# Patient Record
Sex: MALE | Race: WHITE | NOT HISPANIC OR LATINO | Employment: OTHER | URBAN - METROPOLITAN AREA
[De-identification: names, ages, dates, MRNs, and addresses within clinical notes are randomized per-mention and may not be internally consistent; named-entity substitution may affect disease eponyms.]

---

## 2018-06-08 ENCOUNTER — OFFICE VISIT CONVERTED (OUTPATIENT)
Dept: CARDIOLOGY | Facility: CLINIC | Age: 68
End: 2018-06-08
Attending: INTERNAL MEDICINE

## 2018-06-08 ENCOUNTER — CONVERSION ENCOUNTER (OUTPATIENT)
Dept: CARDIOLOGY | Facility: CLINIC | Age: 68
End: 2018-06-08

## 2018-11-07 ENCOUNTER — CONVERSION ENCOUNTER (OUTPATIENT)
Dept: OTHER | Facility: HOSPITAL | Age: 68
End: 2018-11-07

## 2018-11-07 ENCOUNTER — OFFICE VISIT CONVERTED (OUTPATIENT)
Dept: CARDIOLOGY | Facility: CLINIC | Age: 68
End: 2018-11-07
Attending: INTERNAL MEDICINE

## 2019-05-17 ENCOUNTER — OFFICE VISIT CONVERTED (OUTPATIENT)
Dept: CARDIOLOGY | Facility: CLINIC | Age: 69
End: 2019-05-17
Attending: INTERNAL MEDICINE

## 2019-05-17 ENCOUNTER — CONVERSION ENCOUNTER (OUTPATIENT)
Dept: CARDIOLOGY | Facility: CLINIC | Age: 69
End: 2019-05-17

## 2019-08-20 ENCOUNTER — HOSPITAL ENCOUNTER (OUTPATIENT)
Dept: OTHER | Facility: HOSPITAL | Age: 69
Discharge: HOME | End: 2019-08-20
Attending: INTERNAL MEDICINE

## 2019-08-20 LAB
ALBUMIN SERPL-MCNC: 4.2 G/DL (ref 3.5–5)
ALBUMIN/GLOB SERPL: 1.4 {RATIO} (ref 1.4–2.6)
ALP SERPL-CCNC: 95 U/L (ref 56–155)
ALT SERPL-CCNC: 15 U/L (ref 10–40)
ANION GAP SERPL CALC-SCNC: 21 MMOL/L (ref 8–19)
AST SERPL-CCNC: 16 U/L (ref 15–50)
BASOPHILS # BLD AUTO: 0.08 10*3/UL (ref 0–0.2)
BASOPHILS NFR BLD AUTO: 1 % (ref 0–3)
BILIRUB SERPL-MCNC: 0.48 MG/DL (ref 0.2–1.3)
BUN SERPL-MCNC: 19 MG/DL (ref 5–25)
BUN/CREAT SERPL: 22 {RATIO} (ref 6–20)
CALCIUM SERPL-MCNC: 8.9 MG/DL (ref 8.7–10.4)
CHLORIDE SERPL-SCNC: 103 MMOL/L (ref 99–111)
CHOLEST SERPL-MCNC: 188 MG/DL (ref 107–200)
CHOLEST/HDLC SERPL: 4.8 {RATIO} (ref 3–6)
CONV ABS IMM GRAN: 0.06 10*3/UL (ref 0–0.2)
CONV CO2: 25 MMOL/L (ref 22–32)
CONV IMMATURE GRAN: 0.8 % (ref 0–1.8)
CONV TOTAL PROTEIN: 7.3 G/DL (ref 6.3–8.2)
CREAT UR-MCNC: 0.86 MG/DL (ref 0.7–1.2)
DEPRECATED RDW RBC AUTO: 42.4 FL (ref 35.1–43.9)
EOSINOPHIL # BLD AUTO: 0.21 10*3/UL (ref 0–0.7)
EOSINOPHIL # BLD AUTO: 2.7 % (ref 0–7)
ERYTHROCYTE [DISTWIDTH] IN BLOOD BY AUTOMATED COUNT: 13.2 % (ref 11.6–14.4)
GFR SERPLBLD BASED ON 1.73 SQ M-ARVRAT: >60 ML/MIN/{1.73_M2}
GLOBULIN UR ELPH-MCNC: 3.1 G/DL (ref 2–3.5)
GLUCOSE SERPL-MCNC: 94 MG/DL (ref 70–99)
HCT VFR BLD AUTO: 44.9 % (ref 42–52)
HDLC SERPL-MCNC: 39 MG/DL (ref 40–60)
HGB BLD-MCNC: 15.2 G/DL (ref 14–18)
LDLC SERPL CALC-MCNC: 121 MG/DL (ref 70–100)
LYMPHOCYTES # BLD AUTO: 1.74 10*3/UL (ref 1–5)
LYMPHOCYTES NFR BLD AUTO: 22.1 % (ref 20–45)
MCH RBC QN AUTO: 29.6 PG (ref 27–31)
MCHC RBC AUTO-ENTMCNC: 33.9 G/DL (ref 33–37)
MCV RBC AUTO: 87.4 FL (ref 80–96)
MONOCYTES # BLD AUTO: 0.63 10*3/UL (ref 0.2–1.2)
MONOCYTES NFR BLD AUTO: 8 % (ref 3–10)
NEUTROPHILS # BLD AUTO: 5.16 10*3/UL (ref 2–8)
NEUTROPHILS NFR BLD AUTO: 65.4 % (ref 30–85)
NRBC CBCN: 0 % (ref 0–0.7)
OSMOLALITY SERPL CALC.SUM OF ELEC: 300 MOSM/KG (ref 273–304)
PLATELET # BLD AUTO: 177 10*3/UL (ref 130–400)
PMV BLD AUTO: 10.5 FL (ref 9.4–12.4)
POTASSIUM SERPL-SCNC: 4.6 MMOL/L (ref 3.5–5.3)
PSA SERPL-MCNC: 1.03 NG/ML (ref 0–4)
RBC # BLD AUTO: 5.14 10*6/UL (ref 4.7–6.1)
SODIUM SERPL-SCNC: 144 MMOL/L (ref 135–147)
TRIGL SERPL-MCNC: 138 MG/DL (ref 40–150)
TSH SERPL-ACNC: 1.46 M[IU]/L (ref 0.27–4.2)
VLDLC SERPL-MCNC: 28 MG/DL (ref 5–37)
WBC # BLD AUTO: 7.88 10*3/UL (ref 4.8–10.8)

## 2019-12-05 ENCOUNTER — OFFICE VISIT CONVERTED (OUTPATIENT)
Dept: CARDIOLOGY | Facility: CLINIC | Age: 69
End: 2019-12-05
Attending: INTERNAL MEDICINE

## 2020-06-10 ENCOUNTER — CONVERSION ENCOUNTER (OUTPATIENT)
Dept: OTHER | Facility: HOSPITAL | Age: 70
End: 2020-06-10

## 2020-06-10 ENCOUNTER — OFFICE VISIT CONVERTED (OUTPATIENT)
Dept: CARDIOLOGY | Facility: CLINIC | Age: 70
End: 2020-06-10
Attending: INTERNAL MEDICINE

## 2020-10-02 ENCOUNTER — HOSPITAL ENCOUNTER (OUTPATIENT)
Dept: OTHER | Facility: HOSPITAL | Age: 70
Discharge: HOME | End: 2020-10-02
Attending: INTERNAL MEDICINE

## 2020-10-02 LAB
ALBUMIN SERPL-MCNC: 4 G/DL (ref 3.5–5)
ALBUMIN/GLOB SERPL: 1.3 {RATIO} (ref 1.4–2.6)
ALP SERPL-CCNC: 76 U/L (ref 56–155)
ALT SERPL-CCNC: 18 U/L (ref 10–40)
ANION GAP SERPL CALC-SCNC: 17 MMOL/L (ref 8–19)
AST SERPL-CCNC: 17 U/L (ref 15–50)
BASOPHILS # BLD AUTO: 0.08 10*3/UL (ref 0–0.2)
BASOPHILS NFR BLD AUTO: 1 % (ref 0–3)
BILIRUB SERPL-MCNC: 0.3 MG/DL (ref 0.2–1.3)
BUN SERPL-MCNC: 21 MG/DL (ref 5–25)
BUN/CREAT SERPL: 25 {RATIO} (ref 6–20)
CALCIUM SERPL-MCNC: 9.1 MG/DL (ref 8.7–10.4)
CHLORIDE SERPL-SCNC: 102 MMOL/L (ref 99–111)
CHOLEST SERPL-MCNC: 206 MG/DL (ref 107–200)
CHOLEST/HDLC SERPL: 5.7 {RATIO} (ref 3–6)
CONV ABS IMM GRAN: 0.07 10*3/UL (ref 0–0.2)
CONV CO2: 23 MMOL/L (ref 22–32)
CONV IMMATURE GRAN: 0.8 % (ref 0–1.8)
CONV TOTAL PROTEIN: 7 G/DL (ref 6.3–8.2)
CREAT UR-MCNC: 0.83 MG/DL (ref 0.7–1.2)
DEPRECATED RDW RBC AUTO: 43.8 FL (ref 35.1–43.9)
EOSINOPHIL # BLD AUTO: 0.28 10*3/UL (ref 0–0.7)
EOSINOPHIL # BLD AUTO: 3.3 % (ref 0–7)
ERYTHROCYTE [DISTWIDTH] IN BLOOD BY AUTOMATED COUNT: 13.2 % (ref 11.6–14.4)
GFR SERPLBLD BASED ON 1.73 SQ M-ARVRAT: >60 ML/MIN/{1.73_M2}
GLOBULIN UR ELPH-MCNC: 3 G/DL (ref 2–3.5)
GLUCOSE SERPL-MCNC: 91 MG/DL (ref 70–99)
HCT VFR BLD AUTO: 44 % (ref 42–52)
HDLC SERPL-MCNC: 36 MG/DL (ref 40–60)
HGB BLD-MCNC: 14.4 G/DL (ref 14–18)
LDLC SERPL CALC-MCNC: 136 MG/DL (ref 70–100)
LYMPHOCYTES # BLD AUTO: 2.23 10*3/UL (ref 1–5)
LYMPHOCYTES NFR BLD AUTO: 26.5 % (ref 20–45)
MCH RBC QN AUTO: 29.5 PG (ref 27–31)
MCHC RBC AUTO-ENTMCNC: 32.7 G/DL (ref 33–37)
MCV RBC AUTO: 90.2 FL (ref 80–96)
MONOCYTES # BLD AUTO: 0.78 10*3/UL (ref 0.2–1.2)
MONOCYTES NFR BLD AUTO: 9.3 % (ref 3–10)
NEUTROPHILS # BLD AUTO: 4.97 10*3/UL (ref 2–8)
NEUTROPHILS NFR BLD AUTO: 59.1 % (ref 30–85)
NRBC CBCN: 0 % (ref 0–0.7)
OSMOLALITY SERPL CALC.SUM OF ELEC: 289 MOSM/KG (ref 273–304)
PLATELET # BLD AUTO: 186 10*3/UL (ref 130–400)
PMV BLD AUTO: 10.6 FL (ref 9.4–12.4)
POTASSIUM SERPL-SCNC: 4.4 MMOL/L (ref 3.5–5.3)
PSA SERPL-MCNC: 1.04 NG/ML (ref 0–4)
RBC # BLD AUTO: 4.88 10*6/UL (ref 4.7–6.1)
SODIUM SERPL-SCNC: 138 MMOL/L (ref 135–147)
TRIGL SERPL-MCNC: 168 MG/DL (ref 40–150)
TSH SERPL-ACNC: 1.24 M[IU]/L (ref 0.27–4.2)
VLDLC SERPL-MCNC: 34 MG/DL (ref 5–37)
WBC # BLD AUTO: 8.41 10*3/UL (ref 4.8–10.8)

## 2020-12-16 ENCOUNTER — OFFICE VISIT CONVERTED (OUTPATIENT)
Dept: CARDIOLOGY | Facility: CLINIC | Age: 70
End: 2020-12-16
Attending: INTERNAL MEDICINE

## 2021-05-13 NOTE — PROGRESS NOTES
Progress Note      Patient Name: Ansuhl Shook Jr.   Patient ID: [id number]   Sex: Male   YOB: 1950    Primary Care Provider: Won Mcneill MD   Referring Provider: Renzo Hobbs MD    Visit Date: Jenn 10, 2020    Provider: Renzo Hobbs MD   Location: [facility]   Location Address: [address] [place] [address], [facility]   [place], [place]  [id number]   Location Phone: [phone]          Chief Complaint     CHF.       History Of Present Illness  REFERRING CARE PROVIDER: Renzo Hobbs MD   Anshul Shook Jr. is a 70 year old /White male with a history of nonischemic cardiomyopathy and previous traumatic brain injury who has been doing very well. He has not had any worsening shortness of breath, edema, or weight gain.   PAST MEDICAL HISTORY: Hyperlipidemia; Hypertension; Nonischemic cardiomyopathy; Traumatic brain injury.   FAMILY HISTORY: Positive for diabetes mellitus. Negative for hypertension or heart disease.   PSYCHOSOCIAL HISTORY: Denies alcohol or tobacco use. Admits mood changes and depression.   CURRENT MEDICATIONS: Lyrica 150 mg b.i.d.; carvedilol 12.5 mg b.i.d.; lisinopril 10 mg daily; Effexor  mg daily; hyoscyamine 0.375 mg b.i.d.; donepezil 10 mg daily; aspirin 81 mg daily. Dosage and frequency of the medications reviewed with the patient.       Review of Systems  · Cardiovascular  o Denies  o : palpitations (fast, fluttering, or skipping beats), swelling (feet, ankles, hands), shortness of breath while walking or lying flat, chest pain or angina pectoris   · Respiratory  o Denies  o : chronic or frequent cough, asthma or wheezing      Vitals  Date Time BP Position Site L\R Cuff Size HR RR TEMP (F) WT  HT  BMI kg/m2 BSA m2 O2 Sat HC       06/10/2020 12:17 /66 Sitting    84 - R   248lbs 0oz 6'   33.63 2.39           Physical Examination  · Constitutional  o Appearance  o : Awake, alert, in no acute distress.   · Eyes  o Conjunctivae  o : Normal.  · Ears, Nose,  Mouth and Throat  o Oral Cavity  o :   § Oral Mucosa  § : Normal.  · Neck  o Inspection/Palpation  o : No JVD. Good carotid upstroke. No thyromegaly.  · Respiratory  o Respiratory  o : Good respiratory effort. Clear to percussion and auscultation.  · Cardiovascular  o Heart  o :   § Auscultation of Heart  § : S1, S2 normal. Regular rate and rhythm without murmurs, gallops, or rubs.  o Peripheral Vascular System  o :   § Extremities  § : Mild bilateral lower extremity edema.   · Gastrointestinal  o Abdominal Examination  o : Soft. No tenderness or masses felt. No hepatosplenomegaly. Abdominal aorta is not palpable.  · Device Interrogation  o Device Interrogation  o : Interrogation of his ICD today was normal. Right atrial lead paced 1% of the time and working normally. Right ventricular paced 99% of the time and working normally. Left ventricular lead paced 99% of the time and working normally. No episodes were recorded. No changes made to his device.          Assessment     ASSESSMENT & PLAN:    Nonischemic cardiomyopathy with stable symptoms.  Continue with his current meds.  Repeat interrogation in 6 months.             Electronically Signed by: Ashlee Izaguirre-, Other -Author on June 16, 2020 09:18:58 AM  Electronically Co-signed by: Renzo Hobbs MD -Reviewer on June 17, 2020 05:41:26 PM

## 2021-05-14 VITALS
SYSTOLIC BLOOD PRESSURE: 112 MMHG | WEIGHT: 248 LBS | HEIGHT: 72 IN | BODY MASS INDEX: 33.59 KG/M2 | HEART RATE: 80 BPM | DIASTOLIC BLOOD PRESSURE: 68 MMHG

## 2021-05-14 NOTE — PROGRESS NOTES
Progress Note      Patient Name: Anshul Shook Jr.   Patient ID: [id number]   Sex: Male   YOB: 1950    Primary Care Provider: Won Mcneill MD   Referring Provider: Renzo Hobbs MD    Visit Date: December 16, 2020    Provider: Renzo Hobbs MD   Location: [facility]   Location Address: [address] [place] [address], [address]   [place], [place]  [id number]   Location Phone: [phone]          Chief Complaint     Congestive heart failure.       History Of Present Illness  Anshul Shook Jr. is a 70 year old /White male with nonischemic cardiomyopathy and previous traumatic brain injury with paraplegia who has had no complaints or problems and is doing very well symptomatically.   PAST MEDICAL HISTORY: Hyperlipidemia; Hypertension; Nonischemic cardiomyopathy; Traumatic brain injury.   PSYCHOSOCIAL HISTORY: Denies tobacco use. Denies alcohol use.   CURRENT MEDICATIONS: Effexor  mg daily; lisinopril 10 mg daily; hyoscyamine 0.375 mg b.i.d.; carvedilol 12.5 mg b.i.d.; Lyrica 150 mg b.i.d.      ALLERGIES:   Bee stings; Zosyn.       Review of Systems  · Cardiovascular  o Denies  o : palpitations (fast, fluttering, or skipping beats), swelling (feet, ankles, hands), shortness of breath while walking or lying flat, chest pain or angina pectoris   · Respiratory  o Denies  o : chronic or frequent cough      Vitals  Date Time BP Position Site L\R Cuff Size HR RR TEMP (F) WT  HT  BMI kg/m2 BSA m2 O2 Sat FR L/min FiO2 HC       12/16/2020 11:23 /68 Sitting    80 - R   248lbs 0oz 6'   33.63 2.39             Physical Examination  · Constitutional  o Appearance  o : Awake, alert, in no acute distress.   · Eyes  o Conjunctivae  o : Normal.  · Ears, Nose, Mouth and Throat  o Oral Cavity  o :   § Oral Mucosa  § : Normal.  · Neck  o Inspection/Palpation  o : No JVD. Good carotid upstroke. No thyromegaly.  · Respiratory  o Respiratory  o : Good respiratory effort. Clear to percussion and  auscultation.  · Cardiovascular  o Heart  o :   § Auscultation of Heart  § : S1, S2 normal. Regular rate and rhythm without murmurs, gallops, or rubs.  o Peripheral Vascular System  o :   § Extremities  § : Mild bilateral lower extremity edema. Good femoral and pedal pulses.   · Gastrointestinal  o Abdominal Examination  o : Soft. No tenderness or masses felt. No hepatosplenomegaly. Abdominal aorta is not palpable.  · Device Interrogation  o Device Interrogation  o : His interrogation of his bi-V ICD showed a normal functioning right atrial lead pacing 1% of the time. Right ventricular lead was functioning normally and pacing 99% of the time. Left ventricular lead paced 99% of the time and working normally. No episodes recorded. No changes made to the device.          Assessment     ASSESSMENT & PLAN:     1.  Nonischemic cardiomyopathy, stable symptoms.  Continue with current meds.  2.  Bi-V ICD working properly.  Paced 99% of the time.  Repeat interrogation on next visit.             Electronically Signed by: Ashlee Izaguirre-, Other -Author on December 23, 2020 10:01:52 AM  Electronically Co-signed by: Renzo Hobbs MD -Reviewer on January 4, 2021 01:00:58 PM

## 2021-05-15 VITALS
HEIGHT: 60 IN | HEART RATE: 86 BPM | HEIGHT: 72 IN | DIASTOLIC BLOOD PRESSURE: 60 MMHG | SYSTOLIC BLOOD PRESSURE: 110 MMHG | BODY MASS INDEX: 32.37 KG/M2 | DIASTOLIC BLOOD PRESSURE: 74 MMHG | HEART RATE: 70 BPM | SYSTOLIC BLOOD PRESSURE: 134 MMHG | BODY MASS INDEX: 35.73 KG/M2 | WEIGHT: 182 LBS | WEIGHT: 239 LBS

## 2021-05-15 VITALS
WEIGHT: 248 LBS | HEIGHT: 72 IN | BODY MASS INDEX: 33.59 KG/M2 | SYSTOLIC BLOOD PRESSURE: 130 MMHG | HEART RATE: 84 BPM | DIASTOLIC BLOOD PRESSURE: 66 MMHG

## 2021-05-15 VITALS
HEIGHT: 72 IN | DIASTOLIC BLOOD PRESSURE: 66 MMHG | SYSTOLIC BLOOD PRESSURE: 116 MMHG | HEART RATE: 80 BPM | BODY MASS INDEX: 32.51 KG/M2 | WEIGHT: 240 LBS

## 2021-05-16 VITALS
HEART RATE: 68 BPM | BODY MASS INDEX: 32.51 KG/M2 | HEIGHT: 72 IN | DIASTOLIC BLOOD PRESSURE: 66 MMHG | WEIGHT: 240 LBS | SYSTOLIC BLOOD PRESSURE: 110 MMHG

## 2021-05-16 VITALS
HEIGHT: 72 IN | WEIGHT: 243 LBS | HEART RATE: 72 BPM | SYSTOLIC BLOOD PRESSURE: 114 MMHG | DIASTOLIC BLOOD PRESSURE: 72 MMHG | BODY MASS INDEX: 32.91 KG/M2

## 2021-08-11 PROBLEM — I42.8 NICM (NONISCHEMIC CARDIOMYOPATHY): Status: ACTIVE | Noted: 2021-08-11

## 2021-08-11 PROBLEM — I10 ESSENTIAL HYPERTENSION: Status: ACTIVE | Noted: 2021-08-11

## 2021-08-11 NOTE — PROGRESS NOTES
"Chief Complaint  Hypertension    Subjective    Patient is doing well stable shortness of breath no change in edema    Past Medical History:   Diagnosis Date   • Essential hypertension 8/11/2021   • Nonischemic cardiomyopathy 8/11/2021   • Presence of biventricular implantable cardioverter-defibrillator (ICD) 8/12/2021    [employer] BiV ICD         Current Outpatient Medications:   •  carvedilol (COREG) 12.5 MG tablet, , Disp: , Rfl:   •  Effexor  MG 24 hr capsule, , Disp: , Rfl:   •  Hyoscyamine Sulfate ER 0.375 MG tablet controlled-release, Take  by mouth., Disp: , Rfl:   •  lisinopril (PRINIVIL,ZESTRIL) 10 MG tablet, , Disp: , Rfl:   •  Lyrica 150 MG capsule, , Disp: , Rfl:     There are no discontinued medications.  Allergies   Allergen Reactions   • Penicillins Rash        Social History     Tobacco Use   • Smoking status: Never Smoker   • Smokeless tobacco: Never Used   Vaping Use   • Vaping Use: Never used   Substance Use Topics   • Alcohol use: Never   • Drug use: Never       History reviewed. No pertinent family history.     Objective     BP 99/66 (BP Location: Left arm, Patient Position: Sitting)   Pulse 77   Ht 188 cm (74\")   Wt 114 kg (251 lb)   BMI 32.23 kg/m²       Physical Exam    General Appearance:   · no acute distress  · Alert and oriented x3  HENT:   · lips not cyanotic  · Atraumatic  Neck:  · No jvd   · supple  Respiratory:  · no respiratory distress  · normal breath sounds  · no rales  Cardiovascular:  · Regular rate and rhythm  · no S3, no S4   · no murmur  · no rub  Extremities  · No cyanosis  · lower extremity edema: +1 bilateral  Skin:   · warm, dry  · No rashes      Result Review :     No results found for: PROBNP  CMP    CMP 10/2/20   Glucose 91   BUN 21   Creatinine 0.83   Sodium 138   Potassium 4.4   Chloride 102   Calcium 9.1   Albumin 4.0   Total Bilirubin 0.30   Alkaline Phosphatase 76   AST (SGOT) 17   ALT (SGPT) 18           CBC w/diff    CBC w/Diff 10/2/20   WBC 8.41 "   RBC 4.88   Hemoglobin 14.4   Hematocrit 44.0   MCV 90.2   MCH 29.5   MCHC 32.7 (A)   RDW 13.2   Platelets 186   Neutrophil Rel % 59.1   Lymphocyte Rel % 26.5   Monocyte Rel % 9.3   Eosinophil Rel % 3.3   Basophil Rel % 1.0   (A) Abnormal value             Lab Results   Component Value Date    TSH 1.240 10/02/2020      No results found for: FREET4   No results found for: DDIMERQUANT  No results found for: MG   No results found for: DIGOXIN   No results found for: TROPONINT        Lipid Panel    Lipid Panel 10/2/20   Total Cholesterol 206 (A)   Triglycerides 168 (A)   HDL Cholesterol 36 (A)   VLDL Cholesterol 34   LDL Cholesterol  136 (A)   (A) Abnormal value       Comments are available for some flowsheets but are not being displayed.           No results found for: POCTROP    V ICD working properly paced 99% in the right ventricle and 99% left ventricle              Diagnoses and all orders for this visit:    1. Nonischemic cardiomyopathy (Primary)  Assessment & Plan:  Patient was stable volume status continue with lisinopril 10 and Coreg 25 twice daily dosing      2. Essential hypertension  Assessment & Plan:  Controlled blood pressure systolically on the lower side but is ideal for his CHF and no overt symptoms we will continue with current dosing            Follow Up     Return in about 6 months (around 2/12/2022).          Patient was given instructions and counseling regarding his condition or for health maintenance advice. Please see specific information pulled into the AVS if appropriate.

## 2021-08-12 ENCOUNTER — CLINICAL SUPPORT NO REQUIREMENTS (OUTPATIENT)
Dept: CARDIOLOGY | Facility: CLINIC | Age: 71
End: 2021-08-12

## 2021-08-12 ENCOUNTER — OFFICE VISIT (OUTPATIENT)
Dept: CARDIOLOGY | Facility: CLINIC | Age: 71
End: 2021-08-12

## 2021-08-12 VITALS
HEIGHT: 74 IN | HEART RATE: 77 BPM | SYSTOLIC BLOOD PRESSURE: 99 MMHG | BODY MASS INDEX: 32.21 KG/M2 | WEIGHT: 251 LBS | DIASTOLIC BLOOD PRESSURE: 66 MMHG

## 2021-08-12 DIAGNOSIS — Z95.810 PRESENCE OF BIVENTRICULAR IMPLANTABLE CARDIOVERTER-DEFIBRILLATOR (ICD): ICD-10-CM

## 2021-08-12 DIAGNOSIS — I42.9 CARDIOMYOPATHY, UNSPECIFIED TYPE (HCC): Primary | ICD-10-CM

## 2021-08-12 DIAGNOSIS — I42.8 NICM (NONISCHEMIC CARDIOMYOPATHY) (HCC): Primary | ICD-10-CM

## 2021-08-12 DIAGNOSIS — I10 ESSENTIAL HYPERTENSION: ICD-10-CM

## 2021-08-12 PROCEDURE — 93284 PRGRMG EVAL IMPLANTABLE DFB: CPT | Performed by: INTERNAL MEDICINE

## 2021-08-12 PROCEDURE — 99214 OFFICE O/P EST MOD 30 MIN: CPT | Performed by: INTERNAL MEDICINE

## 2021-08-12 RX ORDER — LISINOPRIL 10 MG/1
TABLET ORAL DAILY
Status: ON HOLD | COMMUNITY
Start: 2021-08-03 | End: 2022-09-28

## 2021-08-12 RX ORDER — CARVEDILOL 12.5 MG/1
TABLET ORAL 2 TIMES DAILY WITH MEALS
COMMUNITY
Start: 2021-08-03

## 2021-08-12 NOTE — PROGRESS NOTES
Normal BI-V ICD device interrogation.  Normal evaluation of device function and lead measurements.  No optimization was needed of parameters or maximization of device longevity.  Patient is on automated Home Remote Monitoring.  Remaining battery is 1.9 years.

## 2021-08-12 NOTE — PATIENT INSTRUCTIONS
Cardiomyopathy, Adult    Cardiomyopathy is a long-term (chronic) disease of the heart muscle. The disease makes the heart muscle thick, weak, or stiff. As a result, the heart works harder to pump blood. Over time, cardiomyopathy can lead to an irregular heartbeat (arrhythmia) and a condition in which the heart has trouble pumping blood (heart failure).  There are several types of cardiomyopathy. The kind of cardiomyopathy that you have depends on what part of the heart is affected and how it is affected.  What are the causes?  This condition may be caused by:  · A medical condition that damages the heart, such as diabetes, high blood pressure, or heart attack.  · Diseases of the immune system, connective tissues, or endocrine system.  · Alcohol abuse, use of illegal drugs, and taking certain medicines.  · Pregnancy.  · Too much iron in the body (hemochromatosis).  · Cancer treatments.  · Protein buildup in the organs or inflammation in the organs.  In some cases, the cause is not known.  What increases the risk?  You are more likely to develop this condition if:  · You have a gene that is passed down (inherited) from a family member.  · You are overweight or obese.  What are the signs or symptoms?  Often, people with this condition have no symptoms. If you do have symptoms, this may include:  · Shortness of breath, especially during activity.  · Fatigue.  · An arrhythmia.  · Feeling dizzy or light-headed, or fainting.  · Chest pain.  · Coughing.  · Swelling of the lower legs, feet, abdomen, or neck veins.  How is this diagnosed?  This condition is diagnosed based on:  · Your symptoms and medical history.  · A physical exam.  · Blood tests and imaging studies, such as X-ray, echocardiogram, or MRI.  · Other tests, such as:  ? An electrocardiogram (ECG).  ? A portable heart monitor that records your heart's electrical activity (event monitor).  ? A stress test.  ? Cardiac catheterization and coronary  angiogram.  ? Heart tissue biopsy.  ? An MRI of the heart.  How is this treated?  Your treatment depends on the type and severity of your symptoms. If you do not have symptoms, you may not need treatment. Treatment may include:  · General healthy lifestyle changes.  · Medicines to:  ? Treat high blood pressure, abnormal heart rate, or inflammation.  ? Clear excess fluids from your body.  ? Prevent blood clots.  ? Balance minerals (electrolytes) in your body and get rid of extra sodium in your body.  ? Strengthen your heartbeat.  · Surgery to:  ? Repair a heart defect, remove heart tissue, or destroy tissues in the area of abnormal electrical activity (ablation).  ? Implant a device to treat serious heart rhythm problems or to restore the heart's ability to pump blood, such as a pacemaker or a defibrillator.  ? Replace your heart with a healthy heart. This is done if all other treatments have failed.  Other treatment may include cardiac resynchronization therapy (CRT). This restores the heart's normal beating pattern.  Follow these instructions at home:  Medicines  · Take over-the-counter and prescription medicines only as told by your health care provider. Some medicines can be dangerous for your heart.  · If you are prescribed a blood thinner, make sure you understand what to do in a bleeding situation.  · Tell all health care providers, including your dentist, that you have cardiomyopathy. Ask your health care provider if you need antibiotics before having dental care or before surgery.  Lifestyle    · Eat a heart-healthy diet that includes plenty of fruits, vegetables, whole grains, and foods that are low in salt (sodium).  · Maintain a healthy weight.  · Stay physically active. Ask your health care provider what activities are safe for you.  · Do not use any products that contain nicotine or tobacco, such as cigarettes, e-cigarettes, and chewing tobacco. If you need help quitting, ask your health care  provider.  · Try to get at least 7 hours of sleep each night.  · Find healthy ways to manage stress.  Alcohol use  · Do not drink alcohol if:  ? Your health care provider tells you not to drink.  ? You are pregnant, may be pregnant, or are planning to become pregnant.  If you drink alcohol:  · Limit how much you use to:  ? 0-1 drink a day for women.  ? 0-2 drinks a day for men.  · Be aware of how much alcohol is in your drink. In the [place] one drink equals one 12 oz bottle of beer (355 mL), one 5 oz glass of wine (148 mL), or one 1½ oz glass of hard liquor (44 mL).  General instructions  · Ask your health care provider if you should wear a medical identification bracelet. This may be important if you have a pacemaker or a defibrillator.  · Get all needed vaccines. Get a flu shot every year.  · Work closely with your health care provider to manage any other chronic conditions.  · If you plan to start a family, talk with a genetic counselor to discuss the risk of having a child with cardiomyopathy.  · Keep all follow-up visits as told by your health care provider. This is important, even if you do not have any symptoms. Your health care provider may need to make sure your condition is not getting worse.  Where to find more information  · American Heart Association: www.heart.org  Contact a health care provider if:  · Your symptoms get worse.  · You have new symptoms.  Get help right away if you:  · Have severe chest pain.  · Have shortness of breath.  · Cough up a pink, bubbly substance.  · Feel nauseous and you vomit.  · Suddenly become light-headed or dizzy.  · Feel your heart beating very quickly.  · Feel like your heart is skipping beats.  These symptoms may represent a serious problem that is an emergency. Do not wait to see if the symptoms will go away. Get medical help right away. Call your local emergency services (911 in the [place] Do not drive yourself to the hospital.  Summary  · Cardiomyopathy is a  "long-term (chronic) disease of the heart muscle.  · Over time, cardiomyopathy can lead to an irregular heartbeat (arrhythmia) and heart failure.  · A number of treatments are available for this condition. Your treatment will depend on the type and severity of your symptoms.  · Follow your health care provider's instructions on diet, medicines, physical activity, and when to seek help.  This information is not intended to replace advice given to you by your health care provider. Make sure you discuss any questions you have with your health care provider.  Document Revised: 09/23/2020 Document Reviewed: 09/23/2020  [employer] Patient Education © 2021 [employer]      Low-Sodium Eating Plan  Sodium, which is an element that makes up salt, helps you maintain a healthy balance of fluids in your body. Too much sodium can increase your blood pressure and cause fluid and waste to be held in your body.  Your health care provider or dietitian may recommend following this plan if you have high blood pressure (hypertension), kidney disease, liver disease, or heart failure. Eating less sodium can help lower your blood pressure, reduce swelling, and protect your heart, liver, and kidneys.  What are tips for following this plan?  Reading food labels  · The Nutrition Facts label lists the amount of sodium in one serving of the food. If you eat more than one serving, you must multiply the listed amount of sodium by the number of servings.  · Choose foods with less than 140 mg of sodium per serving.  · Avoid foods with 300 mg of sodium or more per serving.  Shopping    · Look for lower-sodium products, often labeled as \"low-sodium\" or \"no salt added.\"  · Always check the sodium content, even if foods are labeled as \"unsalted\" or \"no salt added.\"  · Buy fresh foods.  ? Avoid canned foods and pre-made or frozen meals.  ? Avoid canned, cured, or processed meats.  · Buy breads that have less than 80 mg of sodium per " "slice.  Cooking    · Eat more home-cooked food and less restaurant, buffet, and fast food.  · Avoid adding salt when cooking. Use salt-free seasonings or herbs instead of table salt or sea salt. Check with your health care provider or pharmacist before using salt substitutes.  · Cook with plant-based oils, such as canola, sunflower, or olive oil.  Meal planning  · When eating at a restaurant, ask that your food be prepared with less salt or no salt, if possible. Avoid dishes labeled as brined, pickled, cured, smoked, or made with soy sauce, miso, or teriyaki sauce.  · Avoid foods that contain MSG (monosodium glutamate). MSG is sometimes added to Chinese food, bouillon, and some canned foods.  · Make meals that can be grilled, baked, poached, roasted, or steamed. These are generally made with less sodium.  General information  Most people on this plan should limit their sodium intake to 1,500-2,000 mg (milligrams) of sodium each day.  What foods should I eat?  Fruits  Fresh, frozen, or canned fruit. Fruit juice.  Vegetables  Fresh or frozen vegetables. \"No salt added\" canned vegetables. \"No salt added\" tomato sauce and paste. Low-sodium or reduced-sodium tomato and vegetable juice.  Grains  Low-sodium cereals, including oats, puffed wheat and rice, and shredded wheat. Low-sodium crackers. Unsalted rice. Unsalted pasta. Low-sodium bread. Whole-grain breads and whole-grain pasta.  Meats and other proteins  Fresh or frozen (no salt added) meat, poultry, seafood, and fish. Low-sodium canned tuna and salmon. Unsalted nuts. Dried peas, beans, and lentils without added salt. Unsalted canned beans. Eggs. Unsalted nut butters.  Dairy  Milk. Soy milk. Cheese that is naturally low in sodium, such as ricotta cheese, fresh mozzarella, or Swiss cheese. Low-sodium or reduced-sodium cheese. Cream cheese. Yogurt.  Seasonings and condiments  Fresh and dried herbs and spices. Salt-free seasonings. Low-sodium mustard and ketchup. " Sodium-free salad dressing. Sodium-free light mayonnaise. Fresh or refrigerated horseradish. Lemon juice. Vinegar.  Other foods  Homemade, reduced-sodium, or low-sodium soups. Unsalted popcorn and pretzels. Low-salt or salt-free chips.  The items listed above may not be a complete list of foods and beverages you can eat. Contact a dietitian for more information.  What foods should I avoid?  Vegetables  Sauerkraut, pickled vegetables, and relishes. Olives. French fries. Onion rings. Regular canned vegetables (not low-sodium or reduced-sodium). Regular canned tomato sauce and paste (not low-sodium or reduced-sodium). Regular tomato and vegetable juice (not low-sodium or reduced-sodium). Frozen vegetables in sauces.  Grains  Instant hot cereals. Bread stuffing, pancake, and biscuit mixes. Croutons. Seasoned rice or pasta mixes. Noodle soup cups. Boxed or frozen macaroni and cheese. Regular salted crackers. Self-rising flour.  Meats and other proteins  Meat or fish that is salted, canned, smoked, spiced, or pickled. Precooked or cured meat, such as sausages or meat loaves. Summers. Ham. Pepperoni. Hot dogs. Corned beef. Chipped beef. Salt pork. Jerky. Pickled herring. Anchovies and sardines. Regular canned tuna. Salted nuts.  Dairy  Processed cheese and cheese spreads. Hard cheeses. Cheese curds. Blue cheese. Feta cheese. String cheese. Regular cottage cheese. Buttermilk. Canned milk.  Fats and oils  Salted butter. Regular margarine. Ghee. Summers fat.  Seasonings and condiments  Onion salt, garlic salt, seasoned salt, table salt, and sea salt. Canned and packaged gravies. Worcestershire sauce. Tartar sauce. Barbecue sauce. Teriyaki sauce. Soy sauce, including reduced-sodium. Steak sauce. Fish sauce. Oyster sauce. Cocktail sauce. Horseradish that you find on the shelf. Regular ketchup and mustard. Meat flavorings and tenderizers. Bouillon cubes. Hot sauce. Pre-made or packaged marinades. Pre-made or packaged taco  seasonings. Relishes. Regular salad dressings. Salsa.  Other foods  Salted popcorn and pretzels. Corn chips and puffs. Potato and tortilla chips. Canned or dried soups. Pizza. Frozen entrees and pot pies.  The items listed above may not be a complete list of foods and beverages you should avoid. Contact a dietitian for more information.  Summary  · Eating less sodium can help lower your blood pressure, reduce swelling, and protect your heart, liver, and kidneys.  · Most people on this plan should limit their sodium intake to 1,500-2,000 mg (milligrams) of sodium each day.  · Canned, boxed, and frozen foods are high in sodium. Restaurant foods, fast foods, and pizza are also very high in sodium. You also get sodium by adding salt to food.  · Try to cook at home, eat more fresh fruits and vegetables, and eat less fast food and canned, processed, or prepared foods.  This information is not intended to replace advice given to you by your health care provider. Make sure you discuss any questions you have with your health care provider.  Document Revised: 01/22/2021 Document Reviewed: 11/18/2020  [employer] Patient Education © 2021 [employer]

## 2021-08-12 NOTE — ASSESSMENT & PLAN NOTE
Controlled blood pressure systolically on the lower side but is ideal for his CHF and no overt symptoms we will continue with current dosing

## 2021-10-06 ENCOUNTER — TRANSCRIBE ORDERS (OUTPATIENT)
Dept: LAB | Facility: HOSPITAL | Age: 71
End: 2021-10-06

## 2021-10-06 ENCOUNTER — LAB (OUTPATIENT)
Dept: LAB | Facility: HOSPITAL | Age: 71
End: 2021-10-06

## 2021-10-06 DIAGNOSIS — I10 ESSENTIAL HYPERTENSION: ICD-10-CM

## 2021-10-06 DIAGNOSIS — Z12.5 SCREENING FOR PROSTATE CANCER: ICD-10-CM

## 2021-10-06 DIAGNOSIS — E78.5 HYPERLIPIDEMIA, UNSPECIFIED HYPERLIPIDEMIA TYPE: Primary | ICD-10-CM

## 2021-10-06 DIAGNOSIS — E78.5 HYPERLIPIDEMIA, UNSPECIFIED HYPERLIPIDEMIA TYPE: ICD-10-CM

## 2021-10-06 LAB
ALBUMIN SERPL-MCNC: 4.1 G/DL (ref 3.5–5.2)
ALBUMIN/GLOB SERPL: 1.5 G/DL
ALP SERPL-CCNC: 65 U/L (ref 39–117)
ALT SERPL W P-5'-P-CCNC: 12 U/L (ref 1–41)
ANION GAP SERPL CALCULATED.3IONS-SCNC: 5.7 MMOL/L (ref 5–15)
AST SERPL-CCNC: 16 U/L (ref 1–40)
BASOPHILS # BLD AUTO: 0.08 10*3/MM3 (ref 0–0.2)
BASOPHILS NFR BLD AUTO: 0.9 % (ref 0–1.5)
BILIRUB SERPL-MCNC: 0.3 MG/DL (ref 0–1.2)
BUN SERPL-MCNC: 20 MG/DL (ref 8–23)
BUN/CREAT SERPL: 21.7 (ref 7–25)
CALCIUM SPEC-SCNC: 9.1 MG/DL (ref 8.6–10.5)
CHLORIDE SERPL-SCNC: 105 MMOL/L (ref 98–107)
CHOLEST SERPL-MCNC: 200 MG/DL (ref 0–200)
CO2 SERPL-SCNC: 27.3 MMOL/L (ref 22–29)
CREAT SERPL-MCNC: 0.92 MG/DL (ref 0.76–1.27)
DEPRECATED RDW RBC AUTO: 41.3 FL (ref 37–54)
EOSINOPHIL # BLD AUTO: 0.37 10*3/MM3 (ref 0–0.4)
EOSINOPHIL NFR BLD AUTO: 4.3 % (ref 0.3–6.2)
ERYTHROCYTE [DISTWIDTH] IN BLOOD BY AUTOMATED COUNT: 13.2 % (ref 12.3–15.4)
GFR SERPL CREATININE-BSD FRML MDRD: 81 ML/MIN/1.73
GLOBULIN UR ELPH-MCNC: 2.8 GM/DL
GLUCOSE SERPL-MCNC: 92 MG/DL (ref 65–99)
HCT VFR BLD AUTO: 42.2 % (ref 37.5–51)
HDLC SERPL-MCNC: 32 MG/DL (ref 40–60)
HGB BLD-MCNC: 14.1 G/DL (ref 13–17.7)
IMM GRANULOCYTES # BLD AUTO: 0.06 10*3/MM3 (ref 0–0.05)
IMM GRANULOCYTES NFR BLD AUTO: 0.7 % (ref 0–0.5)
LDLC SERPL CALC-MCNC: 144 MG/DL (ref 0–100)
LDLC/HDLC SERPL: 4.43 {RATIO}
LYMPHOCYTES # BLD AUTO: 1.88 10*3/MM3 (ref 0.7–3.1)
LYMPHOCYTES NFR BLD AUTO: 21.6 % (ref 19.6–45.3)
MCH RBC QN AUTO: 29 PG (ref 26.6–33)
MCHC RBC AUTO-ENTMCNC: 33.4 G/DL (ref 31.5–35.7)
MCV RBC AUTO: 86.7 FL (ref 79–97)
MONOCYTES # BLD AUTO: 0.8 10*3/MM3 (ref 0.1–0.9)
MONOCYTES NFR BLD AUTO: 9.2 % (ref 5–12)
NEUTROPHILS NFR BLD AUTO: 5.5 10*3/MM3 (ref 1.7–7)
NEUTROPHILS NFR BLD AUTO: 63.3 % (ref 42.7–76)
NRBC BLD AUTO-RTO: 0 /100 WBC (ref 0–0.2)
PLATELET # BLD AUTO: 181 10*3/MM3 (ref 140–450)
PMV BLD AUTO: 11 FL (ref 6–12)
POTASSIUM SERPL-SCNC: 4.5 MMOL/L (ref 3.5–5.2)
PROT SERPL-MCNC: 6.9 G/DL (ref 6–8.5)
PSA SERPL-MCNC: 1.5 NG/ML (ref 0–4)
RBC # BLD AUTO: 4.87 10*6/MM3 (ref 4.14–5.8)
SODIUM SERPL-SCNC: 138 MMOL/L (ref 136–145)
TRIGL SERPL-MCNC: 131 MG/DL (ref 0–150)
TSH SERPL DL<=0.05 MIU/L-ACNC: 1.13 UIU/ML (ref 0.27–4.2)
VLDLC SERPL-MCNC: 24 MG/DL (ref 5–40)
WBC # BLD AUTO: 8.69 10*3/MM3 (ref 3.4–10.8)

## 2021-10-06 PROCEDURE — G0103 PSA SCREENING: HCPCS

## 2021-10-06 PROCEDURE — 84443 ASSAY THYROID STIM HORMONE: CPT

## 2021-10-06 PROCEDURE — 36415 COLL VENOUS BLD VENIPUNCTURE: CPT

## 2021-10-06 PROCEDURE — 80061 LIPID PANEL: CPT

## 2021-10-06 PROCEDURE — 85025 COMPLETE CBC W/AUTO DIFF WBC: CPT

## 2021-10-06 PROCEDURE — 80053 COMPREHEN METABOLIC PANEL: CPT

## 2022-02-16 ENCOUNTER — OFFICE VISIT (OUTPATIENT)
Dept: CARDIOLOGY | Facility: CLINIC | Age: 72
End: 2022-02-16

## 2022-02-16 VITALS
DIASTOLIC BLOOD PRESSURE: 77 MMHG | BODY MASS INDEX: 32.51 KG/M2 | HEIGHT: 72 IN | WEIGHT: 240 LBS | SYSTOLIC BLOOD PRESSURE: 109 MMHG | HEART RATE: 77 BPM

## 2022-02-16 DIAGNOSIS — I10 ESSENTIAL HYPERTENSION: ICD-10-CM

## 2022-02-16 DIAGNOSIS — I42.8 NICM (NONISCHEMIC CARDIOMYOPATHY): ICD-10-CM

## 2022-02-16 DIAGNOSIS — Z95.810 PRESENCE OF BIVENTRICULAR IMPLANTABLE CARDIOVERTER-DEFIBRILLATOR (ICD): Primary | ICD-10-CM

## 2022-02-16 PROCEDURE — 99214 OFFICE O/P EST MOD 30 MIN: CPT | Performed by: INTERNAL MEDICINE

## 2022-02-16 RX ORDER — DONEPEZIL HYDROCHLORIDE 10 MG/1
TABLET, FILM COATED ORAL DAILY
COMMUNITY
Start: 2022-01-09

## 2022-02-16 RX ORDER — ASPIRIN 81 MG/1
81 TABLET, CHEWABLE ORAL DAILY
COMMUNITY

## 2022-02-16 NOTE — ASSESSMENT & PLAN NOTE
Stable symptoms normalization of his ejection fraction continue lisinopril 10 once a day and Coreg 12.5 twice daily dosing

## 2022-02-16 NOTE — PROGRESS NOTES
"Chief Complaint  Cardiomyopathy, Hypertension, and Presence of Defibrillator    Subjective    Patient is doing well symptomatically no change in his breathing weight gain or edema    Past Medical History:   Diagnosis Date   • Essential hypertension 8/11/2021   • Nonischemic cardiomyopathy 8/11/2021   • Presence of biventricular implantable cardioverter-defibrillator (ICD) 8/12/2021    [employer] BiV ICD         Current Outpatient Medications:   •  aspirin 81 MG chewable tablet, Chew 81 mg Daily., Disp: , Rfl:   •  carvedilol (COREG) 12.5 MG tablet, 2 (Two) Times a Day With Meals., Disp: , Rfl:   •  donepezil (ARICEPT) 10 MG tablet, Daily., Disp: , Rfl:   •  Effexor  MG 24 hr capsule, Daily., Disp: , Rfl:   •  Hyoscyamine Sulfate ER 0.375 MG tablet controlled-release, Take  by mouth 2 (Two) Times a Day., Disp: , Rfl:   •  lisinopril (PRINIVIL,ZESTRIL) 10 MG tablet, Daily., Disp: , Rfl:   •  Lyrica 150 MG capsule, 2 (Two) Times a Day., Disp: , Rfl:   •  naltrexone 1 mg/mL oral suspension, Take  by mouth. Takes 3mg QD, Disp: , Rfl:     There are no discontinued medications.  Allergies   Allergen Reactions   • Penicillins Rash        Social History     Tobacco Use   • Smoking status: Never Smoker   • Smokeless tobacco: Never Used   Vaping Use   • Vaping Use: Never used   Substance Use Topics   • Alcohol use: Never   • Drug use: Never       Family History   Family history unknown: Yes        Objective     /77   Pulse 77   Ht 182.9 cm (72\")   Wt 109 kg (240 lb)   BMI 32.55 kg/m²       Physical Exam    General Appearance:   · no acute distress  · Alert and oriented x3  HENT:   · lips not cyanotic  · Atraumatic  Neck:  · No jvd   · supple  Respiratory:  · no respiratory distress  · normal breath sounds  · no rales  Cardiovascular:  · Regular rate and rhythm  · no S3, no S4   · no murmur  · no rub  Extremities  · No cyanosis  · lower extremity edema: Mild  Skin:   · warm, dry  · No rashes      Result Review : "     No results found for: PROBNP  CMP    CMP 10/6/21   Glucose 92   BUN 20   Creatinine 0.92   eGFR Non African Am 81   Sodium 138   Potassium 4.5   Chloride 105   Calcium 9.1   Albumin 4.10   Total Bilirubin 0.3   Alkaline Phosphatase 65   AST (SGOT) 16   ALT (SGPT) 12           CBC w/diff    CBC w/Diff 10/6/21   WBC 8.69   RBC 4.87   Hemoglobin 14.1   Hematocrit 42.2   MCV 86.7   MCH 29.0   MCHC 33.4   RDW 13.2   Platelets 181   Neutrophil Rel % 63.3   Immature Granulocyte Rel % 0.7 (A)   Lymphocyte Rel % 21.6   Monocyte Rel % 9.2   Eosinophil Rel % 4.3   Basophil Rel % 0.9   (A) Abnormal value             Lab Results   Component Value Date    TSH 1.130 10/06/2021      No results found for: FREET4   No results found for: DDIMERQUANT  No results found for: MG   No results found for: DIGOXIN   No results found for: TROPONINT        Lipid Panel    Lipid Panel 10/6/21   Total Cholesterol 200   Triglycerides 131   HDL Cholesterol 32 (A)   VLDL Cholesterol 24   LDL Cholesterol  144 (A)   LDL/HDL Ratio 4.43   (A) Abnormal value            No results found for: POCTROP                   Diagnoses and all orders for this visit:    1. Presence of biventricular implantable cardioverter-defibrillator (ICD) (Primary)  Assessment & Plan:  Device working normally on remote interrogation paced 100% of time      2. Essential hypertension  Assessment & Plan:  Well-controlled continue on lisinopril 10 and Coreg 12.5 twice daily      3. Nonischemic cardiomyopathy  Assessment & Plan:  Stable symptoms normalization of his ejection fraction continue lisinopril 10 once a day and Coreg 12.5 twice daily dosing            Follow Up     Return in about 6 months (around 8/16/2022) for Follow with Naa Gambino.          Patient was given instructions and counseling regarding his condition or for health maintenance advice. Please see specific information pulled into the AVS if appropriate.

## 2022-08-16 ENCOUNTER — OFFICE VISIT (OUTPATIENT)
Dept: CARDIOLOGY | Facility: CLINIC | Age: 72
End: 2022-08-16

## 2022-08-16 VITALS
SYSTOLIC BLOOD PRESSURE: 130 MMHG | HEART RATE: 94 BPM | BODY MASS INDEX: 31.87 KG/M2 | WEIGHT: 235 LBS | DIASTOLIC BLOOD PRESSURE: 70 MMHG

## 2022-08-16 DIAGNOSIS — I10 ESSENTIAL HYPERTENSION: ICD-10-CM

## 2022-08-16 DIAGNOSIS — I42.8 NICM (NONISCHEMIC CARDIOMYOPATHY): Primary | ICD-10-CM

## 2022-08-16 DIAGNOSIS — Z95.810 PRESENCE OF BIVENTRICULAR IMPLANTABLE CARDIOVERTER-DEFIBRILLATOR (ICD): ICD-10-CM

## 2022-08-16 PROCEDURE — 99214 OFFICE O/P EST MOD 30 MIN: CPT | Performed by: NURSE PRACTITIONER

## 2022-08-16 NOTE — PROGRESS NOTES
Chief Complaint  Hypertension    Subjective            History of Present Illness   Anshul hSook is a 72-year-old white/ male patient who presents to the office today for follow-up.  He has nonischemic cardiomyopathy with presence of ICD and has hypertension.  He reports compliance with all of his medications.  He denies any chest pain, shortness of breath, lightheadedness/dizziness, palpitations, or edema.    PMH  Past Medical History:   Diagnosis Date   • Essential hypertension 8/11/2021   • Nonischemic cardiomyopathy 8/11/2021   • Presence of biventricular implantable cardioverter-defibrillator (ICD) 8/12/2021    [employer] BiV ICD         ALLERGY  Allergies   Allergen Reactions   • Penicillins Rash          SURGICALHX  Past Surgical History:   Procedure Laterality Date   • CARDIAC DEFIBRILLATOR PLACEMENT            SOC  Social History     Socioeconomic History   • Marital status:    Tobacco Use   • Smoking status: Never Smoker   • Smokeless tobacco: Never Used   Vaping Use   • Vaping Use: Never used   Substance and Sexual Activity   • Alcohol use: Never   • Drug use: Never   • Sexual activity: Defer         FAMHX  Family History   Family history unknown: Yes          MEDSIGONLY  Current Outpatient Medications on File Prior to Visit   Medication Sig   • aspirin 81 MG chewable tablet Chew 81 mg Daily.   • carvedilol (COREG) 12.5 MG tablet 2 (Two) Times a Day With Meals.   • donepezil (ARICEPT) 10 MG tablet Daily.   • Effexor  MG 24 hr capsule Daily.   • Hyoscyamine Sulfate ER 0.375 MG tablet controlled-release Take  by mouth 2 (Two) Times a Day.   • Lyrica 150 MG capsule 2 (Two) Times a Day.   • naltrexone 1 mg/mL oral suspension Take  by mouth. Takes 3mg QD   • lisinopril (PRINIVIL,ZESTRIL) 10 MG tablet Daily.     No current facility-administered medications on file prior to visit.         Objective   /70   Pulse 94   Wt 107 kg (235 lb) Comment: patient stated  BMI 31.87 kg/m²        Physical Exam  HENT:      Head: Normocephalic.   Neck:      Vascular: No carotid bruit.   Cardiovascular:      Rate and Rhythm: Normal rate and regular rhythm.      Pulses: Normal pulses.      Heart sounds: Normal heart sounds. No murmur heard.  Pulmonary:      Effort: Pulmonary effort is normal.      Breath sounds: Normal breath sounds.   Musculoskeletal:      Cervical back: Neck supple.      Right lower leg: No edema.      Left lower leg: No edema.   Skin:     General: Skin is dry.      Capillary Refill: Capillary refill takes less than 2 seconds.   Neurological:      Mental Status: He is alert and oriented to person, place, and time.   Psychiatric:         Behavior: Behavior normal.       Result Review :   The following data was reviewed by: JUNIOR Kirk on 08/16/2022:  No results found for: PROBNP  CMP    CMP 10/6/21   Glucose 92   BUN 20   Creatinine 0.92   eGFR Non African Am 81   Sodium 138   Potassium 4.5   Chloride 105   Calcium 9.1   Albumin 4.10   Total Bilirubin 0.3   Alkaline Phosphatase 65   AST (SGOT) 16   ALT (SGPT) 12           CBC w/diff    CBC w/Diff 10/6/21   WBC 8.69   RBC 4.87   Hemoglobin 14.1   Hematocrit 42.2   MCV 86.7   MCH 29.0   MCHC 33.4   RDW 13.2   Platelets 181   Neutrophil Rel % 63.3   Immature Granulocyte Rel % 0.7 (A)   Lymphocyte Rel % 21.6   Monocyte Rel % 9.2   Eosinophil Rel % 4.3   Basophil Rel % 0.9   (A) Abnormal value             Lab Results   Component Value Date    TSH 1.130 10/06/2021      No results found for: FREET4   No results found for: DDIMERQUANT  No results found for: MG   No results found for: DIGOXIN   No results found for: TROPONINT        Lipid Panel    Lipid Panel 10/6/21   Total Cholesterol 200   Triglycerides 131   HDL Cholesterol 32 (A)   VLDL Cholesterol 24   LDL Cholesterol  144 (A)   LDL/HDL Ratio 4.43   (A) Abnormal value                 Assessment and Plan    Diagnoses and all orders for this visit:    1. Nonischemic cardiomyopathy  (Primary)  Symptomatically stable at this time and euvolemic on exam.  Continue aspirin 81 mg daily, lisinopril 10 mg daily, and carvedilol 12.5 mg twice daily.  Check BMP to assess renal function and electrolytes.  -     Basic Metabolic Panel; Future    2. Presence of biventricular implantable cardioverter-defibrillator (ICD)  Last time remote session was 7/17/2022 which shows no acute episodes and a little over a year left on the battery.  He needs to come back in 4-5 months to have device checked since he will be approaching JONI soon.    3. Essential hypertension  Currently controlled and without adverse effect from medication, continue carvedilol 12.5 mg twice daily and lisinopril 10 mg daily.  Check lipid and hepatic function panel before next visit to assess need for statin therapy.  -     Lipid Panel; Future  -     Hepatic Function Panel; Future            Follow Up   Return in about 6 months (around 2/16/2023) for Follow up with Dr Hobbs with device check.    Patient was given instructions and counseling regarding his condition or for health maintenance advice. Please see specific information pulled into the AVS if appropriate.     Anshul Shook  reports that he has never smoked. He has never used smokeless tobacco.           Naa Gambino, APRN  08/16/22  14:39 EDT    Dictated Utilizing Dragon Dictation

## 2022-08-22 ENCOUNTER — APPOINTMENT (OUTPATIENT)
Dept: CT IMAGING | Facility: HOSPITAL | Age: 72
End: 2022-08-22

## 2022-08-22 ENCOUNTER — TRANSCRIBE ORDERS (OUTPATIENT)
Dept: ADMINISTRATIVE | Facility: HOSPITAL | Age: 72
End: 2022-08-22

## 2022-08-22 DIAGNOSIS — R19.7 DIARRHEA, UNSPECIFIED TYPE: Primary | ICD-10-CM

## 2022-08-22 DIAGNOSIS — K62.89 RECTAL MASS: ICD-10-CM

## 2022-08-22 DIAGNOSIS — R63.4 WEIGHT LOSS: ICD-10-CM

## 2022-08-26 ENCOUNTER — APPOINTMENT (OUTPATIENT)
Dept: CT IMAGING | Facility: HOSPITAL | Age: 72
End: 2022-08-26

## 2022-08-29 ENCOUNTER — LAB (OUTPATIENT)
Dept: LAB | Facility: HOSPITAL | Age: 72
End: 2022-08-29

## 2022-08-29 DIAGNOSIS — I42.8 NICM (NONISCHEMIC CARDIOMYOPATHY): ICD-10-CM

## 2022-08-29 LAB
ANION GAP SERPL CALCULATED.3IONS-SCNC: 11.3 MMOL/L (ref 5–15)
BUN SERPL-MCNC: 17 MG/DL (ref 8–23)
BUN/CREAT SERPL: 22.4 (ref 7–25)
CALCIUM SPEC-SCNC: 9.2 MG/DL (ref 8.6–10.5)
CHLORIDE SERPL-SCNC: 104 MMOL/L (ref 98–107)
CO2 SERPL-SCNC: 26.7 MMOL/L (ref 22–29)
CREAT SERPL-MCNC: 0.76 MG/DL (ref 0.76–1.27)
EGFRCR SERPLBLD CKD-EPI 2021: 94.3 ML/MIN/1.73
GLUCOSE SERPL-MCNC: 84 MG/DL (ref 65–99)
POTASSIUM SERPL-SCNC: 4.4 MMOL/L (ref 3.5–5.2)
SODIUM SERPL-SCNC: 142 MMOL/L (ref 136–145)

## 2022-08-29 PROCEDURE — 36415 COLL VENOUS BLD VENIPUNCTURE: CPT

## 2022-08-29 PROCEDURE — 80048 BASIC METABOLIC PNL TOTAL CA: CPT

## 2022-08-31 ENCOUNTER — TELEPHONE (OUTPATIENT)
Dept: CARDIOLOGY | Facility: CLINIC | Age: 72
End: 2022-08-31

## 2022-09-15 ENCOUNTER — HOSPITAL ENCOUNTER (OUTPATIENT)
Dept: CT IMAGING | Facility: HOSPITAL | Age: 72
Discharge: HOME | End: 2022-09-15
Admitting: INTERNAL MEDICINE

## 2022-09-15 DIAGNOSIS — K62.89 RECTAL MASS: ICD-10-CM

## 2022-09-15 DIAGNOSIS — R19.7 DIARRHEA, UNSPECIFIED TYPE: ICD-10-CM

## 2022-09-15 DIAGNOSIS — R63.4 WEIGHT LOSS: ICD-10-CM

## 2022-09-15 PROCEDURE — 74177 CT ABD & PELVIS W/CONTRAST: CPT

## 2022-09-15 PROCEDURE — 0 IOPAMIDOL PER 1 ML: Performed by: INTERNAL MEDICINE

## 2022-09-15 RX ADMIN — IOPAMIDOL 100 ML: 755 INJECTION, SOLUTION INTRAVENOUS at 14:44

## 2022-09-21 ENCOUNTER — TELEPHONE (OUTPATIENT)
Dept: CARDIOLOGY | Facility: CLINIC | Age: 72
End: 2022-09-21

## 2022-09-23 ENCOUNTER — OFFICE VISIT (OUTPATIENT)
Dept: SURGERY | Facility: CLINIC | Age: 72
End: 2022-09-23

## 2022-09-23 ENCOUNTER — PREP FOR SURGERY (OUTPATIENT)
Dept: OTHER | Facility: HOSPITAL | Age: 72
End: 2022-09-23

## 2022-09-23 VITALS — HEIGHT: 72 IN | RESPIRATION RATE: 16 BRPM | WEIGHT: 251 LBS | BODY MASS INDEX: 34 KG/M2

## 2022-09-23 DIAGNOSIS — R93.89 ABNORMAL CT SCAN: Primary | ICD-10-CM

## 2022-09-23 PROCEDURE — 99202 OFFICE O/P NEW SF 15 MIN: CPT | Performed by: SURGERY

## 2022-09-23 RX ORDER — MULTIVIT WITH MINERALS/LUTEIN
250 TABLET ORAL DAILY
COMMUNITY

## 2022-09-23 RX ORDER — MULTIPLE VITAMINS W/ MINERALS TAB 9MG-400MCG
1 TAB ORAL DAILY
COMMUNITY

## 2022-09-23 RX ORDER — UBIDECARENONE 100 MG
100 CAPSULE ORAL DAILY
COMMUNITY

## 2022-09-23 NOTE — PROGRESS NOTES
"Chief Complaint:  Mass (rectal)    Primary Care Provider: Won Mcneill MD    Referring Provider: Won Mcneill MD    History of Present Illness  Anshul Shook is a 72 y.o. male referred by Won Mcneill MD to have a colonoscopy.  Patient has some chronic issues with constipation and this is related to a neck injury he sustained over 20 years ago when he fell off a ladder.  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  The tracheostomy has been present for about 20 years.  Sometime recently when the patient had a bowel movement he noticed something protruding from his anus that then retracted back inside.  When he places his finger in his anus he can feel some type of smooth lesion.  CT A/P was done on 9/15/22 and the finding relevant for today's office visit is \"mild prominence of the wall of the distal rectum and anus.\"    Allergies: Penicillins    Outpatient Medications Marked as Taking for the 9/23/22 encounter (Office Visit) with Audie West MD   Medication Sig Dispense Refill   • aspirin 81 MG chewable tablet Chew 81 mg Daily.     • carvedilol (COREG) 12.5 MG tablet 2 (Two) Times a Day With Meals.     • coenzyme Q10 100 MG capsule Take 100 mg by mouth Daily.     • donepezil (ARICEPT) 10 MG tablet Daily.     • Effexor  MG 24 hr capsule Daily.     • Hyoscyamine Sulfate ER 0.375 MG tablet controlled-release Take  by mouth 2 (Two) Times a Day.     • Lyrica 150 MG capsule 2 (Two) Times a Day.     • multivitamin with minerals tablet tablet Take 1 tablet by mouth Daily.     • naltrexone 1 mg/mL oral suspension Take  by mouth. Takes 3mg QD     • vitamin C (ASCORBIC ACID) 250 MG tablet Take 250 mg by mouth Daily.         Past Medical History:   • Essential hypertension   • Nonischemic cardiomyopathy   • Presence of biventricular implantable cardioverter-defibrillator (ICD)    [employer] BiV ICD        Past Surgical History:   • CARDIAC DEFIBRILLATOR PLACEMENT       Family History: " "  Family History   Family history unknown: Yes        Social History:  Social History     Tobacco Use   • Smoking status: Never Smoker   • Smokeless tobacco: Never Used   Substance Use Topics   • Alcohol use: Never       Objective     Vital Signs:  Resp 16   Ht 182.9 cm (72\")   Wt 114 kg (251 lb)   BMI 34.04 kg/m²   • Constitutional: alert, no acute distress  • HENT:  NCAT, no visible deformities or lesions  • Eyes:  sclerae clear, conjunctivae clear, EOMI  • Neck:  tracheostomy  • Respiratory:  breathing not labored, respiratory effort appears normal  • Cardiovascular:  heart regular rate  • Abdomen:  nondistended    • Skin and subcutaneous tissue:  Warm and dry  • Neurologic:  Sitting in a wheelchair, limited movement of lower extremities, alert & oriented      Assessment:  Abnormal CT scan    Plan:  Colonoscopy    Discussion: Indications, options, risks, benefits, and expected outcomes of planned surgery were discussed with the patient and he agrees to proceed.    Audie West MD  09/23/2022    Electronically signed by Audie West MD, 09/23/22, 12:51 PM EDT.  "

## 2022-09-28 ENCOUNTER — ANESTHESIA (OUTPATIENT)
Dept: GASTROENTEROLOGY | Facility: HOSPITAL | Age: 72
End: 2022-09-28

## 2022-09-28 ENCOUNTER — HOSPITAL ENCOUNTER (OUTPATIENT)
Facility: HOSPITAL | Age: 72
Setting detail: HOSPITAL OUTPATIENT SURGERY
Discharge: HOME | End: 2022-09-28
Attending: SURGERY | Admitting: SURGERY

## 2022-09-28 ENCOUNTER — ANESTHESIA EVENT (OUTPATIENT)
Dept: GASTROENTEROLOGY | Facility: HOSPITAL | Age: 72
End: 2022-09-28

## 2022-09-28 VITALS
HEIGHT: 72 IN | TEMPERATURE: 97 F | RESPIRATION RATE: 16 BRPM | SYSTOLIC BLOOD PRESSURE: 108 MMHG | BODY MASS INDEX: 34 KG/M2 | OXYGEN SATURATION: 97 % | WEIGHT: 251 LBS | DIASTOLIC BLOOD PRESSURE: 73 MMHG | HEART RATE: 78 BPM

## 2022-09-28 DIAGNOSIS — R93.89 ABNORMAL CT SCAN: ICD-10-CM

## 2022-09-28 PROCEDURE — 25010000002 PROPOFOL 10 MG/ML EMULSION: Performed by: NURSE ANESTHETIST, CERTIFIED REGISTERED

## 2022-09-28 PROCEDURE — 88305 TISSUE EXAM BY PATHOLOGIST: CPT | Performed by: SURGERY

## 2022-09-28 DEVICE — DEV CLIP ENDO RESOLUTION360 CONTRL ROT 235CM: Type: IMPLANTABLE DEVICE | Site: ASCENDING COLON | Status: FUNCTIONAL

## 2022-09-28 RX ORDER — SODIUM CHLORIDE, SODIUM LACTATE, POTASSIUM CHLORIDE, CALCIUM CHLORIDE 600; 310; 30; 20 MG/100ML; MG/100ML; MG/100ML; MG/100ML
30 INJECTION, SOLUTION INTRAVENOUS CONTINUOUS
Status: DISCONTINUED | OUTPATIENT
Start: 2022-09-28 | End: 2022-09-28 | Stop reason: HOSPADM

## 2022-09-28 RX ORDER — PROPOFOL 10 MG/ML
VIAL (ML) INTRAVENOUS AS NEEDED
Status: DISCONTINUED | OUTPATIENT
Start: 2022-09-28 | End: 2022-09-28 | Stop reason: SURG

## 2022-09-28 RX ORDER — LIDOCAINE HYDROCHLORIDE 20 MG/ML
INJECTION, SOLUTION EPIDURAL; INFILTRATION; INTRACAUDAL; PERINEURAL AS NEEDED
Status: DISCONTINUED | OUTPATIENT
Start: 2022-09-28 | End: 2022-09-28 | Stop reason: SURG

## 2022-09-28 RX ADMIN — SODIUM CHLORIDE, POTASSIUM CHLORIDE, SODIUM LACTATE AND CALCIUM CHLORIDE 30 ML/HR: 600; 310; 30; 20 INJECTION, SOLUTION INTRAVENOUS at 12:24

## 2022-09-28 RX ADMIN — LIDOCAINE HYDROCHLORIDE 100 MG: 20 INJECTION, SOLUTION EPIDURAL; INFILTRATION; INTRACAUDAL; PERINEURAL at 12:30

## 2022-09-28 RX ADMIN — PROPOFOL 100 MG: 10 INJECTION, EMULSION INTRAVENOUS at 12:30

## 2022-09-28 RX ADMIN — PROPOFOL 125 MCG/KG/MIN: 10 INJECTION, EMULSION INTRAVENOUS at 12:33

## 2022-09-28 NOTE — ANESTHESIA POSTPROCEDURE EVALUATION
Patient: Anshul Shook    Procedure Summary     Date: 09/28/22 Room / Location: [facility] ENDOSCOPY 3 / [facility] ENDOSCOPY    Anesthesia Start: 1229 Anesthesia Stop: 1309    Procedure: COLONOSCOPY WITH POLYPECTOMIES, HOT SNARE, CLIP APPLICATION X2 (N/A ) Diagnosis:       Abnormal CT scan      (Abnormal CT scan [R93.89])    Surgeons: Audie West MD Provider: Reinier Ashraf MD    Anesthesia Type: general ASA Status: 2          Anesthesia Type: general    Vitals  Vitals Value Taken Time   BP 92/79 09/28/22 1333   Temp 36.1 °C (97 °F) 09/28/22 1315   Pulse 67 09/28/22 1334   Resp 16 09/28/22 1330   SpO2 97 % 09/28/22 1334   Vitals shown include unvalidated device data.        Post Anesthesia Care and Evaluation    Patient location during evaluation: bedside  Patient participation: complete - patient participated  Level of consciousness: awake  Pain management: adequate    Airway patency: patent  Anesthetic complications: No anesthetic complications  PONV Status: none  Cardiovascular status: acceptable and stable  Respiratory status: acceptable  Hydration status: acceptable    Comments: An Anesthesiologist personally participated in the most demanding procedures (including induction and emergence if applicable) in the anesthesia plan, monitored the course of anesthesia administration at frequent intervals and remained physically present and available for immediate diagnosis and treatment of emergencies.

## 2022-09-28 NOTE — ANESTHESIA PREPROCEDURE EVALUATION
Anesthesia Evaluation     Patient summary reviewed and Nursing notes reviewed   no history of anesthetic complications:  NPO Solid Status: > 8 hours  NPO Liquid Status: > 2 hours           Airway   Mallampati: II  TM distance: >3 FB  Neck ROM: full  No difficulty expected  Dental      Pulmonary - negative pulmonary ROS and normal exam    breath sounds clear to auscultation    ROS comment: Trach stoma x 20 yrs, breathing stable  Cardiovascular - normal exam  Exercise tolerance: good (4-7 METS)    Rhythm: regular  Rate: normal    (+) hypertension well controlled,       Neuro/Psych- negative ROS  GI/Hepatic/Renal/Endo - negative ROS     Musculoskeletal     Abdominal    Substance History - negative use     OB/GYN negative ob/gyn ROS         Other   arthritis,      ROS/Med Hx Other: PAT Nursing Notes unavailable.                 Anesthesia Plan    ASA 2     general     (Total IV Anesthesia    Patient understands anesthesia not responsible for dental damage.  )  intravenous induction     Anesthetic plan, risks, benefits, and alternatives have been provided, discussed and informed consent has been obtained with: patient.  Pre-procedure education provided  Plan discussed with CRNA.        CODE STATUS:

## 2022-09-30 LAB
CYTO UR: NORMAL
LAB AP CASE REPORT: NORMAL
LAB AP CLINICAL INFORMATION: NORMAL
PATH REPORT.FINAL DX SPEC: NORMAL
PATH REPORT.GROSS SPEC: NORMAL

## 2022-10-24 ENCOUNTER — TELEPHONE (OUTPATIENT)
Dept: SURGERY | Facility: CLINIC | Age: 72
End: 2022-10-24

## 2022-10-24 NOTE — TELEPHONE ENCOUNTER
Patient had colonoscopy with polyps removed on 09/28/22.    Patient's wife, Delmy, called. She said patient is paraplegic, and has issues moving his bowels.  When he tries, something comes out, like a prolapse.  He has bleeding.      She doesn't know what to do for him to help, or if there is help for him, or who he needs to see.

## 2023-03-20 ENCOUNTER — LAB (OUTPATIENT)
Dept: LAB | Facility: HOSPITAL | Age: 73
End: 2023-03-20
Payer: MEDICARE

## 2023-03-20 DIAGNOSIS — I10 ESSENTIAL HYPERTENSION: ICD-10-CM

## 2023-03-20 LAB
ALBUMIN SERPL-MCNC: 4.3 G/DL (ref 3.5–5.2)
ALP SERPL-CCNC: 95 U/L (ref 39–117)
ALT SERPL W P-5'-P-CCNC: 14 U/L (ref 1–41)
AST SERPL-CCNC: 14 U/L (ref 1–40)
BILIRUB CONJ SERPL-MCNC: <0.2 MG/DL (ref 0–0.3)
BILIRUB INDIRECT SERPL-MCNC: NORMAL MG/DL
BILIRUB SERPL-MCNC: 0.6 MG/DL (ref 0–1.2)
CHOLEST SERPL-MCNC: 180 MG/DL (ref 0–200)
HDLC SERPL-MCNC: 34 MG/DL (ref 40–60)
LDLC SERPL CALC-MCNC: 123 MG/DL (ref 0–100)
LDLC/HDLC SERPL: 3.56 {RATIO}
PROT SERPL-MCNC: 7.7 G/DL (ref 6–8.5)
TRIGL SERPL-MCNC: 124 MG/DL (ref 0–150)
VLDLC SERPL-MCNC: 23 MG/DL (ref 5–40)

## 2023-03-20 PROCEDURE — 80076 HEPATIC FUNCTION PANEL: CPT

## 2023-03-20 PROCEDURE — 36415 COLL VENOUS BLD VENIPUNCTURE: CPT

## 2023-03-20 PROCEDURE — 80061 LIPID PANEL: CPT

## 2023-03-29 ENCOUNTER — CLINICAL SUPPORT NO REQUIREMENTS (OUTPATIENT)
Dept: CARDIOLOGY | Facility: CLINIC | Age: 73
End: 2023-03-29
Payer: MEDICARE

## 2023-03-29 ENCOUNTER — OFFICE VISIT (OUTPATIENT)
Dept: CARDIOLOGY | Facility: CLINIC | Age: 73
End: 2023-03-29
Payer: MEDICARE

## 2023-03-29 VITALS
SYSTOLIC BLOOD PRESSURE: 95 MMHG | DIASTOLIC BLOOD PRESSURE: 47 MMHG | HEIGHT: 72 IN | BODY MASS INDEX: 34.04 KG/M2 | HEART RATE: 84 BPM

## 2023-03-29 DIAGNOSIS — Z95.810 PRESENCE OF BIVENTRICULAR IMPLANTABLE CARDIOVERTER-DEFIBRILLATOR (ICD): ICD-10-CM

## 2023-03-29 DIAGNOSIS — I42.8 NICM (NONISCHEMIC CARDIOMYOPATHY): Primary | ICD-10-CM

## 2023-03-29 DIAGNOSIS — I10 ESSENTIAL HYPERTENSION: ICD-10-CM

## 2023-03-29 PROBLEM — K75.81 NASH (NONALCOHOLIC STEATOHEPATITIS): Status: ACTIVE | Noted: 2023-03-29

## 2023-03-29 PROCEDURE — 99214 OFFICE O/P EST MOD 30 MIN: CPT | Performed by: INTERNAL MEDICINE

## 2023-03-29 PROCEDURE — 93284 PRGRMG EVAL IMPLANTABLE DFB: CPT | Performed by: INTERNAL MEDICINE

## 2023-03-29 PROCEDURE — 3074F SYST BP LT 130 MM HG: CPT | Performed by: INTERNAL MEDICINE

## 2023-03-29 PROCEDURE — 3078F DIAST BP <80 MM HG: CPT | Performed by: INTERNAL MEDICINE

## 2023-03-29 NOTE — PROGRESS NOTES
Normal BI-V ICD Device Interrogation and Device Testing.  Normal evaluation of device function and lead measurements.  No optimization was needed of parameters or maximization of device longevity.  Patient is on automated Home Remote Monitoring. Patient has approximately 10 months on battery, he is on automated remotes and is not pacemaker dependent.

## 2023-03-29 NOTE — ASSESSMENT & PLAN NOTE
Most recent echo showing low normal ejection fraction stable symptoms patient.  79% time with BiV ICD continue with current dose of Coreg.  No evidence of volume overload on exam

## 2023-03-29 NOTE — PROGRESS NOTES
"Chief Complaint  Follow-up, Cardiomyopathy, and Pacemaker Check    Subjective    Patient with stable breathing capacity no weight gain or increased edema since last visit.    Past Medical History:   Diagnosis Date   • Arthritis    • Back injury    • Essential hypertension 08/11/2021   • Irregular heartbeat    • Memory loss    • Nonischemic cardiomyopathy 08/11/2021   • Paraplegic gait    • Presence of biventricular implantable cardioverter-defibrillator (ICD) 08/12/2021    [employer] BiV ICD         Current Outpatient Medications:   •  aspirin 81 MG chewable tablet, Chew 1 tablet Daily., Disp: , Rfl:   •  carvedilol (COREG) 12.5 MG tablet, 2 (Two) Times a Day With Meals., Disp: , Rfl:   •  coenzyme Q10 100 MG capsule, Take 1 capsule by mouth Daily., Disp: , Rfl:   •  donepezil (ARICEPT) 10 MG tablet, Daily., Disp: , Rfl:   •  Effexor  MG 24 hr capsule, Daily., Disp: , Rfl:   •  Hyoscyamine Sulfate ER 0.375 MG tablet controlled-release, Take  by mouth 2 (Two) Times a Day., Disp: , Rfl:   •  Lyrica 150 MG capsule, 2 (Two) Times a Day., Disp: , Rfl:   •  multivitamin with minerals tablet tablet, Take 1 tablet by mouth Daily., Disp: , Rfl:   •  naltrexone 1 mg/mL oral suspension, Take  by mouth. Takes 3mg QD, Disp: , Rfl:   •  vitamin C (ASCORBIC ACID) 250 MG tablet, Take 1 tablet by mouth Daily., Disp: , Rfl:     There are no discontinued medications.  Allergies   Allergen Reactions   • Penicillins Rash        Social History     Tobacco Use   • Smoking status: Never   • Smokeless tobacco: Never   Vaping Use   • Vaping Use: Never used   Substance Use Topics   • Alcohol use: Never   • Drug use: Never       Family History   Family history unknown: Yes        Objective     BP 95/47   Pulse 84   Ht 182.9 cm (72\")   BMI 34.04 kg/m²       Physical Exam    General Appearance:   · no acute distress  · Alert and oriented x3  HENT:   · lips not cyanotic  · Atraumatic  Neck:  · No jvd   · supple  Respiratory:  · no " respiratory distress  · normal breath sounds  · no rales  Cardiovascular:  · Regular rate and rhythm  · no S3, no S4   · no murmur  · no rub  Extremities  · No cyanosis  · lower extremity edema: +1  Skin:   · warm, dry  · No rashes      Result Review :     No results found for: PROBNP  CMP    CMP 8/29/22 3/20/23   Glucose 84    BUN 17    Creatinine 0.76    eGFR 94.3    Sodium 142    Potassium 4.4    Chloride 104    Calcium 9.2    Total Protein  7.7   Albumin  4.3   Total Bilirubin  0.6   Alkaline Phosphatase  95   AST (SGOT)  14   ALT (SGPT)  14   BUN/Creatinine Ratio 22.4    Anion Gap 11.3       Comments are available for some flowsheets but are not being displayed.              Lab Results   Component Value Date    TSH 1.130 10/06/2021      No results found for: FREET4   No results found for: DDIMERQUANT  No results found for: MG   No results found for: DIGOXIN   No results found for: TROPONINT        Lipid Panel    Lipid Panel 3/20/23   Total Cholesterol 180   Triglycerides 124   HDL Cholesterol 34 (A)   VLDL Cholesterol 23   LDL Cholesterol  123 (A)   LDL/HDL Ratio 3.56   (A) Abnormal value            No results found for: POCTROP    BiV ICD interrogation right atrial lead paced 1% time working normally right ventricularly paced 90% time working normally left-ventricular pacing episode time working normally.  No events recorded device no changes made battery life is 10.4 months              Diagnoses and all orders for this visit:    1. Nonischemic cardiomyopathy (Primary)  Assessment & Plan:   Most recent echo showing low normal ejection fraction stable symptoms patient.  79% time with BiV ICD continue with current dose of Coreg.  No evidence of volume overload on exam        2. Presence of biventricular implantable cardioverter-defibrillator (ICD)  Assessment & Plan:  Device working normally approaching JONI consider change with [employer] battery to promote longer battery life      3. Essential  hypertension  Assessment & Plan:  .  Blood pressure at goal range continue with Coreg 2.5 mg twice daily            Follow Up     Return in about 6 months (around 9/29/2023).          Patient was given instructions and counseling regarding his condition or for health maintenance advice. Please see specific information pulled into the AVS if appropriate.

## 2023-03-29 NOTE — ASSESSMENT & PLAN NOTE
Device working normally approaching JONI consider change with [employer] battery to promote longer battery life

## 2023-08-23 ENCOUNTER — TRANSCRIBE ORDERS (OUTPATIENT)
Dept: ADMINISTRATIVE | Facility: HOSPITAL | Age: 73
End: 2023-08-23
Payer: MEDICARE

## 2023-08-23 ENCOUNTER — LAB (OUTPATIENT)
Dept: LAB | Facility: HOSPITAL | Age: 73
End: 2023-08-23
Payer: MEDICARE

## 2023-08-23 DIAGNOSIS — Z12.5 SPECIAL SCREENING FOR MALIGNANT NEOPLASM OF PROSTATE: Primary | ICD-10-CM

## 2023-08-23 DIAGNOSIS — I10 HYPERTENSION, UNSPECIFIED TYPE: ICD-10-CM

## 2023-08-23 DIAGNOSIS — E78.5 HYPERLIPIDEMIA, UNSPECIFIED HYPERLIPIDEMIA TYPE: ICD-10-CM

## 2023-08-23 DIAGNOSIS — Z12.5 SPECIAL SCREENING FOR MALIGNANT NEOPLASM OF PROSTATE: ICD-10-CM

## 2023-08-23 LAB
ALBUMIN SERPL-MCNC: 3.6 G/DL (ref 3.5–5.2)
ALBUMIN/GLOB SERPL: 1.1 G/DL
ALP SERPL-CCNC: 81 U/L (ref 39–117)
ALT SERPL W P-5'-P-CCNC: 14 U/L (ref 1–41)
ANION GAP SERPL CALCULATED.3IONS-SCNC: 10.3 MMOL/L (ref 5–15)
AST SERPL-CCNC: 17 U/L (ref 1–40)
BILIRUB SERPL-MCNC: 0.4 MG/DL (ref 0–1.2)
BUN SERPL-MCNC: 14 MG/DL (ref 8–23)
BUN/CREAT SERPL: 17.7 (ref 7–25)
CALCIUM SPEC-SCNC: 8.8 MG/DL (ref 8.6–10.5)
CHLORIDE SERPL-SCNC: 105 MMOL/L (ref 98–107)
CHOLEST SERPL-MCNC: 163 MG/DL (ref 0–200)
CO2 SERPL-SCNC: 24.7 MMOL/L (ref 22–29)
CREAT SERPL-MCNC: 0.79 MG/DL (ref 0.76–1.27)
EGFRCR SERPLBLD CKD-EPI 2021: 92.6 ML/MIN/1.73
GLOBULIN UR ELPH-MCNC: 3.2 GM/DL
GLUCOSE SERPL-MCNC: 90 MG/DL (ref 65–99)
HDLC SERPL-MCNC: 35 MG/DL (ref 40–60)
LDLC SERPL CALC-MCNC: 103 MG/DL (ref 0–100)
LDLC/HDLC SERPL: 2.87 {RATIO}
POTASSIUM SERPL-SCNC: 4.1 MMOL/L (ref 3.5–5.2)
PROT SERPL-MCNC: 6.8 G/DL (ref 6–8.5)
PSA SERPL-MCNC: 1.61 NG/ML (ref 0–4)
SODIUM SERPL-SCNC: 140 MMOL/L (ref 136–145)
TRIGL SERPL-MCNC: 138 MG/DL (ref 0–150)
TSH SERPL DL<=0.05 MIU/L-ACNC: 1.39 UIU/ML (ref 0.27–4.2)
VLDLC SERPL-MCNC: 25 MG/DL (ref 5–40)

## 2023-08-23 PROCEDURE — 84443 ASSAY THYROID STIM HORMONE: CPT

## 2023-08-23 PROCEDURE — G0103 PSA SCREENING: HCPCS

## 2023-08-23 PROCEDURE — 36415 COLL VENOUS BLD VENIPUNCTURE: CPT

## 2023-08-23 PROCEDURE — 80061 LIPID PANEL: CPT

## 2023-08-23 PROCEDURE — 80053 COMPREHEN METABOLIC PANEL: CPT

## 2023-10-30 ENCOUNTER — OFFICE VISIT (OUTPATIENT)
Dept: CARDIOLOGY | Facility: CLINIC | Age: 73
End: 2023-10-30
Payer: MEDICARE

## 2023-10-30 VITALS
BODY MASS INDEX: 34.04 KG/M2 | HEIGHT: 72 IN | SYSTOLIC BLOOD PRESSURE: 113 MMHG | DIASTOLIC BLOOD PRESSURE: 69 MMHG | HEART RATE: 70 BPM

## 2023-10-30 DIAGNOSIS — Z95.810 PRESENCE OF BIVENTRICULAR IMPLANTABLE CARDIOVERTER-DEFIBRILLATOR (ICD): Primary | ICD-10-CM

## 2023-10-30 DIAGNOSIS — I42.8 NICM (NONISCHEMIC CARDIOMYOPATHY): ICD-10-CM

## 2023-10-30 DIAGNOSIS — I10 ESSENTIAL HYPERTENSION: ICD-10-CM

## 2023-10-30 PROCEDURE — 3074F SYST BP LT 130 MM HG: CPT | Performed by: INTERNAL MEDICINE

## 2023-10-30 PROCEDURE — 99214 OFFICE O/P EST MOD 30 MIN: CPT | Performed by: INTERNAL MEDICINE

## 2023-10-30 PROCEDURE — 3078F DIAST BP <80 MM HG: CPT | Performed by: INTERNAL MEDICINE

## 2023-10-30 NOTE — PROGRESS NOTES
Chief Complaint  Follow-up, Nonischemic cardiomyopathy, and Hypertension    Subjective    Patient following up today has been doing quite well symptomatically denies any change in his breathing symptoms has not had any chest pain problems  Past Medical History:   Diagnosis Date    Arthritis     Back injury     Essential hypertension 08/11/2021    Irregular heartbeat     Memory loss     Nonischemic cardiomyopathy 08/11/2021    Paraplegic gait     Presence of biventricular implantable cardioverter-defibrillator (ICD) 08/12/2021    [employer] BiV ICD         Current Outpatient Medications:     carvedilol (COREG) 12.5 MG tablet, 2 (Two) Times a Day With Meals., Disp: , Rfl:     Effexor  MG 24 hr capsule, Daily., Disp: , Rfl:     Hyoscyamine Sulfate ER 0.375 MG tablet controlled-release, Take  by mouth 2 (Two) Times a Day., Disp: , Rfl:     Lyrica 150 MG capsule, 2 (Two) Times a Day., Disp: , Rfl:     multivitamin with minerals tablet tablet, Take 1 tablet by mouth Daily., Disp: , Rfl:     aspirin 81 MG chewable tablet, Chew 1 tablet Daily. (Patient not taking: Reported on 10/30/2023), Disp: , Rfl:     coenzyme Q10 100 MG capsule, Take 1 capsule by mouth Daily. (Patient not taking: Reported on 10/30/2023), Disp: , Rfl:     donepezil (ARICEPT) 10 MG tablet, Daily. (Patient not taking: Reported on 10/30/2023), Disp: , Rfl:     naltrexone 1 mg/mL oral suspension, Take  by mouth. Takes 3mg QD (Patient not taking: Reported on 10/30/2023), Disp: , Rfl:     vitamin C (ASCORBIC ACID) 250 MG tablet, Take 1 tablet by mouth Daily. (Patient not taking: Reported on 10/30/2023), Disp: , Rfl:     There are no discontinued medications.  Allergies   Allergen Reactions    Penicillins Rash        Social History     Tobacco Use    Smoking status: Never    Smokeless tobacco: Never   Vaping Use    Vaping Use: Never used   Substance Use Topics    Alcohol use: Never    Drug use: Never       Family History   Family history unknown: Yes     "    Objective     /69   Pulse 70   Ht 182.9 cm (72\")   BMI 34.04 kg/m²       Physical Exam    General Appearance:   no acute distress  Alert and oriented x3  HENT:   lips not cyanotic  Atraumatic  Neck:  No jvd   supple  Respiratory:  no respiratory distress  normal breath sounds  no rales  Cardiovascular:  Regular rate and rhythm  no S3, no S4   no murmur  no rub  Extremities  No cyanosis  lower extremity edema: 1  Skin:   warm, dry  No rashes      Result Review :     No results found for: \"PROBNP\"  CMP          3/20/2023    11:56 8/23/2023    12:44   CMP   Glucose  90    BUN  14    Creatinine  0.79    EGFR  92.6    Sodium  140    Potassium  4.1    Chloride  105    Calcium  8.8    Total Protein 7.7  6.8    Albumin 4.3  3.6    Globulin  3.2    Total Bilirubin 0.6  0.4    Alkaline Phosphatase 95  81    AST (SGOT) 14  17    ALT (SGPT) 14  14    Albumin/Globulin Ratio  1.1    BUN/Creatinine Ratio  17.7    Anion Gap  10.3         Lab Results   Component Value Date    TSH 1.390 08/23/2023      No results found for: \"FREET4\"   No results found for: \"DDIMERQUANT\"  No results found for: \"MG\"   No results found for: \"DIGOXIN\"   No results found for: \"TROPONINT\"        Lipid Panel          3/20/2023    11:56 8/23/2023    12:44   Lipid Panel   Total Cholesterol 180  163    Triglycerides 124  138    HDL Cholesterol 34  35    VLDL Cholesterol 23  25    LDL Cholesterol  123  103    LDL/HDL Ratio 3.56  2.87      No results found for: \"POCTROP\"     BiV ICD interrogated regularly with normal.  The right ventricular lead was paced 99% time working normally.  The left ventricular lead was paced 99% with a persistently elevated threshold level which trended up some over the last couple years.  Other lead parameters were within normal limits.  Device is within 3 months of JONI              Diagnoses and all orders for this visit:    1. Presence of biventricular implantable cardioverter-defibrillator (ICD) (Primary)  Assessment & " Plan:  Patient with device approaching JONI threshold on his left ventricular lead has trended up some discussed with him the options of just changing the battery versus consideration for lead extraction and new lead placement.  Overall feel that if the threshold remains reasonably stable we should get similar battery life with a new device but did discuss with him that there was no way to guarantee this.  Recommended following back up in 2 months      2. Nonischemic cardiomyopathy  Assessment & Plan:  Previous ejection fraction looked normal several years ago continue with BiV pacing and Coreg 12.5 twice daily dosing.  No change clinically since ejection fraction is normal would not add any further GDMT therapy      3. Essential hypertension  Assessment & Plan:  Well-controlled continue with current antihypertensives              Follow Up     Return in about 2 months (around 12/30/2023).          Patient was given instructions and counseling regarding his condition or for health maintenance advice. Please see specific information pulled into the AVS if appropriate.

## 2023-10-30 NOTE — ASSESSMENT & PLAN NOTE
Previous ejection fraction looked normal several years ago continue with BiV pacing and Coreg 12.5 twice daily dosing.  No change clinically since ejection fraction is normal would not add any further GDMT therapy

## 2023-10-30 NOTE — ASSESSMENT & PLAN NOTE
Patient with device approaching JONI threshold on his left ventricular lead has trended up some discussed with him the options of just changing the battery versus consideration for lead extraction and new lead placement.  Overall feel that if the threshold remains reasonably stable we should get similar battery life with a new device but did discuss with him that there was no way to guarantee this.  Recommended following back up in 2 months

## 2024-01-04 ENCOUNTER — OFFICE VISIT (OUTPATIENT)
Dept: CARDIOLOGY | Facility: CLINIC | Age: 74
End: 2024-01-04
Payer: MEDICARE

## 2024-01-04 VITALS
WEIGHT: 245 LBS | HEIGHT: 72 IN | HEART RATE: 83 BPM | DIASTOLIC BLOOD PRESSURE: 66 MMHG | BODY MASS INDEX: 33.18 KG/M2 | SYSTOLIC BLOOD PRESSURE: 130 MMHG

## 2024-01-04 DIAGNOSIS — Z95.810 PRESENCE OF BIVENTRICULAR IMPLANTABLE CARDIOVERTER-DEFIBRILLATOR (ICD): ICD-10-CM

## 2024-01-04 DIAGNOSIS — I42.8 NICM (NONISCHEMIC CARDIOMYOPATHY): Primary | ICD-10-CM

## 2024-01-04 PROCEDURE — 3078F DIAST BP <80 MM HG: CPT | Performed by: INTERNAL MEDICINE

## 2024-01-04 PROCEDURE — 99213 OFFICE O/P EST LOW 20 MIN: CPT | Performed by: INTERNAL MEDICINE

## 2024-01-04 PROCEDURE — 3075F SYST BP GE 130 - 139MM HG: CPT | Performed by: INTERNAL MEDICINE

## 2024-01-04 NOTE — PROGRESS NOTES
Chief Complaint  Nonischemic cardiomyopathy and Follow-up    Subjective    Patient follow-up today stable symptoms has not had any changes breathing ability no problems with increased weight gain or edema.  His blood pressure at home has been well-controlled  Past Medical History:   Diagnosis Date    Arthritis     Back injury     Essential hypertension 08/11/2021    Irregular heartbeat     Memory loss     Nonischemic cardiomyopathy 08/11/2021    Paraplegic gait     Presence of biventricular implantable cardioverter-defibrillator (ICD) 08/12/2021    [employer] BiV ICD         Current Outpatient Medications:     carvedilol (COREG) 12.5 MG tablet, 2 (Two) Times a Day With Meals., Disp: , Rfl:     donepezil (ARICEPT) 10 MG tablet, Daily., Disp: , Rfl:     Effexor  MG 24 hr capsule, Daily., Disp: , Rfl:     Hyoscyamine Sulfate ER 0.375 MG tablet controlled-release, Take  by mouth 2 (Two) Times a Day., Disp: , Rfl:     Lyrica 150 MG capsule, 2 (Two) Times a Day., Disp: , Rfl:     multivitamin with minerals tablet tablet, Take 1 tablet by mouth Daily., Disp: , Rfl:     vitamin C (ASCORBIC ACID) 250 MG tablet, Take 1 tablet by mouth Daily., Disp: , Rfl:     aspirin 81 MG chewable tablet, Chew 1 tablet Daily. (Patient not taking: Reported on 10/30/2023), Disp: , Rfl:     coenzyme Q10 100 MG capsule, Take 1 capsule by mouth Daily. (Patient not taking: Reported on 10/30/2023), Disp: , Rfl:     naltrexone 1 mg/mL oral suspension, Take  by mouth. Takes 3mg QD (Patient not taking: Reported on 10/30/2023), Disp: , Rfl:     There are no discontinued medications.  Allergies   Allergen Reactions    Penicillins Rash        Social History     Tobacco Use    Smoking status: Never    Smokeless tobacco: Never   Vaping Use    Vaping Use: Never used   Substance Use Topics    Alcohol use: Never    Drug use: Never       Family History   Family history unknown: Yes        Objective     /66 (BP Location: Left arm)   Pulse 83   Ht  "182.9 cm (72\")   Wt 111 kg (245 lb)   BMI 33.23 kg/m²       Physical Exam    General Appearance:   no acute distress  Alert and oriented x3  HENT:   lips not cyanotic  Atraumatic  Neck:  No jvd   supple  Respiratory:  no respiratory distress  normal breath sounds  no rales  Cardiovascular:  Regular rate and rhythm  no S3, no S4   no murmur  no rub  Extremities  No cyanosis  lower extremity edema: +1-2  Skin:   warm, dry  No rashes      Result Review :     No results found for: \"PROBNP\"  CMP          3/20/2023    11:56 8/23/2023    12:44   CMP   Glucose  90    BUN  14    Creatinine  0.79    EGFR  92.6    Sodium  140    Potassium  4.1    Chloride  105    Calcium  8.8    Total Protein 7.7  6.8    Albumin 4.3  3.6    Globulin  3.2    Total Bilirubin 0.6  0.4    Alkaline Phosphatase 95  81    AST (SGOT) 14  17    ALT (SGPT) 14  14    Albumin/Globulin Ratio  1.1    BUN/Creatinine Ratio  17.7    Anion Gap  10.3         Lab Results   Component Value Date    TSH 1.390 08/23/2023      No results found for: \"FREET4\"   No results found for: \"DDIMERQUANT\"  No results found for: \"MG\"   No results found for: \"DIGOXIN\"   No results found for: \"TROPONINT\"        Lipid Panel          3/20/2023    11:56 8/23/2023    12:44   Lipid Panel   Total Cholesterol 180  163    Triglycerides 124  138    HDL Cholesterol 34  35    VLDL Cholesterol 23  25    LDL Cholesterol  123  103    LDL/HDL Ratio 3.56  2.87      No results found for: \"POCTROP\"  Device tolerated JONI                 Diagnoses and all orders for this visit:    1. NICM (nonischemic cardiomyopathy) (Primary)  -     Adult Transthoracic Echo Complete W/ Cont if Necessary Per Protocol; Future    2. Presence of biventricular implantable cardioverter-defibrillator (ICD)  Assessment & Plan:  Patient with BiV ICD at JONI left ventricular lead has been having some increased threshold values we will have patient come back in office next week to confirm if this is stable and not to help " decide about possible lead revision at the time of battery change.  In addition we will go ahead and repeat echocardiogram to see where her ejection fraction is as well              Follow Up     No follow-ups on file.          Patient was given instructions and counseling regarding his condition or for health maintenance advice. Please see specific information pulled into the AVS if appropriate.

## 2024-01-04 NOTE — ASSESSMENT & PLAN NOTE
Patient with BiV ICD at JONI left ventricular lead has been having some increased threshold values we will have patient come back in office next week to confirm if this is stable and not to help decide about possible lead revision at the time of battery change.  In addition we will go ahead and repeat echocardiogram to see where her ejection fraction is as well

## 2024-01-09 ENCOUNTER — CLINICAL SUPPORT NO REQUIREMENTS (OUTPATIENT)
Dept: CARDIOLOGY | Facility: CLINIC | Age: 74
End: 2024-01-09
Payer: MEDICARE

## 2024-01-09 DIAGNOSIS — I42.8 NICM (NONISCHEMIC CARDIOMYOPATHY): Primary | ICD-10-CM

## 2024-01-09 DIAGNOSIS — Z95.810 PRESENCE OF BIVENTRICULAR IMPLANTABLE CARDIOVERTER-DEFIBRILLATOR (ICD): ICD-10-CM

## 2024-01-09 NOTE — PROGRESS NOTES
Normal BI-V ICD Device Interrogation and Device Testing.  Normal evaluation of device function and lead measurements.  No optimization was needed of parameters or maximization of device longevity.  Patient is on automated Home Remote Monitoring, Patient has reached JONI.  I will discuss with Dr. Hobbs because patient has a higher threshold on LV Lead and has discussed having a possible lead revision.

## 2024-01-17 ENCOUNTER — HOSPITAL ENCOUNTER (OUTPATIENT)
Dept: CARDIOLOGY | Facility: HOSPITAL | Age: 74
Discharge: HOME | End: 2024-01-17
Admitting: INTERNAL MEDICINE
Payer: MEDICARE

## 2024-01-17 DIAGNOSIS — I42.8 NICM (NONISCHEMIC CARDIOMYOPATHY): ICD-10-CM

## 2024-01-17 PROCEDURE — 93306 TTE W/DOPPLER COMPLETE: CPT

## 2024-01-18 ENCOUNTER — TELEPHONE (OUTPATIENT)
Dept: CARDIOLOGY | Facility: CLINIC | Age: 74
End: 2024-01-18
Payer: MEDICARE

## 2024-01-18 LAB
BH CV ECHO MEAS - AO MAX PG: 6 MMHG
BH CV ECHO MEAS - AO MEAN PG: 3.2 MMHG
BH CV ECHO MEAS - AO V2 MAX: 122.2 CM/SEC
BH CV ECHO MEAS - AO V2 VTI: 24.3 CM
BH CV ECHO MEAS - AVA(I,D): 2.49 CM2
BH CV ECHO MEAS - EDV(CUBED): 78.9 ML
BH CV ECHO MEAS - EDV(MOD-SP2): 64.8 ML
BH CV ECHO MEAS - EDV(MOD-SP4): 79.8 ML
BH CV ECHO MEAS - EF(MOD-BP): 60.6 %
BH CV ECHO MEAS - EF(MOD-SP2): 61.3 %
BH CV ECHO MEAS - EF(MOD-SP4): 58.4 %
BH CV ECHO MEAS - ESV(CUBED): 41.3 ML
BH CV ECHO MEAS - ESV(MOD-SP2): 25.1 ML
BH CV ECHO MEAS - ESV(MOD-SP4): 33.2 ML
BH CV ECHO MEAS - FS: 19.4 %
BH CV ECHO MEAS - IVS/LVPW: 0.91 CM
BH CV ECHO MEAS - IVSD: 0.71 CM
BH CV ECHO MEAS - LAT PEAK E' VEL: 8.3 CM/SEC
BH CV ECHO MEAS - LV DIASTOLIC VOL/BSA (35-75): 34.4 CM2
BH CV ECHO MEAS - LV MASS(C)D: 94.6 GRAMS
BH CV ECHO MEAS - LV MAX PG: 3.2 MMHG
BH CV ECHO MEAS - LV MEAN PG: 2.13 MMHG
BH CV ECHO MEAS - LV SYSTOLIC VOL/BSA (12-30): 14.3 CM2
BH CV ECHO MEAS - LV V1 MAX: 89.5 CM/SEC
BH CV ECHO MEAS - LV V1 VTI: 19.8 CM
BH CV ECHO MEAS - LVIDD: 4.3 CM
BH CV ECHO MEAS - LVIDS: 3.5 CM
BH CV ECHO MEAS - LVOT AREA: 3.1 CM2
BH CV ECHO MEAS - LVOT DIAM: 1.98 CM
BH CV ECHO MEAS - LVPWD: 0.77 CM
BH CV ECHO MEAS - MED PEAK E' VEL: 6.7 CM/SEC
BH CV ECHO MEAS - MV A MAX VEL: 96.4 CM/SEC
BH CV ECHO MEAS - MV DEC SLOPE: 373 CM/SEC2
BH CV ECHO MEAS - MV DEC TIME: 0.26 SEC
BH CV ECHO MEAS - MV E MAX VEL: 95.5 CM/SEC
BH CV ECHO MEAS - MV E/A: 0.99
BH CV ECHO MEAS - PA V2 MAX: 74.7 CM/SEC
BH CV ECHO MEAS - SI(MOD-SP2): 17.1 ML/M2
BH CV ECHO MEAS - SI(MOD-SP4): 20.1 ML/M2
BH CV ECHO MEAS - SV(LVOT): 60.7 ML
BH CV ECHO MEAS - SV(MOD-SP2): 39.7 ML
BH CV ECHO MEAS - SV(MOD-SP4): 46.6 ML
BH CV ECHO MEAS - TAPSE (>1.6): 1.63 CM
BH CV ECHO MEAS - TR MAX PG: 11.3 MMHG
BH CV ECHO MEAS - TR MAX VEL: 168.3 CM/SEC
BH CV ECHO MEASUREMENTS AVERAGE E/E' RATIO: 12.73
LEFT ATRIUM VOLUME INDEX: 10.2 ML/M2

## 2024-01-18 NOTE — TELEPHONE ENCOUNTER
----- Message from JUNIOR Da Silva sent at 1/18/2024  2:03 PM EST -----  Echocardiogram shows normal heart function with mild calcification of aortic valve.  Continue current medication.  Follow-up as scheduled.  He needs appointment with Dr. Hobbs

## 2024-01-19 NOTE — TELEPHONE ENCOUNTER
Attempted to call patient. No answer. Unable to leave VM due to mailbox full     [employer] message sent    Per Naa since just seen Dr Hobbs 1/4/24 can f/u in 6M

## 2024-01-22 ENCOUNTER — CLINICAL SUPPORT NO REQUIREMENTS (OUTPATIENT)
Dept: CARDIOLOGY | Facility: CLINIC | Age: 74
End: 2024-01-22
Payer: MEDICARE

## 2024-01-22 DIAGNOSIS — Z95.810 PRESENCE OF BIVENTRICULAR IMPLANTABLE CARDIOVERTER-DEFIBRILLATOR (ICD): Primary | ICD-10-CM

## 2024-01-22 DIAGNOSIS — I42.8 NICM (NONISCHEMIC CARDIOMYOPATHY): ICD-10-CM

## 2024-01-22 PROCEDURE — 93284 PRGRMG EVAL IMPLANTABLE DFB: CPT | Performed by: INTERNAL MEDICINE

## 2024-01-22 NOTE — PROGRESS NOTES
Mr. Shook's Bi-V ICD has reached JONI and will need to be scheduled for battery change.  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

## 2024-01-29 ENCOUNTER — PREP FOR SURGERY (OUTPATIENT)
Dept: OTHER | Facility: HOSPITAL | Age: 74
End: 2024-01-29
Payer: MEDICARE

## 2024-01-29 ENCOUNTER — TELEPHONE (OUTPATIENT)
Dept: CARDIOLOGY | Facility: CLINIC | Age: 74
End: 2024-01-29
Payer: MEDICARE

## 2024-01-29 DIAGNOSIS — I50.22 CHRONIC SYSTOLIC (CONGESTIVE) HEART FAILURE: ICD-10-CM

## 2024-01-29 DIAGNOSIS — Z95.810 PRESENCE OF BIVENTRICULAR IMPLANTABLE CARDIOVERTER-DEFIBRILLATOR (ICD): Primary | ICD-10-CM

## 2024-01-29 DIAGNOSIS — I50.22 CHRONIC SYSTOLIC CONGESTIVE HEART FAILURE: ICD-10-CM

## 2024-01-29 RX ORDER — SODIUM CHLORIDE 9 MG/ML
40 INJECTION, SOLUTION INTRAVENOUS AS NEEDED
OUTPATIENT
Start: 2024-01-29

## 2024-01-29 RX ORDER — CEFAZOLIN SODIUM IN 0.9 % NACL 3 G/100 ML
3000 INTRAVENOUS SOLUTION, PIGGYBACK (ML) INTRAVENOUS ONCE
OUTPATIENT
Start: 2024-01-29 | End: 2024-01-29

## 2024-01-29 RX ORDER — SODIUM CHLORIDE 0.9 % (FLUSH) 0.9 %
10 SYRINGE (ML) INJECTION EVERY 12 HOURS SCHEDULED
OUTPATIENT
Start: 2024-01-29

## 2024-01-29 RX ORDER — SODIUM CHLORIDE 0.9 % (FLUSH) 0.9 %
10 SYRINGE (ML) INJECTION AS NEEDED
OUTPATIENT
Start: 2024-01-29

## 2024-01-29 NOTE — TELEPHONE ENCOUNTER
I spoke to patient and gave an arrival time of 7:00 on 02/06/24 for [employer] Bi-V pacemaker battery change. Patient was instructed to have a  for the day of the procedure and to arrive at the main entrance/registration area. Patient was instructed to continue all medications as usual. Patient was instructed to be NPO after midnight with sips of water as needed. Patient was instructed to have labs completed on the morning of the procedure due to location. Patient is agreeable with no other questions or concerns.

## 2024-01-31 NOTE — PRE-PROCEDURE INSTRUCTIONS
PATIENT INSTRUCTED TO BE:    - NPO AFTER MIDNIGHT EXCEPT CAN HAVE SIPS OF WATER WITH HIS MEDICATION PRIOR TO PROCEDURE    -  INSTRUCTED NO LOTIONS, JEWELRY, PIERCINGS, OR DEODORANT DAY OF THE PROCEDURE    - INSTRUCTED TO TAKE THE FOLLOWING MEDICATIONS THE DAY OF SURGERY:          COREG, EFFEXOR, HYOSCYAMINE. LYRICA, VITAMINS      - INSTRUCTED PT TO FOLLOW ANY INSTRUCTIONS GIVEN BY HIS CARDIOLOGIST.    - INFORMED PT AN INCISION WILL BE MADE IN UPPER CHEST FOR PACEMAKER PLACEMENT. HE/ SHE WILL GO HOME IF HAVING A BATTERY CHANGE TO THE PACEMAKER. IF A NEW DEVICE/PACEMAKER IS PLACED, HE/SHE WILL STAY OVER NIGHT FOR OBSERVATION AND MONITORING.     - INFORMED THE PATIENT HE CAN HAVE TWO VISITORS WITH HIM THE DAY OF THE PROCEDURE    - INSTRUCTED PT TO PARK IN THE PARKING GARAGE, ENTER THE HOSPITAL THRU ENTRANCE A AND  CHECK IN AT THE MAIN REGISTRATION DESK, AFTER REGISTRATION PT WILL BE TAKEN THE THE PREOP AREA FOR HIS PROCEDURE.    -ARRIVAL TIME GIVEN BY CARDIOLOGIST OFFICE, INFORMED PT IF ARRIVAL TIME CHANGES OR ADJUSTMENTS NEED TO BE MADE IN THEIR ARRIVAL TIME, HE/SHE WOULD RECEIVE A CALL.    -INSTRUCTED PT TO COME TO [facility] TO GET THEIR LABS/ EKG DONE 48 HOURS PRIOR TO PROCEDURE  -- PER ASHER NOTE AND PATIENT REPORTED ASHER AT DR GAY OFFICE INSTRUCTED PT IS OK TO GET BLOOD WORK DAY OF SURGERY DUE TO LOCATION HE LIVES.    - PATIENT VERBALIZED UNDERSTANDING

## 2024-02-02 NOTE — H&P
"Cardiology Consultation Note  [facility] CATH LAB          Patient Identification:  Anshul Shook Jr.      [id number]  73 y.o.        male  1950         PCP: Won Mcneill MD      History of Present Illness:     Patient is a 73-year-old gentleman with a known history of nonischemic cardiomyopathy after traumatic injury with his BiV ICD his ejection fraction did normalize and device has not reached JONI.  The patient has been stable symptomatically has not had any change in his overall breathing ability or increased lower extremity edema problems.  Denies any fever chills or cough    Past History:  Past Medical History:   Diagnosis Date    Arthritis     Back injury     Essential hypertension 08/11/2021    Irregular heartbeat     SEE'S DR GAY. DENIED  CP/SOB    Memory loss     Nonischemic cardiomyopathy 08/11/2021    Paraplegic gait     Presence of biventricular implantable cardioverter-defibrillator (ICD) 08/12/2021    [employer] BiV ICD     Past Surgical History:   Procedure Laterality Date    BACK SURGERY      CARDIAC DEFIBRILLATOR PLACEMENT      [facility]    COLONOSCOPY      COLONOSCOPY N/A 09/28/2022    Procedure: COLONOSCOPY WITH POLYPECTOMIES, HOT SNARE, CLIP APPLICATION X2;  Surgeon: Audie West MD;  Location: [facility] ENDOSCOPY;  Service: General;  Laterality: N/A;  COLON POLYPS     Allergies   Allergen Reactions    Penicillins Rash     Social History     Socioeconomic History    Marital status:    Tobacco Use    Smoking status: Never    Smokeless tobacco: Never   Vaping Use    Vaping Use: Never used   Substance and Sexual Activity    Alcohol use: Never    Drug use: Never    Sexual activity: Defer     Family History   Family history unknown: Yes     Medications:  No current facility-administered medications for this encounter.     Physical exam:    Ht 182.9 cm (72\")   Wt 107 kg (235 lb)   BMI 31.87 kg/m²  Body mass index is 31.87 kg/m².   Oxygen saturation   @FLOWAN(10::1)@ No " "data recorded    General Appearance:   no acute distress  HENT:   lips not cyanotic  Neck:  thyroid not enlarged  supple  Respiratory:  no respiratory distress  normal breath sounds  no rales  Cardiovascular:  no jugular venous distention  regular rhythm  apical impulse normal  S1 normal, S2 normal  no S3, no S4   no murmur  no rub, no thrill  no carotid bruit  pedal pulses normal  lower extremity edema: none    Gastrointestinal:   bowel sounds normal  non-tender  no hepatomegaly, no splenomegaly  Musculoskeletal:  no clubbing of fingers.   normocephalic, head atraumatic  Skin:   warm, dry  Neuro/Psychiatric:  judgement and insight appropriate  normal mood and affect    Cardiographics:   Results for orders placed during the hospital encounter of 01/17/24    Adult Transthoracic Echo Complete W/ Cont if Necessary Per Protocol    Interpretation Summary    The study is technically difficult for diagnosis.    Left ventricular systolic function is normal. Calculated left ventricular EF = 60.6%    Left ventricular diastolic function was indeterminate.    There is mild calcification of the aortic valve.      No results found for this or any previous visit.      Cardiolite (Tc-99m Sestamibi) stress test     Lab Review:           Invalid input(s): \"PLATELETCT\"                            CrCl cannot be calculated (Patient's most recent lab result is older than the maximum 30 days allowed.).         Invalid input(s): \"LDLCALC\"          Lab Results   Component Value Date    TSH 1.390 08/23/2023      No results found for: \"HGBA1C\"   No results found for: \"DIGOXIN\"   No components found for: \"DDIMERQUAN\"       Assessment:      Presence of biventricular implantable cardioverter-defibrillator (ICD)      Initial cardiac assessment: BiV ICD battery has reached JONI is left regularly and has had a chronically elevated threshold which has been stable in the last year discussed with him prior to this the possibility of battery change " versus consider for LV lead revision after going over the risk and benefits of both approaches decided upon just changing the battery life and trying to optimize for longevity.  Discussed risk of bleeding infection need for possible lead placement patient was agreeable proceeding      Recommendations:  1.  BiV ICD battery change        Thank you for allowing us to share in Anshul Shook Jr.  care.        Renzo Hobbs MD  2/2/2024  12:55 EST

## 2024-02-06 ENCOUNTER — HOSPITAL ENCOUNTER (OUTPATIENT)
Facility: HOSPITAL | Age: 74
Setting detail: HOSPITAL OUTPATIENT SURGERY
Discharge: HOME | End: 2024-02-06
Attending: INTERNAL MEDICINE | Admitting: INTERNAL MEDICINE
Payer: MEDICARE

## 2024-02-06 VITALS
BODY MASS INDEX: 31.83 KG/M2 | DIASTOLIC BLOOD PRESSURE: 70 MMHG | HEIGHT: 72 IN | SYSTOLIC BLOOD PRESSURE: 121 MMHG | TEMPERATURE: 98.1 F | HEART RATE: 71 BPM | RESPIRATION RATE: 16 BRPM | WEIGHT: 235 LBS | OXYGEN SATURATION: 95 %

## 2024-02-06 DIAGNOSIS — Z95.810 PRESENCE OF BIVENTRICULAR IMPLANTABLE CARDIOVERTER-DEFIBRILLATOR (ICD): ICD-10-CM

## 2024-02-06 LAB
ANION GAP SERPL CALCULATED.3IONS-SCNC: 7.8 MMOL/L (ref 5–15)
BUN SERPL-MCNC: 11 MG/DL (ref 8–23)
BUN/CREAT SERPL: 13.8 (ref 7–25)
CALCIUM SPEC-SCNC: 9.3 MG/DL (ref 8.6–10.5)
CHLORIDE SERPL-SCNC: 104 MMOL/L (ref 98–107)
CO2 SERPL-SCNC: 27.2 MMOL/L (ref 22–29)
CREAT SERPL-MCNC: 0.8 MG/DL (ref 0.76–1.27)
DEPRECATED RDW RBC AUTO: 47.9 FL (ref 37–54)
EGFRCR SERPLBLD CKD-EPI 2021: 92.3 ML/MIN/1.73
ERYTHROCYTE [DISTWIDTH] IN BLOOD BY AUTOMATED COUNT: 15.4 % (ref 12.3–15.4)
GLUCOSE SERPL-MCNC: 97 MG/DL (ref 65–99)
HCT VFR BLD AUTO: 40.4 % (ref 37.5–51)
HGB BLD-MCNC: 12.9 G/DL (ref 13–17.7)
INR PPP: 1.11 (ref 0.86–1.15)
MCH RBC QN AUTO: 27.1 PG (ref 26.6–33)
MCHC RBC AUTO-ENTMCNC: 31.9 G/DL (ref 31.5–35.7)
MCV RBC AUTO: 84.9 FL (ref 79–97)
PLATELET # BLD AUTO: 152 10*3/MM3 (ref 140–450)
PMV BLD AUTO: 11.4 FL (ref 6–12)
POTASSIUM SERPL-SCNC: 4.1 MMOL/L (ref 3.5–5.2)
PROTHROMBIN TIME: 14.5 SECONDS (ref 11.8–14.9)
RBC # BLD AUTO: 4.76 10*6/MM3 (ref 4.14–5.8)
SODIUM SERPL-SCNC: 139 MMOL/L (ref 136–145)
WBC NRBC COR # BLD AUTO: 8.25 10*3/MM3 (ref 3.4–10.8)

## 2024-02-06 PROCEDURE — C1882 AICD, OTHER THAN SING/DUAL: HCPCS | Performed by: INTERNAL MEDICINE

## 2024-02-06 PROCEDURE — 85027 COMPLETE CBC AUTOMATED: CPT | Performed by: INTERNAL MEDICINE

## 2024-02-06 PROCEDURE — 25010000002 MIDAZOLAM PER 1MG: Performed by: INTERNAL MEDICINE

## 2024-02-06 PROCEDURE — 85610 PROTHROMBIN TIME: CPT | Performed by: INTERNAL MEDICINE

## 2024-02-06 PROCEDURE — 25010000002 FENTANYL CITRATE (PF) 50 MCG/ML SOLUTION: Performed by: INTERNAL MEDICINE

## 2024-02-06 PROCEDURE — 25010000002 CEFAZOLIN IN DEXTROSE 2000 MG/ 100 ML SOLUTION: Performed by: INTERNAL MEDICINE

## 2024-02-06 PROCEDURE — 33264 RMVL & RPLCMT DFB GEN MLT LD: CPT | Performed by: INTERNAL MEDICINE

## 2024-02-06 PROCEDURE — 25010000002 BUPIVACAINE (PF) 0.5 % SOLUTION: Performed by: INTERNAL MEDICINE

## 2024-02-06 PROCEDURE — 33263 RMVL & RPLCMT DFB GEN 2 LEAD: CPT | Performed by: INTERNAL MEDICINE

## 2024-02-06 PROCEDURE — 80048 BASIC METABOLIC PNL TOTAL CA: CPT | Performed by: INTERNAL MEDICINE

## 2024-02-06 DEVICE — IMPLANTABLE DEVICE: Type: IMPLANTABLE DEVICE | Status: FUNCTIONAL

## 2024-02-06 RX ORDER — MIDAZOLAM HYDROCHLORIDE 2 MG/2ML
INJECTION, SOLUTION INTRAMUSCULAR; INTRAVENOUS
Status: DISCONTINUED | OUTPATIENT
Start: 2024-02-06 | End: 2024-02-06 | Stop reason: HOSPADM

## 2024-02-06 RX ORDER — SODIUM CHLORIDE 9 MG/ML
40 INJECTION, SOLUTION INTRAVENOUS AS NEEDED
Status: DISCONTINUED | OUTPATIENT
Start: 2024-02-06 | End: 2024-02-06 | Stop reason: HOSPADM

## 2024-02-06 RX ORDER — CEFAZOLIN SODIUM 2 G/100ML
INJECTION, SOLUTION INTRAVENOUS
Status: COMPLETED | OUTPATIENT
Start: 2024-02-06 | End: 2024-02-06

## 2024-02-06 RX ORDER — CEPHALEXIN 500 MG/1
500 CAPSULE ORAL 3 TIMES DAILY
Qty: 12 CAPSULE | Refills: 0 | Status: SHIPPED | OUTPATIENT
Start: 2024-02-06

## 2024-02-06 RX ORDER — ACETAMINOPHEN 650 MG/1
650 SUPPOSITORY RECTAL EVERY 4 HOURS PRN
Status: DISCONTINUED | OUTPATIENT
Start: 2024-02-06 | End: 2024-02-06 | Stop reason: HOSPADM

## 2024-02-06 RX ORDER — SODIUM CHLORIDE 0.9 % (FLUSH) 0.9 %
3 SYRINGE (ML) INJECTION EVERY 12 HOURS SCHEDULED
Status: DISCONTINUED | OUTPATIENT
Start: 2024-02-06 | End: 2024-02-06 | Stop reason: HOSPADM

## 2024-02-06 RX ORDER — ACETAMINOPHEN 325 MG/1
650 TABLET ORAL EVERY 4 HOURS PRN
Status: DISCONTINUED | OUTPATIENT
Start: 2024-02-06 | End: 2024-02-06 | Stop reason: HOSPADM

## 2024-02-06 RX ORDER — LIDOCAINE HYDROCHLORIDE 20 MG/ML
INJECTION, SOLUTION INFILTRATION; PERINEURAL
Status: DISCONTINUED | OUTPATIENT
Start: 2024-02-06 | End: 2024-02-06 | Stop reason: HOSPADM

## 2024-02-06 RX ORDER — BUPIVACAINE HYDROCHLORIDE 5 MG/ML
INJECTION, SOLUTION EPIDURAL; INTRACAUDAL
Status: DISCONTINUED | OUTPATIENT
Start: 2024-02-06 | End: 2024-02-06 | Stop reason: HOSPADM

## 2024-02-06 RX ORDER — FENTANYL CITRATE 50 UG/ML
INJECTION, SOLUTION INTRAMUSCULAR; INTRAVENOUS
Status: DISCONTINUED | OUTPATIENT
Start: 2024-02-06 | End: 2024-02-06 | Stop reason: HOSPADM

## 2024-02-06 RX ORDER — CEFAZOLIN SODIUM 2 G/100ML
2000 INJECTION, SOLUTION INTRAVENOUS ONCE
Status: DISCONTINUED | OUTPATIENT
Start: 2024-02-06 | End: 2024-02-06 | Stop reason: HOSPADM

## 2024-02-06 RX ORDER — SODIUM CHLORIDE 0.9 % (FLUSH) 0.9 %
10 SYRINGE (ML) INJECTION AS NEEDED
Status: DISCONTINUED | OUTPATIENT
Start: 2024-02-06 | End: 2024-02-06 | Stop reason: HOSPADM

## 2024-02-06 RX ORDER — CEFAZOLIN SODIUM 2 G/100ML
2 INJECTION, SOLUTION INTRAVENOUS ONCE
Qty: 100 ML | Refills: 0 | Status: DISCONTINUED | OUTPATIENT
Start: 2024-02-06 | End: 2024-02-06 | Stop reason: HOSPADM

## 2024-02-06 RX ORDER — SODIUM CHLORIDE 0.9 % (FLUSH) 0.9 %
10 SYRINGE (ML) INJECTION EVERY 12 HOURS SCHEDULED
Status: DISCONTINUED | OUTPATIENT
Start: 2024-02-06 | End: 2024-02-06 | Stop reason: HOSPADM

## 2024-02-06 RX ORDER — ONDANSETRON 2 MG/ML
4 INJECTION INTRAMUSCULAR; INTRAVENOUS EVERY 6 HOURS PRN
Status: DISCONTINUED | OUTPATIENT
Start: 2024-02-06 | End: 2024-02-06 | Stop reason: HOSPADM

## 2024-02-14 ENCOUNTER — CLINICAL SUPPORT NO REQUIREMENTS (OUTPATIENT)
Dept: CARDIOLOGY | Facility: CLINIC | Age: 74
End: 2024-02-14
Payer: MEDICARE

## 2024-02-14 DIAGNOSIS — Z95.810 PRESENCE OF BIVENTRICULAR IMPLANTABLE CARDIOVERTER-DEFIBRILLATOR (ICD): Primary | ICD-10-CM

## 2024-02-14 DIAGNOSIS — I42.8 NICM (NONISCHEMIC CARDIOMYOPATHY): ICD-10-CM

## 2024-05-06 ENCOUNTER — APPOINTMENT (OUTPATIENT)
Dept: GENERAL RADIOLOGY | Facility: HOSPITAL | Age: 74
DRG: 659 | End: 2024-05-06
Payer: MEDICARE

## 2024-05-06 ENCOUNTER — HOSPITAL ENCOUNTER (INPATIENT)
Facility: HOSPITAL | Age: 74
LOS: 4 days | Discharge: HOME | DRG: 659 | End: 2024-05-10
Attending: EMERGENCY MEDICINE | Admitting: HOSPITALIST
Payer: MEDICARE

## 2024-05-06 DIAGNOSIS — R13.12 DYSPHAGIA, OROPHARYNGEAL: ICD-10-CM

## 2024-05-06 DIAGNOSIS — N39.0 ACUTE UTI: Primary | ICD-10-CM

## 2024-05-06 DIAGNOSIS — N20.1 URETERAL STONE: ICD-10-CM

## 2024-05-06 DIAGNOSIS — T83.511A URINARY TRACT INFECTION ASSOCIATED WITH INDWELLING URETHRAL CATHETER, INITIAL ENCOUNTER: ICD-10-CM

## 2024-05-06 DIAGNOSIS — N39.0 URINARY TRACT INFECTION ASSOCIATED WITH INDWELLING URETHRAL CATHETER, INITIAL ENCOUNTER: ICD-10-CM

## 2024-05-06 LAB
ALBUMIN SERPL-MCNC: 3.2 G/DL (ref 3.5–5.2)
ALBUMIN/GLOB SERPL: 0.9 G/DL
ALP SERPL-CCNC: 67 U/L (ref 39–117)
ALT SERPL W P-5'-P-CCNC: 8 U/L (ref 1–41)
ANION GAP SERPL CALCULATED.3IONS-SCNC: 10.5 MMOL/L (ref 5–15)
AST SERPL-CCNC: 15 U/L (ref 1–40)
B PARAPERT DNA SPEC QL NAA+PROBE: NOT DETECTED
B PERT DNA SPEC QL NAA+PROBE: NOT DETECTED
BACTERIA UR QL AUTO: ABNORMAL /HPF
BASOPHILS # BLD AUTO: 0.08 10*3/MM3 (ref 0–0.2)
BASOPHILS NFR BLD AUTO: 0.3 % (ref 0–1.5)
BILIRUB SERPL-MCNC: 0.6 MG/DL (ref 0–1.2)
BILIRUB UR QL STRIP: NEGATIVE
BUN SERPL-MCNC: 16 MG/DL (ref 8–23)
BUN/CREAT SERPL: 15 (ref 7–25)
C PNEUM DNA NPH QL NAA+NON-PROBE: NOT DETECTED
CALCIUM SPEC-SCNC: 8.9 MG/DL (ref 8.6–10.5)
CHLORIDE SERPL-SCNC: 100 MMOL/L (ref 98–107)
CLARITY UR: ABNORMAL
CO2 SERPL-SCNC: 25.5 MMOL/L (ref 22–29)
COLOR UR: YELLOW
CREAT SERPL-MCNC: 1.07 MG/DL (ref 0.76–1.27)
D-LACTATE SERPL-SCNC: 1.2 MMOL/L (ref 0.5–2)
DEPRECATED RDW RBC AUTO: 47.7 FL (ref 37–54)
EGFRCR SERPLBLD CKD-EPI 2021: 71.9 ML/MIN/1.73
EOSINOPHIL # BLD AUTO: 0 10*3/MM3 (ref 0–0.4)
EOSINOPHIL NFR BLD AUTO: 0 % (ref 0.3–6.2)
ERYTHROCYTE [DISTWIDTH] IN BLOOD BY AUTOMATED COUNT: 15.9 % (ref 12.3–15.4)
FLUAV SUBTYP SPEC NAA+PROBE: NOT DETECTED
FLUBV RNA ISLT QL NAA+PROBE: NOT DETECTED
GLOBULIN UR ELPH-MCNC: 3.5 GM/DL
GLUCOSE BLDC GLUCOMTR-MCNC: 108 MG/DL (ref 70–99)
GLUCOSE SERPL-MCNC: 112 MG/DL (ref 65–99)
GLUCOSE UR STRIP-MCNC: NEGATIVE MG/DL
HADV DNA SPEC NAA+PROBE: NOT DETECTED
HCOV 229E RNA SPEC QL NAA+PROBE: NOT DETECTED
HCOV HKU1 RNA SPEC QL NAA+PROBE: NOT DETECTED
HCOV NL63 RNA SPEC QL NAA+PROBE: NOT DETECTED
HCOV OC43 RNA SPEC QL NAA+PROBE: NOT DETECTED
HCT VFR BLD AUTO: 38.8 % (ref 37.5–51)
HGB BLD-MCNC: 12.6 G/DL (ref 13–17.7)
HGB UR QL STRIP.AUTO: ABNORMAL
HMPV RNA NPH QL NAA+NON-PROBE: NOT DETECTED
HOLD SPECIMEN: NORMAL
HOLD SPECIMEN: NORMAL
HPIV1 RNA ISLT QL NAA+PROBE: NOT DETECTED
HPIV2 RNA SPEC QL NAA+PROBE: NOT DETECTED
HPIV3 RNA NPH QL NAA+PROBE: NOT DETECTED
HPIV4 P GENE NPH QL NAA+PROBE: NOT DETECTED
HYALINE CASTS UR QL AUTO: ABNORMAL /LPF
IMM GRANULOCYTES # BLD AUTO: 0.27 10*3/MM3 (ref 0–0.05)
IMM GRANULOCYTES NFR BLD AUTO: 1.2 % (ref 0–0.5)
INR PPP: 1.36 (ref 0.86–1.15)
KETONES UR QL STRIP: ABNORMAL
L PNEUMO1 AG UR QL IA: NEGATIVE
LEUKOCYTE ESTERASE UR QL STRIP.AUTO: ABNORMAL
LYMPHOCYTES # BLD AUTO: 0.93 10*3/MM3 (ref 0.7–3.1)
LYMPHOCYTES NFR BLD AUTO: 4 % (ref 19.6–45.3)
M PNEUMO IGG SER IA-ACNC: NOT DETECTED
MAGNESIUM SERPL-MCNC: 1.6 MG/DL (ref 1.6–2.4)
MCH RBC QN AUTO: 27.2 PG (ref 26.6–33)
MCHC RBC AUTO-ENTMCNC: 32.5 G/DL (ref 31.5–35.7)
MCV RBC AUTO: 83.6 FL (ref 79–97)
MONOCYTES # BLD AUTO: 2.02 10*3/MM3 (ref 0.1–0.9)
MONOCYTES NFR BLD AUTO: 8.7 % (ref 5–12)
NEUTROPHILS NFR BLD AUTO: 19.92 10*3/MM3 (ref 1.7–7)
NEUTROPHILS NFR BLD AUTO: 85.8 % (ref 42.7–76)
NITRITE UR QL STRIP: POSITIVE
NRBC BLD AUTO-RTO: 0.1 /100 WBC (ref 0–0.2)
PH UR STRIP.AUTO: 5.5 [PH] (ref 5–8)
PHOSPHATE SERPL-MCNC: 1.9 MG/DL (ref 2.5–4.5)
PLATELET # BLD AUTO: 169 10*3/MM3 (ref 140–450)
PMV BLD AUTO: 10.1 FL (ref 6–12)
POTASSIUM SERPL-SCNC: 3.5 MMOL/L (ref 3.5–5.2)
PROCALCITONIN SERPL-MCNC: 1.04 NG/ML (ref 0–0.25)
PROT SERPL-MCNC: 6.7 G/DL (ref 6–8.5)
PROT UR QL STRIP: ABNORMAL
PROTHROMBIN TIME: 17 SECONDS (ref 11.8–14.9)
RBC # BLD AUTO: 4.64 10*6/MM3 (ref 4.14–5.8)
RBC # UR STRIP: ABNORMAL /HPF
REF LAB TEST METHOD: ABNORMAL
RHINOVIRUS RNA SPEC NAA+PROBE: NOT DETECTED
RSV RNA NPH QL NAA+NON-PROBE: NOT DETECTED
S PNEUM AG SPEC QL LA: NEGATIVE
SARS-COV-2 RNA RESP QL NAA+PROBE: NOT DETECTED
SODIUM SERPL-SCNC: 136 MMOL/L (ref 136–145)
SP GR UR STRIP: 1.01 (ref 1–1.03)
SQUAMOUS #/AREA URNS HPF: ABNORMAL /HPF
TROPONIN T SERPL HS-MCNC: 26 NG/L
UROBILINOGEN UR QL STRIP: ABNORMAL
WBC # UR STRIP: ABNORMAL /HPF
WBC NRBC COR # BLD AUTO: 23.22 10*3/MM3 (ref 3.4–10.8)
WHOLE BLOOD HOLD COAG: NORMAL
WHOLE BLOOD HOLD SPECIMEN: NORMAL

## 2024-05-06 PROCEDURE — 99291 CRITICAL CARE FIRST HOUR: CPT

## 2024-05-06 PROCEDURE — 0202U NFCT DS 22 TRGT SARS-COV-2: CPT | Performed by: HOSPITALIST

## 2024-05-06 PROCEDURE — 87040 BLOOD CULTURE FOR BACTERIA: CPT | Performed by: EMERGENCY MEDICINE

## 2024-05-06 PROCEDURE — 25010000002 AZITHROMYCIN PER 500 MG: Performed by: HOSPITALIST

## 2024-05-06 PROCEDURE — 81001 URINALYSIS AUTO W/SCOPE: CPT

## 2024-05-06 PROCEDURE — 84484 ASSAY OF TROPONIN QUANT: CPT

## 2024-05-06 PROCEDURE — 87449 NOS EACH ORGANISM AG IA: CPT | Performed by: HOSPITALIST

## 2024-05-06 PROCEDURE — 83605 ASSAY OF LACTIC ACID: CPT | Performed by: EMERGENCY MEDICINE

## 2024-05-06 PROCEDURE — 25810000003 SODIUM CHLORIDE 0.9 % SOLUTION: Performed by: EMERGENCY MEDICINE

## 2024-05-06 PROCEDURE — 84100 ASSAY OF PHOSPHORUS: CPT | Performed by: HOSPITALIST

## 2024-05-06 PROCEDURE — 87077 CULTURE AEROBIC IDENTIFY: CPT | Performed by: FAMILY MEDICINE

## 2024-05-06 PROCEDURE — 81001 URINALYSIS AUTO W/SCOPE: CPT | Performed by: FAMILY MEDICINE

## 2024-05-06 PROCEDURE — 93005 ELECTROCARDIOGRAM TRACING: CPT | Performed by: EMERGENCY MEDICINE

## 2024-05-06 PROCEDURE — 99285 EMERGENCY DEPT VISIT HI MDM: CPT

## 2024-05-06 PROCEDURE — 36415 COLL VENOUS BLD VENIPUNCTURE: CPT

## 2024-05-06 PROCEDURE — 87186 SC STD MICRODIL/AGAR DIL: CPT | Performed by: FAMILY MEDICINE

## 2024-05-06 PROCEDURE — 25010000002 CEFTRIAXONE PER 250 MG: Performed by: EMERGENCY MEDICINE

## 2024-05-06 PROCEDURE — 80053 COMPREHEN METABOLIC PANEL: CPT

## 2024-05-06 PROCEDURE — 84145 PROCALCITONIN (PCT): CPT | Performed by: HOSPITALIST

## 2024-05-06 PROCEDURE — 83735 ASSAY OF MAGNESIUM: CPT

## 2024-05-06 PROCEDURE — 87077 CULTURE AEROBIC IDENTIFY: CPT | Performed by: EMERGENCY MEDICINE

## 2024-05-06 PROCEDURE — 85610 PROTHROMBIN TIME: CPT | Performed by: HOSPITALIST

## 2024-05-06 PROCEDURE — 93005 ELECTROCARDIOGRAM TRACING: CPT

## 2024-05-06 PROCEDURE — 82948 REAGENT STRIP/BLOOD GLUCOSE: CPT

## 2024-05-06 PROCEDURE — 87154 CUL TYP ID BLD PTHGN 6+ TRGT: CPT | Performed by: EMERGENCY MEDICINE

## 2024-05-06 PROCEDURE — 99223 1ST HOSP IP/OBS HIGH 75: CPT | Performed by: HOSPITALIST

## 2024-05-06 PROCEDURE — 0 DEXTROSE 5 % SOLUTION: Performed by: HOSPITALIST

## 2024-05-06 PROCEDURE — 85025 COMPLETE CBC W/AUTO DIFF WBC: CPT

## 2024-05-06 PROCEDURE — 71045 X-RAY EXAM CHEST 1 VIEW: CPT

## 2024-05-06 PROCEDURE — 87186 SC STD MICRODIL/AGAR DIL: CPT | Performed by: EMERGENCY MEDICINE

## 2024-05-06 PROCEDURE — 87086 URINE CULTURE/COLONY COUNT: CPT | Performed by: FAMILY MEDICINE

## 2024-05-06 PROCEDURE — 25810000003 SODIUM CHLORIDE 0.9 % SOLUTION: Performed by: HOSPITALIST

## 2024-05-06 RX ORDER — BISACODYL 10 MG
10 SUPPOSITORY, RECTAL RECTAL DAILY PRN
Status: DISCONTINUED | OUTPATIENT
Start: 2024-05-06 | End: 2024-05-10 | Stop reason: HOSPADM

## 2024-05-06 RX ORDER — DONEPEZIL HYDROCHLORIDE 10 MG/1
10 TABLET, FILM COATED ORAL NIGHTLY
Status: DISCONTINUED | OUTPATIENT
Start: 2024-05-06 | End: 2024-05-10 | Stop reason: HOSPADM

## 2024-05-06 RX ORDER — SODIUM CHLORIDE 0.9 % (FLUSH) 0.9 %
10 SYRINGE (ML) INJECTION AS NEEDED
Status: DISCONTINUED | OUTPATIENT
Start: 2024-05-06 | End: 2024-05-10 | Stop reason: HOSPADM

## 2024-05-06 RX ORDER — ACETAMINOPHEN 160 MG/5ML
650 SOLUTION ORAL EVERY 4 HOURS PRN
Status: DISCONTINUED | OUTPATIENT
Start: 2024-05-06 | End: 2024-05-10 | Stop reason: HOSPADM

## 2024-05-06 RX ORDER — AMOXICILLIN 250 MG
2 CAPSULE ORAL 2 TIMES DAILY
Status: DISCONTINUED | OUTPATIENT
Start: 2024-05-06 | End: 2024-05-10 | Stop reason: HOSPADM

## 2024-05-06 RX ORDER — MULTIPLE VITAMINS W/ MINERALS TAB 9MG-400MCG
1 TAB ORAL DAILY
Status: DISCONTINUED | OUTPATIENT
Start: 2024-05-06 | End: 2024-05-10 | Stop reason: HOSPADM

## 2024-05-06 RX ORDER — BISACODYL 5 MG/1
5 TABLET, DELAYED RELEASE ORAL DAILY PRN
Status: DISCONTINUED | OUTPATIENT
Start: 2024-05-06 | End: 2024-05-10 | Stop reason: HOSPADM

## 2024-05-06 RX ORDER — HYDROCODONE BITARTRATE AND ACETAMINOPHEN 5; 325 MG/1; MG/1
1 TABLET ORAL EVERY 4 HOURS PRN
Status: DISCONTINUED | OUTPATIENT
Start: 2024-05-06 | End: 2024-05-10 | Stop reason: HOSPADM

## 2024-05-06 RX ORDER — SODIUM CHLORIDE 9 MG/ML
40 INJECTION, SOLUTION INTRAVENOUS AS NEEDED
Status: DISCONTINUED | OUTPATIENT
Start: 2024-05-06 | End: 2024-05-10 | Stop reason: HOSPADM

## 2024-05-06 RX ORDER — ONDANSETRON 4 MG/1
4 TABLET, ORALLY DISINTEGRATING ORAL EVERY 6 HOURS PRN
Status: DISCONTINUED | OUTPATIENT
Start: 2024-05-06 | End: 2024-05-10 | Stop reason: HOSPADM

## 2024-05-06 RX ORDER — CARVEDILOL 12.5 MG/1
12.5 TABLET ORAL 2 TIMES DAILY WITH MEALS
Status: DISCONTINUED | OUTPATIENT
Start: 2024-05-06 | End: 2024-05-10 | Stop reason: HOSPADM

## 2024-05-06 RX ORDER — NITROGLYCERIN 0.4 MG/1
0.4 TABLET SUBLINGUAL
Status: DISCONTINUED | OUTPATIENT
Start: 2024-05-06 | End: 2024-05-10 | Stop reason: HOSPADM

## 2024-05-06 RX ORDER — ONDANSETRON 2 MG/ML
4 INJECTION INTRAMUSCULAR; INTRAVENOUS EVERY 6 HOURS PRN
Status: DISCONTINUED | OUTPATIENT
Start: 2024-05-06 | End: 2024-05-10 | Stop reason: HOSPADM

## 2024-05-06 RX ORDER — AMOXICILLIN 250 MG
2 CAPSULE ORAL 2 TIMES DAILY PRN
Status: DISCONTINUED | OUTPATIENT
Start: 2024-05-06 | End: 2024-05-10 | Stop reason: HOSPADM

## 2024-05-06 RX ORDER — PREGABALIN 100 MG/1
200 CAPSULE ORAL 2 TIMES DAILY
Status: DISCONTINUED | OUTPATIENT
Start: 2024-05-06 | End: 2024-05-10 | Stop reason: HOSPADM

## 2024-05-06 RX ORDER — ACETAMINOPHEN 650 MG/1
650 SUPPOSITORY RECTAL EVERY 4 HOURS PRN
Status: DISCONTINUED | OUTPATIENT
Start: 2024-05-06 | End: 2024-05-10 | Stop reason: HOSPADM

## 2024-05-06 RX ORDER — ACETAMINOPHEN 325 MG/1
650 TABLET ORAL EVERY 4 HOURS PRN
Status: DISCONTINUED | OUTPATIENT
Start: 2024-05-06 | End: 2024-05-10 | Stop reason: HOSPADM

## 2024-05-06 RX ORDER — ENOXAPARIN SODIUM 100 MG/ML
40 INJECTION SUBCUTANEOUS DAILY
Status: DISCONTINUED | OUTPATIENT
Start: 2024-05-06 | End: 2024-05-10 | Stop reason: HOSPADM

## 2024-05-06 RX ORDER — POLYETHYLENE GLYCOL 3350 17 G/17G
17 POWDER, FOR SOLUTION ORAL DAILY PRN
Status: DISCONTINUED | OUTPATIENT
Start: 2024-05-06 | End: 2024-05-06

## 2024-05-06 RX ORDER — ENOXAPARIN SODIUM 100 MG/ML
40 INJECTION SUBCUTANEOUS DAILY
Status: DISCONTINUED | OUTPATIENT
Start: 2024-05-06 | End: 2024-05-06

## 2024-05-06 RX ORDER — HYOSCYAMINE SULFATE 0.38 MG/1
375 TABLET, EXTENDED RELEASE ORAL EVERY 12 HOURS PRN
Status: DISCONTINUED | OUTPATIENT
Start: 2024-05-06 | End: 2024-05-10 | Stop reason: HOSPADM

## 2024-05-06 RX ORDER — VENLAFAXINE HYDROCHLORIDE 150 MG/1
150 CAPSULE, EXTENDED RELEASE ORAL DAILY
Status: DISCONTINUED | OUTPATIENT
Start: 2024-05-06 | End: 2024-05-10 | Stop reason: HOSPADM

## 2024-05-06 RX ORDER — SODIUM CHLORIDE 0.9 % (FLUSH) 0.9 %
10 SYRINGE (ML) INJECTION EVERY 12 HOURS SCHEDULED
Status: DISCONTINUED | OUTPATIENT
Start: 2024-05-06 | End: 2024-05-10 | Stop reason: HOSPADM

## 2024-05-06 RX ORDER — BISACODYL 5 MG/1
5 TABLET, DELAYED RELEASE ORAL DAILY PRN
Status: DISCONTINUED | OUTPATIENT
Start: 2024-05-06 | End: 2024-05-06

## 2024-05-06 RX ORDER — POLYETHYLENE GLYCOL 3350 17 G/17G
17 POWDER, FOR SOLUTION ORAL DAILY PRN
Status: DISCONTINUED | OUTPATIENT
Start: 2024-05-06 | End: 2024-05-10 | Stop reason: HOSPADM

## 2024-05-06 RX ORDER — ASCORBIC ACID 500 MG
250 TABLET ORAL DAILY
Status: DISCONTINUED | OUTPATIENT
Start: 2024-05-06 | End: 2024-05-10 | Stop reason: HOSPADM

## 2024-05-06 RX ORDER — MELATONIN
1000 DAILY
COMMUNITY

## 2024-05-06 RX ADMIN — CEFTRIAXONE SODIUM 2000 MG: 2 INJECTION, POWDER, FOR SOLUTION INTRAMUSCULAR; INTRAVENOUS at 13:31

## 2024-05-06 RX ADMIN — SODIUM PHOSPHATE, MONOBASIC, MONOHYDRATE AND SODIUM PHOSPHATE, DIBASIC, ANHYDROUS 15 MMOL: 142; 276 INJECTION, SOLUTION INTRAVENOUS at 18:28

## 2024-05-06 RX ADMIN — AZITHROMYCIN MONOHYDRATE 500 MG: 500 INJECTION, POWDER, LYOPHILIZED, FOR SOLUTION INTRAVENOUS at 16:11

## 2024-05-06 RX ADMIN — Medication 10 ML: at 16:12

## 2024-05-06 RX ADMIN — SODIUM CHLORIDE 1000 ML: 9 INJECTION, SOLUTION INTRAVENOUS at 13:31

## 2024-05-06 RX ADMIN — Medication 10 ML: at 13:31

## 2024-05-06 NOTE — ED PROVIDER NOTES
Time: 4:35 PM EDT  Date of encounter:  5/6/2024  Independent Historian/Clinical History and Information was obtained by:   Patient and Family    History is limited by: N/A    Chief Complaint: Weakness      History of Present Illness:  Patient is a 74 y.o. year old male who presents to the emergency department for evaluation of weakness.  Patient has had a fever with no chills.  Patient denies nausea, vomiting, and diarrhea.  Patient has no leg pain or swelling.  Patient has had urinary frequency with no dysuria.    HPI    Patient Care Team  Primary Care Provider: Won Mcneill MD    Past Medical History:     Allergies   Allergen Reactions    Penicillins Rash     Past Medical History:   Diagnosis Date    Arthritis     Back injury     Essential hypertension 08/11/2021    Irregular heartbeat     SEE'S DR GAY. DENIED  CP/SOB    Memory loss     Nonischemic cardiomyopathy 08/11/2021    Paraplegic gait     Presence of biventricular implantable cardioverter-defibrillator (ICD) 08/12/2021    [employer] BiV ICD     Past Surgical History:   Procedure Laterality Date    BACK SURGERY      CARDIAC DEFIBRILLATOR PLACEMENT      [facility]    COLONOSCOPY      COLONOSCOPY N/A 09/28/2022    Procedure: COLONOSCOPY WITH POLYPECTOMIES, HOT SNARE, CLIP APPLICATION X2;  Surgeon: Audie West MD;  Location: [facility] ENDOSCOPY;  Service: General;  Laterality: N/A;  COLON POLYPS    PACEMAKER REPLACEMENT Bilateral 2/6/2024    Procedure: PPM generator change bi-v;  Surgeon: Renzo Gay MD;  Location: [facility] CATH INVASIVE LOCATION;  Service: Cardiovascular;  Laterality: Bilateral;     Family History   Family history unknown: Yes       Home Medications:  Prior to Admission medications    Medication Sig Start Date End Date Taking? Authorizing Provider   carvedilol (COREG) 12.5 MG tablet Take 1 tablet by mouth 2 (Two) Times a Day With Meals. 8/3/21  Yes Provider, MD Harpal   Cholecalciferol 25 MCG (1000 UT) tablet Take 1 tablet by  "mouth Daily.   Yes Harpal Liu MD   donepezil (ARICEPT) 10 MG tablet Take 1 tablet by mouth Every Night. 1/9/22  Yes Harpal Liu MD   Effexor  MG 24 hr capsule Take 1 capsule by mouth Daily. 5/21/21  Yes Harpal Liu MD   Hyoscyamine Sulfate ER 0.375 MG tablet controlled-release Take  by mouth 2 (Two) Times a Day.   Yes Harpal Liu MD   multivitamin with minerals tablet tablet Take 1 tablet by mouth Daily.   Yes Harpal Liu MD   pregabalin (LYRICA) 200 MG capsule Take 1 capsule by mouth 2 (Two) Times a Day. 7/26/21  Yes Harpal Liu MD   vitamin C (ASCORBIC ACID) 250 MG tablet Take 1 tablet by mouth Daily.   Yes Harpal Liu MD   cephalexin (Keflex) 500 MG capsule Take 1 capsule by mouth 3 (Three) Times a Day. 2/6/24 5/6/24  Renzo Hobbs MD        Social History:   Social History     Tobacco Use    Smoking status: Never    Smokeless tobacco: Never   Vaping Use    Vaping status: Never Used   Substance Use Topics    Alcohol use: Never    Drug use: Never         Review of Systems:  Review of Systems   Constitutional:  Negative for chills and fever.   HENT:  Negative for congestion, rhinorrhea and sore throat.    Eyes:  Negative for pain and visual disturbance.   Respiratory:  Negative for apnea, cough, chest tightness and shortness of breath.    Cardiovascular:  Negative for chest pain and palpitations.   Gastrointestinal:  Negative for abdominal pain, diarrhea, nausea and vomiting.   Genitourinary:  Negative for difficulty urinating and dysuria.   Musculoskeletal:  Negative for joint swelling and myalgias.   Skin:  Negative for color change.   Neurological:  Positive for weakness. Negative for seizures and headaches.   Psychiatric/Behavioral: Negative.     All other systems reviewed and are negative.       Physical Exam:  /82   Pulse (!) 122   Temp (!) 101 °F (38.3 °C) (Oral)   Resp 22   Ht 182.9 cm (72\")   Wt 108 kg (237 lb 10.5 oz)  "  SpO2 96%   BMI 32.23 kg/m²     Physical Exam  Vitals and nursing note reviewed.   Constitutional:       General: He is not in acute distress.     Appearance: Normal appearance. He is not toxic-appearing.   HENT:      Head: Normocephalic and atraumatic.      Jaw: There is normal jaw occlusion.   Eyes:      General: Lids are normal.      Extraocular Movements: Extraocular movements intact.      Conjunctiva/sclera: Conjunctivae normal.      Pupils: Pupils are equal, round, and reactive to light.   Cardiovascular:      Rate and Rhythm: Normal rate and regular rhythm.      Pulses: Normal pulses.      Heart sounds: Normal heart sounds.   Pulmonary:      Effort: Pulmonary effort is normal. No respiratory distress.      Breath sounds: Normal breath sounds. No wheezing or rhonchi.   Abdominal:      General: Abdomen is flat.      Palpations: Abdomen is soft.      Tenderness: There is no abdominal tenderness. There is no guarding or rebound.   Musculoskeletal:         General: Normal range of motion.      Cervical back: Normal range of motion and neck supple.      Right lower leg: No edema.      Left lower leg: No edema.   Skin:     General: Skin is warm and dry.   Neurological:      Mental Status: He is alert and oriented to person, place, and time. Mental status is at baseline.   Psychiatric:         Mood and Affect: Mood normal.                  Procedures:  Procedures      Medical Decision Making:      Comorbidities that affect care:    Hypertension    External Notes reviewed:    Hospital Discharge Summary: Patient was last seen and admitted for cardiomyopathy.      The following orders were placed and all results were independently analyzed by me:  Orders Placed This Encounter   Procedures    Blood Culture - Blood,    Blood Culture - Blood,    Respiratory Panel PCR w/COVID-19(SARS-CoV-2) NEGIN/TIFFANY/DWAINE/PAD/COR/ESTRELLA In-House, NP Swab in UTM/VTM, 2 HR TAT - Swab, Nasopharynx    Respiratory Culture - Sputum, Throat     Legionella Antigen, Urine - Urine, Urine, Clean Catch    S. Pneumo Ag Urine or CSF - Urine, Urine, Clean Catch    XR Chest 1 View    [place] Draw    Comprehensive Metabolic Panel    Single High Sensitivity Troponin T    Magnesium    Urinalysis With Microscopic If Indicated (No Culture) - Urine, Clean Catch    CBC Auto Differential    Urinalysis, Microscopic Only - Urine, Clean Catch    Lactic Acid, Plasma    Procalcitonin    Basic Metabolic Panel    Magnesium    Phosphorus    Protime-INR    Diet: Regular/House; Fluid Consistency: Thin (IDDSI 0)    Undress & Gown    Continuous Pulse Oximetry    Vital Signs    Orthostatic Blood Pressure    Vital Signs    Intake & Output    Weigh Patient    Oral Care    Telemetry - Place Orders & Notify Provider of Results When Patient Experiences Acute Chest Pain, Dysrhythmia or Respiratory Distress    Insert Indwelling Urinary Catheter    Assess Need for Indwelling Urinary Catheter - Follow Removal Protocol    Urinary Catheter Care    Vital Signs    Intake & Output    Weigh Patient    Oral Care    Saline Lock & Maintain IV Access    Code Status and Medical Interventions:    Inpatient Hospitalist Consult    Inpatient Case Management  Consult    OT Consult: Eval & Treat ADL Performance Below Baseline    PT Consult: Eval & Treat As Tolerated; Discharge Placement Assessment    Oxygen Therapy- Nasal Cannula; Titrate 1-6 LPM Per SpO2; 90 - 95%    RT to Initiate Bronchopulmonary Hygiene Protocol    SLP Consult: Eval & Treat Swallow Disorder    POC Glucose Once    POC Glucose Once    ECG 12 Lead ED Triage Standing Order; Weak / Dizzy / AMS    Insert Peripheral IV    Insert Peripheral IV    Insert Peripheral IV    Inpatient Admission    Fall Precautions    CBC & Differential    Green Top (Gel)    Lavender Top    Gold Top - SST    Light Blue Top    CBC & Differential       Medications Given in the Emergency Department:  Medications   sodium chloride 0.9 % flush 10 mL (has no  administration in time range)   sodium chloride 0.9 % flush 10 mL (10 mL Intravenous Given 5/6/24 1612)   sodium chloride 0.9 % flush 10 mL (10 mL Intravenous Given 5/6/24 1331)   sodium chloride 0.9 % infusion 40 mL (has no administration in time range)   nitroglycerin (NITROSTAT) SL tablet 0.4 mg (has no administration in time range)   sennosides-docusate (PERICOLACE) 8.6-50 MG per tablet 2 tablet (has no administration in time range)     And   bisacodyl (DULCOLAX) suppository 10 mg (has no administration in time range)   cefTRIAXone (ROCEPHIN) 2000 mg/100 mL 0.9% NS IVPB (MBP) (has no administration in time range)   carvedilol (COREG) tablet 12.5 mg (has no administration in time range)   donepezil (ARICEPT) tablet 10 mg (has no administration in time range)   venlafaxine XR (EFFEXOR-XR) 24 hr capsule 150 mg (has no administration in time range)   hyoscyamine (LEVBID) 12 hr tablet 375 mcg (has no administration in time range)   multivitamin with minerals 1 tablet (has no administration in time range)   pregabalin (LYRICA) capsule 200 mg (has no administration in time range)   ascorbic acid (VITAMIN C) tablet 250 mg (has no administration in time range)   sodium chloride 0.9 % flush 10 mL (has no administration in time range)   sodium chloride 0.9 % flush 10 mL (has no administration in time range)   sodium chloride 0.9 % infusion 40 mL (has no administration in time range)   Enoxaparin Sodium (LOVENOX) syringe 40 mg (has no administration in time range)   sennosides-docusate (PERICOLACE) 8.6-50 MG per tablet 2 tablet (has no administration in time range)     And   polyethylene glycol (MIRALAX) packet 17 g (has no administration in time range)     And   bisacodyl (DULCOLAX) EC tablet 5 mg (has no administration in time range)     And   bisacodyl (DULCOLAX) suppository 10 mg (has no administration in time range)   acetaminophen (TYLENOL) tablet 650 mg (has no administration in time range)     Or   acetaminophen  (TYLENOL) 160 MG/5ML oral solution 650 mg (has no administration in time range)     Or   acetaminophen (TYLENOL) suppository 650 mg (has no administration in time range)   HYDROcodone-acetaminophen (NORCO) 5-325 MG per tablet 1 tablet (has no administration in time range)   ondansetron ODT (ZOFRAN-ODT) disintegrating tablet 4 mg (has no administration in time range)     Or   ondansetron (ZOFRAN) injection 4 mg (has no administration in time range)   AZITHROMYCIN 500 MG/250 ML 0.9% NS IVPB (vial-mate) (500 mg Intravenous New Bag 5/6/24 1611)   Sodium Phosphates-Dextrose IVPB 15 mmol (has no administration in time range)   cefTRIAXone (ROCEPHIN) 2000 mg/100 mL 0.9% NS IVPB (MBP) (0 mg Intravenous Stopped 5/6/24 1518)   sodium chloride 0.9 % bolus 1,000 mL (0 mL Intravenous Stopped 5/6/24 1518)        ED Course:         Labs:    Lab Results (last 24 hours)       Procedure Component Value Units Date/Time    POC Glucose Once [[id number]]  (Abnormal) Collected: 05/06/24 1040    Specimen: Blood Updated: 05/06/24 1042     Glucose 108 mg/dL      Comment: Serial Number: [id number]:  [id number]       CBC & Differential [[id number]]  (Abnormal) Collected: 05/06/24 1049    Specimen: Blood Updated: 05/06/24 1056    Narrative:      The following orders were created for panel order CBC & Differential.  Procedure                               Abnormality         Status                     ---------                               -----------         ------                     CBC Auto Differential[[id number]]        Abnormal            Final result                 Please view results for these tests on the individual orders.    Comprehensive Metabolic Panel [[id number]]  (Abnormal) Collected: 05/06/24 1049    Specimen: Blood Updated: 05/06/24 1123     Glucose 112 mg/dL      BUN 16 mg/dL      Creatinine 1.07 mg/dL      Sodium 136 mmol/L      Potassium 3.5 mmol/L      Chloride 100 mmol/L      CO2 25.5 mmol/L      Calcium 8.9 mg/dL       Total Protein 6.7 g/dL      Albumin 3.2 g/dL      ALT (SGPT) 8 U/L      AST (SGOT) 15 U/L      Alkaline Phosphatase 67 U/L      Total Bilirubin 0.6 mg/dL      Globulin 3.5 gm/dL      A/G Ratio 0.9 g/dL      BUN/Creatinine Ratio 15.0     Anion Gap 10.5 mmol/L      eGFR 71.9 mL/min/1.73     Narrative:      GFR Normal >60  Chronic Kidney Disease <60  Kidney Failure <15    The GFR formula is only valid for adults with stable renal function between ages 18 and 70.    Single High Sensitivity Troponin T [[id number]]  (Abnormal) Collected: 05/06/24 1049    Specimen: Blood Updated: 05/06/24 1123     HS Troponin T 26 ng/L     Narrative:      High Sensitive Troponin T Reference Range:  <14.0 ng/L- Negative Female for AMI  <22.0 ng/L- Negative Male for AMI  >=14 - Abnormal Female indicating possible myocardial injury.  >=22 - Abnormal Male indicating possible myocardial injury.   Clinicians would have to utilize clinical acumen, EKG, Troponin, and serial changes to determine if it is an Acute Myocardial Infarction or myocardial injury due to an underlying chronic condition.         Magnesium [[id number]]  (Normal) Collected: 05/06/24 1049    Specimen: Blood Updated: 05/06/24 1123     Magnesium 1.6 mg/dL     Urinalysis With Microscopic If Indicated (No Culture) - Urine, Clean Catch [[id number]]  (Abnormal) Collected: 05/06/24 1049    Specimen: Urine, Clean Catch Updated: 05/06/24 1113     Color, UA Yellow     Appearance, UA Turbid     pH, UA 5.5     Specific Gravity, UA 1.013     Glucose, UA Negative     Ketones, UA 15 mg/dL (1+)     Bilirubin, UA Negative     Blood, UA Moderate (2+)     Protein,  mg/dL (2+)     Leuk Esterase, UA Large (3+)     Nitrite, UA Positive     Urobilinogen, UA 1.0 E.U./dL    CBC Auto Differential [[id number]]  (Abnormal) Collected: 05/06/24 1049    Specimen: Blood Updated: 05/06/24 1056     WBC 23.22 10*3/mm3      RBC 4.64 10*6/mm3      Hemoglobin 12.6 g/dL      Hematocrit 38.8 %      MCV 83.6 fL      " MCH 27.2 pg      MCHC 32.5 g/dL      RDW 15.9 %      RDW-SD 47.7 fl      MPV 10.1 fL      Platelets 169 10*3/mm3      Neutrophil % 85.8 %      Lymphocyte % 4.0 %      Monocyte % 8.7 %      Eosinophil % 0.0 %      Basophil % 0.3 %      Immature Grans % 1.2 %      Neutrophils, Absolute 19.92 10*3/mm3      Lymphocytes, Absolute 0.93 10*3/mm3      Monocytes, Absolute 2.02 10*3/mm3      Eosinophils, Absolute 0.00 10*3/mm3      Basophils, Absolute 0.08 10*3/mm3      Immature Grans, Absolute 0.27 10*3/mm3      nRBC 0.1 /100 WBC     Urinalysis, Microscopic Only - Urine, Clean Catch [[id number]]  (Abnormal) Collected: 05/06/24 1049    Specimen: Urine, Clean Catch Updated: 05/06/24 1113     RBC, UA 0-2 /HPF      WBC, UA       Unable to determine due to loaded field     /HPF     Bacteria, UA 4+ /HPF      Squamous Epithelial Cells, UA 0-2 /HPF      Hyaline Casts, UA None Seen /LPF      Methodology Manual Light Microscopy    Procalcitonin [[id number]]  (Abnormal) Collected: 05/06/24 1049    Specimen: Blood Updated: 05/06/24 1640     Procalcitonin 1.04 ng/mL     Narrative:      As a Marker for Sepsis (Non-Neonates):    1. <0.5 ng/mL represents a low risk of severe sepsis and/or septic shock.  2. >2 ng/mL represents a high risk of severe sepsis and/or septic shock.    As a Marker for Lower Respiratory Tract Infections that require antibiotic therapy:    PCT on Admission    Antibiotic Therapy       6-12 Hrs later    >0.5                Strongly Recommended  >0.25 - <0.5        Recommended  0.1 - 0.25          Discouraged              Remeasure/reassess PCT  <0.1                Strongly Discouraged     Remeasure/reassess PCT    As 28 day mortality risk marker: \"Change in Procalcitonin Result\" (>80% or <=80%) if Day 0 (or Day 1) and Day 4 values are available. Refer to http://www.[facility]-pct-calculator.com    Change in PCT <=80%  A decrease of PCT levels below or equal to 80% defines a positive change in PCT test result representing a " higher risk for 28-day all-cause mortality of patients diagnosed with severe sepsis for septic shock.    Change in PCT >80%  A decrease of PCT levels of more than 80% defines a negative change in PCT result representing a lower risk for 28-day all-cause mortality of patients diagnosed with severe sepsis or septic shock.    This test is Prognostic not Diagnostic, if elevated correlate with clinical findings before administering antibiotic treatment.        Phosphorus [[id number]]  (Abnormal) Collected: 05/06/24 1049    Specimen: Blood Updated: 05/06/24 1639     Phosphorus 1.9 mg/dL     Protime-INR [[id number]]  (Abnormal) Collected: 05/06/24 1049    Specimen: Blood Updated: 05/06/24 1624     Protime 17.0 Seconds      INR 1.36    Narrative:      Suggested Therapeutic Ranges For Oral Anticoagulant Therapy:  Level of Therapy                      INR Target Range  Standard Dose                            2.0-3.0  High Dose                                2.5-3.5  Patients not receiving anticoagulant  Therapy Normal Range                     0.86-1.15    Lactic Acid, Plasma [[id number]]  (Normal) Collected: 05/06/24 1236    Specimen: Blood Updated: 05/06/24 1308     Lactate 1.2 mmol/L     Blood Culture - Blood, Arm, Right [[id number]] Collected: 05/06/24 1236    Specimen: Blood from Arm, Right Updated: 05/06/24 1247    Blood Culture - Blood, Arm, Left [[id number]] Collected: 05/06/24 1258    Specimen: Blood from Arm, Left Updated: 05/06/24 1304    Respiratory Panel PCR w/COVID-19(SARS-CoV-2) NEGIN/TIFFANY/DWAINE/PAD/COR/ESTRELLA In-House, NP Swab in UTM/VTM, 2 HR TAT - Swab, Nasopharynx [[id number]] Collected: 05/06/24 1622    Specimen: Swab from Nasopharynx Updated: 05/06/24 1626    Legionella Antigen, Urine - Urine, Urine, Clean Catch [[id number]] Collected: 05/06/24 1623    Specimen: Urine, Clean Catch Updated: 05/06/24 1626    S. Pneumo Ag Urine or CSF - Urine, Urine, Clean Catch [[id number]] Collected: 05/06/24 1623    Specimen: Urine,  Clean Catch Updated: 05/06/24 1626             Imaging:    XR Chest 1 View    Result Date: 5/6/2024  XR CHEST 1 VW-  Date of Exam: 5/6/2024 11:11 AM  Indication: Weak/Dizzy/AMS triage protocol  Comparison: None available.  Findings: Left subclavian transvenous pacemaker is in place. Heart size and pulmonary vessels are within normal limits. There is linear atelectasis within the left lower lobe. Lungs are otherwise clear. No pleural effusion or pneumothorax. There are multiple old healed right rib fractures. There is fusion of the thoracolumbar junction with left lateral cordelia and screw fixation, incompletely imaged. Tracheostomy tube is noted to be in place the tip at the thoracic inlet.      Impression:  1. Left lower lobe atelectasis. No other acute cardiopulmonary disease identified.   Electronically Signed By-Eliceo Garza MD On:5/6/2024 11:25 AM         Differential Diagnosis and Discussion:    Weakness: Based on the patient's history, signs, and symptoms, the diffential diagnosis includes but is not limited to meningitis, stroke, sepsis, subarachnoid hemorrhage, intracranial bleeding, encephalitis, acute uti, dehydration, MS, myasthenia gravis, Gu[place]an [place], migraine variant, neuromuscular disorders vertigo, electrolyte imbalance, and metabolic disorders.    All labs were reviewed and interpreted by me.  All X-rays impressions were independently interpreted by me.  EKG was interpreted by me.    MDM     The patient´s CBC that was reviewed and interpreted by me shows no abnormalities of critical concern. Of note, there is no anemia requiring a blood transfusion and the platelet count is acceptable.  The patient´s CMP that was reviewed and interpretted by me shows no abnormalities of critical concern. Of note, the patient´s sodium and potassium are acceptable. The patient´s liver enzymes are unremarkable. The patient´s renal function (creatinine) is preserved. The patient has a normal anion gap.  Urinalysis  shows 4+ bacteriuria.  Urinalysis also shows positive nitrites.        Sepsis criteria was met in the emergency department and the Sepsis protocol (including antibiotic administration) was initiated.      SIRS criteria considered:   1.  Temperature > 100.4 or <96.8    2.  Heart Rate > 90    3.  Respiratory Rate > 22    4.  WBC > 12K or <4K.             Severe Sepsis:     Respiratory: Mechanical Ventilation or Bipap  Hypotension: SBP > 90 or MAP < 65  Renal: Creatinine > 2  Metabolic: Lactic Acid > 2  Hematologic: Platelets < 100K or INR > 1.5  Hepatic: BILI  >  2  CNS: Sudden AMS     Septic Shock:     Severe Sepsis + Persistent hypotension or Lactic Acid > 4     Normal saline bolus, Antibiotics, and final disposition was based on these definitions.        Sepsis was recognized at 1210    Antibiotics were ordered.     30 cc/kg bolus was not indicated.       Total Critical Care time of 45 minutes. Total critical care time documented does not include time spent on separately billed procedures for services of nurses or physician assistants. I personally saw and examined the patient. I have reviewed all diagnostic interpretations and treatment plans as written. I was present for the key portions of any procedures performed and the inclusive time noted in any critical care statement. Critical care time includes patient management by me, time spent at the patients bedside,  time to review lab and imaging results, discussing patient care, documentation in the medical record, and time spent with family or caregiver.    Patient Care Considerations:    None      Consultants/Shared Management Plan:    Case was discussed with Dr. Gil who agrees with admission.    Social Determinants of Health:    Patient is independent, reliable, and has access to care.       Disposition and Care Coordination:    Admit:   Through independent evaluation of the patient's history, physical, and imperical data, the patient meets criteria for  inpatient admission to the hospital.        Final diagnoses:   Acute UTI        ED Disposition       ED Disposition   Decision to Admit    Condition   --    Comment   Level of Care: Telemetry [5]   Diagnosis: UTI (urinary tract infection) [022194]   Admitting Physician: BHUPINDER COBB [[id number]]   Attending Physician: BHUPINDER COBB [[id number]]   Certification: I Certify That Inpatient Hospital Services Are Medically Necessary For Greater Than 2 Midnights                 This medical record created using voice recognition software.             Karime Nelson MD  05/06/24 [id number]

## 2024-05-06 NOTE — ED NOTES
Family reports the patient started to have weakness and chills yesterday. Family reports the patient typically self caths himself but was unable to and is usually able to transfer to a wheelchair with assistance but was very difficult today.

## 2024-05-06 NOTE — CASE MANAGEMENT/SOCIAL WORK
Discharge Planning Assessment   Larry     Patient Name: Anshul Shook Jr.  MRN: [id number]  Today's Date: 5/6/2024    Admit Date: 5/6/2024        Discharge Needs Assessment       Row Name 05/06/24 1638       Living Environment    People in Home child(mina), adult;spouse    Unique Family Situation adult daughter is special needs    Current Living Arrangements home    Potentially Unsafe Housing Conditions none    In the past 12 months has the electric, gas, oil, or water company threatened to shut off services in your home? No    Primary Care Provided by self;spouse/significant other    Provides Primary Care For no one, unable/limited ability to care for self    Family Caregiver if Needed spouse    Quality of Family Relationships supportive    Able to Return to Prior Arrangements yes       Resource/Environmental Concerns    Resource/Environmental Concerns none    Transportation Concerns none       Transportation Needs    In the past 12 months, has lack of transportation kept you from medical appointments or from getting medications? no    In the past 12 months, has lack of transportation kept you from meetings, work, or from getting things needed for daily living? No       Food Insecurity    Within the past 12 months, you worried that your food would run out before you got the money to buy more. Never true    Within the past 12 months, the food you bought just didn't last and you didn't have money to get more. Never true       Transition Planning    Patient/Family Anticipates Transition to home with family;home    Patient/Family Anticipated Services at Transition none    Transportation Anticipated family or friend will provide       Discharge Needs Assessment    Readmission Within the Last 30 Days no previous admission in last 30 days    Equipment Currently Used at Home walker, standard;wheelchair;other (see comments)  temperpedic bed that adjusts at the head/foot of the bed; per wife pt is not strong enough to  utilize walker and is wheelchair bound    Concerns to be Addressed denies needs/concerns at this time    Anticipated Changes Related to Illness inability to care for self    Equipment Needed After Discharge none                   Discharge Plan    No documentation.                 Continued Care and Services - Admitted Since 5/6/2024    No active coordination exists for this encounter.          Demographic Summary       Row Name 05/06/24 1635       General Information    Admission Type inpatient    Arrived From home    Referral Source emergency department    Reason for Consult discharge planning    Preferred Language English       Contact Information    Permission Granted to Share Info With family/designee                   Functional Status       Row Name 05/06/24 1637       Functional Status    Usual Activity Tolerance moderate    Current Activity Tolerance poor       Physical Activity    On average, how many days per week do you engage in moderate to strenuous exercise (like a brisk walk)? 0 days    On average, how many minutes do you engage in exercise at this level? 0 min    Number of minutes of exercise per week 0       Assessment of Health Literacy    How often do you have someone help you read hospital materials? Sometimes    How often do you have problems learning about your medical condition because of difficulty understanding written information? Sometimes    How often do you have a problem understanding what is told to you about your medical condition? Sometimes    How confident are you filling out medical forms by yourself? Somewhat    Health Literacy Moderate       Functional Status, IADL    Medications assistive equipment and person    Meal Preparation completely dependent    Housekeeping completely dependent    Laundry completely dependent    Shopping completely dependent       Mental Status    General Appearance WDL WDL       Mental Status Summary    Recent Changes in Mental Status/Cognitive  Functioning ability to understand                   Psychosocial    No documentation.                  Abuse/Neglect       Row Name 05/06/24 1637       Personal Safety    Feels Unsafe at Home or Work/School no    Feels Threatened by Someone no    Does Anyone Try to Keep You From Having Contact with Others or Doing Things Outside Your Home? no    Physical Signs of Abuse Present no                   Legal       Row Name 05/06/24 1637       Financial Resource Strain    How hard is it for you to pay for the very basics like food, housing, medical care, and heating? Not hard                   Substance Abuse    No documentation.                  Patient Forms    No documentation.                 SW spoke with pts wife at bedside this date. At this time, pts wife denies any discharge needs. She reports that they do not have a living will or POA, however, they would like to speak to their  about completing this. She reports that pt does have chronic depression/anxiety and was recently changed from Effexor to a generic brand of Effexor resulting in pts mood being indifferent. She reports that he currently takes 150mg but believes the dosage could be adjusted. She reports that pts PCP is Dr. Won Mcneill and they use [facility] as their pharmacy. Pt does not have any  affiliation.     JASSI Shannon

## 2024-05-06 NOTE — PLAN OF CARE
Problem: Adult Inpatient Plan of Care  Goal: Plan of Care Review  Outcome: Ongoing, Progressing  Flowsheets (Taken 5/6/2024 3976)  Plan of Care Reviewed With: patient  Outcome Evaluation: Patient arrived to unit alert and oriented. Room air with no signs of distress. Patient is paraplegic with a tracheostomy. Patient plan of care discussed at bedside with patient and wife. Will continue with patient plan of care.   Goal Outcome Evaluation:  Plan of Care Reviewed With: patient           Outcome Evaluation: Patient arrived to unit alert and oriented. Room air with no signs of distress. Patient is paraplegic with a tracheostomy. Patient plan of care discussed at bedside with patient and wife. Will continue with patient plan of care.

## 2024-05-06 NOTE — H&P
[facility]  Date: 2024   Patient Name: Anshul Shook Jr.  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  Patient also has a trach.    Patient reports fevers and chills.  Wife is also noted that he has more secretions from his trach.    On arrival to the ED, patient's temperature 101.3, pulse of 128, respiratory rate of 22, blood pressure 154/75, and he saturating 95% on room air.    This x-ray shows left lower lobe atelectasis.    His initial troponin is 26, white blood cell count is 23.22.  Patient has positive nitrites in his urine.  Personal History     Past Medical History:  Past Medical History:   Diagnosis Date    Arthritis     Back injury     Essential hypertension 2021    Irregular heartbeat     SEE'S DR HOBBS. DENIED  CP/SOB    Memory loss     Nonischemic cardiomyopathy 2021    Paraplegic gait     Presence of biventricular implantable cardioverter-defibrillator (ICD) 2021    [employer] BiV ICD       Past Surgical History:  Past Surgical History:   Procedure Laterality Date    BACK SURGERY      CARDIAC DEFIBRILLATOR PLACEMENT      [facility]    COLONOSCOPY      COLONOSCOPY N/A 2022    Procedure: COLONOSCOPY WITH POLYPECTOMIES, HOT SNARE, CLIP APPLICATION X2;  Surgeon: Audie West MD;  Location: [facility] ENDOSCOPY;  Service: General;  Laterality: N/A;  COLON POLYPS    PACEMAKER REPLACEMENT Bilateral 2024    Procedure: PPM generator change bi-v;  Surgeon: Renzo Hobbs MD;  Location: [facility] CATH INVASIVE LOCATION;  Service: Cardiovascular;  Laterality: Bilateral;       Family History:   Family History   Family history unknown: Yes       Social History:   Social History      Socioeconomic History    Marital status:    Tobacco Use    Smoking status: Never    Smokeless tobacco: Never   Vaping Use    Vaping status: Never Used   Substance and Sexual Activity    Alcohol use: Never    Drug use: Never    Sexual activity: Defer       Home Medications:  Hyoscyamine Sulfate ER, carvedilol, cholecalciferol, donepezil, multivitamin with minerals, pregabalin, venlafaxine XR, and vitamin C    Allergies:  Allergies   Allergen Reactions    Penicillins Rash       Review of Systems   All systems were reviewed and negative except for: Cough, fevers and chills    Objective   Objective     Vitals:   Temp:  [101 °F (38.3 °C)-101.3 °F (38.5 °C)] 101 °F (38.3 °C)  Heart Rate:  [119-128] 122  Resp:  [19-22] 22  BP: (135-152)/(75-82) 150/82    Physical Exam    Constitutional: Awake, alert, no acute distress   Eyes: Pupils equal, sclerae anicteric, no conjunctival injection   HENT: NCAT, mucous membranes moist   Neck: Supple, no thyromegaly, no lymphadenopathy, trachea midline   Respiratory: Clear to auscultation bilaterally, nonlabored respirations    Cardiovascular: RRR, no murmurs, rubs, or gallops, palpable pedal pulses bilaterally   Gastrointestinal: Positive bowel sounds, soft, nontender, nondistended   Musculoskeletal: No bilateral ankle edema, no clubbing or cyanosis to extremities   Psychiatric: Appropriate affect, cooperative   Neurologic: Oriented x 3, strength symmetric in all extremities, Cranial Nerves grossly intact to confrontation, speech clear   Skin: No rashes     Result Review    Result Review:  I have personally reviewed the results from the time of this admission to 5/6/2024 15:47 EDT and agree with these findings:  [x]  Laboratory  [x]  Microbiology  [x]  Radiology  [x]  EKG/Telemetry   []  Cardiology/Vascular   [x]  Pathology  [x]  Old records  []  Other:      Assessment & Plan   Assessment / Plan   #1 UTI-started on Rocephin.  Follow urine cultures. Patient has indwelling  carrera.  Continue hyoscyamine.   #2 concern for pneumonia-on Azithromycin and Rocephin.  RT bronchopulmonary hygiene.  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  Continue Coreg.  #4 depression continue Effexor.  #5 dementia continue Aricept.  #6 chronic pain continue Lyrica.      DVT prophylaxis:  Medical DVT prophylaxis orders are present.        CODE STATUS:    Code Status (Patient has no pulse and is not breathing): CPR (Attempt to Resuscitate)  Medical Interventions (Patient has pulse or is breathing): Full Support      Admission Status:  I believe this patient meets inpatient status.    Electronically signed by Dany Gil DO, 05/06/24, 3:47 PM EDT.

## 2024-05-07 LAB
AMORPH URATE CRY URNS QL MICRO: ABNORMAL /HPF
ANION GAP SERPL CALCULATED.3IONS-SCNC: 10.4 MMOL/L (ref 5–15)
BACTERIA BLD CULT: ABNORMAL
BACTERIA UR QL AUTO: ABNORMAL /HPF
BASOPHILS # BLD AUTO: 0.05 10*3/MM3 (ref 0–0.2)
BASOPHILS NFR BLD AUTO: 0.2 % (ref 0–1.5)
BILIRUB UR QL STRIP: NEGATIVE
BOTTLE TYPE: ABNORMAL
BUN SERPL-MCNC: 18 MG/DL (ref 8–23)
BUN/CREAT SERPL: 16.7 (ref 7–25)
CALCIUM SPEC-SCNC: 8.2 MG/DL (ref 8.6–10.5)
CHLORIDE SERPL-SCNC: 103 MMOL/L (ref 98–107)
CLARITY UR: ABNORMAL
CO2 SERPL-SCNC: 23.6 MMOL/L (ref 22–29)
COLOR UR: YELLOW
CREAT SERPL-MCNC: 1.08 MG/DL (ref 0.76–1.27)
DEPRECATED RDW RBC AUTO: 48.5 FL (ref 37–54)
EGFRCR SERPLBLD CKD-EPI 2021: 71.1 ML/MIN/1.73
EOSINOPHIL # BLD AUTO: 0.06 10*3/MM3 (ref 0–0.4)
EOSINOPHIL NFR BLD AUTO: 0.2 % (ref 0.3–6.2)
ERYTHROCYTE [DISTWIDTH] IN BLOOD BY AUTOMATED COUNT: 16.1 % (ref 12.3–15.4)
GLUCOSE BLDC GLUCOMTR-MCNC: 110 MG/DL (ref 70–99)
GLUCOSE SERPL-MCNC: 123 MG/DL (ref 65–99)
GLUCOSE UR STRIP-MCNC: NEGATIVE MG/DL
HCT VFR BLD AUTO: 37.9 % (ref 37.5–51)
HGB BLD-MCNC: 12.3 G/DL (ref 13–17.7)
HGB UR QL STRIP.AUTO: ABNORMAL
HYALINE CASTS UR QL AUTO: ABNORMAL /LPF
IMM GRANULOCYTES # BLD AUTO: 0.33 10*3/MM3 (ref 0–0.05)
IMM GRANULOCYTES NFR BLD AUTO: 1.3 % (ref 0–0.5)
INR PPP: 1.44 (ref 0.86–1.15)
KETONES UR QL STRIP: ABNORMAL
LARGE PLATELETS: NORMAL
LEUKOCYTE ESTERASE UR QL STRIP.AUTO: ABNORMAL
LYMPHOCYTES # BLD AUTO: 1.31 10*3/MM3 (ref 0.7–3.1)
LYMPHOCYTES NFR BLD AUTO: 5.2 % (ref 19.6–45.3)
MAGNESIUM SERPL-MCNC: 1.6 MG/DL (ref 1.6–2.4)
MCH RBC QN AUTO: 27 PG (ref 26.6–33)
MCHC RBC AUTO-ENTMCNC: 32.5 G/DL (ref 31.5–35.7)
MCV RBC AUTO: 83.1 FL (ref 79–97)
MONOCYTES # BLD AUTO: 1.61 10*3/MM3 (ref 0.1–0.9)
MONOCYTES NFR BLD AUTO: 6.4 % (ref 5–12)
NEUTROPHILS NFR BLD AUTO: 21.63 10*3/MM3 (ref 1.7–7)
NEUTROPHILS NFR BLD AUTO: 86.7 % (ref 42.7–76)
NITRITE UR QL STRIP: POSITIVE
NRBC BLD AUTO-RTO: 0 /100 WBC (ref 0–0.2)
PH UR STRIP.AUTO: 5.5 [PH] (ref 5–8)
PHOSPHATE SERPL-MCNC: 2.7 MG/DL (ref 2.5–4.5)
PLATELET # BLD AUTO: 142 10*3/MM3 (ref 140–450)
PMV BLD AUTO: 10.4 FL (ref 6–12)
POTASSIUM SERPL-SCNC: 2.9 MMOL/L (ref 3.5–5.2)
PROT UR QL STRIP: ABNORMAL
PROTHROMBIN TIME: 17.9 SECONDS (ref 11.8–14.9)
RBC # BLD AUTO: 4.56 10*6/MM3 (ref 4.14–5.8)
RBC # UR STRIP: ABNORMAL /HPF
RBC MORPH BLD: NORMAL
REF LAB TEST METHOD: ABNORMAL
SODIUM SERPL-SCNC: 137 MMOL/L (ref 136–145)
SP GR UR STRIP: 1.01 (ref 1–1.03)
SQUAMOUS #/AREA URNS HPF: ABNORMAL /HPF
TRANS CELLS #/AREA URNS HPF: ABNORMAL /HPF
UROBILINOGEN UR QL STRIP: ABNORMAL
WBC # UR STRIP: ABNORMAL /HPF
WBC MORPH BLD: NORMAL
WBC NRBC COR # BLD AUTO: 24.99 10*3/MM3 (ref 3.4–10.8)

## 2024-05-07 PROCEDURE — 25010000002 ENOXAPARIN PER 10 MG: Performed by: HOSPITALIST

## 2024-05-07 PROCEDURE — 85025 COMPLETE CBC W/AUTO DIFF WBC: CPT | Performed by: HOSPITALIST

## 2024-05-07 PROCEDURE — 25010000002 MAGNESIUM SULFATE 2 GM/50ML SOLUTION: Performed by: FAMILY MEDICINE

## 2024-05-07 PROCEDURE — 94799 UNLISTED PULMONARY SVC/PX: CPT

## 2024-05-07 PROCEDURE — 25010000002 CEFTRIAXONE PER 250 MG: Performed by: FAMILY MEDICINE

## 2024-05-07 PROCEDURE — 80048 BASIC METABOLIC PNL TOTAL CA: CPT | Performed by: HOSPITALIST

## 2024-05-07 PROCEDURE — 99233 SBSQ HOSP IP/OBS HIGH 50: CPT | Performed by: FAMILY MEDICINE

## 2024-05-07 PROCEDURE — 85007 BL SMEAR W/DIFF WBC COUNT: CPT | Performed by: HOSPITALIST

## 2024-05-07 PROCEDURE — 36415 COLL VENOUS BLD VENIPUNCTURE: CPT | Performed by: HOSPITALIST

## 2024-05-07 PROCEDURE — 85610 PROTHROMBIN TIME: CPT | Performed by: HOSPITALIST

## 2024-05-07 PROCEDURE — 84100 ASSAY OF PHOSPHORUS: CPT | Performed by: HOSPITALIST

## 2024-05-07 PROCEDURE — 25810000003 SODIUM CHLORIDE 0.9 % SOLUTION: Performed by: HOSPITALIST

## 2024-05-07 PROCEDURE — 82948 REAGENT STRIP/BLOOD GLUCOSE: CPT

## 2024-05-07 PROCEDURE — 25010000002 AZITHROMYCIN PER 500 MG: Performed by: HOSPITALIST

## 2024-05-07 PROCEDURE — 92610 EVALUATE SWALLOWING FUNCTION: CPT

## 2024-05-07 PROCEDURE — 83735 ASSAY OF MAGNESIUM: CPT | Performed by: HOSPITALIST

## 2024-05-07 PROCEDURE — 97165 OT EVAL LOW COMPLEX 30 MIN: CPT

## 2024-05-07 PROCEDURE — 25010000002 POTASSIUM CHLORIDE 10 MEQ/100ML SOLUTION: Performed by: FAMILY MEDICINE

## 2024-05-07 RX ORDER — POTASSIUM CHLORIDE 7.45 MG/ML
10 INJECTION INTRAVENOUS
Status: COMPLETED | OUTPATIENT
Start: 2024-05-07 | End: 2024-05-07

## 2024-05-07 RX ORDER — POTASSIUM CHLORIDE 750 MG/1
40 CAPSULE, EXTENDED RELEASE ORAL
Status: COMPLETED | OUTPATIENT
Start: 2024-05-07 | End: 2024-05-07

## 2024-05-07 RX ORDER — MAGNESIUM SULFATE HEPTAHYDRATE 40 MG/ML
2 INJECTION, SOLUTION INTRAVENOUS ONCE
Status: COMPLETED | OUTPATIENT
Start: 2024-05-07 | End: 2024-05-07

## 2024-05-07 RX ORDER — MELATONIN
1000 DAILY
Status: DISCONTINUED | OUTPATIENT
Start: 2024-05-07 | End: 2024-05-10 | Stop reason: HOSPADM

## 2024-05-07 RX ADMIN — Medication 1000 UNITS: at 14:13

## 2024-05-07 RX ADMIN — Medication 10 ML: at 02:35

## 2024-05-07 RX ADMIN — CARVEDILOL 12.5 MG: 12.5 TABLET, FILM COATED ORAL at 08:41

## 2024-05-07 RX ADMIN — MAGNESIUM SULFATE HEPTAHYDRATE 2 G: 40 INJECTION, SOLUTION INTRAVENOUS at 14:13

## 2024-05-07 RX ADMIN — ACETAMINOPHEN 650 MG: 325 TABLET ORAL at 02:53

## 2024-05-07 RX ADMIN — PREGABALIN 200 MG: 100 CAPSULE ORAL at 02:33

## 2024-05-07 RX ADMIN — POTASSIUM CHLORIDE 40 MEQ: 750 CAPSULE, EXTENDED RELEASE ORAL at 14:12

## 2024-05-07 RX ADMIN — POTASSIUM CHLORIDE 10 MEQ: 7.46 INJECTION, SOLUTION INTRAVENOUS at 16:17

## 2024-05-07 RX ADMIN — POTASSIUM CHLORIDE 10 MEQ: 7.46 INJECTION, SOLUTION INTRAVENOUS at 14:13

## 2024-05-07 RX ADMIN — POTASSIUM CHLORIDE 40 MEQ: 750 CAPSULE, EXTENDED RELEASE ORAL at 17:25

## 2024-05-07 RX ADMIN — DONEPEZIL HYDROCHLORIDE 10 MG: 10 TABLET, FILM COATED ORAL at 20:38

## 2024-05-07 RX ADMIN — SENNOSIDES AND DOCUSATE SODIUM 2 TABLET: 50; 8.6 TABLET ORAL at 20:39

## 2024-05-07 RX ADMIN — ENOXAPARIN SODIUM 40 MG: 100 INJECTION SUBCUTANEOUS at 08:41

## 2024-05-07 RX ADMIN — Medication 10 ML: at 20:39

## 2024-05-07 RX ADMIN — PREGABALIN 200 MG: 100 CAPSULE ORAL at 08:45

## 2024-05-07 RX ADMIN — PREGABALIN 200 MG: 100 CAPSULE ORAL at 20:38

## 2024-05-07 RX ADMIN — SENNOSIDES AND DOCUSATE SODIUM 2 TABLET: 50; 8.6 TABLET ORAL at 08:41

## 2024-05-07 RX ADMIN — DONEPEZIL HYDROCHLORIDE 10 MG: 10 TABLET, FILM COATED ORAL at 02:32

## 2024-05-07 RX ADMIN — Medication 1 TABLET: at 08:41

## 2024-05-07 RX ADMIN — VENLAFAXINE HYDROCHLORIDE 150 MG: 150 CAPSULE, EXTENDED RELEASE ORAL at 12:23

## 2024-05-07 RX ADMIN — Medication 10 ML: at 08:46

## 2024-05-07 RX ADMIN — Medication 10 ML: at 08:45

## 2024-05-07 RX ADMIN — AZITHROMYCIN MONOHYDRATE 500 MG: 500 INJECTION, POWDER, LYOPHILIZED, FOR SOLUTION INTRAVENOUS at 18:11

## 2024-05-07 RX ADMIN — CARVEDILOL 12.5 MG: 12.5 TABLET, FILM COATED ORAL at 17:25

## 2024-05-07 RX ADMIN — OXYCODONE HYDROCHLORIDE AND ACETAMINOPHEN 250 MG: 500 TABLET ORAL at 08:48

## 2024-05-07 RX ADMIN — Medication 10 ML: at 02:33

## 2024-05-07 RX ADMIN — POTASSIUM CHLORIDE 10 MEQ: 7.46 INJECTION, SOLUTION INTRAVENOUS at 17:24

## 2024-05-07 RX ADMIN — CEFTRIAXONE SODIUM 2000 MG: 2 INJECTION, POWDER, FOR SOLUTION INTRAMUSCULAR; INTRAVENOUS at 08:42

## 2024-05-07 NOTE — PLAN OF CARE
Goal Outcome Evaluation:      VSS. Sinus Tach / paced. Lungs with scattered rhonchi, respirations even, unlabored. Patient and wife independent with trach care. Kyle catheter patent to bedside drainage. No acute distress noted.

## 2024-05-07 NOTE — PLAN OF CARE
Goal Outcome Evaluation:  Plan of Care Reviewed With: patient        Progress: no change  Outcome Evaluation: Patient is primarily bedbound and dependent with all self-care activities.  No additional OT services recommended at this time      Anticipated Discharge Disposition (OT): home with 24/7 care

## 2024-05-07 NOTE — PLAN OF CARE
Goal Outcome Evaluation:                 FUNCTIONAL ASSESSMENT INSTRUMENT: Patient currently scored a level 7 of 7 on Functional Communication Measures for swallowing indicating a 0% limitation in function.    ASSESSMENT/ PLAN OF CARE:  Pt presents with functional oropharyngeal swallow no clinical signs or symptoms of aspiration noted.  Vocal quality remained clear to auscultation.  REHAB POTENTIAL:  Pt has good rehab potential.  The following limitations may influence improvement/ length of tx medical diagnosis.    RECOMMENDATIONS:   1.   DIET: Regular diet-thin liquids    2.  POSITION: 90 degrees upright for all intake    3.  COMPENSATORY STRATEGIES: Slow rate, small bites/drinks    Pt/responsible party agrees with plan of care and has been informed of all alternatives, risks and benefits.    EDUCATION  The patient has been educated in the following areas:   Dysphagia (Swallowing Impairment).

## 2024-05-07 NOTE — PLAN OF CARE
Goal Outcome Evaluation:  Plan of Care Reviewed With: patient        Progress: improving     VSS this shift with no significant issues to report. Will continue with POC.

## 2024-05-07 NOTE — THERAPY EVALUATION
Acute Care - Speech Language Pathology   Swallow Initial Evaluation  [place]     Patient Name: Anshul Shook Jr.  : 1950  MRN: [id number]  Today's Date: 2024               Admit Date: 2024    Visit Dx:     ICD-10-CM ICD-9-CM   1. Acute UTI  N39.0 599.0   2. Dysphagia, oropharyngeal  R13.12 787.22     Patient Active Problem List   Diagnosis    Nonischemic cardiomyopathy    Essential hypertension    Presence of biventricular implantable cardioverter-defibrillator (ICD)    SANTAMARIA (nonalcoholic steatohepatitis)    UTI (urinary tract infection)     Past Medical History:   Diagnosis Date    Arthritis     Back injury     Essential hypertension 2021    Irregular heartbeat     SEE'S DR HOBBS. DENIED  CP/SOB    Memory loss     Nonischemic cardiomyopathy 2021    Paraplegic gait     Presence of biventricular implantable cardioverter-defibrillator (ICD) 2021    [employer] BiV ICD     Past Surgical History:   Procedure Laterality Date    BACK SURGERY      CARDIAC DEFIBRILLATOR PLACEMENT      [facility]    COLONOSCOPY      COLONOSCOPY N/A 2022    Procedure: COLONOSCOPY WITH POLYPECTOMIES, HOT SNARE, CLIP APPLICATION X2;  Surgeon: Audie West MD;  Location: [facility] ENDOSCOPY;  Service: General;  Laterality: N/A;  COLON POLYPS    PACEMAKER REPLACEMENT Bilateral 2024    Procedure: PPM generator change bi-v;  Surgeon: Renzo Hobbs MD;  Location: [facility] CATH INVASIVE LOCATION;  Service: Cardiovascular;  Laterality: Bilateral;       Inpatient Speech Pathology Dysphagia Evaluation        PAIN SCALE: None noted    PRECAUTIONS/CONTRAINDICATIONS: None noted     SUSPECTED ABUSE/NEGLECT/EXPLOITATION: None noted    SOCIAL/PSYCHOLOGICAL NEEDS/BARRIERS: None noted    PAST SOCIAL HISTORY: Lives at home with family    PRIOR FUNCTION: Dependent for care    PATIENT GOALS/EXPECTATIONS: Did not report    HISTORY: This patient is a 74-year-old admitted with the above diagnosis.  Patient had trach for  approximately 20 years.  Denies difficulty swallowing or any coughing or choking episodes when eating or drinking.    CURRENT DIET LEVEL: Regular diet    OBJECTIVE:    TEST ADMINISTERED: Clinical dysphagia evaluation    COGNITION/SAFETY AWARENESS: Alert    BEHAVIORAL OBSERVATIONS: Cooperative    ORAL MOTOR EXAM: Lingual and labial strength and range of motion within functional limits    VOICE QUALITY: Within normal limits    REFLEX EXAM: Deferred    POSTURE: 90 degrees upright    FEEDING/SWALLOWING FUNCTION: Assessed with full range of consistencies with thin liquids from spoon cup and straw, purée consistency soft and hard solids    CLINICAL OBSERVATIONS: Oral stage is characterized by good bolus preparation and control.  Timely oral transit.  Pharyngeal phase is timely with good laryngeal elevation per palpation.  No clinical signs or symptoms of aspiration noted.  Vocal quality remained clear to auscultation.    DYSPHAGIA CRITERIA: N/A    FUNCTIONAL ASSESSMENT INSTRUMENT: Patient currently scored a level 7 of 7 on Functional Communication Measures for swallowing indicating a 0% limitation in function.    ASSESSMENT/ PLAN OF CARE:  Pt presents with functional oropharyngeal swallow no clinical signs or symptoms of aspiration noted.  Vocal quality remained clear to auscultation.  REHAB POTENTIAL:  Pt has good rehab potential.  The following limitations may influence improvement/ length of tx medical diagnosis.    RECOMMENDATIONS:   1.   DIET: Regular diet-thin liquids    2.  POSITION: 90 degrees upright for all intake    3.  COMPENSATORY STRATEGIES: Slow rate, small bites/drinks    Pt/responsible party agrees with plan of care and has been informed of all alternatives, risks and benefits.    EDUCATION  The patient has been educated in the following areas:   Dysphagia (Swallowing Impairment).            Nighat Santos, SLP  5/7/2024

## 2024-05-07 NOTE — PROGRESS NOTES
Respiratory Therapist Broncho-Pulmonary Hygiene Progress Note      Patient Name:  Anshul Shook Jr.  YOB: 1950    Anshul Shook Jr. meets the qualification for Level 1 of the Bronco-Pulmonary Hygiene Protocol. This was based on my daily patient assessment and includes review of chest x-ray results, cough ability and quality, oxygenation, secretions or risk for secretion development and patient mobility.     Broncho-Pulmonary Hygiene Assessment:    Level of Movement: Other: Patient is paraplegic    Breath Sounds: Clear to slightly diminished    Cough: Strong, effective    Chest X-Ray: Possible signs of consolidation and/or atelectasis or clear.     Sputum Productions: Able to produce small to moderate amount, of moderately thick secretions    History and Physical: None    SpO2 to Oxygen Need: greater than 92% on room air or  less than 3L nasal canula    Current SpO2 is: 95 on 21%    Based on this information, I have completed the following interventions: Teach/Instruct patient on cough and deep breathe      Electronically signed by López Castaneda RRT, 05/07/24, 4:49 AM EDT.

## 2024-05-07 NOTE — THERAPY EVALUATION
Patient Name: Anshul Shook Jr.  : 1950    MRN: [id number]                              Today's Date: 2024       Admit Date: 2024    Visit Dx:     ICD-10-CM ICD-9-CM   1. Acute UTI  N39.0 599.0     Patient Active Problem List   Diagnosis    Nonischemic cardiomyopathy    Essential hypertension    Presence of biventricular implantable cardioverter-defibrillator (ICD)    SANTAMARIA (nonalcoholic steatohepatitis)    UTI (urinary tract infection)     Past Medical History:   Diagnosis Date    Arthritis     Back injury     Essential hypertension 2021    Irregular heartbeat     SEE'S DR HOBBS. DENIED  CP/SOB    Memory loss     Nonischemic cardiomyopathy 2021    Paraplegic gait     Presence of biventricular implantable cardioverter-defibrillator (ICD) 2021    [employer] BiV ICD     Past Surgical History:   Procedure Laterality Date    BACK SURGERY      CARDIAC DEFIBRILLATOR PLACEMENT      [facility]    COLONOSCOPY      COLONOSCOPY N/A 2022    Procedure: COLONOSCOPY WITH POLYPECTOMIES, HOT SNARE, CLIP APPLICATION X2;  Surgeon: Audie West MD;  Location: [facility] ENDOSCOPY;  Service: General;  Laterality: N/A;  COLON POLYPS    PACEMAKER REPLACEMENT Bilateral 2024    Procedure: PPM generator change bi-v;  Surgeon: Renzo Hobbs MD;  Location: [facility] ED CATH INVASIVE LOCATION;  Service: Cardiovascular;  Laterality: Bilateral;      General Information       Row Name 24 0846          OT Time and Intention    Document Type evaluation  -PG     Mode of Treatment occupational therapy;individual therapy  -PG       Row Name 2446          General Information    Patient Profile Reviewed yes  Patient reportedly bedbound due to paraplegia.  -PG     Prior Level of Function dependent:;ADL's  -PG     Existing Precautions/Restrictions no known precautions/restrictions  -PG     Barriers to Rehab none identified  -PG       Row Name 2446          Occupational Profile    Reason for  Services/Referral (Occupational Profile) Patient is a 74-year-old paraplegic male admitted for acute UTI.  Patient being evaluated by Occupational Therapy due to recent decline in ADL function.  No previous OT services identified  -       Row Name 05/07/24 0846          Living Environment    People in Home child(mina), adult  -PG       Row Name 05/07/24 0846          Cognition    Orientation Status (Cognition) oriented to;person;verbal cues/prompts needed for orientation  -PG               User Key  (r) = Recorded By, (t) = Taken By, (c) = Cosigned By      Initials Name Provider Type    PG Randolph Saleem, OT Occupational Therapist                     Mobility/ADL's       Row Name 05/07/24 0848          Bed Mobility    Bed Mobility bed mobility (all) activities  -     All Activities, [place] (Bed Mobility) dependent (less than 25% patient effort)  -       Row Name 05/07/24 0848          Activities of Daily Living    BADL Assessment/Intervention bathing;upper body dressing;lower body dressing;grooming;toileting  -       Row Name 05/07/24 0848          Bathing Assessment/Intervention    [place] Level (Bathing) bathing skills;maximum assist (25% patient effort)  -       Row Name 05/07/24 0848          Upper Body Dressing Assessment/Training    [place] Level (Upper Body Dressing) upper body dressing skills;dependent (less than 25% patient effort)  -       Row Name 05/07/24 0848          Lower Body Dressing Assessment/Training    [place] Level (Lower Body Dressing) lower body dressing skills;dependent (less than 25% patient effort)  -       Row Name 05/07/24 0848          Grooming Assessment/Training    [place] Level (Grooming) grooming skills;dependent (less than 25% patient effort)  -       Row Name 05/07/24 0848          Toileting Assessment/Training    [place] Level (Toileting) toileting skills;dependent (less than 25% patient effort)  -PG               User Key  (r) =  Recorded By, (t) = Taken By, (c) = Cosigned By      Initials Name Provider Type    PG Randolph Saleem, LATONYA Occupational Therapist                   Obj/Interventions       Row Name 05/07/24 0848          Sensory Assessment (Somatosensory)    Sensory Assessment (Somatosensory) unable/difficult to assess  -PG       [facility] Name 05/07/24 0848          Vision Assessment/Intervention    Visual Impairment/Limitations unable/difficult to assess  -PG       Row Name 05/07/24 0848          Range of Motion Comprehensive    General Range of Motion no range of motion deficits identified  -PG       Row Name 05/07/24 0848          Strength Comprehensive (MMT)    Comment, General Manual Muscle Testing (MMT) Assessment Grossly 4 -/5  -PG       [facility] Name 05/07/24 0848          Motor Skills    Motor Skills coordination;functional endurance  -PG     Coordination WFL;minimal impairment  -PG     Functional Endurance Fair minus  -PG               User Key  (r) = Recorded By, (t) = Taken By, (c) = Cosigned By      Initials Name Provider Type    PG Randolph Saleem OT Occupational Therapist                   Goals/Plan    No documentation.                  Clinical Impression       Row Name 05/07/24 0849          Pain Assessment    Pretreatment Pain Rating 2/10  -PG     Posttreatment Pain Rating 2/10  -PG     Pain Location - back  -PG       Row Name 05/07/24 0849          Plan of Care Review    Plan of Care Reviewed With patient  -PG     Progress no change  -PG     Outcome Evaluation Patient is primarily bedbound and dependent with all self-care activities.  No additional OT services recommended at this time  -PG       [facility] Name 05/07/24 0849          Therapy Assessment/Plan (OT)    Criteria for Skilled Therapeutic Interventions Met (OT) no;does not meet criteria for skilled intervention  -PG     Therapy Frequency (OT) evaluation only  -PG       Row Name 05/07/24 0849          Therapy Plan Review/Discharge Plan (OT)    Anticipated Discharge  Disposition (OT) home with 24/7 care  -PG               User Key  (r) = Recorded By, (t) = Taken By, (c) = Cosigned By      Initials Name Provider Type    PG Randolph Saleem OT Occupational Therapist                   Outcome Measures       Row Name 05/07/24 0850          How much help from another is currently needed...    Putting on and taking off regular lower body clothing? 1  -PG     Bathing (including washing, rinsing, and drying) 1  -PG     Toileting (which includes using toilet bed pan or urinal) 1  -PG     Putting on and taking off regular upper body clothing 1  -PG     Taking care of personal grooming (such as brushing teeth) 2  -PG     Eating meals 3  -PG     AM-PAC 6 Clicks Score (OT) 9  -PG       Row Name 05/07/24 0850          Functional Assessment    Outcome Measure Options AM-PAC 6 Clicks Daily Activity (OT);Optimal Instrument  -PG       Row Name 05/07/24 0850          Optimal Instrument    Optimal Instrument Optimal - 3  -PG     Bending/Stooping 5  -PG     Standing 5  -PG     Reaching 2  -PG     From the list, choose the 3 activities you would most like to be able to do without any difficulty Bending/stooping;Standing;Reaching  -PG     Total Score Optimal - 3 12  -PG               User Key  (r) = Recorded By, (t) = Taken By, (c) = Cosigned By      Initials Name Provider Type    Randolph Davison OT Occupational Therapist                      OT Recommendation and Plan  Therapy Frequency (OT): evaluation only  Plan of Care Review  Plan of Care Reviewed With: patient  Progress: no change  Outcome Evaluation: Patient is primarily bedbound and dependent with all self-care activities.  No additional OT services recommended at this time     Time Calculation:   Evaluation Complexity (OT)  Review Occupational Profile/Medical/Therapy History Complexity: brief/low complexity  Assessment, Occupational Performance/Identification of Deficit Complexity: 3-5 performance deficits  Clinical Decision Making Complexity  (OT): problem focused assessment/low complexity  Overall Complexity of Evaluation (OT): low complexity     Time Calculation- OT       Row Name 05/07/24 0850             Time Calculation- OT    OT Received On 05/07/24  -PG         Untimed Charges    OT Eval/Re-eval Minutes 30  -PG         Total Minutes    Untimed Charges Total Minutes 30  -PG       Total Minutes 30  -PG                User Key  (r) = Recorded By, (t) = Taken By, (c) = Cosigned By      Initials Name Provider Type    PG Randolph Saleem OT Occupational Therapist                  Therapy Charges for Today       Code Description Service Date Service Provider Modifiers Qty    [id number]  OT EVAL LOW COMPLEXITY 2 5/7/2024 Randolph Saleem OT GO 1                 Randolph Saleem OT  5/7/2024

## 2024-05-08 ENCOUNTER — APPOINTMENT (OUTPATIENT)
Dept: CT IMAGING | Facility: HOSPITAL | Age: 74
DRG: 659 | End: 2024-05-08
Payer: MEDICARE

## 2024-05-08 LAB
ANION GAP SERPL CALCULATED.3IONS-SCNC: 9.5 MMOL/L (ref 5–15)
BASOPHILS # BLD AUTO: 0.04 10*3/MM3 (ref 0–0.2)
BASOPHILS NFR BLD AUTO: 0.3 % (ref 0–1.5)
BUN SERPL-MCNC: 19 MG/DL (ref 8–23)
BUN/CREAT SERPL: 21.3 (ref 7–25)
CALCIUM SPEC-SCNC: 7.9 MG/DL (ref 8.6–10.5)
CHLORIDE SERPL-SCNC: 101 MMOL/L (ref 98–107)
CO2 SERPL-SCNC: 22.5 MMOL/L (ref 22–29)
CREAT SERPL-MCNC: 0.89 MG/DL (ref 0.76–1.27)
DEPRECATED RDW RBC AUTO: 47.9 FL (ref 37–54)
EGFRCR SERPLBLD CKD-EPI 2021: 88.8 ML/MIN/1.73
EOSINOPHIL # BLD AUTO: 0.12 10*3/MM3 (ref 0–0.4)
EOSINOPHIL NFR BLD AUTO: 0.8 % (ref 0.3–6.2)
ERYTHROCYTE [DISTWIDTH] IN BLOOD BY AUTOMATED COUNT: 15.9 % (ref 12.3–15.4)
GLUCOSE SERPL-MCNC: 95 MG/DL (ref 65–99)
HCT VFR BLD AUTO: 34.1 % (ref 37.5–51)
HGB BLD-MCNC: 11.2 G/DL (ref 13–17.7)
IMM GRANULOCYTES # BLD AUTO: 0.1 10*3/MM3 (ref 0–0.05)
IMM GRANULOCYTES NFR BLD AUTO: 0.6 % (ref 0–0.5)
INR PPP: 1.27 (ref 0.86–1.15)
LYMPHOCYTES # BLD AUTO: 1 10*3/MM3 (ref 0.7–3.1)
LYMPHOCYTES NFR BLD AUTO: 6.4 % (ref 19.6–45.3)
MAGNESIUM SERPL-MCNC: 1.9 MG/DL (ref 1.6–2.4)
MCH RBC QN AUTO: 27.3 PG (ref 26.6–33)
MCHC RBC AUTO-ENTMCNC: 32.8 G/DL (ref 31.5–35.7)
MCV RBC AUTO: 83.2 FL (ref 79–97)
MONOCYTES # BLD AUTO: 1.1 10*3/MM3 (ref 0.1–0.9)
MONOCYTES NFR BLD AUTO: 7 % (ref 5–12)
NEUTROPHILS NFR BLD AUTO: 13.31 10*3/MM3 (ref 1.7–7)
NEUTROPHILS NFR BLD AUTO: 84.9 % (ref 42.7–76)
NRBC BLD AUTO-RTO: 0 /100 WBC (ref 0–0.2)
PHOSPHATE SERPL-MCNC: 1.5 MG/DL (ref 2.5–4.5)
PLATELET # BLD AUTO: 128 10*3/MM3 (ref 140–450)
PMV BLD AUTO: 11.7 FL (ref 6–12)
POTASSIUM SERPL-SCNC: 4 MMOL/L (ref 3.5–5.2)
PROTHROMBIN TIME: 16.1 SECONDS (ref 11.8–14.9)
RBC # BLD AUTO: 4.1 10*6/MM3 (ref 4.14–5.8)
SODIUM SERPL-SCNC: 133 MMOL/L (ref 136–145)
WBC NRBC COR # BLD AUTO: 15.67 10*3/MM3 (ref 3.4–10.8)

## 2024-05-08 PROCEDURE — 99233 SBSQ HOSP IP/OBS HIGH 50: CPT | Performed by: FAMILY MEDICINE

## 2024-05-08 PROCEDURE — 84100 ASSAY OF PHOSPHORUS: CPT | Performed by: HOSPITALIST

## 2024-05-08 PROCEDURE — 25010000002 ENOXAPARIN PER 10 MG: Performed by: HOSPITALIST

## 2024-05-08 PROCEDURE — 25810000003 SODIUM CHLORIDE 0.9 % SOLUTION: Performed by: FAMILY MEDICINE

## 2024-05-08 PROCEDURE — 85025 COMPLETE CBC W/AUTO DIFF WBC: CPT | Performed by: HOSPITALIST

## 2024-05-08 PROCEDURE — 25010000002 CEFTRIAXONE PER 250 MG: Performed by: FAMILY MEDICINE

## 2024-05-08 PROCEDURE — 74176 CT ABD & PELVIS W/O CONTRAST: CPT

## 2024-05-08 PROCEDURE — 83735 ASSAY OF MAGNESIUM: CPT | Performed by: HOSPITALIST

## 2024-05-08 PROCEDURE — 80048 BASIC METABOLIC PNL TOTAL CA: CPT | Performed by: HOSPITALIST

## 2024-05-08 PROCEDURE — 36415 COLL VENOUS BLD VENIPUNCTURE: CPT | Performed by: HOSPITALIST

## 2024-05-08 PROCEDURE — 85610 PROTHROMBIN TIME: CPT | Performed by: HOSPITALIST

## 2024-05-08 RX ORDER — FENTANYL/ROPIVACAINE/NS/PF 2-625MCG/1
15 PLASTIC BAG, INJECTION (ML) EPIDURAL ONCE
Status: COMPLETED | OUTPATIENT
Start: 2024-05-08 | End: 2024-05-08

## 2024-05-08 RX ORDER — FENTANYL/ROPIVACAINE/NS/PF 2-625MCG/1
15 PLASTIC BAG, INJECTION (ML) EPIDURAL
Status: DISPENSED | OUTPATIENT
Start: 2024-05-08 | End: 2024-05-08

## 2024-05-08 RX ORDER — CHOLECALCIFEROL (VITAMIN D3) 125 MCG
10 CAPSULE ORAL NIGHTLY PRN
Status: DISCONTINUED | OUTPATIENT
Start: 2024-05-08 | End: 2024-05-10 | Stop reason: HOSPADM

## 2024-05-08 RX ADMIN — SENNOSIDES AND DOCUSATE SODIUM 2 TABLET: 50; 8.6 TABLET ORAL at 09:44

## 2024-05-08 RX ADMIN — Medication 10 ML: at 21:52

## 2024-05-08 RX ADMIN — Medication 10 ML: at 09:45

## 2024-05-08 RX ADMIN — Medication 10 ML: at 21:51

## 2024-05-08 RX ADMIN — ENOXAPARIN SODIUM 40 MG: 100 INJECTION SUBCUTANEOUS at 09:45

## 2024-05-08 RX ADMIN — Medication 10 ML: at 10:39

## 2024-05-08 RX ADMIN — PREGABALIN 200 MG: 100 CAPSULE ORAL at 21:51

## 2024-05-08 RX ADMIN — Medication 10 MG: at 01:28

## 2024-05-08 RX ADMIN — DONEPEZIL HYDROCHLORIDE 10 MG: 10 TABLET, FILM COATED ORAL at 21:52

## 2024-05-08 RX ADMIN — Medication 1 TABLET: at 09:44

## 2024-05-08 RX ADMIN — POTASSIUM PHOSPHATES 15 MMOL: 236; 224 INJECTION, SOLUTION INTRAVENOUS at 17:29

## 2024-05-08 RX ADMIN — CEFTRIAXONE SODIUM 2000 MG: 2 INJECTION, POWDER, FOR SOLUTION INTRAMUSCULAR; INTRAVENOUS at 09:44

## 2024-05-08 RX ADMIN — POTASSIUM PHOSPHATES 15 MMOL: 236; 224 INJECTION, SOLUTION INTRAVENOUS at 11:51

## 2024-05-08 RX ADMIN — Medication 1000 UNITS: at 09:44

## 2024-05-08 RX ADMIN — CARVEDILOL 12.5 MG: 12.5 TABLET, FILM COATED ORAL at 17:29

## 2024-05-08 RX ADMIN — PREGABALIN 200 MG: 100 CAPSULE ORAL at 09:44

## 2024-05-08 RX ADMIN — CARVEDILOL 12.5 MG: 12.5 TABLET, FILM COATED ORAL at 09:44

## 2024-05-08 RX ADMIN — OXYCODONE HYDROCHLORIDE AND ACETAMINOPHEN 250 MG: 500 TABLET ORAL at 10:38

## 2024-05-08 RX ADMIN — VENLAFAXINE HYDROCHLORIDE 150 MG: 150 CAPSULE, EXTENDED RELEASE ORAL at 10:39

## 2024-05-08 NOTE — PROGRESS NOTES
[facility]   Hospitalist Progress Note  Date: 2024  Patient Name: Anshul Shook Jr.  : 1950  MRN: [id number]  Date of admission: 2024      Subjective   Subjective     Chief Complaint: Follow-up fevers and chills    Summary:Anshul Shook Jr. is a 74 y.o. 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  Patient also has a trach and chronic indwelling Kyle catheter. Patient reports fevers and chills.  Wife is also noted that he has more secretions from his trach and has been much more lethargic in the past couple days. On arrival to the ED, patient's temperature 101.3, pulse of 128, respiratory rate of 22, blood pressure 154/75, and he saturating 95% on room air. This x-ray shows left lower lobe atelectasis. His initial troponin is 26, white blood cell count is 23.22.  Patient has positive nitrites in his urine.  Patient started on empiric antibiotics.  Hospitalist service contacted for admission for further evaluation and treatment.  Blood cultures positive for Klebsiella pneumoniae.  Antibiotics tailored to known sensitivities.    Interval Followup: Patient lying in bed appears to be resting fairly comfortably with family at the bedside.  Per family patient's mental status improved.  Patient indicates he is feeling much better.  Patient febrile overnight Tmax 103.1 though fever curve improved over this morning into this afternoon.  V-paced with PVCs 70s to 100 on telemetry review.  Blood pressure within normal limits.  Satting well on room air.  Serum potassium low this morning.  Mag slightly low.  White blood cell count not improved.  Hemoglobin stable.  Blood culture appears to be growing Klebsiella pneumoniae 1 out of 2 bottles.  Respiratory panel negative.  Urine culture pending.  No other issues per nursing.    Review of Systems  Constitutional: Positive for fatigue and fever.   HENT: Negative for sore throat and trouble swallowing.    Eyes:  Negative for pain and discharge.   Respiratory: Negative for cough and shortness of breath.    Cardiovascular: Negative for chest pain and palpitations.   Gastrointestinal: Negative for abdominal pain, nausea and vomiting.   Endocrine: Negative for cold intolerance and heat intolerance.   Genitourinary: Negative for difficulty urinating and dysuria.   Musculoskeletal: Negative for back pain and neck stiffness.   Skin: Negative for color change and rash.   Neurological: Negative for syncope and headaches.   Hematological: Negative for adenopathy.   Psychiatric/Behavioral: Negative for confusion and hallucinations.    Objective   Objective     Vitals:   Temp:  [97.8 °F (36.6 °C)-102 °F (38.9 °C)] 98.6 °F (37 °C)  Heart Rate:  [72-96] 96  Resp:  [18-20] 20  BP: (108-143)/(60-75) 108/65  Physical Exam   General: well-developed appearing stated age in no acute distress  HEENT: Normocephalic atraumatic moist membranes pupils equal round reactive light, no scleral icterus no conjunctival injection  Cardiovascular: regular rate and rhythm no murmurs rubs or gallops S1-S2, no lower extremity edema appreciated  Pulmonary: Clear to auscultation bilaterally no wheezes rales or rhonchi symmetric chest expansion, unlabored, no conversational dyspnea appreciated  Gastrointestinal: Soft nontender nondistended positive bowel sounds all 4 quadrants no rebound or guarding  Musculoskeletal: No clubbing cyanosis, warm and well-perfused, calves soft symmetric nontender bilaterally  Skin: Clean dry without rashes  Neuro: Cranial nerves II through XII intact grossly no sensorimotor deficits appreciated bilateral upper and lower extremities  Psych: Patient is calm cooperative and appropriate with exam not responding to internal stimuli  : Kyle catheter no bladder distention no suprapubic tenderness    Result Review    Result Review:  I have personally reviewed these results and agree with these findings:  [x]  Laboratory  LAB RESULTS:       Lab 05/07/24  0507 05/06/24  1236 05/06/24  1049   WBC 24.99*  --  23.22*   HEMOGLOBIN 12.3*  --  12.6*   HEMATOCRIT 37.9  --  38.8   PLATELETS 142  --  169   NEUTROS ABS 21.63*  --  19.92*   IMMATURE GRANS (ABS) 0.33*  --  0.27*   LYMPHS ABS 1.31  --  0.93   MONOS ABS 1.61*  --  2.02*   EOS ABS 0.06  --  0.00   MCV 83.1  --  83.6   PROCALCITONIN  --   --  1.04*   LACTATE  --  1.2  --    PROTIME 17.9*  --  17.0*         Lab 05/07/24  0507 05/06/24  1049   SODIUM 137 136   POTASSIUM 2.9* 3.5   CHLORIDE 103 100   CO2 23.6 25.5   ANION GAP 10.4 10.5   BUN 18 16   CREATININE 1.08 1.07   EGFR 71.1 71.9   GLUCOSE 123* 112*   CALCIUM 8.2* 8.9   MAGNESIUM 1.6 1.6   PHOSPHORUS 2.7 1.9*         Lab 05/06/24  1049   TOTAL PROTEIN 6.7   ALBUMIN 3.2*   GLOBULIN 3.5   ALT (SGPT) 8   AST (SGOT) 15   BILIRUBIN 0.6   ALK PHOS 67         Lab 05/07/24  0507 05/06/24  1049   HSTROP T  --  26*   PROTIME 17.9* 17.0*   INR 1.44* 1.36*                 Brief Urine Lab Results  (Last result in the past 365 days)        Color   Clarity   Blood   Leuk Est   Nitrite   Protein   CREAT   Urine HCG        05/06/24 1623 Yellow   Cloudy   Moderate (2+)   Moderate (2+)   Positive   100 mg/dL (2+)                 Microbiology Results (last 10 days)       Procedure Component Value - Date/Time    Legionella Antigen, Urine - Urine, Urine, Clean Catch [[id number]]  (Normal) Collected: 05/06/24 1623    Lab Status: Final result Specimen: Urine, Clean Catch Updated: 05/06/24 1646     LEGIONELLA ANTIGEN, URINE Negative    S. Pneumo Ag Urine or CSF - Urine, Urine, Clean Catch [[id number]]  (Normal) Collected: 05/06/24 1623    Lab Status: Final result Specimen: Urine, Clean Catch Updated: 05/06/24 1647     Strep Pneumo Ag Negative    Respiratory Panel PCR w/COVID-19(SARS-CoV-2) NEGIN/TIFFANY/DWAINE/PAD/COR/ESTRELLA In-House, NP Swab in UTM/VTM, 2 HR TAT - Swab, Nasopharynx [[id number]]  (Normal) Collected: 05/06/24 1622    Lab Status: Final result Specimen: Swab from  Nasopharynx Updated: 05/06/24 1718     ADENOVIRUS, PCR Not Detected     Coronavirus 229E Not Detected     Coronavirus HKU1 Not Detected     Coronavirus NL63 Not Detected     Coronavirus OC43 Not Detected     COVID19 Not Detected     Human Metapneumovirus Not Detected     Human Rhinovirus/Enterovirus Not Detected     Influenza A PCR Not Detected     Influenza B PCR Not Detected     Parainfluenza Virus 1 Not Detected     Parainfluenza Virus 2 Not Detected     Parainfluenza Virus 3 Not Detected     Parainfluenza Virus 4 Not Detected     RSV, PCR Not Detected     Bordetella pertussis pcr Not Detected     Bordetella parapertussis PCR Not Detected     Chlamydophila pneumoniae PCR Not Detected     Mycoplasma pneumo by PCR Not Detected    Narrative:      In the setting of a positive respiratory panel with a viral infection PLUS a negative procalcitonin without other underlying concern for bacterial infection, consider observing off antibiotics or discontinuation of antibiotics and continue supportive care. If the respiratory panel is positive for atypical bacterial infection (Bordetella pertussis, Chlamydophila pneumoniae, or Mycoplasma pneumoniae), consider antibiotic de-escalation to target atypical bacterial infection.    Blood Culture - Blood, Arm, Left [[id number]]  (Normal) Collected: 05/06/24 1258    Lab Status: Preliminary result Specimen: Blood from Arm, Left Updated: 05/07/24 1315     Blood Culture No growth at 24 hours    Blood Culture - Blood, Arm, Right [[id number]]  (Abnormal) Collected: 05/06/24 1236    Lab Status: Preliminary result Specimen: Blood from Arm, Right Updated: 05/07/24 0506     Blood Culture Abnormal Stain     Gram Stain Anaerobic Bottle Gram negative bacilli    Blood Culture ID, PCR - Blood, Arm, Right [[id number]]  (Abnormal) Collected: 05/06/24 1236    Lab Status: Final result Specimen: Blood from Arm, Right Updated: 05/07/24 0638     BCID, PCR Klebsiella pneumoniae group. Identification by  BCID2 PCR.     BOTTLE TYPE Anaerobic Bottle    Narrative:      No resistance genes detected.            [x]  Microbiology  [x]  Radiology  XR Chest 1 View    Result Date: 5/6/2024  Impression:  1. Left lower lobe atelectasis. No other acute cardiopulmonary disease identified.   Electronically Signed By-Eliceo Garza MD On:5/6/2024 11:25 AM       [x]  EKG/Telemetry   []  Cardiology/Vascular   []  Pathology  []  Old records  [x]  Other:  Scheduled Meds:vitamin C, 250 mg, Oral, Daily  azithromycin, 500 mg, Intravenous, Q24H  carvedilol, 12.5 mg, Oral, BID With Meals  [START ON 5/8/2024] cefTRIAXone, 2,000 mg, Intravenous, Daily  cholecalciferol, 1,000 Units, Oral, Daily  donepezil, 10 mg, Oral, Nightly  enoxaparin, 40 mg, Subcutaneous, Daily  multivitamin with minerals, 1 tablet, Oral, Daily  pregabalin, 200 mg, Oral, BID  senna-docusate sodium, 2 tablet, Oral, BID  sodium chloride, 10 mL, Intravenous, Q12H  sodium chloride, 10 mL, Intravenous, Q12H  venlafaxine XR, 150 mg, Oral, Daily      Continuous Infusions:   PRN Meds:.  acetaminophen **OR** acetaminophen **OR** acetaminophen    senna-docusate sodium **AND** polyethylene glycol **AND** bisacodyl **AND** bisacodyl    senna-docusate sodium **AND** [DISCONTINUED] polyethylene glycol **AND** [DISCONTINUED] bisacodyl **AND** bisacodyl    HYDROcodone-acetaminophen    hyoscyamine    nitroglycerin    ondansetron ODT **OR** ondansetron    sodium chloride    sodium chloride    sodium chloride    sodium chloride    sodium chloride      Assessment & Plan   Assessment / Plan     Assessment/Plan:  Sepsis present on admission due to Klebsiella bacteremia  Leukocytosis  Fever  Tachycardia  Klebsiella pneumonia bacteremia  Suspected Klebsiella UTI due to chronic indwelling Kyle catheter  Concern for possible pneumonia  Nonischemic cardiomyopathy  Depression  Dementia  Chronic pain  Chronic urinary retention with chronic indwelling Kyle catheter  Hypokalemia        Patient  admitted for further evaluation and treatment  Continue ceftriaxone 2 g daily for bacteremia UTI coverage and de-escalate based on cultures  Continue azithromycin for pneumonia coverage  Continue to monitor fever and leukocytosis  Continue Tylenol as needed for fever  Continue bronchopulmonary hygiene protocol  Continue carvedilol 12.5 mg twice daily and titrate as needed  Monitor volume status closely diuresis needed  Continue venlafaxine and donepezil  Continue oral analgesics as needed  Continue to monitor electrolytes replace as needed  Continue Kyle catheter care per protocol  Further inpatient orders recommendations pending clinical course       Discussed plan with bedside RN.    Disposition: Home with 24/7 care pending culture and sensitivity results resolution of sepsis    DVT prophylaxis:  Medical DVT prophylaxis orders are present.        CODE STATUS:   Level Of Support Discussed With: Patient  Code Status (Patient has no pulse and is not breathing): CPR (Attempt to Resuscitate)  Medical Interventions (Patient has pulse or is breathing): Full Support

## 2024-05-08 NOTE — PLAN OF CARE
Goal Outcome Evaluation:  Plan of Care Reviewed With: patient        Progress: no change  Outcome Evaluation: pt resed well during the night. no significant changes in pt condition this shift. will continue POC. Monisha Malagon RN

## 2024-05-08 NOTE — PROGRESS NOTES
[facility]   Hospitalist Progress Note  Date: 2024  Patient Name: Anshul Shook Jr.  : 1950  MRN: [id number]  Date of admission: 2024      Subjective   Subjective     Chief Complaint: Follow-up fevers and chills    Summary:Anshul Shook Jr. is a 74 y.o. 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  Patient also has a trach and chronic indwelling Kyle catheter. Patient reports fevers and chills.  Wife is also noted that he has more secretions from his trach and has been much more lethargic in the past couple days. On arrival to the ED, patient's temperature 101.3, pulse of 128, respiratory rate of 22, blood pressure 154/75, and he saturating 95% on room air. This x-ray shows left lower lobe atelectasis. His initial troponin is 26, white blood cell count is 23.22.  Patient has positive nitrites in his urine.  Patient started on empiric antibiotics.  Hospitalist service contacted for admission for further evaluation and treatment.  Blood cultures positive for Klebsiella pneumoniae.  Antibiotics tailored to known sensitivities.  Patient had recurrent fever.  CT of the abdomen pursued and pending.    Interval Followup: Patient lying in bed appears to be resting fairly comfortably with family at the bedside.  Patient's mental status remains at baseline per family at the bedside.  Patient indicates he is feeling good.  Patient febrile overnight Tmax 100.8.  V-paced with PVCs 80s to 90s on telemetry review.  Blood pressure low normal limits.  Satting well on room air.  Serum potassium within normal limits this morning.  Phosphorus low this morning.  White blood cell count improving.  Hemoglobin fairly stable.  Blood culture appears to be growing Klebsiella pneumoniae 1 out of 2 bottles detected by PCR.  Respiratory panel negative.  Urine culture growing gram-negative bacilli.  No other issues per nursing.    Review of Systems  Constitutional: Positive  for fatigue and fever.   HENT: Negative for sore throat and trouble swallowing.    Eyes: Negative for pain and discharge.   Respiratory: Negative for cough and shortness of breath.    Cardiovascular: Negative for chest pain and palpitations.   Gastrointestinal: Negative for abdominal pain, nausea and vomiting.   Endocrine: Negative for cold intolerance and heat intolerance.   Genitourinary: Negative for difficulty urinating and dysuria.   Musculoskeletal: Negative for back pain and neck stiffness.   Skin: Negative for color change and rash.   Neurological: Negative for syncope and headaches.   Hematological: Negative for adenopathy.   Psychiatric/Behavioral: Negative for confusion and hallucinations.    Objective   Objective     Vitals:   Temp:  [98.2 °F (36.8 °C)-100.8 °F (38.2 °C)] 98.6 °F (37 °C)  Heart Rate:  [82-96] 82  Resp:  [19-20] 19  BP: (101-112)/(55-65) 103/62  Physical Exam   General: well-developed appearing stated age in no acute distress  HEENT: Normocephalic atraumatic moist membranes pupils equal round reactive light, no scleral icterus no conjunctival injection  Cardiovascular: regular rate and rhythm no murmurs rubs or gallops S1-S2, no lower extremity edema appreciated  Pulmonary: Clear to auscultation bilaterally no wheezes rales or rhonchi symmetric chest expansion, unlabored, no conversational dyspnea appreciated  Gastrointestinal: Soft nontender nondistended positive bowel sounds all 4 quadrants no rebound or guarding  Musculoskeletal: No clubbing cyanosis, warm and well-perfused, calves soft symmetric nontender bilaterally  Skin: Clean dry without rashes  Neuro: Cranial nerves II through XII intact grossly no sensorimotor deficits appreciated bilateral upper and lower extremities  Psych: Patient is calm cooperative and appropriate with exam not responding to internal stimuli  : Kyle catheter no bladder distention no suprapubic tenderness    Result Review    Result Review:  I have  personally reviewed these results and agree with these findings:  [x]  Laboratory  LAB RESULTS:      Lab 05/08/24  0502 05/07/24  0507 05/06/24  1236 05/06/24  1049   WBC 15.67* 24.99*  --  23.22*   HEMOGLOBIN 11.2* 12.3*  --  12.6*   HEMATOCRIT 34.1* 37.9  --  38.8   PLATELETS 128* 142  --  169   NEUTROS ABS 13.31* 21.63*  --  19.92*   IMMATURE GRANS (ABS) 0.10* 0.33*  --  0.27*   LYMPHS ABS 1.00 1.31  --  0.93   MONOS ABS 1.10* 1.61*  --  2.02*   EOS ABS 0.12 0.06  --  0.00   MCV 83.2 83.1  --  83.6   PROCALCITONIN  --   --   --  1.04*   LACTATE  --   --  1.2  --    PROTIME 16.1* 17.9*  --  17.0*         Lab 05/08/24  0502 05/07/24  0507 05/06/24  1049   SODIUM 133* 137 136   POTASSIUM 4.0 2.9* 3.5   CHLORIDE 101 103 100   CO2 22.5 23.6 25.5   ANION GAP 9.5 10.4 10.5   BUN 19 18 16   CREATININE 0.89 1.08 1.07   EGFR 88.8 71.1 71.9   GLUCOSE 95 123* 112*   CALCIUM 7.9* 8.2* 8.9   MAGNESIUM 1.9 1.6 1.6   PHOSPHORUS 1.5* 2.7 1.9*         Lab 05/06/24  1049   TOTAL PROTEIN 6.7   ALBUMIN 3.2*   GLOBULIN 3.5   ALT (SGPT) 8   AST (SGOT) 15   BILIRUBIN 0.6   ALK PHOS 67         Lab 05/08/24  0502 05/07/24  0507 05/06/24  1049   HSTROP T  --   --  26*   PROTIME 16.1* 17.9* 17.0*   INR 1.27* 1.44* 1.36*                 Brief Urine Lab Results  (Last result in the past 365 days)        Color   Clarity   Blood   Leuk Est   Nitrite   Protein   CREAT   Urine HCG        05/06/24 1623 Yellow   Cloudy   Moderate (2+)   Moderate (2+)   Positive   100 mg/dL (2+)                 Microbiology Results (last 10 days)       Procedure Component Value - Date/Time    Legionella Antigen, Urine - Urine, Urine, Clean Catch [[id number]]  (Normal) Collected: 05/06/24 1623    Lab Status: Final result Specimen: Urine, Clean Catch Updated: 05/06/24 1646     LEGIONELLA ANTIGEN, URINE Negative    S. Pneumo Ag Urine or CSF - Urine, Urine, Clean Catch [[id number]]  (Normal) Collected: 05/06/24 1623    Lab Status: Final result Specimen: Urine, Clean Catch  Updated: 05/06/24 1647     Strep Pneumo Ag Negative    Urine Culture - Urine, Urine, Clean Catch [[id number]]  (Abnormal) Collected: 05/06/24 1623    Lab Status: Preliminary result Specimen: Urine, Clean Catch Updated: 05/08/24 1253     Urine Culture 50,000 CFU/mL Gram Negative Bacilli    Narrative:      Colonization of the urinary tract without infection is common. Treatment is discouraged unless the patient is symptomatic, pregnant, or undergoing an invasive urologic procedure.    Respiratory Panel PCR w/COVID-19(SARS-CoV-2) NEGIN/TIFFANY/DWAINE/PAD/COR/ESTRELLA In-House, NP Swab in UTM/VTM, 2 HR TAT - Swab, Nasopharynx [[id number]]  (Normal) Collected: 05/06/24 1622    Lab Status: Final result Specimen: Swab from Nasopharynx Updated: 05/06/24 1718     ADENOVIRUS, PCR Not Detected     Coronavirus 229E Not Detected     Coronavirus HKU1 Not Detected     Coronavirus NL63 Not Detected     Coronavirus OC43 Not Detected     COVID19 Not Detected     Human Metapneumovirus Not Detected     Human Rhinovirus/Enterovirus Not Detected     Influenza A PCR Not Detected     Influenza B PCR Not Detected     Parainfluenza Virus 1 Not Detected     Parainfluenza Virus 2 Not Detected     Parainfluenza Virus 3 Not Detected     Parainfluenza Virus 4 Not Detected     RSV, PCR Not Detected     Bordetella pertussis pcr Not Detected     Bordetella parapertussis PCR Not Detected     Chlamydophila pneumoniae PCR Not Detected     Mycoplasma pneumo by PCR Not Detected    Narrative:      In the setting of a positive respiratory panel with a viral infection PLUS a negative procalcitonin without other underlying concern for bacterial infection, consider observing off antibiotics or discontinuation of antibiotics and continue supportive care. If the respiratory panel is positive for atypical bacterial infection (Bordetella pertussis, Chlamydophila pneumoniae, or Mycoplasma pneumoniae), consider antibiotic de-escalation to target atypical bacterial infection.     Blood Culture - Blood, Arm, Left [[id number]]  (Normal) Collected: 05/06/24 1258    Lab Status: Preliminary result Specimen: Blood from Arm, Left Updated: 05/08/24 1316     Blood Culture No growth at 2 days    Blood Culture - Blood, Arm, Right [[id number]]  (Abnormal) Collected: 05/06/24 1236    Lab Status: Preliminary result Specimen: Blood from Arm, Right Updated: 05/08/24 0631     Blood Culture Gram Negative Bacilli     Isolated from Anaerobic Bottle     Gram Stain Anaerobic Bottle Gram negative bacilli    Blood Culture ID, PCR - Blood, Arm, Right [[id number]]  (Abnormal) Collected: 05/06/24 1236    Lab Status: Final result Specimen: Blood from Arm, Right Updated: 05/07/24 0638     BCID, PCR Klebsiella pneumoniae group. Identification by BCID2 PCR.     BOTTLE TYPE Anaerobic Bottle    Narrative:      No resistance genes detected.            [x]  Microbiology  [x]  Radiology  XR Chest 1 View    Result Date: 5/6/2024  Impression:  1. Left lower lobe atelectasis. No other acute cardiopulmonary disease identified.   Electronically Signed By-Eliceo Garza MD On:5/6/2024 11:25 AM       [x]  EKG/Telemetry   []  Cardiology/Vascular   []  Pathology  []  Old records  [x]  Other:  Scheduled Meds:vitamin C, 250 mg, Oral, Daily  carvedilol, 12.5 mg, Oral, BID With Meals  cefTRIAXone, 2,000 mg, Intravenous, Daily  cholecalciferol, 1,000 Units, Oral, Daily  donepezil, 10 mg, Oral, Nightly  enoxaparin, 40 mg, Subcutaneous, Daily  multivitamin with minerals, 1 tablet, Oral, Daily  potassium phosphate, 15 mmol, Intravenous, Once  pregabalin, 200 mg, Oral, BID  senna-docusate sodium, 2 tablet, Oral, BID  sodium chloride, 10 mL, Intravenous, Q12H  sodium chloride, 10 mL, Intravenous, Q12H  venlafaxine XR, 150 mg, Oral, Daily      Continuous Infusions:   PRN Meds:.  acetaminophen **OR** acetaminophen **OR** acetaminophen    senna-docusate sodium **AND** polyethylene glycol **AND** bisacodyl **AND** bisacodyl    senna-docusate sodium  **AND** [DISCONTINUED] polyethylene glycol **AND** [DISCONTINUED] bisacodyl **AND** bisacodyl    HYDROcodone-acetaminophen    hyoscyamine    melatonin    nitroglycerin    ondansetron ODT **OR** ondansetron    sodium chloride    sodium chloride    sodium chloride    sodium chloride    sodium chloride      Assessment & Plan   Assessment / Plan     Assessment/Plan:  Sepsis present on admission due to Klebsiella bacteremia  Leukocytosis  Fever  Tachycardia  Klebsiella pneumonia bacteremia  Suspected Klebsiella UTI due to chronic indwelling Kyle catheter  Pneumonia ruled out  Nonischemic cardiomyopathy  Depression  Dementia  Chronic pain  Chronic urinary retention with chronic indwelling Kyle catheter  Hypokalemia  Hypophosphatemia        Patient admitted for further evaluation and treatment  Continue ceftriaxone 2 g daily for bacteremia UTI coverage and de-escalate based on cultures  Follow-up CT of the abdomen for possible obstruction versus abscess  Stop azithromycin  Continue to monitor fever and leukocytosis  Continue Tylenol as needed for fever  Continue bronchopulmonary hygiene protocol  Continue carvedilol 12.5 mg twice daily and titrate as needed  Continue to monitor volume status closely and diuresis as needed  Continue venlafaxine and donepezil  Continue oral analgesics as needed  Continue to monitor electrolytes replace as needed  Kyle catheter exchanged  Continue Kyle catheter care per protocol  Further inpatient orders recommendations pending clinical course       Discussed plan with bedside RN.    Disposition: Home with 24/7 care pending culture and sensitivity results resolution of sepsis and results of CT abdomen.    DVT prophylaxis:  Medical DVT prophylaxis orders are present.        CODE STATUS:   Level Of Support Discussed With: Patient  Code Status (Patient has no pulse and is not breathing): CPR (Attempt to Resuscitate)  Medical Interventions (Patient has pulse or is breathing): Full  Support

## 2024-05-08 NOTE — PLAN OF CARE
Goal Outcome Evaluation:  Plan of Care Reviewed With: patient           Outcome Evaluation: Patient AOx4, on room air. No complaints of pain or discomfort. Continuing plan of care.

## 2024-05-08 NOTE — SIGNIFICANT NOTE
05/08/24 0800   Physical Therapy Time and Intention   Session Not Performed other (see comments)  (DC orders-patient is bedbound at baseline)   Plan of Care Review   Outcome Evaluation According to information available in the medical records, patient is bedbound at baseline.  He needs maximum assistance for all ADLs and out of bed transfers.  Patient is not appropriate for inpatient PT services.   Therapy Assessment/Plan (PT)   Criteria for Skilled Interventions Met (PT) does not meet criteria for skilled intervention

## 2024-05-09 ENCOUNTER — ANESTHESIA (OUTPATIENT)
Dept: PERIOP | Facility: HOSPITAL | Age: 74
End: 2024-05-09
Payer: MEDICARE

## 2024-05-09 ENCOUNTER — ANESTHESIA EVENT (OUTPATIENT)
Dept: PERIOP | Facility: HOSPITAL | Age: 74
End: 2024-05-09
Payer: MEDICARE

## 2024-05-09 ENCOUNTER — APPOINTMENT (OUTPATIENT)
Dept: GENERAL RADIOLOGY | Facility: HOSPITAL | Age: 74
DRG: 659 | End: 2024-05-09
Payer: MEDICARE

## 2024-05-09 PROBLEM — N20.1 URETERAL STONE: Status: ACTIVE | Noted: 2024-05-06

## 2024-05-09 LAB
ALBUMIN SERPL-MCNC: 2.6 G/DL (ref 3.5–5.2)
ANION GAP SERPL CALCULATED.3IONS-SCNC: 9.4 MMOL/L (ref 5–15)
BACTERIA SPEC AEROBE CULT: ABNORMAL
BASOPHILS # BLD AUTO: 0.06 10*3/MM3 (ref 0–0.2)
BASOPHILS NFR BLD AUTO: 0.6 % (ref 0–1.5)
BUN SERPL-MCNC: 16 MG/DL (ref 8–23)
BUN/CREAT SERPL: 20.3 (ref 7–25)
CALCIUM SPEC-SCNC: 7.6 MG/DL (ref 8.6–10.5)
CHLORIDE SERPL-SCNC: 105 MMOL/L (ref 98–107)
CO2 SERPL-SCNC: 21.6 MMOL/L (ref 22–29)
CREAT SERPL-MCNC: 0.79 MG/DL (ref 0.76–1.27)
DEPRECATED RDW RBC AUTO: 48.6 FL (ref 37–54)
EGFRCR SERPLBLD CKD-EPI 2021: 92.1 ML/MIN/1.73
EOSINOPHIL # BLD AUTO: 0.26 10*3/MM3 (ref 0–0.4)
EOSINOPHIL NFR BLD AUTO: 2.5 % (ref 0.3–6.2)
ERYTHROCYTE [DISTWIDTH] IN BLOOD BY AUTOMATED COUNT: 15.9 % (ref 12.3–15.4)
GLUCOSE SERPL-MCNC: 99 MG/DL (ref 65–99)
GRAM STN SPEC: ABNORMAL
HCT VFR BLD AUTO: 35.3 % (ref 37.5–51)
HGB BLD-MCNC: 11.2 G/DL (ref 13–17.7)
IMM GRANULOCYTES # BLD AUTO: 0.06 10*3/MM3 (ref 0–0.05)
IMM GRANULOCYTES NFR BLD AUTO: 0.6 % (ref 0–0.5)
ISOLATED FROM: ABNORMAL
LYMPHOCYTES # BLD AUTO: 1.21 10*3/MM3 (ref 0.7–3.1)
LYMPHOCYTES NFR BLD AUTO: 11.6 % (ref 19.6–45.3)
MCH RBC QN AUTO: 26.7 PG (ref 26.6–33)
MCHC RBC AUTO-ENTMCNC: 31.7 G/DL (ref 31.5–35.7)
MCV RBC AUTO: 84 FL (ref 79–97)
MONOCYTES # BLD AUTO: 0.9 10*3/MM3 (ref 0.1–0.9)
MONOCYTES NFR BLD AUTO: 8.7 % (ref 5–12)
NEUTROPHILS NFR BLD AUTO: 7.9 10*3/MM3 (ref 1.7–7)
NEUTROPHILS NFR BLD AUTO: 76 % (ref 42.7–76)
NRBC BLD AUTO-RTO: 0 /100 WBC (ref 0–0.2)
PHOSPHATE SERPL-MCNC: 2.2 MG/DL (ref 2.5–4.5)
PLATELET # BLD AUTO: 147 10*3/MM3 (ref 140–450)
PMV BLD AUTO: 11.3 FL (ref 6–12)
POTASSIUM SERPL-SCNC: 3.9 MMOL/L (ref 3.5–5.2)
RBC # BLD AUTO: 4.2 10*6/MM3 (ref 4.14–5.8)
SODIUM SERPL-SCNC: 136 MMOL/L (ref 136–145)
WBC NRBC COR # BLD AUTO: 10.39 10*3/MM3 (ref 3.4–10.8)

## 2024-05-09 PROCEDURE — 0T778DZ DILATION OF LEFT URETER WITH INTRALUMINAL DEVICE, VIA NATURAL OR ARTIFICIAL OPENING ENDOSCOPIC: ICD-10-PCS | Performed by: UROLOGY

## 2024-05-09 PROCEDURE — 52332 CYSTOSCOPY AND TREATMENT: CPT | Performed by: UROLOGY

## 2024-05-09 PROCEDURE — 25010000002 ONDANSETRON PER 1 MG

## 2024-05-09 PROCEDURE — 25810000003 SODIUM CHLORIDE 0.9 % SOLUTION: Performed by: FAMILY MEDICINE

## 2024-05-09 PROCEDURE — 25010000002 CEFTRIAXONE PER 250 MG: Performed by: FAMILY MEDICINE

## 2024-05-09 PROCEDURE — BT1F1ZZ FLUOROSCOPY OF LEFT KIDNEY, URETER AND BLADDER USING LOW OSMOLAR CONTRAST: ICD-10-PCS | Performed by: UROLOGY

## 2024-05-09 PROCEDURE — 74420 UROGRAPHY RTRGR +-KUB: CPT | Performed by: UROLOGY

## 2024-05-09 PROCEDURE — 94799 UNLISTED PULMONARY SVC/PX: CPT

## 2024-05-09 PROCEDURE — 25010000002 PROPOFOL 10 MG/ML EMULSION

## 2024-05-09 PROCEDURE — C1876 STENT, NON-COA/NON-COV W/DEL: HCPCS | Performed by: UROLOGY

## 2024-05-09 PROCEDURE — 76000 FLUOROSCOPY <1 HR PHYS/QHP: CPT

## 2024-05-09 PROCEDURE — 85025 COMPLETE CBC W/AUTO DIFF WBC: CPT | Performed by: HOSPITALIST

## 2024-05-09 PROCEDURE — 80069 RENAL FUNCTION PANEL: CPT | Performed by: FAMILY MEDICINE

## 2024-05-09 PROCEDURE — 25010000002 AMPICILLIN-SULBACTAM PER 1.5 G: Performed by: UROLOGY

## 2024-05-09 PROCEDURE — 25010000002 SUGAMMADEX 200 MG/2ML SOLUTION

## 2024-05-09 PROCEDURE — 25810000003 SODIUM CHLORIDE 0.9 % SOLUTION

## 2024-05-09 PROCEDURE — 25010000002 DEXAMETHASONE PER 1 MG

## 2024-05-09 PROCEDURE — 25510000001 IOPAMIDOL PER 1 ML: Performed by: UROLOGY

## 2024-05-09 PROCEDURE — 94664 DEMO&/EVAL PT USE INHALER: CPT

## 2024-05-09 PROCEDURE — C1769 GUIDE WIRE: HCPCS | Performed by: UROLOGY

## 2024-05-09 PROCEDURE — 25010000002 AMPICILLIN-SULBACTAM PER 1.5 G: Performed by: FAMILY MEDICINE

## 2024-05-09 DEVICE — IMPLANTABLE DEVICE: Type: IMPLANTABLE DEVICE | Site: URETER | Status: FUNCTIONAL

## 2024-05-09 RX ORDER — LIDOCAINE HYDROCHLORIDE 20 MG/ML
INJECTION, SOLUTION EPIDURAL; INFILTRATION; INTRACAUDAL; PERINEURAL AS NEEDED
Status: DISCONTINUED | OUTPATIENT
Start: 2024-05-09 | End: 2024-05-09 | Stop reason: SURG

## 2024-05-09 RX ORDER — SODIUM CHLORIDE, SODIUM LACTATE, POTASSIUM CHLORIDE, CALCIUM CHLORIDE 600; 310; 30; 20 MG/100ML; MG/100ML; MG/100ML; MG/100ML
9 INJECTION, SOLUTION INTRAVENOUS CONTINUOUS PRN
Status: CANCELLED | OUTPATIENT
Start: 2024-05-09

## 2024-05-09 RX ORDER — FENTANYL/ROPIVACAINE/NS/PF 2-625MCG/1
15 PLASTIC BAG, INJECTION (ML) EPIDURAL ONCE
Status: COMPLETED | OUTPATIENT
Start: 2024-05-09 | End: 2024-05-09

## 2024-05-09 RX ORDER — PROPOFOL 10 MG/ML
VIAL (ML) INTRAVENOUS AS NEEDED
Status: DISCONTINUED | OUTPATIENT
Start: 2024-05-09 | End: 2024-05-09 | Stop reason: SURG

## 2024-05-09 RX ORDER — ROCURONIUM BROMIDE 10 MG/ML
INJECTION, SOLUTION INTRAVENOUS AS NEEDED
Status: DISCONTINUED | OUTPATIENT
Start: 2024-05-09 | End: 2024-05-09 | Stop reason: SURG

## 2024-05-09 RX ORDER — OXYCODONE HYDROCHLORIDE 5 MG/1
5 TABLET ORAL
Status: DISCONTINUED | OUTPATIENT
Start: 2024-05-09 | End: 2024-05-09 | Stop reason: HOSPADM

## 2024-05-09 RX ORDER — SODIUM CHLORIDE 9 MG/ML
INJECTION, SOLUTION INTRAVENOUS CONTINUOUS PRN
Status: DISCONTINUED | OUTPATIENT
Start: 2024-05-09 | End: 2024-05-09 | Stop reason: SURG

## 2024-05-09 RX ORDER — ONDANSETRON 2 MG/ML
INJECTION INTRAMUSCULAR; INTRAVENOUS AS NEEDED
Status: DISCONTINUED | OUTPATIENT
Start: 2024-05-09 | End: 2024-05-09 | Stop reason: SURG

## 2024-05-09 RX ORDER — DEXAMETHASONE SODIUM PHOSPHATE 4 MG/ML
INJECTION, SOLUTION INTRA-ARTICULAR; INTRALESIONAL; INTRAMUSCULAR; INTRAVENOUS; SOFT TISSUE AS NEEDED
Status: DISCONTINUED | OUTPATIENT
Start: 2024-05-09 | End: 2024-05-09 | Stop reason: SURG

## 2024-05-09 RX ORDER — ONDANSETRON 2 MG/ML
4 INJECTION INTRAMUSCULAR; INTRAVENOUS ONCE AS NEEDED
Status: DISCONTINUED | OUTPATIENT
Start: 2024-05-09 | End: 2024-05-09 | Stop reason: HOSPADM

## 2024-05-09 RX ADMIN — AMPICILLIN AND SULBACTAM 3 G: 2; 1 INJECTION, POWDER, FOR SOLUTION INTRAVENOUS at 23:30

## 2024-05-09 RX ADMIN — SODIUM CHLORIDE: 9 INJECTION, SOLUTION INTRAVENOUS at 15:39

## 2024-05-09 RX ADMIN — SUGAMMADEX 200 MG: 100 INJECTION, SOLUTION INTRAVENOUS at 16:15

## 2024-05-09 RX ADMIN — Medication 10 ML: at 09:06

## 2024-05-09 RX ADMIN — PREGABALIN 200 MG: 100 CAPSULE ORAL at 20:23

## 2024-05-09 RX ADMIN — POTASSIUM PHOSPHATES 15 MMOL: 236; 224 INJECTION, SOLUTION INTRAVENOUS at 13:16

## 2024-05-09 RX ADMIN — Medication 10 ML: at 20:23

## 2024-05-09 RX ADMIN — LIDOCAINE HYDROCHLORIDE 50 MG: 20 INJECTION, SOLUTION INTRAVENOUS at 15:48

## 2024-05-09 RX ADMIN — DONEPEZIL HYDROCHLORIDE 10 MG: 10 TABLET, FILM COATED ORAL at 20:23

## 2024-05-09 RX ADMIN — PROPOFOL 70 MG: 10 INJECTION, EMULSION INTRAVENOUS at 15:48

## 2024-05-09 RX ADMIN — OXYCODONE HYDROCHLORIDE AND ACETAMINOPHEN 250 MG: 500 TABLET ORAL at 11:31

## 2024-05-09 RX ADMIN — AMPICILLIN AND SULBACTAM 3 G: 2; 1 INJECTION, POWDER, FOR SOLUTION INTRAVENOUS at 12:11

## 2024-05-09 RX ADMIN — ONDANSETRON HYDROCHLORIDE 4 MG: 2 SOLUTION INTRAMUSCULAR; INTRAVENOUS at 15:52

## 2024-05-09 RX ADMIN — SENNOSIDES AND DOCUSATE SODIUM 2 TABLET: 50; 8.6 TABLET ORAL at 11:31

## 2024-05-09 RX ADMIN — CARVEDILOL 12.5 MG: 12.5 TABLET, FILM COATED ORAL at 09:05

## 2024-05-09 RX ADMIN — DEXAMETHASONE SODIUM PHOSPHATE 4 MG: 4 INJECTION, SOLUTION INTRAMUSCULAR; INTRAVENOUS at 15:52

## 2024-05-09 RX ADMIN — AMPICILLIN AND SULBACTAM 3 G: 2; 1 INJECTION, POWDER, FOR SOLUTION INTRAVENOUS at 18:20

## 2024-05-09 RX ADMIN — Medication 1 TABLET: at 09:05

## 2024-05-09 RX ADMIN — CARVEDILOL 12.5 MG: 12.5 TABLET, FILM COATED ORAL at 18:19

## 2024-05-09 RX ADMIN — CEFTRIAXONE SODIUM 2000 MG: 2 INJECTION, POWDER, FOR SOLUTION INTRAMUSCULAR; INTRAVENOUS at 09:05

## 2024-05-09 RX ADMIN — PREGABALIN 200 MG: 100 CAPSULE ORAL at 09:05

## 2024-05-09 RX ADMIN — Medication 1000 UNITS: at 09:05

## 2024-05-09 RX ADMIN — ROCURONIUM BROMIDE 20 MG: 10 INJECTION, SOLUTION INTRAVENOUS at 16:01

## 2024-05-09 RX ADMIN — VENLAFAXINE HYDROCHLORIDE 150 MG: 150 CAPSULE, EXTENDED RELEASE ORAL at 09:05

## 2024-05-09 RX ADMIN — PROPOFOL 70 MG: 10 INJECTION, EMULSION INTRAVENOUS at 15:46

## 2024-05-09 RX ADMIN — LIDOCAINE HYDROCHLORIDE 50 MG: 20 INJECTION, SOLUTION INTRAVENOUS at 15:46

## 2024-05-09 NOTE — OP NOTE
Preoperative diagnosis  Left ureteral stone, obstructive uropathy, UTI    Postoperative diagnosis  Left ureteral stone, obstructive uropathy, UTI    Procedure performed  Cystoscopy, left retrograde pyelogram, ureteral stent insertion    Attending surgeon  Sara Montano MD     Anesthesia  General    EBL  0 mL    Complications  None    Specimen  None    Findings  Cystoscopy without abnormality, findings consistent with chronic indwelling catheter.  Bilateral orthotopic ureter  Left retrograde pyelogram with filling defect of the proximal ureter consistent with stone seen on CT  6 x 28 stent no string    Indications  74 y.o. male agreed to undergo the above named procedure after discussion of the alternatives, risks and benefits.   Informed consent was obtained.      Procedure  After informed consent was obtained, the patient was taken back to the  operating suite where general anesthesia was administered. Once the patient  was adequately anesthetized, he was placed in the dorsal lithotomy position.  The genitals were prepped and draped in a normal sterile fashion.  Rigid  cystoscope was inserted gently in the patient's urethra meatus, which passed  atraumatically into the bladder; pan cystoscopy was performed a systematic manner.  Patchy erythema in bladder consistent with indwelling Kyle catheter.  Bilateral orthotopic ureters were identified.   A Pollack catheter was then placed in the distal left ureter in order to obtain retrograde  pyelogram. Retrograde pyelogram was performed revealing mild hydronephrosis, filling defect of the proximal ureter consistent with calculi.  The wire was then placed after retrograde pyelogram, the Pollack was reduced.  A 6 x 28 double J stent was placed over the wire under  direct visualization.  Upon retrieval of the wire, there was good proximal  coil noted on x-ray, as well as distal coil within the bladder.  Patient's bladder was emptied.  Cystoscope removed.  At this  point,  the procedure was deemed terminated.    He was then placed back in the supine position and awakened from general  anesthesia and transferred to the PACU in good condition.       Signed:  Sara Montano MD  05/09/24  16:25 EDT

## 2024-05-09 NOTE — PLAN OF CARE
Goal Outcome Evaluation:      No complaints or significant changes at this time. NPO since 0130.

## 2024-05-09 NOTE — PROGRESS NOTES
Respiratory Therapist Broncho-Pulmonary Hygiene Progress Note      Patient Name:  Anshul Shook Jr.  YOB: 1950    Anshul Shook Jr. meets the qualification for Level 1 of the Bronco-Pulmonary Hygiene Protocol. This was based on my daily patient assessment and includes review of chest x-ray results, cough ability and quality, oxygenation, secretions or risk for secretion development and patient mobility.     Broncho-Pulmonary Hygiene Assessment:    Level of Movement: Other: Paraplegic patient    Breath Sounds: Clear to slightly diminished    Cough: Strong, effective    Chest X-Ray: Possible signs of consolidation and/or atelectasis or clear.     Sputum Productions: None or small amount of thin or watery secretions with effective cough    History and Physical: None    SpO2 to Oxygen Need: greater than 92% on room air or  less than 3L nasal canula    Current SpO2 is: 94 on RA    Based on this information, I have completed the following interventions: Teach/Instruct patient on cough and deep breathe      Electronically signed by Danae Izaguirre RRT, 05/09/24, 9:05 AM EDT.

## 2024-05-09 NOTE — CONSULTS
History and Physical    Patient Name:  Anshul Shook Jr.  YOB: 1950  [id number]    Referring Provider: Dr. Mena      Patient Care Team:  Won Mcneill MD as PCP - General (Internal Medicine)    Reason for consult/Chief complaint:  left ureteral stones    Subjective .     History of present illness:  Anshul Shook Jr. is a 74 y.o.  Gentleman with a chronic indwelling Kyle catheter who presented to the ED for fevers and chills on 5/6/2024.  He was noted to have a fever of 101.3 tachycardic at 128.  He was positive for nitrites in his urine.  He was started on IV antibiotics.  His blood cultures positive for Klebsiella pneumoniae.  He had a CT scan of the abdomen and pelvis yesterday that indicates he has obstructive uropathy in the left side due to severe proximal left ureteral calculi at the level of the left UPJ with moderate left hydronephrosis.  Currently his vital signs are stable.  He is afebrile.  He denies any left sided flank pain.      History:  Past Medical History:   Diagnosis Date    Arthritis     Back injury     Essential hypertension 08/11/2021    Irregular heartbeat     SEE'S DR HOBBS. DENIED  CP/SOB    Memory loss     Nonischemic cardiomyopathy 08/11/2021    Paraplegic gait     Presence of biventricular implantable cardioverter-defibrillator (ICD) 08/12/2021    [employer] BiV ICD       Past Surgical History:   Procedure Laterality Date    BACK SURGERY      CARDIAC DEFIBRILLATOR PLACEMENT      [facility]    COLONOSCOPY      COLONOSCOPY N/A 09/28/2022    Procedure: COLONOSCOPY WITH POLYPECTOMIES, HOT SNARE, CLIP APPLICATION X2;  Surgeon: Audie West MD;  Location: [facility] ENDOSCOPY;  Service: General;  Laterality: N/A;  COLON POLYPS    PACEMAKER REPLACEMENT Bilateral 2/6/2024    Procedure: PPM generator change bi-v;  Surgeon: Renzo Hobbs MD;  Location: [facility] CATH INVASIVE LOCATION;  Service: Cardiovascular;  Laterality: Bilateral;       Family History   Family history  unknown: Yes       Social History     Tobacco Use    Smoking status: Never    Smokeless tobacco: Never   Vaping Use    Vaping status: Never Used   Substance Use Topics    Alcohol use: Never    Drug use: Never       Review of Systems:  A complete ROS was performed and all are negative except for what is documented in the HPI.    MEDS:  Prior to Admission medications    Medication Sig Start Date End Date Taking? Authorizing Provider   carvedilol (COREG) 12.5 MG tablet Take 1 tablet by mouth 2 (Two) Times a Day With Meals. 8/3/21  Yes Harpal Liu MD   Cholecalciferol 25 MCG (1000 UT) tablet Take 1 tablet by mouth Daily.   Yes Harpal Liu MD   donepezil (ARICEPT) 10 MG tablet Take 1 tablet by mouth Every Night. 1/9/22  Yes Harpal Liu MD   Effexor  MG 24 hr capsule Take 1 capsule by mouth Daily. 5/21/21  Yes Harpal Liu MD   Hyoscyamine Sulfate ER 0.375 MG tablet controlled-release Take  by mouth 2 (Two) Times a Day.   Yes Harpal Liu MD   multivitamin with minerals tablet tablet Take 1 tablet by mouth Daily.   Yes Harpal Liu MD   pregabalin (LYRICA) 200 MG capsule Take 1 capsule by mouth 2 (Two) Times a Day. 7/26/21  Yes Harpal Liu MD   vitamin C (ASCORBIC ACID) 250 MG tablet Take 1 tablet by mouth Daily.   Yes Harpal Liu MD        vitamin C, 250 mg, Oral, Daily  carvedilol, 12.5 mg, Oral, BID With Meals  cefTRIAXone, 2,000 mg, Intravenous, Daily  cholecalciferol, 1,000 Units, Oral, Daily  donepezil, 10 mg, Oral, Nightly  enoxaparin, 40 mg, Subcutaneous, Daily  multivitamin with minerals, 1 tablet, Oral, Daily  pregabalin, 200 mg, Oral, BID  senna-docusate sodium, 2 tablet, Oral, BID  sodium chloride, 10 mL, Intravenous, Q12H  sodium chloride, 10 mL, Intravenous, Q12H  venlafaxine XR, 150 mg, Oral, Daily               Allergies:  Penicillins    Objective         Physical Exam:      Constitutional:  well nourished, no acute  "distress, appears stated age /73 (BP Location: Left arm, Patient Position: Lying)   Pulse 89   Temp 97.9 °F (36.6 °C) (Oral)   Resp 18   Ht 182.9 cm (72\")   Wt 108 kg (237 lb 10.5 oz)   SpO2 94%   BMI 32.23 kg/m²    Eyes:  anicteric sclerae, moist conjunctivae, no lid lag, PERRLA  ENMT:  oropharynx clear, moist mucous membranes, good dentition  Neck:   full ROM, trachea midline, no thyromegaly  Cardiovascular: RRR, S1 and S2 present, no MRG, heart rate 89, no pedal edema  Respiratory: lungs CTA, respirations even and unlabored  GI:  Abdomen soft, nontender, nondistended, no HSM     Skin:  warm and dry, normal turgor, no rashes  Psychiatric:  alert and oriented x3, intact judgment and insight, cooperative         Results Review: I have reviewed the patient's labs and imaging including CBC, BMP, CT of abd and pelvis    LABS/IMAGING:    WBC   Date Value Ref Range Status   05/09/2024 10.39 3.40 - 10.80 10*3/mm3 Final     RBC   Date Value Ref Range Status   05/09/2024 4.20 4.14 - 5.80 10*6/mm3 Final     Hemoglobin   Date Value Ref Range Status   05/09/2024 11.2 (L) 13.0 - 17.7 g/dL Final     Hematocrit   Date Value Ref Range Status   05/09/2024 35.3 (L) 37.5 - 51.0 % Final     MCV   Date Value Ref Range Status   05/09/2024 84.0 79.0 - 97.0 fL Final     MCH   Date Value Ref Range Status   05/09/2024 26.7 26.6 - 33.0 pg Final     MCHC   Date Value Ref Range Status   05/09/2024 31.7 31.5 - 35.7 g/dL Final     RDW   Date Value Ref Range Status   05/09/2024 15.9 (H) 12.3 - 15.4 % Final     RDW-SD   Date Value Ref Range Status   05/09/2024 48.6 37.0 - 54.0 fl Final     MPV   Date Value Ref Range Status   05/09/2024 11.3 6.0 - 12.0 fL Final     Platelets   Date Value Ref Range Status   05/09/2024 147 140 - 450 10*3/mm3 Final     Neutrophil %   Date Value Ref Range Status   05/09/2024 76.0 42.7 - 76.0 % Final     Lymphocyte %   Date Value Ref Range Status   05/09/2024 11.6 (L) 19.6 - 45.3 % Final     Monocyte % " "  Date Value Ref Range Status   05/09/2024 8.7 5.0 - 12.0 % Final     Eosinophil %   Date Value Ref Range Status   05/09/2024 2.5 0.3 - 6.2 % Final     Basophil %   Date Value Ref Range Status   05/09/2024 0.6 0.0 - 1.5 % Final     Immature Grans %   Date Value Ref Range Status   05/09/2024 0.6 (H) 0.0 - 0.5 % Final     Neutrophils, Absolute   Date Value Ref Range Status   05/09/2024 7.90 (H) 1.70 - 7.00 10*3/mm3 Final     Lymphocytes, Absolute   Date Value Ref Range Status   05/09/2024 1.21 0.70 - 3.10 10*3/mm3 Final     Monocytes, Absolute   Date Value Ref Range Status   05/09/2024 0.90 0.10 - 0.90 10*3/mm3 Final     Eosinophils, Absolute   Date Value Ref Range Status   05/09/2024 0.26 0.00 - 0.40 10*3/mm3 Final     Basophils, Absolute   Date Value Ref Range Status   05/09/2024 0.06 0.00 - 0.20 10*3/mm3 Final     Immature Grans, Absolute   Date Value Ref Range Status   05/09/2024 0.06 (H) 0.00 - 0.05 10*3/mm3 Final     nRBC   Date Value Ref Range Status   05/09/2024 0.0 0.0 - 0.2 /100 WBC Final        Basic Metabolic Panel    Sodium Sodium   Date Value Ref Range Status   05/09/2024 136 136 - 145 mmol/L Final   05/08/2024 133 (L) 136 - 145 mmol/L Final   05/07/2024 137 136 - 145 mmol/L Final   05/06/2024 136 136 - 145 mmol/L Final      Potassium Potassium   Date Value Ref Range Status   05/09/2024 3.9 3.5 - 5.2 mmol/L Final   05/08/2024 4.0 3.5 - 5.2 mmol/L Final   05/07/2024 2.9 (L) 3.5 - 5.2 mmol/L Final   05/06/2024 3.5 3.5 - 5.2 mmol/L Final      Chloride Chloride   Date Value Ref Range Status   05/09/2024 105 98 - 107 mmol/L Final   05/08/2024 101 98 - 107 mmol/L Final   05/07/2024 103 98 - 107 mmol/L Final   05/06/2024 100 98 - 107 mmol/L Final      Bicarbonate No results found for: \"PLASMABICARB\"   BUN BUN   Date Value Ref Range Status   05/09/2024 16 8 - 23 mg/dL Final   05/08/2024 19 8 - 23 mg/dL Final   05/07/2024 18 8 - 23 mg/dL Final   05/06/2024 16 8 - 23 mg/dL Final      Creatinine Creatinine   Date " "Value Ref Range Status   05/09/2024 0.79 0.76 - 1.27 mg/dL Final   05/08/2024 0.89 0.76 - 1.27 mg/dL Final   05/07/2024 1.08 0.76 - 1.27 mg/dL Final   05/06/2024 1.07 0.76 - 1.27 mg/dL Final      Calcium Calcium   Date Value Ref Range Status   05/09/2024 7.6 (L) 8.6 - 10.5 mg/dL Final   05/08/2024 7.9 (L) 8.6 - 10.5 mg/dL Final   05/07/2024 8.2 (L) 8.6 - 10.5 mg/dL Final   05/06/2024 8.9 8.6 - 10.5 mg/dL Final      Glucose      No components found for: \"GLUCOSE.*\"        Lab Results   Component Value Date    GLUCOSE 99 05/09/2024    BUN 16 05/09/2024    CREATININE 0.79 05/09/2024    EGFRIFNONA 81 10/06/2021    BCR 20.3 05/09/2024    K 3.9 05/09/2024    CO2 21.6 (L) 05/09/2024    CALCIUM 7.6 (L) 05/09/2024    ALBUMIN 2.6 (L) 05/09/2024    LABIL2 1.3 (L) 10/02/2020    AST 15 05/06/2024    ALT 8 05/06/2024          CT Abdomen Pelvis Without Contrast    Result Date: 5/8/2024  CT ABDOMEN PELVIS WO CONTRAST-  Date of Exam: 5/8/2024 4:36 PM  Indication: 74-year-old male w/ h/o UTI w/ bacteremia (slow to resolve); possible urinary tract obstruction or abscess; h/o contrast allergy (not a candidate for preprocedure steroids); N39.0-urinary tract infection (UTI), site not specified; R13.12-dysphagia, oropharyngeal phase.  Comparison: 9/15/2022.  Technique: Axial CT images were obtained of the abdomen and pelvis without the administration of contrast. Reconstructed 2D coronal and sagittal images were also obtained. Automated exposure control and iterative construction methods were used.  Findings: There are several obstructing proximal left ureteral calculi, such as seen on image 85 of series 4 and adjacent images, measuring about 5 mm in greatest diameter. The extent of the obstructing calculi is about 2 cm (craniocaudal dimension). These findings are centered at the left ureteropelvic junction (UPJ). There is moderate left hydronephrosis. No hydronephrosis is seen on the right. Nonobstructing renal calyceal stones are " present bilaterally, which measure about 6 mm or less. There are bilateral renal cysts, which are likely benign and measure about 6 cm in greatest axial diameter. Similar cysts were seen on the prior study, allowing for differences in technique. A urinary bladder catheter is in place. The urinary bladder is under-distended. Probably no prostatomegaly is seen. There is interval progression of disc and endplate degenerative change at L3-4 with chronic retrolisthesis. Nonemergent MRI examination of the lumbar spine may be helpful in further imaging assessment if clinically warranted and if not contraindicated. Please correlate with pertinent lab values. Otherwise, no significant change is seen since the 9/15/2022 CT study. No other acute findings are seen. Please see the prior exam report for further detail regarding additional chronic findings.      Impression: The study is ABNORMAL. Obstructive uropathy is seen on the LEFT due to several proximal LEFT ureteral calculi at about the level of the LEFT ureterovesical junction (UPJ) with moderate LEFT hydronephrosis. No ureterolithiasis or hydronephrosis is seen on the right. A urinary bladder catheter is in place. The urinary bladder is under-distended. Worsening degenerative changes are suggested at the L5-S1 level. Otherwise, no acute findings are seen, and no significant change is suggested since the 9/15/2022 study, allowing for differences in technique.    Please note that portions of this note were completed with a voice recognition program.        Electronically Signed By-Lalito Churchill MD On:5/8/2024 9:01 PM            Assessment & Plan         UTI (urinary tract infection)    Ureteral stone      NPO   Case request and consent for cystoscopy left ureteral stent placement by Dr. Montano risks, benefits, alternatives discussed            Electronically signed by JUNIOR Guevara, 05/09/24, 8:40 AM EDT.

## 2024-05-09 NOTE — H&P (VIEW-ONLY)
History and Physical    Patient Name:  Anshul Shook Jr.  YOB: 1950  [id number]    Referring Provider: Dr. Mena      Patient Care Team:  Won Mcneill MD as PCP - General (Internal Medicine)    Reason for consult/Chief complaint:  left ureteral stones    Subjective .     History of present illness:  Anshul Shook Jr. is a 74 y.o.  Gentleman with a chronic indwelling Kyle catheter who presented to the ED for fevers and chills on 5/6/2024.  He was noted to have a fever of 101.3 tachycardic at 128.  He was positive for nitrites in his urine.  He was started on IV antibiotics.  His blood cultures positive for Klebsiella pneumoniae.  He had a CT scan of the abdomen and pelvis yesterday that indicates he has obstructive uropathy in the left side due to severe proximal left ureteral calculi at the level of the left UPJ with moderate left hydronephrosis.  Currently his vital signs are stable.  He is afebrile.  He denies any left sided flank pain.      History:  Past Medical History:   Diagnosis Date    Arthritis     Back injury     Essential hypertension 08/11/2021    Irregular heartbeat     SEE'S DR HOBBS. DENIED  CP/SOB    Memory loss     Nonischemic cardiomyopathy 08/11/2021    Paraplegic gait     Presence of biventricular implantable cardioverter-defibrillator (ICD) 08/12/2021    [employer] BiV ICD       Past Surgical History:   Procedure Laterality Date    BACK SURGERY      CARDIAC DEFIBRILLATOR PLACEMENT      [facility]    COLONOSCOPY      COLONOSCOPY N/A 09/28/2022    Procedure: COLONOSCOPY WITH POLYPECTOMIES, HOT SNARE, CLIP APPLICATION X2;  Surgeon: Audie West MD;  Location: [facility] ENDOSCOPY;  Service: General;  Laterality: N/A;  COLON POLYPS    PACEMAKER REPLACEMENT Bilateral 2/6/2024    Procedure: PPM generator change bi-v;  Surgeon: Renzo Hobbs MD;  Location: [facility] CATH INVASIVE LOCATION;  Service: Cardiovascular;  Laterality: Bilateral;       Family History   Family history  unknown: Yes       Social History     Tobacco Use    Smoking status: Never    Smokeless tobacco: Never   Vaping Use    Vaping status: Never Used   Substance Use Topics    Alcohol use: Never    Drug use: Never       Review of Systems:  A complete ROS was performed and all are negative except for what is documented in the HPI.    MEDS:  Prior to Admission medications    Medication Sig Start Date End Date Taking? Authorizing Provider   carvedilol (COREG) 12.5 MG tablet Take 1 tablet by mouth 2 (Two) Times a Day With Meals. 8/3/21  Yes Harpal Liu MD   Cholecalciferol 25 MCG (1000 UT) tablet Take 1 tablet by mouth Daily.   Yes Harpal Liu MD   donepezil (ARICEPT) 10 MG tablet Take 1 tablet by mouth Every Night. 1/9/22  Yes Harpal Liu MD   Effexor  MG 24 hr capsule Take 1 capsule by mouth Daily. 5/21/21  Yes Harpal Liu MD   Hyoscyamine Sulfate ER 0.375 MG tablet controlled-release Take  by mouth 2 (Two) Times a Day.   Yes Harpal Liu MD   multivitamin with minerals tablet tablet Take 1 tablet by mouth Daily.   Yes Harpal Liu MD   pregabalin (LYRICA) 200 MG capsule Take 1 capsule by mouth 2 (Two) Times a Day. 7/26/21  Yes Harpal Liu MD   vitamin C (ASCORBIC ACID) 250 MG tablet Take 1 tablet by mouth Daily.   Yes Harpal Liu MD        vitamin C, 250 mg, Oral, Daily  carvedilol, 12.5 mg, Oral, BID With Meals  cefTRIAXone, 2,000 mg, Intravenous, Daily  cholecalciferol, 1,000 Units, Oral, Daily  donepezil, 10 mg, Oral, Nightly  enoxaparin, 40 mg, Subcutaneous, Daily  multivitamin with minerals, 1 tablet, Oral, Daily  pregabalin, 200 mg, Oral, BID  senna-docusate sodium, 2 tablet, Oral, BID  sodium chloride, 10 mL, Intravenous, Q12H  sodium chloride, 10 mL, Intravenous, Q12H  venlafaxine XR, 150 mg, Oral, Daily               Allergies:  Penicillins    Objective         Physical Exam:      Constitutional:  well nourished, no acute  "distress, appears stated age /73 (BP Location: Left arm, Patient Position: Lying)   Pulse 89   Temp 97.9 °F (36.6 °C) (Oral)   Resp 18   Ht 182.9 cm (72\")   Wt 108 kg (237 lb 10.5 oz)   SpO2 94%   BMI 32.23 kg/m²    Eyes:  anicteric sclerae, moist conjunctivae, no lid lag, PERRLA  ENMT:  oropharynx clear, moist mucous membranes, good dentition  Neck:   full ROM, trachea midline, no thyromegaly  Cardiovascular: RRR, S1 and S2 present, no MRG, heart rate 89, no pedal edema  Respiratory: lungs CTA, respirations even and unlabored  GI:  Abdomen soft, nontender, nondistended, no HSM     Skin:  warm and dry, normal turgor, no rashes  Psychiatric:  alert and oriented x3, intact judgment and insight, cooperative         Results Review: I have reviewed the patient's labs and imaging including CBC, BMP, CT of abd and pelvis    LABS/IMAGING:    WBC   Date Value Ref Range Status   05/09/2024 10.39 3.40 - 10.80 10*3/mm3 Final     RBC   Date Value Ref Range Status   05/09/2024 4.20 4.14 - 5.80 10*6/mm3 Final     Hemoglobin   Date Value Ref Range Status   05/09/2024 11.2 (L) 13.0 - 17.7 g/dL Final     Hematocrit   Date Value Ref Range Status   05/09/2024 35.3 (L) 37.5 - 51.0 % Final     MCV   Date Value Ref Range Status   05/09/2024 84.0 79.0 - 97.0 fL Final     MCH   Date Value Ref Range Status   05/09/2024 26.7 26.6 - 33.0 pg Final     MCHC   Date Value Ref Range Status   05/09/2024 31.7 31.5 - 35.7 g/dL Final     RDW   Date Value Ref Range Status   05/09/2024 15.9 (H) 12.3 - 15.4 % Final     RDW-SD   Date Value Ref Range Status   05/09/2024 48.6 37.0 - 54.0 fl Final     MPV   Date Value Ref Range Status   05/09/2024 11.3 6.0 - 12.0 fL Final     Platelets   Date Value Ref Range Status   05/09/2024 147 140 - 450 10*3/mm3 Final     Neutrophil %   Date Value Ref Range Status   05/09/2024 76.0 42.7 - 76.0 % Final     Lymphocyte %   Date Value Ref Range Status   05/09/2024 11.6 (L) 19.6 - 45.3 % Final     Monocyte % " "  Date Value Ref Range Status   05/09/2024 8.7 5.0 - 12.0 % Final     Eosinophil %   Date Value Ref Range Status   05/09/2024 2.5 0.3 - 6.2 % Final     Basophil %   Date Value Ref Range Status   05/09/2024 0.6 0.0 - 1.5 % Final     Immature Grans %   Date Value Ref Range Status   05/09/2024 0.6 (H) 0.0 - 0.5 % Final     Neutrophils, Absolute   Date Value Ref Range Status   05/09/2024 7.90 (H) 1.70 - 7.00 10*3/mm3 Final     Lymphocytes, Absolute   Date Value Ref Range Status   05/09/2024 1.21 0.70 - 3.10 10*3/mm3 Final     Monocytes, Absolute   Date Value Ref Range Status   05/09/2024 0.90 0.10 - 0.90 10*3/mm3 Final     Eosinophils, Absolute   Date Value Ref Range Status   05/09/2024 0.26 0.00 - 0.40 10*3/mm3 Final     Basophils, Absolute   Date Value Ref Range Status   05/09/2024 0.06 0.00 - 0.20 10*3/mm3 Final     Immature Grans, Absolute   Date Value Ref Range Status   05/09/2024 0.06 (H) 0.00 - 0.05 10*3/mm3 Final     nRBC   Date Value Ref Range Status   05/09/2024 0.0 0.0 - 0.2 /100 WBC Final        Basic Metabolic Panel    Sodium Sodium   Date Value Ref Range Status   05/09/2024 136 136 - 145 mmol/L Final   05/08/2024 133 (L) 136 - 145 mmol/L Final   05/07/2024 137 136 - 145 mmol/L Final   05/06/2024 136 136 - 145 mmol/L Final      Potassium Potassium   Date Value Ref Range Status   05/09/2024 3.9 3.5 - 5.2 mmol/L Final   05/08/2024 4.0 3.5 - 5.2 mmol/L Final   05/07/2024 2.9 (L) 3.5 - 5.2 mmol/L Final   05/06/2024 3.5 3.5 - 5.2 mmol/L Final      Chloride Chloride   Date Value Ref Range Status   05/09/2024 105 98 - 107 mmol/L Final   05/08/2024 101 98 - 107 mmol/L Final   05/07/2024 103 98 - 107 mmol/L Final   05/06/2024 100 98 - 107 mmol/L Final      Bicarbonate No results found for: \"PLASMABICARB\"   BUN BUN   Date Value Ref Range Status   05/09/2024 16 8 - 23 mg/dL Final   05/08/2024 19 8 - 23 mg/dL Final   05/07/2024 18 8 - 23 mg/dL Final   05/06/2024 16 8 - 23 mg/dL Final      Creatinine Creatinine   Date " "Value Ref Range Status   05/09/2024 0.79 0.76 - 1.27 mg/dL Final   05/08/2024 0.89 0.76 - 1.27 mg/dL Final   05/07/2024 1.08 0.76 - 1.27 mg/dL Final   05/06/2024 1.07 0.76 - 1.27 mg/dL Final      Calcium Calcium   Date Value Ref Range Status   05/09/2024 7.6 (L) 8.6 - 10.5 mg/dL Final   05/08/2024 7.9 (L) 8.6 - 10.5 mg/dL Final   05/07/2024 8.2 (L) 8.6 - 10.5 mg/dL Final   05/06/2024 8.9 8.6 - 10.5 mg/dL Final      Glucose      No components found for: \"GLUCOSE.*\"        Lab Results   Component Value Date    GLUCOSE 99 05/09/2024    BUN 16 05/09/2024    CREATININE 0.79 05/09/2024    EGFRIFNONA 81 10/06/2021    BCR 20.3 05/09/2024    K 3.9 05/09/2024    CO2 21.6 (L) 05/09/2024    CALCIUM 7.6 (L) 05/09/2024    ALBUMIN 2.6 (L) 05/09/2024    LABIL2 1.3 (L) 10/02/2020    AST 15 05/06/2024    ALT 8 05/06/2024          CT Abdomen Pelvis Without Contrast    Result Date: 5/8/2024  CT ABDOMEN PELVIS WO CONTRAST-  Date of Exam: 5/8/2024 4:36 PM  Indication: 74-year-old male w/ h/o UTI w/ bacteremia (slow to resolve); possible urinary tract obstruction or abscess; h/o contrast allergy (not a candidate for preprocedure steroids); N39.0-urinary tract infection (UTI), site not specified; R13.12-dysphagia, oropharyngeal phase.  Comparison: 9/15/2022.  Technique: Axial CT images were obtained of the abdomen and pelvis without the administration of contrast. Reconstructed 2D coronal and sagittal images were also obtained. Automated exposure control and iterative construction methods were used.  Findings: There are several obstructing proximal left ureteral calculi, such as seen on image 85 of series 4 and adjacent images, measuring about 5 mm in greatest diameter. The extent of the obstructing calculi is about 2 cm (craniocaudal dimension). These findings are centered at the left ureteropelvic junction (UPJ). There is moderate left hydronephrosis. No hydronephrosis is seen on the right. Nonobstructing renal calyceal stones are " present bilaterally, which measure about 6 mm or less. There are bilateral renal cysts, which are likely benign and measure about 6 cm in greatest axial diameter. Similar cysts were seen on the prior study, allowing for differences in technique. A urinary bladder catheter is in place. The urinary bladder is under-distended. Probably no prostatomegaly is seen. There is interval progression of disc and endplate degenerative change at L3-4 with chronic retrolisthesis. Nonemergent MRI examination of the lumbar spine may be helpful in further imaging assessment if clinically warranted and if not contraindicated. Please correlate with pertinent lab values. Otherwise, no significant change is seen since the 9/15/2022 CT study. No other acute findings are seen. Please see the prior exam report for further detail regarding additional chronic findings.      Impression: The study is ABNORMAL. Obstructive uropathy is seen on the LEFT due to several proximal LEFT ureteral calculi at about the level of the LEFT ureterovesical junction (UPJ) with moderate LEFT hydronephrosis. No ureterolithiasis or hydronephrosis is seen on the right. A urinary bladder catheter is in place. The urinary bladder is under-distended. Worsening degenerative changes are suggested at the L5-S1 level. Otherwise, no acute findings are seen, and no significant change is suggested since the 9/15/2022 study, allowing for differences in technique.    Please note that portions of this note were completed with a voice recognition program.        Electronically Signed By-Lalito Churchill MD On:5/8/2024 9:01 PM            Assessment & Plan         UTI (urinary tract infection)    Ureteral stone      NPO   Case request and consent for cystoscopy left ureteral stent placement by Dr. Montano risks, benefits, alternatives discussed            Electronically signed by JUNIOR Guevara, 05/09/24, 8:40 AM EDT.

## 2024-05-10 ENCOUNTER — READMISSION MANAGEMENT (OUTPATIENT)
Dept: CALL CENTER | Facility: HOSPITAL | Age: 74
End: 2024-05-10
Payer: MEDICARE

## 2024-05-10 VITALS
SYSTOLIC BLOOD PRESSURE: 121 MMHG | WEIGHT: 237.66 LBS | RESPIRATION RATE: 18 BRPM | BODY MASS INDEX: 32.19 KG/M2 | TEMPERATURE: 97.3 F | HEIGHT: 72 IN | OXYGEN SATURATION: 98 % | HEART RATE: 82 BPM | DIASTOLIC BLOOD PRESSURE: 69 MMHG

## 2024-05-10 LAB
ALBUMIN SERPL-MCNC: 2.7 G/DL (ref 3.5–5.2)
ANION GAP SERPL CALCULATED.3IONS-SCNC: 6.9 MMOL/L (ref 5–15)
BACTERIA SPEC AEROBE CULT: ABNORMAL
BACTERIA SPEC AEROBE CULT: ABNORMAL
BUN SERPL-MCNC: 12 MG/DL (ref 8–23)
BUN/CREAT SERPL: 15.4 (ref 7–25)
CALCIUM SPEC-SCNC: 8 MG/DL (ref 8.6–10.5)
CHLORIDE SERPL-SCNC: 105 MMOL/L (ref 98–107)
CO2 SERPL-SCNC: 23.1 MMOL/L (ref 22–29)
CREAT SERPL-MCNC: 0.78 MG/DL (ref 0.76–1.27)
EGFRCR SERPLBLD CKD-EPI 2021: 92.4 ML/MIN/1.73
GLUCOSE SERPL-MCNC: 123 MG/DL (ref 65–99)
MAGNESIUM SERPL-MCNC: 2 MG/DL (ref 1.6–2.4)
PHOSPHATE SERPL-MCNC: 2.1 MG/DL (ref 2.5–4.5)
POTASSIUM SERPL-SCNC: 5 MMOL/L (ref 3.5–5.2)
SODIUM SERPL-SCNC: 135 MMOL/L (ref 136–145)

## 2024-05-10 PROCEDURE — 25010000002 ENOXAPARIN PER 10 MG: Performed by: UROLOGY

## 2024-05-10 PROCEDURE — 99231 SBSQ HOSP IP/OBS SF/LOW 25: CPT | Performed by: UROLOGY

## 2024-05-10 PROCEDURE — 83735 ASSAY OF MAGNESIUM: CPT | Performed by: FAMILY MEDICINE

## 2024-05-10 PROCEDURE — 80069 RENAL FUNCTION PANEL: CPT | Performed by: FAMILY MEDICINE

## 2024-05-10 PROCEDURE — 25010000002 AMPICILLIN-SULBACTAM PER 1.5 G: Performed by: UROLOGY

## 2024-05-10 RX ORDER — AMOXICILLIN AND CLAVULANATE POTASSIUM 875; 125 MG/1; MG/1
1 TABLET, FILM COATED ORAL 2 TIMES DAILY
Qty: 14 TABLET | Refills: 0 | Status: SHIPPED | OUTPATIENT
Start: 2024-05-10 | End: 2024-05-17

## 2024-05-10 RX ORDER — HYDROCODONE BITARTRATE AND ACETAMINOPHEN 5; 325 MG/1; MG/1
1 TABLET ORAL EVERY 8 HOURS PRN
Qty: 6 TABLET | Refills: 0 | Status: SHIPPED | OUTPATIENT
Start: 2024-05-10 | End: 2024-05-12

## 2024-05-10 RX ADMIN — Medication 10 ML: at 09:36

## 2024-05-10 RX ADMIN — SENNOSIDES AND DOCUSATE SODIUM 2 TABLET: 50; 8.6 TABLET ORAL at 09:35

## 2024-05-10 RX ADMIN — HYDROCODONE BITARTRATE AND ACETAMINOPHEN 1 TABLET: 5; 325 TABLET ORAL at 09:36

## 2024-05-10 RX ADMIN — AMPICILLIN AND SULBACTAM 3 G: 2; 1 INJECTION, POWDER, FOR SOLUTION INTRAVENOUS at 06:03

## 2024-05-10 RX ADMIN — PREGABALIN 200 MG: 100 CAPSULE ORAL at 09:36

## 2024-05-10 RX ADMIN — ENOXAPARIN SODIUM 40 MG: 100 INJECTION SUBCUTANEOUS at 09:34

## 2024-05-10 RX ADMIN — AMPICILLIN AND SULBACTAM 3 G: 2; 1 INJECTION, POWDER, FOR SOLUTION INTRAVENOUS at 13:47

## 2024-05-10 RX ADMIN — Medication 1000 UNITS: at 09:36

## 2024-05-10 RX ADMIN — OXYCODONE HYDROCHLORIDE AND ACETAMINOPHEN 250 MG: 500 TABLET ORAL at 09:35

## 2024-05-10 RX ADMIN — Medication 1 TABLET: at 09:34

## 2024-05-10 RX ADMIN — VENLAFAXINE HYDROCHLORIDE 150 MG: 150 CAPSULE, EXTENDED RELEASE ORAL at 09:34

## 2024-05-10 RX ADMIN — CARVEDILOL 12.5 MG: 12.5 TABLET, FILM COATED ORAL at 09:35
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
## 2024-05-10 NOTE — PROGRESS NOTES
[facility]   Urology Progress Note    Patient Name: Anshul Shook Jr.  : 1950  MRN: [id number]  Primary Care Physician:  Won Mcneill MD  Date of admission: 2024    Subjective   Subjective     Denies complaints      Objective   Objective     Vitals:   Temp:  [97 °F (36.1 °C)-98.6 °F (37 °C)] 97.9 °F (36.6 °C)  Heart Rate:  [] 82  Resp:  [18-24] 18  BP: (101-128)/(58-73) 128/73  Flow (L/min):  [10] 10  Physical Exam     Alert and oriented x3  No acute distress  Unlabored respirations  Nontender/nondistended       Result Review    Result Review:  I have personally reviewed the results from the time of this admission to 5/10/2024 07:32 EDT and agree with these findings:  [x]  Laboratory  []  Microbiology  []  Radiology  []  EKG/Telemetry   []  Cardiology/Vascular   []  Pathology  []  Old records  []  Other:      Assessment & Plan   Assessment / Plan     Brief Patient Summary:  Anshul Shook Jr. is a 74 y.o. male found to have left obstructive uropathy status post stent placement 2024  Klebsiella bacteremia    Active Hospital Problems:  Active Hospital Problems    Diagnosis     **UTI (urinary tract infection)     Ureteral stone      Plan:   Labs reviewed  Clinically doing well    Patient maintain ureteral stent    Continue antibiotics to complete course    Office to arrange outpatient OR for definitive stone management  Will contact him to schedule    Discharge instructions and details placed under discharge tab  Please call with questions    Electronically signed by Sara Montano MD, 05/10/24, 7:32 AM EDT.

## 2024-05-10 NOTE — PROGRESS NOTES
[facility]   Hospitalist Progress Note  Date: 2024  Patient Name: Anshul Shook Jr.  : 1950  MRN: [id number]  Date of admission: 2024      Subjective   Subjective     Chief Complaint: Follow-up fevers and chills    Summary:Anshul Shook Jr. is a 74 y.o. 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  Patient also has a trach and chronic indwelling Kyle catheter. Patient reports fevers and chills.  Wife is also noted that he has more secretions from his trach and has been much more lethargic in the past couple days. On arrival to the ED, patient's temperature 101.3, pulse of 128, respiratory rate of 22, blood pressure 154/75, and he saturating 95% on room air. This x-ray shows left lower lobe atelectasis. His initial troponin is 26, white blood cell count is 23.22.  Patient has positive nitrites in his urine.  Patient started on empiric antibiotics.  Hospitalist service contacted for admission for further evaluation and treatment.  Blood cultures positive for Klebsiella pneumoniae.  Antibiotics tailored to known sensitivities.  Patient had recurrent fever.  CT of the abdomen demonstrated obstructive left-sided ureterolithiasis.  Urology consulted.  Stent placed.  Patient to follow-up for definitive stone treatment once completed course of antibiotics.  Patient planning on returning home with full-time caregivers on discharge once cleared by urology.    Interval Followup: Patient lying in bed appears to be resting fairly comfortably .  Patient's mental status remains at baseline.  Patient indicates he is feeling good making jokes about getting his stones from his dad.  Afebrile overnight.  V-paced with PVCs 80s to 100s on telemetry review.  Blood pressure within normal limits.  Satting well on room air.  Phosphorus low this morning.  White blood cell count improved.  Hemoglobin stable.  Cultures and urine cultures both positive for Klebsiella.   No  other issues per nursing.    Review of Systems  Constitutional: Negative for fatigue and fever.   HENT: Negative for sore throat and trouble swallowing.    Eyes: Negative for pain and discharge.   Respiratory: Negative for cough and shortness of breath.    Cardiovascular: Negative for chest pain and palpitations.   Gastrointestinal: Negative for abdominal pain, nausea and vomiting.   Endocrine: Negative for cold intolerance and heat intolerance.   Genitourinary: Negative for difficulty urinating and dysuria.   Musculoskeletal: Negative for back pain and neck stiffness.   Skin: Negative for color change and rash.   Neurological: Negative for syncope and headaches.   Hematological: Negative for adenopathy.   Psychiatric/Behavioral: Negative for confusion and hallucinations.    Objective   Objective     Vitals:   Temp:  [97 °F (36.1 °C)-99 °F (37.2 °C)] 98.2 °F (36.8 °C)  Heart Rate:  [] 88  Resp:  [18-24] 18  BP: (101-124)/(58-75) 108/68  Flow (L/min):  [10] 10  Physical Exam   General: well-developed appearing stated age in no acute distress  HEENT: Normocephalic atraumatic moist membranes pupils equal round reactive light, no scleral icterus no conjunctival injection  Cardiovascular: regular rate and rhythm no murmurs rubs or gallops S1-S2, no lower extremity edema appreciated  Pulmonary: Clear to auscultation bilaterally no wheezes rales or rhonchi symmetric chest expansion, unlabored, no conversational dyspnea appreciated  Gastrointestinal: Soft nontender nondistended positive bowel sounds all 4 quadrants no rebound or guarding  Musculoskeletal: No clubbing cyanosis, warm and well-perfused, calves soft symmetric nontender bilaterally  Skin: Clean dry without rashes  Neuro: Cranial nerves II through XII intact grossly no sensorimotor deficits appreciated bilateral upper and lower extremities  Psych: Patient is calm cooperative and appropriate with exam not responding to internal stimuli  : Kyle catheter no  bladder distention no suprapubic tenderness    Result Review    Result Review:  I have personally reviewed these results and agree with these findings:  [x]  Laboratory  LAB RESULTS:      Lab 05/09/24 0343 05/08/24 0502 05/07/24  0507 05/06/24  1236 05/06/24  1049   WBC 10.39 15.67* 24.99*  --  23.22*   HEMOGLOBIN 11.2* 11.2* 12.3*  --  12.6*   HEMATOCRIT 35.3* 34.1* 37.9  --  38.8   PLATELETS 147 128* 142  --  169   NEUTROS ABS 7.90* 13.31* 21.63*  --  19.92*   IMMATURE GRANS (ABS) 0.06* 0.10* 0.33*  --  0.27*   LYMPHS ABS 1.21 1.00 1.31  --  0.93   MONOS ABS 0.90 1.10* 1.61*  --  2.02*   EOS ABS 0.26 0.12 0.06  --  0.00   MCV 84.0 83.2 83.1  --  83.6   PROCALCITONIN  --   --   --   --  1.04*   LACTATE  --   --   --  1.2  --    PROTIME  --  16.1* 17.9*  --  17.0*         Lab 05/09/24 0343 05/08/24  0502 05/07/24  0507 05/06/24  1049   SODIUM 136 133* 137 136   POTASSIUM 3.9 4.0 2.9* 3.5   CHLORIDE 105 101 103 100   CO2 21.6* 22.5 23.6 25.5   ANION GAP 9.4 9.5 10.4 10.5   BUN 16 19 18 16   CREATININE 0.79 0.89 1.08 1.07   EGFR 92.1 88.8 71.1 71.9   GLUCOSE 99 95 123* 112*   CALCIUM 7.6* 7.9* 8.2* 8.9   MAGNESIUM  --  1.9 1.6 1.6   PHOSPHORUS 2.2* 1.5* 2.7 1.9*         Lab 05/09/24 0343 05/06/24  1049   TOTAL PROTEIN  --  6.7   ALBUMIN 2.6* 3.2*   GLOBULIN  --  3.5   ALT (SGPT)  --  8   AST (SGOT)  --  15   BILIRUBIN  --  0.6   ALK PHOS  --  67         Lab 05/08/24  0502 05/07/24  0507 05/06/24  1049   HSTROP T  --   --  26*   PROTIME 16.1* 17.9* 17.0*   INR 1.27* 1.44* 1.36*                 Brief Urine Lab Results  (Last result in the past 365 days)        Color   Clarity   Blood   Leuk Est   Nitrite   Protein   CREAT   Urine HCG        05/06/24 1623 Yellow   Cloudy   Moderate (2+)   Moderate (2+)   Positive   100 mg/dL (2+)                 Microbiology Results (last 10 days)       Procedure Component Value - Date/Time    Legionella Antigen, Urine - Urine, Urine, Clean Catch [[id number]]  (Normal) Collected:  05/06/24 1623    Lab Status: Final result Specimen: Urine, Clean Catch Updated: 05/06/24 1646     LEGIONELLA ANTIGEN, URINE Negative    S. Pneumo Ag Urine or CSF - Urine, Urine, Clean Catch [[id number]]  (Normal) Collected: 05/06/24 1623    Lab Status: Final result Specimen: Urine, Clean Catch Updated: 05/06/24 1647     Strep Pneumo Ag Negative    Urine Culture - Urine, Urine, Clean Catch [[id number]]  (Abnormal)  (Susceptibility) Collected: 05/06/24 1623    Lab Status: Preliminary result Specimen: Urine, Clean Catch Updated: 05/09/24 1018     Urine Culture 50,000 CFU/mL Klebsiella pneumoniae ssp pneumoniae      >100,000 CFU/mL Gram Positive Cocci    Narrative:      Colonization of the urinary tract without infection is common. Treatment is discouraged unless the patient is symptomatic, pregnant, or undergoing an invasive urologic procedure.    Susceptibility        Klebsiella pneumoniae ssp pneumoniae      SAL      Amoxicillin + Clavulanate Susceptible      Ampicillin Resistant      Ampicillin + Sulbactam Susceptible      Cefazolin Susceptible      Cefepime Susceptible      Ceftazidime Susceptible      Ceftriaxone Susceptible      Gentamicin Susceptible      Levofloxacin Susceptible      Nitrofurantoin Resistant      Piperacillin + Tazobactam Susceptible      Trimethoprim + Sulfamethoxazole Susceptible                           Respiratory Panel PCR w/COVID-19(SARS-CoV-2) NEGIN/TIFFANY/DWAINE/PAD/COR/ESTRELLA In-House, NP Swab in UTM/VTM, 2 HR TAT - Swab, Nasopharynx [[id number]]  (Normal) Collected: 05/06/24 1622    Lab Status: Final result Specimen: Swab from Nasopharynx Updated: 05/06/24 1718     ADENOVIRUS, PCR Not Detected     Coronavirus 229E Not Detected     Coronavirus HKU1 Not Detected     Coronavirus NL63 Not Detected     Coronavirus OC43 Not Detected     COVID19 Not Detected     Human Metapneumovirus Not Detected     Human Rhinovirus/Enterovirus Not Detected     Influenza A PCR Not Detected     Influenza B PCR Not  Detected     Parainfluenza Virus 1 Not Detected     Parainfluenza Virus 2 Not Detected     Parainfluenza Virus 3 Not Detected     Parainfluenza Virus 4 Not Detected     RSV, PCR Not Detected     Bordetella pertussis pcr Not Detected     Bordetella parapertussis PCR Not Detected     Chlamydophila pneumoniae PCR Not Detected     Mycoplasma pneumo by PCR Not Detected    Narrative:      In the setting of a positive respiratory panel with a viral infection PLUS a negative procalcitonin without other underlying concern for bacterial infection, consider observing off antibiotics or discontinuation of antibiotics and continue supportive care. If the respiratory panel is positive for atypical bacterial infection (Bordetella pertussis, Chlamydophila pneumoniae, or Mycoplasma pneumoniae), consider antibiotic de-escalation to target atypical bacterial infection.    Blood Culture - Blood, Arm, Left [[id number]]  (Normal) Collected: 05/06/24 1258    Lab Status: Preliminary result Specimen: Blood from Arm, Left Updated: 05/09/24 1315     Blood Culture No growth at 3 days    Blood Culture - Blood, Arm, Right [[id number]]  (Abnormal)  (Susceptibility) Collected: 05/06/24 1236    Lab Status: Final result Specimen: Blood from Arm, Right Updated: 05/09/24 0618     Blood Culture Klebsiella pneumoniae ssp pneumoniae     Isolated from Anaerobic Bottle     Gram Stain Anaerobic Bottle Gram negative bacilli    Susceptibility        Klebsiella pneumoniae ssp pneumoniae      SAL      Amoxicillin + Clavulanate Susceptible      Ampicillin Resistant      Ampicillin + Sulbactam Susceptible      Cefepime Susceptible      Ceftazidime Susceptible      Ceftriaxone Susceptible      Gentamicin Susceptible      Levofloxacin Susceptible      Piperacillin + Tazobactam Susceptible      Trimethoprim + Sulfamethoxazole Susceptible                       Susceptibility Comments       Klebsiella pneumoniae ssp pneumoniae    Cefazolin sensitivity will not be  reported for Enterobacteriaceae in non-urine isolates. If cefazolin is preferred, please call the microbiology lab to request an E-test.  With the exception of urinary-sourced infections, aminoglycosides should not be used as monotherapy.               Blood Culture ID, PCR - Blood, Arm, Right [[id number]]  (Abnormal) Collected: 05/06/24 1236    Lab Status: Final result Specimen: Blood from Arm, Right Updated: 05/07/24 0638     BCID, PCR Klebsiella pneumoniae group. Identification by BCID2 PCR.     BOTTLE TYPE Anaerobic Bottle    Narrative:      No resistance genes detected.            [x]  Microbiology  [x]  Radiology  CT Abdomen Pelvis Without Contrast    Result Date: 5/8/2024  The study is ABNORMAL. Obstructive uropathy is seen on the LEFT due to several proximal LEFT ureteral calculi at about the level of the LEFT ureterovesical junction (UPJ) with moderate LEFT hydronephrosis. No ureterolithiasis or hydronephrosis is seen on the right. A urinary bladder catheter is in place. The urinary bladder is under-distended. Worsening degenerative changes are suggested at the L5-S1 level. Otherwise, no acute findings are seen, and no significant change is suggested since the 9/15/2022 study, allowing for differences in technique.    Please note that portions of this note were completed with a voice recognition program.        Electronically Signed By-Lalito Churchill MD On:5/8/2024 9:01 PM      XR Chest 1 View    Result Date: 5/6/2024  Impression:  1. Left lower lobe atelectasis. No other acute cardiopulmonary disease identified.   Electronically Signed By-Eliceo Garza MD On:5/6/2024 11:25 AM       [x]  EKG/Telemetry   []  Cardiology/Vascular   []  Pathology  []  Old records  [x]  Other:  Scheduled Meds:ampicillin-sulbactam, 3 g, Intravenous, Q6H  vitamin C, 250 mg, Oral, Daily  carvedilol, 12.5 mg, Oral, BID With Meals  cholecalciferol, 1,000 Units, Oral, Daily  donepezil, 10 mg, Oral, Nightly  enoxaparin, 40 mg,  Subcutaneous, Daily  multivitamin with minerals, 1 tablet, Oral, Daily  pregabalin, 200 mg, Oral, BID  senna-docusate sodium, 2 tablet, Oral, BID  sodium chloride, 10 mL, Intravenous, Q12H  sodium chloride, 10 mL, Intravenous, Q12H  venlafaxine XR, 150 mg, Oral, Daily      Continuous Infusions:   PRN Meds:.  acetaminophen **OR** acetaminophen **OR** acetaminophen    senna-docusate sodium **AND** polyethylene glycol **AND** bisacodyl **AND** bisacodyl    senna-docusate sodium **AND** [DISCONTINUED] polyethylene glycol **AND** [DISCONTINUED] bisacodyl **AND** bisacodyl    [Transfer Hold] HYDROcodone-acetaminophen    hyoscyamine    melatonin    nitroglycerin    ondansetron ODT **OR** ondansetron    sodium chloride    sodium chloride    sodium chloride    sodium chloride    sodium chloride      Assessment & Plan   Assessment / Plan     Assessment/Plan:  Sepsis present on admission due to Klebsiella bacteremia  Leukocytosis  Fever  Tachycardia  Klebsiella pneumonia bacteremia  Suspected Klebsiella UTI due to chronic indwelling Kyle catheter  Obstructive left ureterolithiasis with hydronephrosis  Pneumonia ruled out  Nonischemic cardiomyopathy  Depression  Dementia  Chronic pain  Chronic urinary retention with chronic indwelling Kyle catheter  Hypokalemia  Hypophosphatemia        Patient admitted for further evaluation and treatment  Urology consulted thank you for your assistance  Change antibiotics to Unasyn with plan to discharge on Augmentin to complete 14-day course  Status post left ureteral stent placement  Follow-up with urology as an outpatient near the completion of antibiotics for definitive stone treatment  Continue Tylenol as needed for fever  Continue bronchopulmonary hygiene protocol  Continue carvedilol 12.5 mg twice daily and titrate as needed  Continue to monitor volume status closely and diuresis as needed  Continue venlafaxine and donepezil  Continue oral analgesics as needed  Continue to monitor  electrolytes replace as needed  Kyle catheter exchanged  Continue Kyle catheter care per protocol  Further inpatient orders recommendations pending clinical course       Discussed plan with bedside RN.  Discussed case with Dr. Montano urology attending.    Disposition: Home with 24/7 care once cleared by urology.    DVT prophylaxis:  Medical DVT prophylaxis orders are present.        CODE STATUS:   Level Of Support Discussed With: Patient  Code Status (Patient has no pulse and is not breathing): CPR (Attempt to Resuscitate)  Medical Interventions (Patient has pulse or is breathing): Full Support

## 2024-05-10 NOTE — PLAN OF CARE
Goal Outcome Evaluation:   No complaints or significant changes this shift.

## 2024-05-11 LAB — BACTERIA SPEC AEROBE CULT: NORMAL

## 2024-05-13 LAB
QT INTERVAL: 373 MS
QTC INTERVAL: 511 MS

## 2024-05-15 ENCOUNTER — READMISSION MANAGEMENT (OUTPATIENT)
Dept: CALL CENTER | Facility: HOSPITAL | Age: 74
End: 2024-05-15
Payer: MEDICARE

## 2024-05-15 NOTE — OUTREACH NOTE
Medical Week 1 Survey      Flowsheet Row Responses   [facility] facility patient discharged from? [facility]   Does the patient have one of the following disease processes/diagnoses(primary or secondary)? Other   Week 1 attempt successful? No   Unsuccessful attempts Attempt 1            Betsey Leonard Registered Nurse

## 2024-05-20 ENCOUNTER — TELEPHONE (OUTPATIENT)
Dept: SURGERY | Facility: CLINIC | Age: 74
End: 2024-05-20
Payer: MEDICARE

## 2024-05-20 NOTE — TELEPHONE ENCOUNTER
Patient wife called wanting to know when patient is going to be scheduled for the surgery. Patient states it has been two weeks and she has not heard anything back yet.

## 2024-05-21 ENCOUNTER — READMISSION MANAGEMENT (OUTPATIENT)
Dept: CALL CENTER | Facility: HOSPITAL | Age: 74
End: 2024-05-21
Payer: MEDICARE

## 2024-05-21 ENCOUNTER — PREP FOR SURGERY (OUTPATIENT)
Dept: OTHER | Facility: HOSPITAL | Age: 74
End: 2024-05-21
Payer: MEDICARE

## 2024-05-21 DIAGNOSIS — N20.1 URETERAL STONE: Primary | ICD-10-CM

## 2024-05-21 RX ORDER — LEVOFLOXACIN 5 MG/ML
500 INJECTION, SOLUTION INTRAVENOUS ONCE
OUTPATIENT
Start: 2024-05-21 | End: 2024-05-21

## 2024-05-21 NOTE — OUTREACH NOTE
Medical Week 2 Survey      Flowsheet Row Responses   [facility] facility patient discharged from? [facility]   Does the patient have one of the following disease processes/diagnoses(primary or secondary)? Other   Week 2 attempt successful? No   Unsuccessful attempts Attempt 1            Radha SY - Registered Nurse

## 2024-05-22 DIAGNOSIS — N20.1 URETERAL STONE: Primary | ICD-10-CM

## 2024-05-22 NOTE — TELEPHONE ENCOUNTER
SPOKE TO WIFE AND ADVISED OF SURG DATE OF 6/17 AT [facility]. ADVISED NEED UCX ON 6/10, THEY MAY DO AT [facility] (ORDER MAILED TO PT AND FAXED TO [facility]). ADVISED THAT SCHED WILL CALL DIRECTLY WITH ARRIVAL TIME THE WEEK PRIOR. ASKED FOR A CALL BACK WITH ANY QUESTIONS CONCERNS. WIFE EXPRESSED UNDERSTANDING AND IS AGREEABLE.

## 2024-05-29 ENCOUNTER — READMISSION MANAGEMENT (OUTPATIENT)
Dept: CALL CENTER | Facility: HOSPITAL | Age: 74
End: 2024-05-29
Payer: MEDICARE

## 2024-05-29 NOTE — OUTREACH NOTE
Medical Week 3 Survey      Flowsheet Row Responses   [facility] patient discharged from? [facility]   Does the patient have one of the following disease processes/diagnoses(primary or secondary)? Other   Week 3 attempt successful? No   Unsuccessful attempts Attempt 1   Revoke Decline to participate            Meredith WHITE - Licensed Nurse

## 2024-06-12 ENCOUNTER — TELEPHONE (OUTPATIENT)
Dept: UROLOGY | Facility: CLINIC | Age: 74
End: 2024-06-12
Payer: MEDICARE

## 2024-06-12 NOTE — TELEPHONE ENCOUNTER
Called [facility], spoke to Tiffany. Tiffany will have those records faxed once the testing is complete. Test results are not back at this time.    Informed Delmy that blood in urine is normal with kidney stones but informed to monitor patient for fever, nausea/vomiting and informed if pt starts to run a fever to report to ER.  Also informed Delmy that we are waiting on test results to be completed and then they will fax them to us.

## 2024-06-12 NOTE — TELEPHONE ENCOUNTER
PATIENT'S WIFE, GABO, CALLED.  PATIENT IS SCHEDULED FOR SURGERY 06/17/24 FOR STONE.  SHE SAID STACIE TOLD HER THAT HE WOULD NEED TO DO A URINE TEST A WEEK PRIOR.    WIFE SAID HE DID THE URINE SPECIMEN AT [facility] ON 06/10/24.  SHE ASKED FOR THE RESULTS.  SHE SAID HE HAD BLOOD IN THE SPECIMEN.    SHE SAID TO PLEASE CALL HER [phone], RATHER THAN CALLING HER .    PHARMACY VERIFIED.

## 2024-06-13 ENCOUNTER — TELEPHONE (OUTPATIENT)
Dept: UROLOGY | Facility: CLINIC | Age: 74
End: 2024-06-13
Payer: MEDICARE

## 2024-06-14 ENCOUNTER — TELEPHONE (OUTPATIENT)
Dept: UROLOGY | Facility: CLINIC | Age: 74
End: 2024-06-14
Payer: MEDICARE

## 2024-06-14 NOTE — PRE-PROCEDURE INSTRUCTIONS
"Assumed care of pt at this time. No voiced compaints of pain or discomfort at this time. Safety protocols remain. No lines/leads attatched to patient at this time.        General: Sleeping, rouses easily to gentle stimulation. On waking reports he feels "hot," resolved after a few minutes of being awake.  Skin: Somewhat diaphoretic while sleeping/flushed, resolved upon waking for several minutes.   Neck: Supple  Respiratory: Nonlabored respirations. No audible wheeze. Speaking in full sentences.  Cardiac: Well-perfused. No acrocyanosis.  Abdomen: Supple  Neurological: Moves all extremities symmetrically and equally. Answers questions appropriately.       ED nursing assistant currently at bedside obtaining blood glucose.    " PATIENT INSTRUCTED TO BE:    - NOTHING TO EAT AFTER MIDNIGHT OR CHEW, EXCEPT CAN HAVE CLEAR LIQUIDS 2 HOURS PRIOR TO SURGERY ARRIVAL TIME , NO MORE THAN 8 OZ. (NOTHING RED)     - TO HOLD ALL VITAMINS, SUPPLEMENTS, NSAIDS FOR ONE WEEK PRIOR TO THEIR SURGICAL PROCEDURE LAST DOSE 6/14/24     - DO NOT TAKE ________NA______________ 7 DAYS PRIOR TO PROCEDURE PER ANESTHESIA RECOMMENDATIONS/INSTRUCTIONS     - INSTRUCTED PT TO SHOWER WITH ANTIBACTERIAL SOAP 1 TIME THE NIGHT PRIOR TO SURGERY OR THE AM OF SURGERY       INSTRUCTED NO LOTIONS, JEWELRY, PIERCINGS,  NAIL POLISH, OR DEODORANT DAY OF SURGERY    - IF DIABETIC, CHECK BLOOD GLUCOSE IF LESS THAN 70 OR HAVING SYMPTOMS CALL THE PREOP AREA FOR INSTRUCTIONS ON AM OF SURGERY ([phone])  NA    -INSTRUCTED TO TAKE THE FOLLOWING MEDICATIONS THE DAY OF SURGERY WITH SIPS OF WATER: COREG, EFFEXOR, HYOSCYAMINE, LYRICA          - DO NOT BRING ANY MEDICATIONS WITH YOU TO THE HOSPITAL THE DAY OF SURGERY, EXCEPT IF USE INHALERS. BRING INHALERS DAY OF SURGERY       - BRING CPAP OR BIPAP TO THE HOSPITAL ONLY IF YOU ARE SPENDING THE NIGHT NA    - DO NOT SMOKE OR VAPE 24 HOURS PRIOR TO PROCEDURE PER ANESTHESIA REQUEST NA     -MAKE SURE YOU HAVE A RIDE HOME OR SOMEONE TO STAY WITH YOU THE DAY OF THE PROCEDURE AFTER YOU GO HOME     - FOLLOW ANY OTHER INSTRUCTIONS GIVEN TO YOU BY YOUR SURGEON'S OFFICE.     - DAY OF SURGERY  COME TO ELEVATOR A, THIRD FLOOR, CHECK IN AT THE DESK FOR REGISTRATION/SURGERY.   YOU WILL RECEIVE A PHONE CALL THE DAY PRIOR TO SURGERY BETWEEN 1PM AND 4 PM WITH ARRIVAL TIME, IF YOUR SURGERY IS ON A MONDAY YOU WILL RECEIVE A CALL THE FRIDAY PRIOR TO SURGERY DATE    - BRING CASH OR CREDIT CARD FOR COPAYMENT OF MEDICATIONS AFTER SURGERY IF YOU USE THE HOSPITAL PHARMACY (MEDS TO BED) NA    - PREADMISSION TESTING NURSE  [facility] [phone] IF HAVE ANY QUESTIONS     -PATIENT PROVIDED THE NUMBER FOR PREOP SURGICAL DEPT IF HAD QUESTIONS AFTER HOURS PRIOR  TO SURGERY ([phone])   INFORMED PT IF NO ANSWER, LEAVE A MESSAGE AND SOMEONE WILL RETURN THEIR CALL       PATIENT VERBALIZED UNDERSTANDING

## 2024-06-14 NOTE — TELEPHONE ENCOUNTER
Patient wife called back and said that they did leave a urine culture with [facility] on Monday of this week. Please call them back if there are any further instructions.

## 2024-06-16 ENCOUNTER — ANESTHESIA EVENT (OUTPATIENT)
Dept: PERIOP | Facility: HOSPITAL | Age: 74
End: 2024-06-16
Payer: MEDICARE

## 2024-06-17 ENCOUNTER — ANESTHESIA (OUTPATIENT)
Dept: PERIOP | Facility: HOSPITAL | Age: 74
End: 2024-06-17
Payer: MEDICARE

## 2024-06-17 ENCOUNTER — APPOINTMENT (OUTPATIENT)
Dept: GENERAL RADIOLOGY | Facility: HOSPITAL | Age: 74
End: 2024-06-17
Payer: MEDICARE

## 2024-06-17 ENCOUNTER — HOSPITAL ENCOUNTER (OUTPATIENT)
Facility: HOSPITAL | Age: 74
Setting detail: HOSPITAL OUTPATIENT SURGERY
Discharge: HOME | End: 2024-06-17
Attending: UROLOGY | Admitting: UROLOGY
Payer: MEDICARE

## 2024-06-17 VITALS
HEART RATE: 73 BPM | TEMPERATURE: 97.1 F | RESPIRATION RATE: 16 BRPM | BODY MASS INDEX: 30.64 KG/M2 | HEIGHT: 72 IN | OXYGEN SATURATION: 97 % | SYSTOLIC BLOOD PRESSURE: 121 MMHG | WEIGHT: 226.19 LBS | DIASTOLIC BLOOD PRESSURE: 65 MMHG

## 2024-06-17 DIAGNOSIS — N31.9 NEUROGENIC BLADDER: Primary | ICD-10-CM

## 2024-06-17 DIAGNOSIS — N20.1 URETERAL STONE: ICD-10-CM

## 2024-06-17 PROCEDURE — 52356 CYSTO/URETERO W/LITHOTRIPSY: CPT | Performed by: UROLOGY

## 2024-06-17 PROCEDURE — 82365 CALCULUS SPECTROSCOPY: CPT | Performed by: UROLOGY

## 2024-06-17 PROCEDURE — 25510000001 IOPAMIDOL PER 1 ML: Performed by: UROLOGY

## 2024-06-17 PROCEDURE — 76000 FLUOROSCOPY <1 HR PHYS/QHP: CPT

## 2024-06-17 PROCEDURE — 25010000002 LEVOFLOXACIN PER 250 MG: Performed by: UROLOGY

## 2024-06-17 PROCEDURE — 88300 SURGICAL PATH GROSS: CPT | Performed by: UROLOGY

## 2024-06-17 PROCEDURE — C2617 STENT, NON-COR, TEM W/O DEL: HCPCS | Performed by: UROLOGY

## 2024-06-17 PROCEDURE — 25810000003 LACTATED RINGERS PER 1000 ML: Performed by: ANESTHESIOLOGY

## 2024-06-17 PROCEDURE — 93005 ELECTROCARDIOGRAM TRACING: CPT | Performed by: UROLOGY

## 2024-06-17 PROCEDURE — C1769 GUIDE WIRE: HCPCS | Performed by: UROLOGY

## 2024-06-17 PROCEDURE — C1758 CATHETER, URETERAL: HCPCS | Performed by: UROLOGY

## 2024-06-17 PROCEDURE — 74420 UROGRAPHY RTRGR +-KUB: CPT | Performed by: UROLOGY

## 2024-06-17 PROCEDURE — 25010000002 ONDANSETRON PER 1 MG

## 2024-06-17 PROCEDURE — C1894 INTRO/SHEATH, NON-LASER: HCPCS | Performed by: UROLOGY

## 2024-06-17 PROCEDURE — S0260 H&P FOR SURGERY: HCPCS | Performed by: UROLOGY

## 2024-06-17 PROCEDURE — 25010000002 DEXAMETHASONE PER 1 MG

## 2024-06-17 PROCEDURE — 25010000002 MIDAZOLAM PER 1MG: Performed by: ANESTHESIOLOGY

## 2024-06-17 DEVICE — URETERAL STENT
Type: IMPLANTABLE DEVICE | Site: URETER | Status: FUNCTIONAL
Brand: ASCERTA™

## 2024-06-17 RX ORDER — IBUPROFEN 600 MG/1
600 TABLET ORAL EVERY 6 HOURS PRN
Status: DISCONTINUED | OUTPATIENT
Start: 2024-06-17 | End: 2024-06-17 | Stop reason: HOSPADM

## 2024-06-17 RX ORDER — LEVOFLOXACIN 5 MG/ML
500 INJECTION, SOLUTION INTRAVENOUS ONCE
Status: COMPLETED | OUTPATIENT
Start: 2024-06-17 | End: 2024-06-17

## 2024-06-17 RX ORDER — ACETAMINOPHEN 325 MG/1
650 TABLET ORAL ONCE
Status: DISCONTINUED | OUTPATIENT
Start: 2024-06-17 | End: 2024-06-17 | Stop reason: HOSPADM

## 2024-06-17 RX ORDER — TAMSULOSIN HYDROCHLORIDE 0.4 MG/1
1 CAPSULE ORAL DAILY
Qty: 10 CAPSULE | Refills: 0 | Status: SHIPPED | OUTPATIENT
Start: 2024-06-17

## 2024-06-17 RX ORDER — OXYCODONE HYDROCHLORIDE 5 MG/1
5 TABLET ORAL
Status: DISCONTINUED | OUTPATIENT
Start: 2024-06-17 | End: 2024-06-17 | Stop reason: HOSPADM

## 2024-06-17 RX ORDER — HYDROCODONE BITARTRATE AND ACETAMINOPHEN 5; 325 MG/1; MG/1
1-2 TABLET ORAL EVERY 6 HOURS PRN
Qty: 14 TABLET | Refills: 0 | Status: SHIPPED | OUTPATIENT
Start: 2024-06-17

## 2024-06-17 RX ORDER — OXYBUTYNIN CHLORIDE 5 MG/1
5 TABLET ORAL 3 TIMES DAILY PRN
Qty: 12 TABLET | Refills: 0 | Status: SHIPPED | OUTPATIENT
Start: 2024-06-17

## 2024-06-17 RX ORDER — PHENAZOPYRIDINE HYDROCHLORIDE 200 MG/1
200 TABLET, FILM COATED ORAL 3 TIMES DAILY PRN
Qty: 20 TABLET | Refills: 0 | Status: SHIPPED | OUTPATIENT
Start: 2024-06-17

## 2024-06-17 RX ORDER — ONDANSETRON 2 MG/ML
4 INJECTION INTRAMUSCULAR; INTRAVENOUS ONCE AS NEEDED
Status: DISCONTINUED | OUTPATIENT
Start: 2024-06-17 | End: 2024-06-17 | Stop reason: HOSPADM

## 2024-06-17 RX ORDER — PROMETHAZINE HYDROCHLORIDE 12.5 MG/1
12.5 TABLET ORAL ONCE AS NEEDED
Status: DISCONTINUED | OUTPATIENT
Start: 2024-06-17 | End: 2024-06-17 | Stop reason: HOSPADM

## 2024-06-17 RX ORDER — SODIUM CHLORIDE, SODIUM LACTATE, POTASSIUM CHLORIDE, CALCIUM CHLORIDE 600; 310; 30; 20 MG/100ML; MG/100ML; MG/100ML; MG/100ML
9 INJECTION, SOLUTION INTRAVENOUS CONTINUOUS PRN
Status: DISCONTINUED | OUTPATIENT
Start: 2024-06-17 | End: 2024-06-17 | Stop reason: HOSPADM

## 2024-06-17 RX ORDER — MAGNESIUM HYDROXIDE 1200 MG/15ML
LIQUID ORAL AS NEEDED
Status: DISCONTINUED | OUTPATIENT
Start: 2024-06-17 | End: 2024-06-17 | Stop reason: HOSPADM

## 2024-06-17 RX ORDER — MIDAZOLAM HYDROCHLORIDE 2 MG/2ML
1 INJECTION, SOLUTION INTRAMUSCULAR; INTRAVENOUS ONCE
Status: COMPLETED | OUTPATIENT
Start: 2024-06-17 | End: 2024-06-17

## 2024-06-17 RX ORDER — DEXAMETHASONE SODIUM PHOSPHATE 4 MG/ML
INJECTION, SOLUTION INTRA-ARTICULAR; INTRALESIONAL; INTRAMUSCULAR; INTRAVENOUS; SOFT TISSUE AS NEEDED
Status: DISCONTINUED | OUTPATIENT
Start: 2024-06-17 | End: 2024-06-17 | Stop reason: SURG

## 2024-06-17 RX ORDER — ONDANSETRON 2 MG/ML
INJECTION INTRAMUSCULAR; INTRAVENOUS AS NEEDED
Status: DISCONTINUED | OUTPATIENT
Start: 2024-06-17 | End: 2024-06-17 | Stop reason: SURG

## 2024-06-17 RX ORDER — ONDANSETRON 4 MG/1
4 TABLET, ORALLY DISINTEGRATING ORAL ONCE AS NEEDED
Status: DISCONTINUED | OUTPATIENT
Start: 2024-06-17 | End: 2024-06-17 | Stop reason: HOSPADM

## 2024-06-17 RX ORDER — ACETAMINOPHEN 500 MG
1000 TABLET ORAL ONCE
Status: COMPLETED | OUTPATIENT
Start: 2024-06-17 | End: 2024-06-17

## 2024-06-17 RX ADMIN — ONDANSETRON 4 MG: 2 INJECTION INTRAMUSCULAR; INTRAVENOUS at 14:29

## 2024-06-17 RX ADMIN — ACETAMINOPHEN 1000 MG: 500 TABLET ORAL at 13:00

## 2024-06-17 RX ADMIN — MIDAZOLAM HYDROCHLORIDE 1 MG: 1 INJECTION, SOLUTION INTRAMUSCULAR; INTRAVENOUS at 14:11

## 2024-06-17 RX ADMIN — DEXAMETHASONE SODIUM PHOSPHATE 4 MG: 4 INJECTION, SOLUTION INTRAMUSCULAR; INTRAVENOUS at 14:29

## 2024-06-17 RX ADMIN — LEVOFLOXACIN 500 MG: 5 INJECTION, SOLUTION INTRAVENOUS at 14:25

## 2024-06-17 RX ADMIN — SODIUM CHLORIDE, POTASSIUM CHLORIDE, SODIUM LACTATE AND CALCIUM CHLORIDE 9 ML/HR: 600; 310; 30; 20 INJECTION, SOLUTION INTRAVENOUS at 12:59

## 2024-06-17 NOTE — DISCHARGE INSTRUCTIONS
DISCHARGE INSTRUCTIONS CYSTOSCOPY      For your surgery you had:  General anesthesia (you may have a sore throat for the first 24 hours)    You may experience dizziness, drowsiness, or lightheadedness for several hours following surgery.  Do not stay alone today or tonight.  Limit your activity for 24 hours.  You should not drive, operate machinery, drink alcohol, or sign legally binding documents for 24 hours or while you are taking pain medication.  Resume your diet slowly.  Follow any special dietary instructions you may have been given by your doctor.    NOTIFY YOUR DOCTOR IF YOU EXPERIENCE ANY OF THE FOLLOWING:  Temperature greater than 101 degrees Fahrenheit  Shaking Chills  Redness or excessive drainage from incision  Nausea, vomiting and/or pain that is not controlled by prescribed medications  Increase in bleeding or bleeding that is excessive  Unable to urinate in 6 hours after surgery  If unable to reach your doctor, please go to the closest Emergency Room   Following your cystoscopy exam, you may experience burning upon urination.  You may also pass some bloody urine.  If the burning sensation and/or bloody urine should persist beyond 48 hours, call your doctor.  To encourage kidney and bladder function, you should drink as much fluid as possible.  If you have difficulty urinating, try sitting in a bath tub of warm water.  If you become uncomfortable because you cannot urinate, call your doctor or come to the Emergency Room at the hospital.  Medications per physician instructions as indicated on After Visit Summary Sheet.    Last dose of pain medication given at:    Tylenol 1000mg at 1:00pm. .    SPECIAL INSTRUCTIONS:  *Resume home medications as directed by Physician

## 2024-06-17 NOTE — ANESTHESIA PREPROCEDURE EVALUATION
Anesthesia Evaluation     Patient summary reviewed and Nursing notes reviewed   no history of anesthetic complications:   NPO Solid Status: > 8 hours  NPO Liquid Status: > 2 hours           Airway   Mallampati: II  TM distance: >3 FB  Neck ROM: full  Possible difficult intubation and Large neck circumference  Dental      Pulmonary - negative pulmonary ROS and normal exam    breath sounds clear to auscultation    ROS comment: Trach stoma x 20 yrs, breathing stable  Cardiovascular - normal exam  Exercise tolerance: good (4-7 METS)    ECG reviewed  Rhythm: regular  Rate: normal    (+) pacemaker (is pacer dependent, but has never been defib) ICD, hypertension well controlled      Neuro/Psych- negative ROS  GI/Hepatic/Renal/Endo    (+) obesity, liver disease fatty liver disease, renal disease- stones    Musculoskeletal     Abdominal    Substance History - negative use     OB/GYN negative ob/gyn ROS         Other   arthritis,     ROS/Med Hx Other: 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  Patient also has a trach and chronic indwelling Kyle catheter.    Echo ef 60%, mild calcification of the AV      Phys Exam Other: Tracheostomy present                  Anesthesia Plan    ASA 3     general     (Presence of biventricular implantable cardioverter-defibrillator (ICD)  Patient understands anesthesia not responsible for dental damage.  )  intravenous induction     Anesthetic plan, risks, benefits, and alternatives have been provided, discussed and informed consent has been obtained with: patient.  Pre-procedure education provided      CODE STATUS:

## 2024-06-17 NOTE — H&P
[facility]   Urology Preop H&P Note    Patient Name: Anshul Shook Jr.  : 1950  MRN: [id number]  Primary Care Physician:  Won Mcneill MD  Referring Physician: Sara Montano MD  Date of admission: 2024    Subjective   Subjective     Reason for Consult/ Chief Complaint: Ureteral stone [N20.1]    HPI:  Anshul Shook Jr. is a 74 y.o. male history ofUreteral stone [N20.1] who presents for further management OR.  Presents for planned Procedure(s):  CYSTOSCOPY URETEROSCOPY RETROGRADE PYELOGRAM HOLMIUM LASER STENT exchange, left;  .  Procedure to be preformed on the left.  Risk, benefits, and alternatives discussed with patient prior to today.All questions were addressed after providing time for discussion.  Patient denies significant changes since last visit.  No new complaints today.    Review of Systems   All systems were reviewed and negative except for the above  Personal History     Past Medical History:   Diagnosis Date    Arthritis     Back injury     RESULTING IN PARAPLEGIA    Essential hypertension 2021    Irregular heartbeat     SEE'S DR GAY. DENIED  CP/SOB    Kidney stone     Memory loss     Nonischemic cardiomyopathy 2021    FOLLOWS DR. GAY    Paraplegic gait     Presence of biventricular implantable cardioverter-defibrillator (ICD) 2021    [employer] BiV ICD       Past Surgical History:   Procedure Laterality Date    BACK SURGERY      CARDIAC DEFIBRILLATOR PLACEMENT      [facility]    COLONOSCOPY      COLONOSCOPY N/A 2022    Procedure: COLONOSCOPY WITH POLYPECTOMIES, HOT SNARE, CLIP APPLICATION X2;  Surgeon: Audie West MD;  Location: [facility] ENDOSCOPY;  Service: General;  Laterality: N/A;  COLON POLYPS    CYSTOSCOPY, RETROGRADE PYELOGRAM AND STENT INSERTION Left 2024    Procedure: CYSTOSCOPY RETROGRADE PYELOGRAM AND STENT INSERTION, left;  Surgeon: Sara Montano MD;  Location: [facility] MAIN OR;  Service: Urology;  Laterality: Left;    ORIF  ANKLE FRACTURE Right     PACEMAKER REPLACEMENT Bilateral 02/06/2024    Procedure: PPM generator change bi-v;  Surgeon: Renzo Hobbs MD;  Location: [facility] INVASIVE LOCATION;  Service: Cardiovascular;  Laterality: Bilateral;       Family History: family history is not on file. Otherwise pertinent FHx was reviewed and not pertinent to current issue.    Social History:  reports that he has never smoked. He has never used smokeless tobacco. He reports that he does not drink alcohol and does not use drugs.    Home Medications:  Hyoscyamine Sulfate ER, carvedilol, cholecalciferol, donepezil, multivitamin with minerals, pregabalin, venlafaxine XR, and vitamin C    Allergies:  Allergies   Allergen Reactions    Amoxicillin Hives    Ct Contrast Hives    Penicillins Rash     Has tolerated Ampicillin/Sulbactam and Amoxicillin/clavulanate 5/2024 -Obed Randhawa RPH    Zosyn [Piperacillin-Tazobactam In Dex] Hives     Has tolerated Ampicillin/Sulbactam and Amoxicillin/clavulanate 5/2024 -Obed Randhawa RP       Objective    Objective     Vitals:   Temp:  [97.3 °F (36.3 °C)] 97.3 °F (36.3 °C)  Heart Rate:  [72] 72  Resp:  [18] 18  BP: (123)/(75) 123/75    Physical Exam:   Constitutional: Awake, alert   Respiratory: Clear, nonlabored respirations    Cardiovascular: Regular rate, no chest retractions   gastrointestinal: Appears soft, nontender     Results:    Assessment & Plan   Assessment / Plan     Brief Patient Summary:  Anshul Shook Jr. is a 74 y.o. male who     Active Hospital Problems:  Active Hospital Problems    Diagnosis     **Ureteral stone        Plan:   Proceed to the OR for planned procedure, Procedure(s):  CYSTOSCOPY URETEROSCOPY RETROGRADE PYELOGRAM HOLMIUM LASER STENT exchange, left,  ,   Risk, benefits, and alternatives discussed with patient at length he is agreeable to proceed  Laterality identified, left  All questions addressed      Electronically signed by Sara Montano MD, 06/17/24, 1:36 PM  EDT.

## 2024-06-17 NOTE — OP NOTE
Preoperative diagnosis  Left ureteral stone    Postoperative diagnosis  Left ureteral stone    Procedure performed  Cystoscopy, left retrograde pyelogram, ureteroscopy, laser lithotripsy, ureteral stent exchange    Attending surgeon  Sara Montano MD     Anesthesia  General    EBL  0 mL    Complications  None    Specimen  Left ureteral stone    Findings  Prostatomegaly; no bladder mucosal abnormality; left ureteral stones treated with laser lithotripsy, all fragments retrieved; no stone burden at conclusion of case only sediment dust  4.8 x 28 stent with string  Placement of indwelling 18 [place] Kyle catheter    Indications  74 y.o. male agreed to undergo the above named procedure after discussion of the alternatives, risks and benefits.   Informed consent was obtained.      Procedure  After informed consent was obtained, the patient was taken back to the  operating suite where general anesthesia was administered. Once the patient  was adequately anesthetized, he was placed in the dorsal lithotomy position.  His genitals were prepped and draped in a normal sterile fashion.  Rigid  cystoscope was inserted gently in the patient's urethra meatus, which passed  atraumatically into the bladder.  The previously placed left ureteral stent was easily visible.  It was grasped and retrieved through the urethral meatus.  Sensor wire was threaded through it into the renal pelvis and the stent reduced.  Dual-lumen catheter was then placed over the wire and a second wire placed.  Dual l-lumen reduced.  One of the wires was secured to the drape as a safety wire for the remainder of the case.  The  access sheath was passed level of the mid ureter.  The ureteroscope was then assembled and ureteroscopy  proceeded in a systematic manner, stone was encountered at the proximal ureter, noted to be somewhat impacted with ureteral mucosal edema surrounding the stone.  Once the stone was encountered, laser  lithotripsy was then  initiated.  Laser lithotripsy was utilized to dust and  fragment the calculus into tiny pieces.  Any sizable  fragments were then basketed out. After adequate treatment of the stone, pyeloscopy then proceeded in a systematic manner.  Several fragments were retrieved and there was only sediment dust remaining.  Contrast dye was injected through the ureteroscope ensuring thorough inspection of all calyces.  Mild hydronephrosis noted.  Once there were no further stone fragments, ureteroscopy proceeded down the length of the ureter  with retrieval of the access sheath, leaving the safety wire in place. There were no further ureteral calculi  or fragments.  The ureteroscope was removed.  A 4.8 x 28 double J stent was placed over the wire under fluoroscopic guidance.  Upon retrieval of the wire, there was good proximal  coil noted on x-ray, as well as distal coil within the bladder.  18 [place] coudé catheter was then inserted in the bladder without difficulty.  Balloon inflated and catheter secured.  The string was secured with a Tegaderm.  At this  point, the procedure was deemed terminated.  The patient's bladder was then emptied and the cystoscope removed.  He was then placed back in the supine position and awakened from general  anesthesia and transferred to the PACU in good condition.        Signed:  Sara Montano MD  06/17/24  15:24 EDT

## 2024-06-17 NOTE — ANESTHESIA POSTPROCEDURE EVALUATION
Patient: Anshul Shook Jr.    Procedure Summary       Date: 06/17/24 Room / Location: [facility] OR 07 / [facility] MAIN OR    Anesthesia Start: 1415 Anesthesia Stop: 1511    Procedure: CYSTOSCOPY URETEROSCOPY RETROGRADE PYELOGRAM HOLMIUM LASER STENT exchange, left (Left) Diagnosis:       Ureteral stone      (Ureteral stone [N20.1])    Surgeons: Sara Montano MD Provider: Vida Benoit MD    Anesthesia Type: general ASA Status: 3            Anesthesia Type: general    Vitals  Vitals Value Taken Time   /76 06/17/24 1517   Temp 36.1 °C (97 °F) 06/17/24 1507   Pulse 68 06/17/24 1520   Resp 18 06/17/24 1512   SpO2 93 % 06/17/24 1520   Vitals shown include unfiled device data.        Post Anesthesia Care and Evaluation    Patient location during evaluation: bedside  Patient participation: complete - patient participated  Level of consciousness: awake  Pain management: adequate    Airway patency: patent  PONV Status: none  Cardiovascular status: acceptable and stable  Respiratory status: acceptable  Hydration status: acceptable

## 2024-06-18 DIAGNOSIS — N20.1 URETERAL STONE: ICD-10-CM

## 2024-06-18 DIAGNOSIS — N31.9 NEUROGENIC BLADDER: ICD-10-CM

## 2024-06-18 LAB
LAB AP CASE REPORT: NORMAL
LAB AP CLINICAL INFORMATION: NORMAL
PATH REPORT.FINAL DX SPEC: NORMAL
PATH REPORT.GROSS SPEC: NORMAL

## 2024-06-18 RX ORDER — OXYBUTYNIN CHLORIDE 5 MG/1
TABLET ORAL
Qty: 12 TABLET | Refills: 0 | OUTPATIENT
Start: 2024-06-18

## 2024-06-20 LAB
QT INTERVAL: 442 MS
QTC INTERVAL: 487 MS

## 2024-06-25 DIAGNOSIS — N20.1 URETERAL STONE: ICD-10-CM

## 2024-06-25 DIAGNOSIS — N31.9 NEUROGENIC BLADDER: ICD-10-CM

## 2024-06-26 LAB
CALCIUM OXALATE DIHYDRATE MFR STONE IR: 50 %
COLOR STONE: NORMAL
COM MFR STONE: 40 %
COMPN STONE: NORMAL
HYDROXYAPATITE: 10 %
LABORATORY COMMENT REPORT: NORMAL
Lab: NORMAL
Lab: NORMAL
PHOTO: NORMAL
SIZE STONE: NORMAL MM
SPEC SOURCE SUBJ: NORMAL
STONE ANALYSIS-IMP: NORMAL
WT STONE: 74 MG

## 2024-06-26 RX ORDER — TAMSULOSIN HYDROCHLORIDE 0.4 MG/1
1 CAPSULE ORAL DAILY
Qty: 10 CAPSULE | Refills: 0 | OUTPATIENT
Start: 2024-06-26

## 2024-07-25 ENCOUNTER — OFFICE VISIT (OUTPATIENT)
Dept: CARDIOLOGY | Facility: CLINIC | Age: 74
End: 2024-07-25
Payer: MEDICARE

## 2024-07-25 VITALS — DIASTOLIC BLOOD PRESSURE: 58 MMHG | SYSTOLIC BLOOD PRESSURE: 103 MMHG | HEART RATE: 75 BPM

## 2024-07-25 DIAGNOSIS — I10 ESSENTIAL HYPERTENSION: ICD-10-CM

## 2024-07-25 DIAGNOSIS — R09.89 LEFT CAROTID BRUIT: ICD-10-CM

## 2024-07-25 DIAGNOSIS — I42.8 NICM (NONISCHEMIC CARDIOMYOPATHY): Primary | ICD-10-CM

## 2024-07-25 DIAGNOSIS — Z95.810 PRESENCE OF BIVENTRICULAR IMPLANTABLE CARDIOVERTER-DEFIBRILLATOR (ICD): ICD-10-CM

## 2024-07-25 PROCEDURE — 3078F DIAST BP <80 MM HG: CPT | Performed by: INTERNAL MEDICINE

## 2024-07-25 PROCEDURE — 99214 OFFICE O/P EST MOD 30 MIN: CPT | Performed by: INTERNAL MEDICINE

## 2024-07-25 PROCEDURE — 3074F SYST BP LT 130 MM HG: CPT | Performed by: INTERNAL MEDICINE

## 2024-07-25 NOTE — ASSESSMENT & PLAN NOTE
Bruit heard incidentally on his left side of his carotid artery will get carotid duplex for further evaluation

## 2024-07-25 NOTE — ASSESSMENT & PLAN NOTE
Patient with normalization of his EF on last echocardiogram symptomatically stable continue with his Coreg 25 twice daily dosing

## 2024-07-25 NOTE — ASSESSMENT & PLAN NOTE
Device stable chronically elevated left ventricular threshold levels so pacing 100% time and echocardiogram done this year shows normal ejection fraction

## 2024-07-25 NOTE — PROGRESS NOTES
Chief Complaint  Follow-up, Cardiomyopathy, and Hypertension    Subjective    Patient's with recent kidney stone problems following up since then denies any change in his shortness of breath symptoms no increased weight gain or edema problems  Past Medical History:   Diagnosis Date    Arthritis     Back injury     RESULTING IN PARAPLEGIA    Essential hypertension 08/11/2021    Irregular heartbeat     SEE'S DR GAY. DENIED  CP/SOB    Kidney stone     Memory loss     Nonischemic cardiomyopathy 08/11/2021    FOLLOWS DR. GAY    Paraplegic gait     Presence of biventricular implantable cardioverter-defibrillator (ICD) 08/12/2021    [employer] BiV ICD         Current Outpatient Medications:     carvedilol (COREG) 12.5 MG tablet, Take 1 tablet by mouth 2 (Two) Times a Day With Meals., Disp: , Rfl:     Cholecalciferol 25 MCG (1000 UT) tablet, Take 1 tablet by mouth Daily., Disp: , Rfl:     donepezil (ARICEPT) 10 MG tablet, Take 1 tablet by mouth Every Night., Disp: , Rfl:     Effexor  MG 24 hr capsule, Take 1 capsule by mouth Daily., Disp: , Rfl:     Hyoscyamine Sulfate ER 0.375 MG tablet controlled-release, Take  by mouth 2 (Two) Times a Day., Disp: , Rfl:     multivitamin with minerals tablet tablet, Take 1 tablet by mouth Daily., Disp: , Rfl:     pregabalin (LYRICA) 200 MG capsule, Take 1 capsule by mouth 2 (Two) Times a Day., Disp: , Rfl:     Medications Discontinued During This Encounter   Medication Reason    oxybutynin (DITROPAN) 5 MG tablet *Therapy completed    HYDROcodone-acetaminophen (Norco) 5-325 MG per tablet *Therapy completed    phenazopyridine (Pyridium) 200 MG tablet *Therapy completed    tamsulosin (FLOMAX) 0.4 MG capsule 24 hr capsule *Therapy completed    vitamin C (ASCORBIC ACID) 250 MG tablet Alternate therapy     Allergies   Allergen Reactions    Amoxicillin Hives    Ct Contrast Hives    Iodinated Contrast Media Other (See Comments)     Unknown, marked as high severity from,another facility,  "but reaction was unknown.    Penicillins Rash     Has tolerated Ampicillin/Sulbactam and Amoxicillin/clavulanate 5/2024 -Obed Randhawa EMMANUELLE    Zosyn [Piperacillin-Tazobactam In Dex] Hives     Has tolerated Ampicillin/Sulbactam and Amoxicillin/clavulanate 5/2024 -Obed Randhawa RP        Social History     Tobacco Use    Smoking status: Never    Smokeless tobacco: Never   Vaping Use    Vaping status: Never Used   Substance Use Topics    Alcohol use: Never    Drug use: Never       Family History   Problem Relation Age of Onset    Malig Hyperthermia Neg Hx         Objective     /58   Pulse 75       Physical Exam    General Appearance:   no acute distress  Alert and oriented x3  HENT:   lips not cyanotic  Atraumatic  Neck:  No jvd   supple  Respiratory:  no respiratory distress  normal breath sounds  no rales  Cardiovascular:  Regular rate and rhythm  no S3, no S4   no murmur  no rub  Extremities  No cyanosis  lower extremity edema: Mild  Skin:   warm, dry  No rashes      Result Review :     No results found for: \"PROBNP\"  CMP          5/8/2024    05:02 5/9/2024    03:43 5/10/2024    05:34   CMP   Glucose 95  99  123    BUN 19  16  12    Creatinine 0.89  0.79  0.78    EGFR 88.8  92.1  92.4    Sodium 133  136  135    Potassium 4.0  3.9  5.0    Chloride 101  105  105    Calcium 7.9  7.6  8.0    Albumin  2.6  2.7    BUN/Creatinine Ratio 21.3  20.3  15.4    Anion Gap 9.5  9.4  6.9      CBC w/diff          5/7/2024    05:07 5/8/2024    05:02 5/9/2024    03:43   CBC w/Diff   WBC 24.99  15.67  10.39    RBC 4.56  4.10  4.20    Hemoglobin 12.3  11.2  11.2    Hematocrit 37.9  34.1  35.3    MCV 83.1  83.2  84.0    MCH 27.0  27.3  26.7    MCHC 32.5  32.8  31.7    RDW 16.1  15.9  15.9    Platelets 142  128  147    Neutrophil Rel % 86.7  84.9  76.0    Immature Granulocyte Rel % 1.3  0.6  0.6    Lymphocyte Rel % 5.2  6.4  11.6    Monocyte Rel % 6.4  7.0  8.7    Eosinophil Rel % 0.2  0.8  2.5    Basophil Rel % 0.2  0.3  " "0.6       Lab Results   Component Value Date    TSH 1.390 08/23/2023      No results found for: \"FREET4\"   No results found for: \"DDIMERQUANT\"  Magnesium   Date Value Ref Range Status   05/10/2024 2.0 1.6 - 2.4 mg/dL Final      No results found for: \"DIGOXIN\"   Lab Results   Component Value Date    TROPONINT 26 (H) 05/06/2024           Lipid Panel          8/23/2023    12:44   Lipid Panel   Total Cholesterol 163    Triglycerides 138    HDL Cholesterol 35    VLDL Cholesterol 25    LDL Cholesterol  103    LDL/HDL Ratio 2.87      No results found for: \"POCTROP\"    Results for orders placed during the hospital encounter of 01/17/24    Adult Transthoracic Echo Complete W/ Cont if Necessary Per Protocol    Interpretation Summary    The study is technically difficult for diagnosis.    Left ventricular systolic function is normal. Calculated left ventricular EF = 60.6%    Left ventricular diastolic function was indeterminate.    There is mild calcification of the aortic valve.                 Diagnoses and all orders for this visit:    1. Nonischemic cardiomyopathy (Primary)  Assessment & Plan:  Patient with normalization of his EF on last echocardiogram symptomatically stable continue with his Coreg 25 twice daily dosing           2. Presence of biventricular implantable cardioverter-defibrillator (ICD)  Assessment & Plan:  Device stable chronically elevated left ventricular threshold levels so pacing 100% time and echocardiogram done this year shows normal ejection fraction      3. Left carotid bruit  Assessment & Plan:  Bruit heard incidentally on his left side of his carotid artery will get carotid duplex for further evaluation    Orders:  -     Duplex Carotid Ultrasound CAR; Future    4. Essential hypertension  Assessment & Plan:  Well-controlled continue with Coreg 25 twice daily dosing               Follow Up     Return in about 6 months (around 1/25/2025) for Follow with Naa Gambino, EKG with F/U.          Patient " was given instructions and counseling regarding his condition or for health maintenance advice. Please see specific information pulled into the AVS if appropriate.

## 2024-07-31 ENCOUNTER — HOSPITAL ENCOUNTER (OUTPATIENT)
Dept: ULTRASOUND IMAGING | Facility: HOSPITAL | Age: 74
Discharge: HOME | End: 2024-07-31
Admitting: UROLOGY
Payer: MEDICARE

## 2024-07-31 DIAGNOSIS — N31.9 NEUROGENIC BLADDER: ICD-10-CM

## 2024-07-31 DIAGNOSIS — N20.1 URETERAL STONE: ICD-10-CM

## 2024-07-31 PROCEDURE — 76775 US EXAM ABDO BACK WALL LIM: CPT

## 2024-08-07 ENCOUNTER — OFFICE VISIT (OUTPATIENT)
Dept: UROLOGY | Facility: CLINIC | Age: 74
End: 2024-08-07
Payer: MEDICARE

## 2024-08-07 VITALS
TEMPERATURE: 98 F | DIASTOLIC BLOOD PRESSURE: 70 MMHG | WEIGHT: 242 LBS | HEART RATE: 75 BPM | SYSTOLIC BLOOD PRESSURE: 134 MMHG | BODY MASS INDEX: 32.78 KG/M2 | HEIGHT: 72 IN

## 2024-08-07 DIAGNOSIS — E83.59 CALCIUM OXALATE CALCULUS: Primary | ICD-10-CM

## 2024-08-07 DIAGNOSIS — N20.0 NEPHROLITHIASIS: ICD-10-CM

## 2024-08-07 NOTE — PROGRESS NOTES
UROLOGY OFFICE FOLLOW UP NOTE    Subjective   HPI  Anshul Shook Jr. is a 74 y.o. male.  Presents for follow-up status post left ureteroscopy, stone treatment, 6/17/2024.  Patient subsequently moved stent as instructed and presents with renal ultrasound prior to today's appointment.  Patient states that he has been doing well since the procedure.    Forming intermittent self cath per routine.    Currently denies any urinary symptoms.  Denies hematuria or flank pain.  No complaints today.    Former clinical pathologist.  History of Present Illness    ______  Stone analysis 6/17/2024: 90% calcium oxalate    Left URS, stone treatment 6/17/2024    Renal ultrasound 7/31/2024: + Hydronephrosis; simple right cyst      CT abdomen pelvis without contrast 5/8/2024: several proximal LEFT ureteral calculi at about the level of the LEFT  ureterovesical junction (UPJ) with moderate LEFT hydronephrosis.  Small nonobstructing right intrarenal stones.  No  ureterolithiasis or hydronephrosis is seen on the right.     Results for orders placed or performed during the hospital encounter of 06/17/24   STONE ANALYSIS - Calculus, Ureter, Left    Specimen: Ureter, Left; Calculus   Result Value Ref Range    Stone Source Comment     Color Tan     Size 6x3 mm    Stone Weight 74 mg    Composition Comment     Ca Oxalate - Monohydrate, Stone 40 %    Ca Oxalate-Dihydrate, Stone 50 %    HYDROXYAPATITE 10 %    Comment Comment     Photo Comment     Comments: Comment     Please note Comment     Disclaimer: Comment    ECG 12 Lead Pre-Op / Pre-Procedure   Result Value Ref Range    QT Interval 442 ms    QTC Interval 487 ms   Tissue Pathology Exam    Specimen: Ureter, Left; Calculus   Result Value Ref Range    Case Report       Surgical Pathology Report                         Case: [id number]                                  Authorizing Provider:  aSra Montano MD      Collected:           06/17/2024 02:25 PM          Ordering Location:      "[facility] MAIN Received:            06/18/2024 05:22 AM                                 OR                                                                           Pathologist:           Reinier Bill MD                                                            Specimen:    Ureter, Left, Right Ureteral Stone                                                         Clinical Information       Ureteral stone      Final Diagnosis       Right ureter, stone, removal:   - Stone, sent for chemical analysis (gross only diagnosis)         Gross Description       1. Ureter, Left.  The specimen is received fresh labeled \" right ureteral stone\" and consists of a 0.7 x 0.7 x 0.7 cm aggregate of brown, friable renal stones.  The specimen is submitted en toto for chemical analysis following gross examination by staff pathologist Reinier Bill.   CHAPO           Review of systems  A review of systems was performed, and positive findings are noted in the HPI.    Objective     Vital Signs:   /70   Pulse 75   Temp 98 °F (36.7 °C)   Ht 182.9 cm (72\")   Wt 110 kg (242 lb)   BMI 32.82 kg/m²       Physical exam  No acute distress, well-nourished, in wheelchair  Awake alert and oriented  Mood normal; affect normal  Physical Exam        Problem List:  Patient Active Problem List   Diagnosis    Nonischemic cardiomyopathy    Essential hypertension    Presence of biventricular implantable cardioverter-defibrillator (ICD)    SANTAMARIA (nonalcoholic steatohepatitis)    UTI (urinary tract infection)    Ureteral stone    Neurogenic bladder    Left carotid bruit       Assessment & Plan   Diagnoses and all orders for this visit:    1. Calcium oxalate calculus (Primary)    2. Nephrolithiasis        Assessment & Plan  Renal ultrasound imaging reviewed and discussed with patient at length, no hydronephrosis, known right intrarenal calculi nonobstructing.  Likely to pass right sided stones without surgical intervention; too small at this " point to observe with KUB.    No further intervention necessary at this time.  Patient to monitor for symptoms; would warrant additional imaging should he experience suspected ureteral colic.        Calcium oxalate stone- Discussed dietary modifications for prevention of stone growth and recurrence including increased hydration, decrease sodium, normal calcium, low animal protein diet.      Informational handouts provided  All questions addressed     Patient to follow-up as needed                Patient or patient representative verbalized consent for the use of Ambient Listening during the visit with  Sara Montano MD for chart documentation. 8/7/2024  14:08 EDT

## 2024-10-09 ENCOUNTER — LAB (OUTPATIENT)
Dept: LAB | Facility: HOSPITAL | Age: 74
End: 2024-10-09
Payer: MEDICARE

## 2024-10-09 ENCOUNTER — APPOINTMENT (OUTPATIENT)
Dept: LAB | Facility: HOSPITAL | Age: 74
End: 2024-10-09
Payer: MEDICARE

## 2024-10-09 ENCOUNTER — TRANSCRIBE ORDERS (OUTPATIENT)
Dept: ADMINISTRATIVE | Facility: HOSPITAL | Age: 74
End: 2024-10-09
Payer: MEDICARE

## 2024-10-09 DIAGNOSIS — I10 ESSENTIAL HYPERTENSION, MALIGNANT: ICD-10-CM

## 2024-10-09 DIAGNOSIS — I10 ESSENTIAL HYPERTENSION, MALIGNANT: Primary | ICD-10-CM

## 2024-10-09 DIAGNOSIS — R73.01 IMPAIRED FASTING GLUCOSE: ICD-10-CM

## 2024-10-09 DIAGNOSIS — E78.2 MIXED HYPERLIPIDEMIA: ICD-10-CM

## 2024-10-09 DIAGNOSIS — Z95.810 PRESENCE OF BIVENTRICULAR IMPLANTABLE CARDIOVERTER-DEFIBRILLATOR (ICD): ICD-10-CM

## 2024-10-09 DIAGNOSIS — I50.22 CHRONIC SYSTOLIC (CONGESTIVE) HEART FAILURE: ICD-10-CM

## 2024-10-09 LAB
ALBUMIN SERPL-MCNC: 3.7 G/DL (ref 3.5–5.2)
ALBUMIN/GLOB SERPL: 1 G/DL
ALP SERPL-CCNC: 93 U/L (ref 39–117)
ALT SERPL W P-5'-P-CCNC: 13 U/L (ref 1–41)
ANION GAP SERPL CALCULATED.3IONS-SCNC: 9 MMOL/L (ref 5–15)
AST SERPL-CCNC: 12 U/L (ref 1–40)
BASOPHILS # BLD AUTO: 0.08 10*3/MM3 (ref 0–0.2)
BASOPHILS NFR BLD AUTO: 1 % (ref 0–1.5)
BILIRUB SERPL-MCNC: 0.2 MG/DL (ref 0–1.2)
BUN SERPL-MCNC: 23 MG/DL (ref 8–23)
BUN/CREAT SERPL: 25.8 (ref 7–25)
CALCIUM SPEC-SCNC: 9.9 MG/DL (ref 8.6–10.5)
CHLORIDE SERPL-SCNC: 105 MMOL/L (ref 98–107)
CHOLEST SERPL-MCNC: 205 MG/DL (ref 0–200)
CO2 SERPL-SCNC: 26 MMOL/L (ref 22–29)
CREAT SERPL-MCNC: 0.89 MG/DL (ref 0.76–1.27)
DEPRECATED RDW RBC AUTO: 44 FL (ref 37–54)
EGFRCR SERPLBLD CKD-EPI 2021: 88.8 ML/MIN/1.73
EOSINOPHIL # BLD AUTO: 0.38 10*3/MM3 (ref 0–0.4)
EOSINOPHIL NFR BLD AUTO: 4.7 % (ref 0.3–6.2)
ERYTHROCYTE [DISTWIDTH] IN BLOOD BY AUTOMATED COUNT: 14.6 % (ref 12.3–15.4)
GLOBULIN UR ELPH-MCNC: 3.7 GM/DL
GLUCOSE SERPL-MCNC: 86 MG/DL (ref 65–99)
HBA1C MFR BLD: 5.4 % (ref 4.8–5.6)
HCT VFR BLD AUTO: 41.5 % (ref 37.5–51)
HDLC SERPL-MCNC: 37 MG/DL (ref 40–60)
HGB BLD-MCNC: 13.1 G/DL (ref 13–17.7)
IMM GRANULOCYTES # BLD AUTO: 0.03 10*3/MM3 (ref 0–0.05)
IMM GRANULOCYTES NFR BLD AUTO: 0.4 % (ref 0–0.5)
LDLC SERPL CALC-MCNC: 142 MG/DL (ref 0–100)
LDLC/HDLC SERPL: 3.76 {RATIO}
LYMPHOCYTES # BLD AUTO: 1.74 10*3/MM3 (ref 0.7–3.1)
LYMPHOCYTES NFR BLD AUTO: 21.5 % (ref 19.6–45.3)
MCH RBC QN AUTO: 26.4 PG (ref 26.6–33)
MCHC RBC AUTO-ENTMCNC: 31.6 G/DL (ref 31.5–35.7)
MCV RBC AUTO: 83.5 FL (ref 79–97)
MONOCYTES # BLD AUTO: 0.7 10*3/MM3 (ref 0.1–0.9)
MONOCYTES NFR BLD AUTO: 8.7 % (ref 5–12)
NEUTROPHILS NFR BLD AUTO: 5.16 10*3/MM3 (ref 1.7–7)
NEUTROPHILS NFR BLD AUTO: 63.7 % (ref 42.7–76)
NRBC BLD AUTO-RTO: 0 /100 WBC (ref 0–0.2)
PLATELET # BLD AUTO: 180 10*3/MM3 (ref 140–450)
PMV BLD AUTO: 11.6 FL (ref 6–12)
POTASSIUM SERPL-SCNC: 3.9 MMOL/L (ref 3.5–5.2)
PROT SERPL-MCNC: 7.4 G/DL (ref 6–8.5)
RBC # BLD AUTO: 4.97 10*6/MM3 (ref 4.14–5.8)
SODIUM SERPL-SCNC: 140 MMOL/L (ref 136–145)
TRIGL SERPL-MCNC: 145 MG/DL (ref 0–150)
TSH SERPL DL<=0.05 MIU/L-ACNC: 2.1 UIU/ML (ref 0.27–4.2)
VLDLC SERPL-MCNC: 26 MG/DL (ref 5–40)
WBC NRBC COR # BLD AUTO: 8.09 10*3/MM3 (ref 3.4–10.8)

## 2024-10-09 PROCEDURE — 80053 COMPREHEN METABOLIC PANEL: CPT

## 2024-10-09 PROCEDURE — 36415 COLL VENOUS BLD VENIPUNCTURE: CPT

## 2024-10-09 PROCEDURE — 84443 ASSAY THYROID STIM HORMONE: CPT

## 2024-10-09 PROCEDURE — 80061 LIPID PANEL: CPT

## 2024-10-09 PROCEDURE — 85025 COMPLETE CBC W/AUTO DIFF WBC: CPT

## 2024-10-09 PROCEDURE — 83036 HEMOGLOBIN GLYCOSYLATED A1C: CPT

## 2025-01-27 ENCOUNTER — OFFICE VISIT (OUTPATIENT)
Dept: CARDIOLOGY | Facility: CLINIC | Age: 75
End: 2025-01-27
Payer: MEDICARE

## 2025-01-27 VITALS
HEIGHT: 72 IN | DIASTOLIC BLOOD PRESSURE: 62 MMHG | BODY MASS INDEX: 32.81 KG/M2 | SYSTOLIC BLOOD PRESSURE: 139 MMHG | HEART RATE: 90 BPM

## 2025-01-27 DIAGNOSIS — Z95.810 PRESENCE OF BIVENTRICULAR IMPLANTABLE CARDIOVERTER-DEFIBRILLATOR (ICD): Primary | ICD-10-CM

## 2025-01-27 DIAGNOSIS — I10 ESSENTIAL HYPERTENSION: ICD-10-CM

## 2025-01-27 DIAGNOSIS — I42.8 NICM (NONISCHEMIC CARDIOMYOPATHY): ICD-10-CM

## 2025-01-27 DIAGNOSIS — E78.5 HYPERLIPIDEMIA LDL GOAL <100: ICD-10-CM

## 2025-01-27 PROBLEM — M86.9 OSTEOMYELITIS: Status: RESOLVED | Noted: 2024-08-04 | Resolved: 2025-01-27

## 2025-01-27 PROBLEM — N39.0 UTI (URINARY TRACT INFECTION): Status: RESOLVED | Noted: 2024-05-06 | Resolved: 2025-01-27

## 2025-01-27 RX ORDER — CARVEDILOL 3.12 MG/1
3.12 TABLET ORAL 2 TIMES DAILY WITH MEALS
Qty: 60 TABLET | Refills: 1 | Status: SHIPPED | OUTPATIENT
Start: 2025-01-27

## 2025-01-27 NOTE — PROGRESS NOTES
Chief Complaint  Follow-up, Cardiomyopathy, and Hypertension    Subjective            History of Present Illness  Anshul Shook Jr. Isd a 74-year-old male patient who presents to the office today for follow up. He has presence of ICD, cardiomyopathy, hypertension, and hyperlipidemia. He reports stopping carvedilol 3 months ago due to hypotension. Blood pressures have improved. He is compliant with the rest of his medications. He denies any new or worsening cardiac symptoms today.    PMH  Past Medical History:   Diagnosis Date    Arthritis     Back injury     RESULTING IN PARAPLEGIA    Essential hypertension 08/11/2021    Irregular heartbeat     SEE'S DR GAY. DENIED  CP/SOB    Kidney stone     Memory loss     Nonischemic cardiomyopathy 08/11/2021    FOLLOWS DR. GAY    Osteomyelitis 08/04/2024    Paraplegic gait     Presence of biventricular implantable cardioverter-defibrillator (ICD) 08/12/2021    [employer] BiV ICD    UTI (urinary tract infection) 05/06/2024         ALLERGY  Allergies   Allergen Reactions    Amoxicillin Hives    Ct Contrast Hives    Iodinated Contrast Media Other (See Comments)     Unknown, marked as high severity from,another facility, but reaction was unknown.    Penicillins Rash     Has tolerated Ampicillin/Sulbactam and Amoxicillin/clavulanate 5/2024 -Obed Randhawa [facility]    Zosyn [Piperacillin-Tazobactam In Dex] Hives     Has tolerated Ampicillin/Sulbactam and Amoxicillin/clavulanate 5/2024 -Obed Randhawa [facility]          SURGICALHX  Past Surgical History:   Procedure Laterality Date    BACK SURGERY      CARDIAC DEFIBRILLATOR PLACEMENT      [facility]    COLONOSCOPY      COLONOSCOPY N/A 09/28/2022    Procedure: COLONOSCOPY WITH POLYPECTOMIES, HOT SNARE, CLIP APPLICATION X2;  Surgeon: Audie West MD;  Location: [facility] ENDOSCOPY;  Service: General;  Laterality: N/A;  COLON POLYPS    CYSTOSCOPY, RETROGRADE PYELOGRAM AND STENT INSERTION Left 05/09/2024    Procedure: CYSTOSCOPY RETROGRADE  "PYELOGRAM AND STENT INSERTION, left;  Surgeon: Sara Montano MD;  Location: [facility] MAIN OR;  Service: Urology;  Laterality: Left;    ORIF ANKLE FRACTURE Right     PACEMAKER REPLACEMENT Bilateral 02/06/2024    Procedure: PPM generator change bi-v;  Surgeon: Renzo Hobbs MD;  Location: [facility] CATH INVASIVE LOCATION;  Service: Cardiovascular;  Laterality: Bilateral;    URETEROSCOPY LASER LITHOTRIPSY WITH STENT INSERTION Left 6/17/2024    Procedure: CYSTOSCOPY URETEROSCOPY RETROGRADE PYELOGRAM HOLMIUM LASER STENT exchange, left;  Surgeon: Sara Montano MD;  Location: [facility] MAIN OR;  Service: Urology;  Laterality: Left;          SOC  Social History     Socioeconomic History    Marital status:    Tobacco Use    Smoking status: Never    Smokeless tobacco: Never   Vaping Use    Vaping status: Never Used   Substance and Sexual Activity    Alcohol use: Never    Drug use: Never    Sexual activity: Defer         FAMHX  Family History   Problem Relation Age of Onset    Malig Hyperthermia Neg Hx           MEDSIGONLY  Current Outpatient Medications on File Prior to Visit   Medication Sig    Cholecalciferol 25 MCG (1000 UT) tablet Take 1 tablet by mouth Daily.    donepezil (ARICEPT) 10 MG tablet Take 1 tablet by mouth Every Night.    Effexor  MG 24 hr capsule Take 1 capsule by mouth Daily.    Hyoscyamine Sulfate ER 0.375 MG tablet controlled-release Take  by mouth 2 (Two) Times a Day.    multivitamin with minerals tablet tablet Take 1 tablet by mouth Daily.    pregabalin (LYRICA) 200 MG capsule Take 1 capsule by mouth 2 (Two) Times a Day.    carvedilol (COREG) 12.5 MG tablet Take 1 tablet by mouth 2 (Two) Times a Day With Meals. (Patient not taking: Reported on 1/27/2025)     No current facility-administered medications on file prior to visit.         Objective   /62   Pulse 90   Ht 182.9 cm (72.01\")   BMI 32.81 kg/m²       Physical Exam  Constitutional:       Appearance: He is normal weight. " "  HENT:      Head: Normocephalic.   Neck:      Vascular: No carotid bruit.   Cardiovascular:      Rate and Rhythm: Normal rate and regular rhythm.      Pulses: Normal pulses.      Heart sounds: Normal heart sounds. No murmur heard.  Pulmonary:      Effort: Pulmonary effort is normal.      Breath sounds: Normal breath sounds.   Musculoskeletal:      Cervical back: Neck supple.      Right lower leg: No edema.      Left lower leg: No edema.   Skin:     General: Skin is dry.   Neurological:      Mental Status: He is alert and oriented to person, place, and time.   Psychiatric:         Behavior: Behavior normal.         Result Review :   The following data was reviewed by: JUNIOR Kirk on 01/27/2025:  No results found for: \"PROBNP\"  CMP          10/9/2024    11:39   CMP   Glucose 86    BUN 23    Creatinine 0.89    EGFR 88.8    Sodium 140    Potassium 3.9    Chloride 105    Calcium 9.9    Total Protein 7.4    Albumin 3.7    Globulin 3.7    Total Bilirubin 0.2    Alkaline Phosphatase 93    AST (SGOT) 12    ALT (SGPT) 13    Albumin/Globulin Ratio 1.0    BUN/Creatinine Ratio 25.8    Anion Gap 9.0      CBC w/diff          10/9/2024    11:39   CBC w/Diff   WBC 8.09    RBC 4.97    Hemoglobin 13.1    Hematocrit 41.5    MCV 83.5    MCH 26.4    MCHC 31.6    RDW 14.6    Platelets 180    Neutrophil Rel % 63.7    Immature Granulocyte Rel % 0.4    Lymphocyte Rel % 21.5    Monocyte Rel % 8.7    Eosinophil Rel % 4.7    Basophil Rel % 1.0       Lab Results   Component Value Date    TSH 2.100 10/09/2024      No results found for: \"FREET4\"   No results found for: \"DDIMERQUANT\"  Magnesium   Date Value Ref Range Status   05/10/2024 2.0 1.6 - 2.4 mg/dL Final      No results found for: \"DIGOXIN\"   Lab Results   Component Value Date    TROPONINT 26 (H) 05/06/2024           Lipid Panel          10/9/2024    11:39   Lipid Panel   Total Cholesterol 205    Triglycerides 145    HDL Cholesterol 37    VLDL Cholesterol 26    LDL Cholesterol  " 142    LDL/HDL Ratio 3.76    The 10-year ASCVD risk score (Allyssa PRIDE, et al., 2019) is: 36.9%    Values used to calculate the score:      Age: 74 years      Sex: Male      Is Non- : No      Diabetic: No      Tobacco smoker: No      Systolic Blood Pressure: 139 mmHg      Is BP treated: Yes      HDL Cholesterol: 37 mg/dL      Total Cholesterol: 205 mg/dL      Results for orders placed during the hospital encounter of 01/17/24    Adult Transthoracic Echo Complete W/ Cont if Necessary Per Protocol    Interpretation Summary    The study is technically difficult for diagnosis.    Left ventricular systolic function is normal. Calculated left ventricular EF = 60.6%    Left ventricular diastolic function was indeterminate.    There is mild calcification of the aortic valve.           Assessment and Plan    Diagnoses and all orders for this visit:    1. Presence of biventricular implantable cardioverter-defibrillator (ICD) (Primary)  Last home remote session was 1/9/25 with no new episodes. There is 6 and half years left on battery life.    2. Nonischemic cardiomyopathy  Symptomatically stable at this time, euvolemic on exam, continue to monitor for fluid retention.    3. Essential hypertension  Blood pressure is mildly elevated, patient is agreeable to restart carvedilol at lower dose 3.125 mg twice daily. Check blood pressure twice a day for the next two weeks, blood pressure log provided for patient.    4. Hyperlipidemia LDL goal <100  Last lipid panel was 10/9/24 with  which is outside of goal range. Repeat fasting lipid panel to see if statin therapy is warranted.   -     Lipid Panel; Future  -     Hepatic Function Panel; Future    Other orders  -     carvedilol (COREG) 3.125 MG tablet; Take 1 tablet by mouth 2 (Two) Times a Day With Meals.  Dispense: 60 tablet; Refill: 1            Follow Up   Return in about 6 months (around 7/27/2025) for Follow up with Dr Hobbs.    Patient was given  instructions and counseling regarding his condition or for health maintenance advice. Please see specific information pulled into the AVS if appropriate.     Anshul Shook Jr.  reports that he has never smoked. He has never used smokeless tobacco.     Naa Gambino, APRN  01/27/25  14:09 EST    Dictated Utilizing Dragon Dictation

## 2025-01-28 ENCOUNTER — TELEPHONE (OUTPATIENT)
Dept: CARDIOLOGY | Facility: CLINIC | Age: 75
End: 2025-01-28
Payer: MEDICARE

## 2025-02-10 ENCOUNTER — LAB (OUTPATIENT)
Facility: HOSPITAL | Age: 75
End: 2025-02-10
Payer: MEDICARE

## 2025-02-10 DIAGNOSIS — E78.5 HYPERLIPIDEMIA LDL GOAL <100: ICD-10-CM

## 2025-02-10 LAB
ALBUMIN SERPL-MCNC: 3.7 G/DL (ref 3.5–5.2)
ALP SERPL-CCNC: 82 U/L (ref 39–117)
ALT SERPL W P-5'-P-CCNC: 12 U/L (ref 1–41)
AST SERPL-CCNC: 15 U/L (ref 1–40)
BILIRUB CONJ SERPL-MCNC: 0.1 MG/DL (ref 0–0.3)
BILIRUB INDIRECT SERPL-MCNC: 0.3 MG/DL
BILIRUB SERPL-MCNC: 0.4 MG/DL (ref 0–1.2)
CHOLEST SERPL-MCNC: 172 MG/DL (ref 0–200)
DEPRECATED RDW RBC AUTO: 41.1 FL (ref 37–54)
ERYTHROCYTE [DISTWIDTH] IN BLOOD BY AUTOMATED COUNT: 14 % (ref 12.3–15.4)
HCT VFR BLD AUTO: 41 % (ref 37.5–51)
HDLC SERPL-MCNC: 38 MG/DL (ref 40–60)
HGB BLD-MCNC: 13.6 G/DL (ref 13–17.7)
LDLC SERPL CALC-MCNC: 114 MG/DL (ref 0–100)
LDLC/HDLC SERPL: 2.96 {RATIO}
MCH RBC QN AUTO: 27 PG (ref 26.6–33)
MCHC RBC AUTO-ENTMCNC: 33.2 G/DL (ref 31.5–35.7)
MCV RBC AUTO: 81.5 FL (ref 79–97)
PLATELET # BLD AUTO: 173 10*3/MM3 (ref 140–450)
PMV BLD AUTO: 11.7 FL (ref 6–12)
PROT SERPL-MCNC: 6.8 G/DL (ref 6–8.5)
RBC # BLD AUTO: 5.03 10*6/MM3 (ref 4.14–5.8)
TRIGL SERPL-MCNC: 108 MG/DL (ref 0–150)
VLDLC SERPL-MCNC: 20 MG/DL (ref 5–40)
WBC NRBC COR # BLD AUTO: 8.02 10*3/MM3 (ref 3.4–10.8)

## 2025-02-10 PROCEDURE — 80061 LIPID PANEL: CPT

## 2025-02-10 PROCEDURE — 85027 COMPLETE CBC AUTOMATED: CPT

## 2025-02-10 PROCEDURE — 36415 COLL VENOUS BLD VENIPUNCTURE: CPT

## 2025-02-10 PROCEDURE — 80076 HEPATIC FUNCTION PANEL: CPT

## 2025-02-11 ENCOUNTER — TELEPHONE (OUTPATIENT)
Dept: CARDIOLOGY | Facility: CLINIC | Age: 75
End: 2025-02-11
Payer: MEDICARE

## 2025-02-11 DIAGNOSIS — E78.5 HYPERLIPIDEMIA LDL GOAL <100: Primary | ICD-10-CM

## 2025-02-11 RX ORDER — ATORVASTATIN CALCIUM 20 MG/1
20 TABLET, FILM COATED ORAL NIGHTLY
Qty: 90 TABLET | Refills: 3 | Status: SHIPPED | OUTPATIENT
Start: 2025-02-11

## 2025-02-25 ENCOUNTER — TELEPHONE (OUTPATIENT)
Dept: CARDIOLOGY | Facility: CLINIC | Age: 75
End: 2025-02-25
Payer: MEDICARE

## 2025-02-25 NOTE — TELEPHONE ENCOUNTER
----- Message from Naa Gambino sent at 2/25/2025  2:22 PM EST -----        ----- Message -----  From: Naida Muir RegSched Rep  Sent: 2/25/2025   2:20 PM EST  To: JUNIOR Da Silva

## 2025-02-25 NOTE — TELEPHONE ENCOUNTER
Per Naa:    BP log looks great, continue current meds     Left message for patient to call office.

## 2025-03-03 RX ORDER — CARVEDILOL 3.12 MG/1
3.12 TABLET ORAL 2 TIMES DAILY WITH MEALS
Qty: 180 TABLET | Refills: 3 | Status: SHIPPED | OUTPATIENT
Start: 2025-03-03

## 2025-03-11 ENCOUNTER — TELEPHONE (OUTPATIENT)
Dept: CARDIOLOGY | Facility: CLINIC | Age: 75
End: 2025-03-11
Payer: MEDICARE

## 2025-03-11 NOTE — TELEPHONE ENCOUNTER
The [facility] received a fax that requires your attention. The document has been indexed to the patient’s chart for your review.      Reason for sending: EXTERNAL MEDICAL RECORD NOTIFICATION     Documents Description: CARVEDILOL ADVANCE REFILL REQ-[facility]-3.8.25    Name of Sender: [employer]AN[facility]     Date Indexed: 3.8.25    ADVANCE REFILL APPROVAL REQUEST

## 2025-03-28 RX ORDER — CARVEDILOL 3.12 MG/1
3.12 TABLET ORAL 2 TIMES DAILY WITH MEALS
Qty: 180 TABLET | Refills: 3 | OUTPATIENT
Start: 2025-03-28

## 2025-04-07 ENCOUNTER — TELEPHONE (OUTPATIENT)
Dept: CARDIOLOGY | Facility: CLINIC | Age: 75
End: 2025-04-07
Payer: MEDICARE

## 2025-04-07 RX ORDER — CARVEDILOL 3.12 MG/1
3.12 TABLET ORAL 2 TIMES DAILY WITH MEALS
Qty: 180 TABLET | Refills: 3 | OUTPATIENT
Start: 2025-04-07

## 2025-04-07 RX ORDER — CARVEDILOL 3.12 MG/1
3.12 TABLET ORAL 2 TIMES DAILY WITH MEALS
Qty: 180 TABLET | Refills: 3 | Status: SHIPPED | OUTPATIENT
Start: 2025-04-07

## 2025-04-07 NOTE — TELEPHONE ENCOUNTER
Caller: GABO HAMILTON    Relationship: Emergency Contact    Best call back number: [phone]    What was the call regarding: WIFE STATES THE CARVEDILOL THAT WAS CALLED IN ON 3.3.25, [facility] STATES THEY NEVER RECEIVED. SHE IS ASKING WE TRY TO RESEND THE PRESCRIPTION IF POSSIBLE.

## 2025-04-07 NOTE — TELEPHONE ENCOUNTER
Script was sent in on 3/3/25 for a 90 day supply with 3 refills.   Spoke with patient, patient is aware and verbalized understanding.

## 2025-04-07 NOTE — TELEPHONE ENCOUNTER
Caller: GABO HAMILTON    Relationship: Emergency Contact    Best call back number: [phone]    Requested Prescriptions:   Requested Prescriptions     Pending Prescriptions Disp Refills    carvedilol (COREG) 3.125 MG tablet 180 tablet 3     Sig: Take 1 tablet by mouth 2 (Two) Times a Day With Meals.        Pharmacy where request should be sent:      Last office visit with prescribing clinician: 1/27/2025   Last telemedicine visit with prescribing clinician: Visit date not found   Next office visit with prescribing clinician: Visit date not found     Additional details provided by patient: BEEN OUT OF THIS MEDICATION FOR 2 DAYS.    Does the patient have less than a 3 day supply:  [x] Yes  [] No    Would you like a call back once the refill request has been completed: [] Yes [x] No    If the office needs to give you a call back, can they leave a voicemail: [] Yes [x] No    Bridget Shepherd Rep   04/07/25 10:49 EDT

## 2025-04-23 ENCOUNTER — APPOINTMENT (OUTPATIENT)
Dept: GENERAL RADIOLOGY | Facility: HOSPITAL | Age: 75
DRG: 853 | End: 2025-04-23
Payer: MEDICARE

## 2025-04-23 ENCOUNTER — APPOINTMENT (OUTPATIENT)
Dept: CT IMAGING | Facility: HOSPITAL | Age: 75
DRG: 853 | End: 2025-04-23
Payer: MEDICARE

## 2025-04-23 ENCOUNTER — HOSPITAL ENCOUNTER (INPATIENT)
Facility: HOSPITAL | Age: 75
LOS: 5 days | Discharge: HOME | DRG: 853 | End: 2025-04-28
Attending: EMERGENCY MEDICINE | Admitting: STUDENT IN AN ORGANIZED HEALTH CARE EDUCATION/TRAINING PROGRAM
Payer: MEDICARE

## 2025-04-23 ENCOUNTER — ANESTHESIA (OUTPATIENT)
Dept: PERIOP | Facility: HOSPITAL | Age: 75
End: 2025-04-23
Payer: MEDICARE

## 2025-04-23 ENCOUNTER — ANESTHESIA EVENT (OUTPATIENT)
Dept: PERIOP | Facility: HOSPITAL | Age: 75
End: 2025-04-23
Payer: MEDICARE

## 2025-04-23 DIAGNOSIS — N39.0 SEPSIS SECONDARY TO UTI: ICD-10-CM

## 2025-04-23 DIAGNOSIS — R44.3 HALLUCINATIONS: ICD-10-CM

## 2025-04-23 DIAGNOSIS — N20.0 KIDNEY STONE: ICD-10-CM

## 2025-04-23 DIAGNOSIS — R26.2 DIFFICULTY WALKING: ICD-10-CM

## 2025-04-23 DIAGNOSIS — A41.9 SEPSIS SECONDARY TO UTI: ICD-10-CM

## 2025-04-23 DIAGNOSIS — N20.1 URETERAL STONE: Primary | ICD-10-CM

## 2025-04-23 LAB
ABO GROUP BLD: NORMAL
ABO GROUP BLD: NORMAL
ALBUMIN SERPL-MCNC: 3.4 G/DL (ref 3.5–5.2)
ALBUMIN/GLOB SERPL: 1.1 G/DL
ALP SERPL-CCNC: 72 U/L (ref 39–117)
ALT SERPL W P-5'-P-CCNC: 9 U/L (ref 1–41)
ANION GAP SERPL CALCULATED.3IONS-SCNC: 14.4 MMOL/L (ref 5–15)
ANISOCYTOSIS BLD QL: ABNORMAL
APTT PPP: 42.4 SECONDS (ref 24.2–34.2)
AST SERPL-CCNC: 17 U/L (ref 1–40)
BACTERIA UR QL AUTO: ABNORMAL /HPF
BILIRUB SERPL-MCNC: 0.4 MG/DL (ref 0–1.2)
BILIRUB UR QL STRIP: NEGATIVE
BLD GP AB SCN SERPL QL: NEGATIVE
BUN SERPL-MCNC: 20 MG/DL (ref 8–23)
BUN/CREAT SERPL: 10.6 (ref 7–25)
CALCIUM SPEC-SCNC: 8.9 MG/DL (ref 8.6–10.5)
CHLORIDE SERPL-SCNC: 98 MMOL/L (ref 98–107)
CLARITY UR: ABNORMAL
CO2 SERPL-SCNC: 22.6 MMOL/L (ref 22–29)
COLOR UR: YELLOW
CREAT SERPL-MCNC: 1.89 MG/DL (ref 0.76–1.27)
D-LACTATE SERPL-SCNC: 3.3 MMOL/L (ref 0.5–2)
DEPRECATED RDW RBC AUTO: 46.5 FL (ref 37–54)
DOHLE BOD BLD QL SMEAR: PRESENT
EGFRCR SERPLBLD CKD-EPI 2021: 36.1 ML/MIN/1.73
EOSINOPHIL # BLD MANUAL: 0.25 10*3/MM3 (ref 0–0.4)
EOSINOPHIL NFR BLD MANUAL: 1 % (ref 0.3–6.2)
ERYTHROCYTE [DISTWIDTH] IN BLOOD BY AUTOMATED COUNT: 15.4 % (ref 12.3–15.4)
GLOBULIN UR ELPH-MCNC: 3 GM/DL
GLUCOSE SERPL-MCNC: 130 MG/DL (ref 65–99)
GLUCOSE UR STRIP-MCNC: NEGATIVE MG/DL
HCT VFR BLD AUTO: 39.1 % (ref 37.5–51)
HGB BLD-MCNC: 12.1 G/DL (ref 13–17.7)
HGB UR QL STRIP.AUTO: ABNORMAL
HOLD SPECIMEN: NORMAL
HOLD SPECIMEN: NORMAL
HYALINE CASTS UR QL AUTO: ABNORMAL /LPF
HYPOCHROMIA BLD QL: ABNORMAL
INR PPP: 1.35 (ref 0.86–1.15)
KETONES UR QL STRIP: ABNORMAL
LEUKOCYTE ESTERASE UR QL STRIP.AUTO: ABNORMAL
LYMPHOCYTES # BLD MANUAL: 0.5 10*3/MM3 (ref 0.7–3.1)
LYMPHOCYTES NFR BLD MANUAL: 3 % (ref 5–12)
MCH RBC QN AUTO: 25.6 PG (ref 26.6–33)
MCHC RBC AUTO-ENTMCNC: 30.9 G/DL (ref 31.5–35.7)
MCV RBC AUTO: 82.8 FL (ref 79–97)
MONOCYTES # BLD: 0.75 10*3/MM3 (ref 0.1–0.9)
NEUTROPHILS # BLD AUTO: 23.65 10*3/MM3 (ref 1.7–7)
NEUTROPHILS NFR BLD MANUAL: 83 % (ref 42.7–76)
NEUTS BAND NFR BLD MANUAL: 11 % (ref 0–5)
NITRITE UR QL STRIP: NEGATIVE
OVALOCYTES BLD QL SMEAR: ABNORMAL
PH UR STRIP.AUTO: 5.5 [PH] (ref 5–8)
PLATELET # BLD AUTO: 111 10*3/MM3 (ref 140–450)
PMV BLD AUTO: 11.7 FL (ref 6–12)
POLYCHROMASIA BLD QL SMEAR: ABNORMAL
POTASSIUM SERPL-SCNC: 3.8 MMOL/L (ref 3.5–5.2)
PROT SERPL-MCNC: 6.4 G/DL (ref 6–8.5)
PROT UR QL STRIP: ABNORMAL
PROTHROMBIN TIME: 17.3 SECONDS (ref 11.8–14.9)
QT INTERVAL: 368 MS
QTC INTERVAL: 479 MS
RBC # BLD AUTO: 4.72 10*6/MM3 (ref 4.14–5.8)
RBC # UR STRIP: ABNORMAL /HPF
REF LAB TEST METHOD: ABNORMAL
RH BLD: POSITIVE
RH BLD: POSITIVE
SCAN SLIDE: NORMAL
SMALL PLATELETS BLD QL SMEAR: ABNORMAL
SODIUM SERPL-SCNC: 135 MMOL/L (ref 136–145)
SP GR UR STRIP: 1.01 (ref 1–1.03)
SQUAMOUS #/AREA URNS HPF: ABNORMAL /HPF
T&S EXPIRATION DATE: NORMAL
UROBILINOGEN UR QL STRIP: ABNORMAL
VARIANT LYMPHS NFR BLD MANUAL: 2 % (ref 19.6–45.3)
WBC # UR STRIP: ABNORMAL /HPF
WBC NRBC COR # BLD AUTO: 25.16 10*3/MM3 (ref 3.4–10.8)
WHOLE BLOOD HOLD COAG: NORMAL
WHOLE BLOOD HOLD SPECIMEN: NORMAL

## 2025-04-23 PROCEDURE — 86850 RBC ANTIBODY SCREEN: CPT | Performed by: EMERGENCY MEDICINE

## 2025-04-23 PROCEDURE — 87077 CULTURE AEROBIC IDENTIFY: CPT | Performed by: EMERGENCY MEDICINE

## 2025-04-23 PROCEDURE — 86900 BLOOD TYPING SEROLOGIC ABO: CPT

## 2025-04-23 PROCEDURE — 25010000002 PROPOFOL 10 MG/ML EMULSION: Performed by: ANESTHESIOLOGY

## 2025-04-23 PROCEDURE — 99284 EMERGENCY DEPT VISIT MOD MDM: CPT | Performed by: UROLOGY

## 2025-04-23 PROCEDURE — 87186 SC STD MICRODIL/AGAR DIL: CPT | Performed by: UROLOGY

## 2025-04-23 PROCEDURE — 93005 ELECTROCARDIOGRAM TRACING: CPT | Performed by: EMERGENCY MEDICINE

## 2025-04-23 PROCEDURE — 71045 X-RAY EXAM CHEST 1 VIEW: CPT

## 2025-04-23 PROCEDURE — 25010000002 VASOPRESSIN 20 UNIT/ML SOLUTION: Performed by: ANESTHESIOLOGY

## 2025-04-23 PROCEDURE — 81001 URINALYSIS AUTO W/SCOPE: CPT | Performed by: EMERGENCY MEDICINE

## 2025-04-23 PROCEDURE — 85730 THROMBOPLASTIN TIME PARTIAL: CPT | Performed by: EMERGENCY MEDICINE

## 2025-04-23 PROCEDURE — 86901 BLOOD TYPING SEROLOGIC RH(D): CPT | Performed by: EMERGENCY MEDICINE

## 2025-04-23 PROCEDURE — 36415 COLL VENOUS BLD VENIPUNCTURE: CPT

## 2025-04-23 PROCEDURE — 87077 CULTURE AEROBIC IDENTIFY: CPT | Performed by: UROLOGY

## 2025-04-23 PROCEDURE — 85007 BL SMEAR W/DIFF WBC COUNT: CPT | Performed by: EMERGENCY MEDICINE

## 2025-04-23 PROCEDURE — 99222 1ST HOSP IP/OBS MODERATE 55: CPT | Performed by: INTERNAL MEDICINE

## 2025-04-23 PROCEDURE — C2617 STENT, NON-COR, TEM W/O DEL: HCPCS | Performed by: UROLOGY

## 2025-04-23 PROCEDURE — 74176 CT ABD & PELVIS W/O CONTRAST: CPT

## 2025-04-23 PROCEDURE — 86900 BLOOD TYPING SEROLOGIC ABO: CPT | Performed by: EMERGENCY MEDICINE

## 2025-04-23 PROCEDURE — 25010000002 CEFTRIAXONE PER 250 MG: Performed by: EMERGENCY MEDICINE

## 2025-04-23 PROCEDURE — 99291 CRITICAL CARE FIRST HOUR: CPT

## 2025-04-23 PROCEDURE — 25810000003 LACTATED RINGERS PER 1000 ML: Performed by: ANESTHESIOLOGY

## 2025-04-23 PROCEDURE — 85025 COMPLETE CBC W/AUTO DIFF WBC: CPT | Performed by: EMERGENCY MEDICINE

## 2025-04-23 PROCEDURE — 76000 FLUOROSCOPY <1 HR PHYS/QHP: CPT

## 2025-04-23 PROCEDURE — 88312 SPECIAL STAINS GROUP 1: CPT | Performed by: EMERGENCY MEDICINE

## 2025-04-23 PROCEDURE — 87186 SC STD MICRODIL/AGAR DIL: CPT | Performed by: EMERGENCY MEDICINE

## 2025-04-23 PROCEDURE — 99223 1ST HOSP IP/OBS HIGH 75: CPT | Performed by: STUDENT IN AN ORGANIZED HEALTH CARE EDUCATION/TRAINING PROGRAM

## 2025-04-23 PROCEDURE — 0T768DZ DILATION OF RIGHT URETER WITH INTRALUMINAL DEVICE, VIA NATURAL OR ARTIFICIAL OPENING ENDOSCOPIC: ICD-10-PCS | Performed by: UROLOGY

## 2025-04-23 PROCEDURE — 87040 BLOOD CULTURE FOR BACTERIA: CPT | Performed by: EMERGENCY MEDICINE

## 2025-04-23 PROCEDURE — 70450 CT HEAD/BRAIN W/O DYE: CPT

## 2025-04-23 PROCEDURE — 86901 BLOOD TYPING SEROLOGIC RH(D): CPT

## 2025-04-23 PROCEDURE — 85610 PROTHROMBIN TIME: CPT | Performed by: EMERGENCY MEDICINE

## 2025-04-23 PROCEDURE — 82948 REAGENT STRIP/BLOOD GLUCOSE: CPT

## 2025-04-23 PROCEDURE — 25810000003 SODIUM CHLORIDE 0.9 % SOLUTION: Performed by: EMERGENCY MEDICINE

## 2025-04-23 PROCEDURE — 87154 CUL TYP ID BLD PTHGN 6+ TRGT: CPT | Performed by: EMERGENCY MEDICINE

## 2025-04-23 PROCEDURE — 52332 CYSTOSCOPY AND TREATMENT: CPT | Performed by: UROLOGY

## 2025-04-23 PROCEDURE — 83605 ASSAY OF LACTIC ACID: CPT | Performed by: EMERGENCY MEDICINE

## 2025-04-23 PROCEDURE — 80053 COMPREHEN METABOLIC PANEL: CPT | Performed by: EMERGENCY MEDICINE

## 2025-04-23 PROCEDURE — 87086 URINE CULTURE/COLONY COUNT: CPT | Performed by: UROLOGY

## 2025-04-23 DEVICE — URETERAL STENT
Type: IMPLANTABLE DEVICE | Site: URETER | Status: FUNCTIONAL
Brand: ASCERTA™

## 2025-04-23 RX ORDER — PROMETHAZINE HYDROCHLORIDE 25 MG/1
25 TABLET ORAL ONCE AS NEEDED
Status: CANCELLED | OUTPATIENT
Start: 2025-04-23

## 2025-04-23 RX ORDER — ONDANSETRON 2 MG/ML
4 INJECTION INTRAMUSCULAR; INTRAVENOUS ONCE AS NEEDED
Status: CANCELLED | OUTPATIENT
Start: 2025-04-23

## 2025-04-23 RX ORDER — VASOPRESSIN 20 [USP'U]/ML
INJECTION, SOLUTION INTRAVENOUS AS NEEDED
Status: DISCONTINUED | OUTPATIENT
Start: 2025-04-23 | End: 2025-04-23 | Stop reason: SURG

## 2025-04-23 RX ORDER — SODIUM CHLORIDE, SODIUM LACTATE, POTASSIUM CHLORIDE, CALCIUM CHLORIDE 600; 310; 30; 20 MG/100ML; MG/100ML; MG/100ML; MG/100ML
INJECTION, SOLUTION INTRAVENOUS CONTINUOUS PRN
Status: DISCONTINUED | OUTPATIENT
Start: 2025-04-23 | End: 2025-04-23 | Stop reason: SURG

## 2025-04-23 RX ORDER — OXYCODONE HYDROCHLORIDE 5 MG/1
5 TABLET ORAL
Refills: 0 | Status: CANCELLED | OUTPATIENT
Start: 2025-04-23

## 2025-04-23 RX ORDER — CIPROFLOXACIN 500 MG/1
500 TABLET, FILM COATED ORAL 2 TIMES DAILY
COMMUNITY
Start: 2025-04-23 | End: 2025-04-28 | Stop reason: HOSPADM

## 2025-04-23 RX ORDER — PEDIATRIC MULTIPLE VITAMINS W/ IRON CHEW TAB 18 MG 18 MG
1 CHEW TAB ORAL DAILY
Status: ON HOLD | COMMUNITY

## 2025-04-23 RX ORDER — PROMETHAZINE HYDROCHLORIDE 25 MG/1
25 SUPPOSITORY RECTAL ONCE AS NEEDED
Status: CANCELLED | OUTPATIENT
Start: 2025-04-23

## 2025-04-23 RX ORDER — MULTIVITAMIN WITH IRON
500 TABLET ORAL DAILY
Status: ON HOLD | COMMUNITY

## 2025-04-23 RX ORDER — SODIUM CHLORIDE 0.9 % (FLUSH) 0.9 %
10 SYRINGE (ML) INJECTION AS NEEDED
Status: DISCONTINUED | OUTPATIENT
Start: 2025-04-23 | End: 2025-04-28 | Stop reason: HOSPADM

## 2025-04-23 RX ORDER — PROPOFOL 10 MG/ML
VIAL (ML) INTRAVENOUS AS NEEDED
Status: DISCONTINUED | OUTPATIENT
Start: 2025-04-23 | End: 2025-04-23 | Stop reason: SURG

## 2025-04-23 RX ADMIN — SODIUM CHLORIDE 1000 ML: 9 INJECTION, SOLUTION INTRAVENOUS at 20:56

## 2025-04-23 RX ADMIN — PROPOFOL 40 MG: 10 INJECTION, EMULSION INTRAVENOUS at 22:54

## 2025-04-23 RX ADMIN — PROPOFOL 40 MG: 10 INJECTION, EMULSION INTRAVENOUS at 22:58

## 2025-04-23 RX ADMIN — VASOPRESSIN 2 UNITS: 20 INJECTION INTRAVENOUS at 22:57

## 2025-04-23 RX ADMIN — SODIUM CHLORIDE, POTASSIUM CHLORIDE, SODIUM LACTATE AND CALCIUM CHLORIDE: 600; 310; 30; 20 INJECTION, SOLUTION INTRAVENOUS at 23:05

## 2025-04-23 RX ADMIN — CEFTRIAXONE SODIUM 2000 MG: 2 INJECTION, POWDER, FOR SOLUTION INTRAMUSCULAR; INTRAVENOUS at 22:41

## 2025-04-23 RX ADMIN — VASOPRESSIN 2 UNITS: 20 INJECTION INTRAVENOUS at 23:04

## 2025-04-23 RX ADMIN — CEFTRIAXONE SODIUM 2000 MG: 2 INJECTION, POWDER, FOR SOLUTION INTRAMUSCULAR; INTRAVENOUS at 22:44

## 2025-04-23 RX ADMIN — SODIUM CHLORIDE, POTASSIUM CHLORIDE, SODIUM LACTATE AND CALCIUM CHLORIDE: 600; 310; 30; 20 INJECTION, SOLUTION INTRAVENOUS at 22:54

## 2025-04-24 LAB
ALBUMIN SERPL-MCNC: 2.7 G/DL (ref 3.5–5.2)
ALBUMIN/GLOB SERPL: 0.9 G/DL
ALP SERPL-CCNC: 63 U/L (ref 39–117)
ALT SERPL W P-5'-P-CCNC: 9 U/L (ref 1–41)
ANION GAP SERPL CALCULATED.3IONS-SCNC: 11 MMOL/L (ref 5–15)
AST SERPL-CCNC: 15 U/L (ref 1–40)
BACTERIA BLD CULT: ABNORMAL
BASOPHILS # BLD AUTO: 0.06 10*3/MM3 (ref 0–0.2)
BASOPHILS NFR BLD AUTO: 0.2 % (ref 0–1.5)
BILIRUB SERPL-MCNC: 0.3 MG/DL (ref 0–1.2)
BOTTLE TYPE: ABNORMAL
BUN SERPL-MCNC: 18 MG/DL (ref 8–23)
BUN/CREAT SERPL: 12.3 (ref 7–25)
CALCIUM SPEC-SCNC: 8 MG/DL (ref 8.6–10.5)
CHLORIDE SERPL-SCNC: 103 MMOL/L (ref 98–107)
CO2 SERPL-SCNC: 18 MMOL/L (ref 22–29)
CREAT SERPL-MCNC: 1.46 MG/DL (ref 0.76–1.27)
D-LACTATE SERPL-SCNC: 1.8 MMOL/L (ref 0.5–2)
D-LACTATE SERPL-SCNC: 2.1 MMOL/L (ref 0.5–2)
D-LACTATE SERPL-SCNC: 2.6 MMOL/L (ref 0.5–2)
DEPRECATED RDW RBC AUTO: 47.8 FL (ref 37–54)
EGFRCR SERPLBLD CKD-EPI 2021: 49.2 ML/MIN/1.73
EOSINOPHIL # BLD AUTO: 0 10*3/MM3 (ref 0–0.4)
EOSINOPHIL NFR BLD AUTO: 0 % (ref 0.3–6.2)
ERYTHROCYTE [DISTWIDTH] IN BLOOD BY AUTOMATED COUNT: 15.7 % (ref 12.3–15.4)
GLOBULIN UR ELPH-MCNC: 3.1 GM/DL
GLUCOSE BLDC GLUCOMTR-MCNC: 137 MG/DL (ref 70–99)
GLUCOSE SERPL-MCNC: 121 MG/DL (ref 65–99)
HCT VFR BLD AUTO: 36.7 % (ref 37.5–51)
HGB BLD-MCNC: 11.4 G/DL (ref 13–17.7)
IMM GRANULOCYTES # BLD AUTO: 0.35 10*3/MM3 (ref 0–0.05)
IMM GRANULOCYTES NFR BLD AUTO: 1 % (ref 0–0.5)
LARGE PLATELETS: NORMAL
LYMPHOCYTES # BLD AUTO: 1.02 10*3/MM3 (ref 0.7–3.1)
LYMPHOCYTES NFR BLD AUTO: 3 % (ref 19.6–45.3)
MAGNESIUM SERPL-MCNC: 1.3 MG/DL (ref 1.6–2.4)
MCH RBC QN AUTO: 26.1 PG (ref 26.6–33)
MCHC RBC AUTO-ENTMCNC: 31.1 G/DL (ref 31.5–35.7)
MCV RBC AUTO: 84.2 FL (ref 79–97)
MONOCYTES # BLD AUTO: 1.88 10*3/MM3 (ref 0.1–0.9)
MONOCYTES NFR BLD AUTO: 5.6 % (ref 5–12)
NEUTROPHILS NFR BLD AUTO: 30.54 10*3/MM3 (ref 1.7–7)
NEUTROPHILS NFR BLD AUTO: 90.2 % (ref 42.7–76)
NRBC BLD AUTO-RTO: 0.1 /100 WBC (ref 0–0.2)
PHOSPHATE SERPL-MCNC: 2.5 MG/DL (ref 2.5–4.5)
PLATELET # BLD AUTO: 96 10*3/MM3 (ref 140–450)
PMV BLD AUTO: 12 FL (ref 6–12)
POTASSIUM SERPL-SCNC: 4.1 MMOL/L (ref 3.5–5.2)
PROT SERPL-MCNC: 5.8 G/DL (ref 6–8.5)
RBC # BLD AUTO: 4.36 10*6/MM3 (ref 4.14–5.8)
RBC MORPH BLD: NORMAL
SMALL PLATELETS BLD QL SMEAR: NORMAL
SODIUM SERPL-SCNC: 132 MMOL/L (ref 136–145)
WBC MORPH BLD: NORMAL
WBC NRBC COR # BLD AUTO: 33.85 10*3/MM3 (ref 3.4–10.8)

## 2025-04-24 PROCEDURE — 83605 ASSAY OF LACTIC ACID: CPT | Performed by: EMERGENCY MEDICINE

## 2025-04-24 PROCEDURE — 25010000002 HEPARIN (PORCINE) PER 1000 UNITS: Performed by: STUDENT IN AN ORGANIZED HEALTH CARE EDUCATION/TRAINING PROGRAM

## 2025-04-24 PROCEDURE — 99232 SBSQ HOSP IP/OBS MODERATE 35: CPT | Performed by: STUDENT IN AN ORGANIZED HEALTH CARE EDUCATION/TRAINING PROGRAM

## 2025-04-24 PROCEDURE — 94799 UNLISTED PULMONARY SVC/PX: CPT

## 2025-04-24 PROCEDURE — 99231 SBSQ HOSP IP/OBS SF/LOW 25: CPT | Performed by: UROLOGY

## 2025-04-24 PROCEDURE — 84100 ASSAY OF PHOSPHORUS: CPT | Performed by: STUDENT IN AN ORGANIZED HEALTH CARE EDUCATION/TRAINING PROGRAM

## 2025-04-24 PROCEDURE — 80053 COMPREHEN METABOLIC PANEL: CPT | Performed by: STUDENT IN AN ORGANIZED HEALTH CARE EDUCATION/TRAINING PROGRAM

## 2025-04-24 PROCEDURE — 83735 ASSAY OF MAGNESIUM: CPT | Performed by: STUDENT IN AN ORGANIZED HEALTH CARE EDUCATION/TRAINING PROGRAM

## 2025-04-24 PROCEDURE — 25810000003 LACTATED RINGERS SOLUTION: Performed by: STUDENT IN AN ORGANIZED HEALTH CARE EDUCATION/TRAINING PROGRAM

## 2025-04-24 PROCEDURE — 25010000002 MAGNESIUM SULFATE 4 GM/100ML SOLUTION: Performed by: PHYSICIAN ASSISTANT

## 2025-04-24 PROCEDURE — 85007 BL SMEAR W/DIFF WBC COUNT: CPT | Performed by: STUDENT IN AN ORGANIZED HEALTH CARE EDUCATION/TRAINING PROGRAM

## 2025-04-24 PROCEDURE — 94761 N-INVAS EAR/PLS OXIMETRY MLT: CPT

## 2025-04-24 PROCEDURE — 99232 SBSQ HOSP IP/OBS MODERATE 35: CPT | Performed by: PSYCHIATRY & NEUROLOGY

## 2025-04-24 PROCEDURE — 25810000003 SODIUM CHLORIDE 0.9 % SOLUTION: Performed by: STUDENT IN AN ORGANIZED HEALTH CARE EDUCATION/TRAINING PROGRAM

## 2025-04-24 PROCEDURE — 25010000002 CEFTRIAXONE PER 250 MG: Performed by: STUDENT IN AN ORGANIZED HEALTH CARE EDUCATION/TRAINING PROGRAM

## 2025-04-24 PROCEDURE — 85025 COMPLETE CBC W/AUTO DIFF WBC: CPT | Performed by: STUDENT IN AN ORGANIZED HEALTH CARE EDUCATION/TRAINING PROGRAM

## 2025-04-24 PROCEDURE — 25010000002 VANCOMYCIN 5 G RECONSTITUTED SOLUTION: Performed by: STUDENT IN AN ORGANIZED HEALTH CARE EDUCATION/TRAINING PROGRAM

## 2025-04-24 RX ORDER — VANCOMYCIN 2 GRAM/500 ML IN 0.9 % SODIUM CHLORIDE INTRAVENOUS
20 ONCE
Status: COMPLETED | OUTPATIENT
Start: 2025-04-24 | End: 2025-04-24

## 2025-04-24 RX ORDER — NOREPINEPHRINE BITARTRATE 0.03 MG/ML
INJECTION, SOLUTION INTRAVENOUS
Status: COMPLETED
Start: 2025-04-24 | End: 2025-04-24

## 2025-04-24 RX ORDER — NOREPINEPHRINE BITARTRATE 0.03 MG/ML
.02-.3 INJECTION, SOLUTION INTRAVENOUS
Status: DISCONTINUED | OUTPATIENT
Start: 2025-04-24 | End: 2025-04-24

## 2025-04-24 RX ORDER — SODIUM CHLORIDE 0.9 % (FLUSH) 0.9 %
10 SYRINGE (ML) INJECTION EVERY 12 HOURS SCHEDULED
Status: DISCONTINUED | OUTPATIENT
Start: 2025-04-24 | End: 2025-04-28 | Stop reason: HOSPADM

## 2025-04-24 RX ORDER — BISACODYL 5 MG/1
5 TABLET, DELAYED RELEASE ORAL DAILY PRN
Status: DISCONTINUED | OUTPATIENT
Start: 2025-04-24 | End: 2025-04-28 | Stop reason: HOSPADM

## 2025-04-24 RX ORDER — MAGNESIUM SULFATE HEPTAHYDRATE 40 MG/ML
4 INJECTION, SOLUTION INTRAVENOUS ONCE
Status: COMPLETED | OUTPATIENT
Start: 2025-04-24 | End: 2025-04-24

## 2025-04-24 RX ORDER — POLYETHYLENE GLYCOL 3350 17 G/17G
17 POWDER, FOR SOLUTION ORAL DAILY PRN
Status: DISCONTINUED | OUTPATIENT
Start: 2025-04-24 | End: 2025-04-28 | Stop reason: HOSPADM

## 2025-04-24 RX ORDER — AMOXICILLIN 250 MG
2 CAPSULE ORAL 2 TIMES DAILY PRN
Status: DISCONTINUED | OUTPATIENT
Start: 2025-04-24 | End: 2025-04-28 | Stop reason: HOSPADM

## 2025-04-24 RX ORDER — SODIUM CHLORIDE 0.9 % (FLUSH) 0.9 %
10 SYRINGE (ML) INJECTION AS NEEDED
Status: DISCONTINUED | OUTPATIENT
Start: 2025-04-24 | End: 2025-04-28 | Stop reason: HOSPADM

## 2025-04-24 RX ORDER — MORPHINE SULFATE 2 MG/ML
1 INJECTION, SOLUTION INTRAMUSCULAR; INTRAVENOUS EVERY 4 HOURS PRN
Status: DISCONTINUED | OUTPATIENT
Start: 2025-04-24 | End: 2025-04-28 | Stop reason: HOSPADM

## 2025-04-24 RX ORDER — SODIUM CHLORIDE 9 MG/ML
40 INJECTION, SOLUTION INTRAVENOUS AS NEEDED
Status: DISCONTINUED | OUTPATIENT
Start: 2025-04-24 | End: 2025-04-28 | Stop reason: HOSPADM

## 2025-04-24 RX ORDER — ATORVASTATIN CALCIUM 20 MG/1
20 TABLET, FILM COATED ORAL NIGHTLY
Status: DISCONTINUED | OUTPATIENT
Start: 2025-04-24 | End: 2025-04-28 | Stop reason: HOSPADM

## 2025-04-24 RX ORDER — BISACODYL 10 MG
10 SUPPOSITORY, RECTAL RECTAL DAILY PRN
Status: DISCONTINUED | OUTPATIENT
Start: 2025-04-24 | End: 2025-04-28 | Stop reason: HOSPADM

## 2025-04-24 RX ORDER — NITROGLYCERIN 0.4 MG/1
0.4 TABLET SUBLINGUAL
Status: DISCONTINUED | OUTPATIENT
Start: 2025-04-24 | End: 2025-04-28 | Stop reason: HOSPADM

## 2025-04-24 RX ORDER — PANTOPRAZOLE SODIUM 40 MG/1
40 TABLET, DELAYED RELEASE ORAL
Status: DISCONTINUED | OUTPATIENT
Start: 2025-04-24 | End: 2025-04-28 | Stop reason: HOSPADM

## 2025-04-24 RX ORDER — VENLAFAXINE HYDROCHLORIDE 150 MG/1
150 CAPSULE, EXTENDED RELEASE ORAL DAILY
Status: DISCONTINUED | OUTPATIENT
Start: 2025-04-24 | End: 2025-04-26

## 2025-04-24 RX ORDER — HEPARIN SODIUM 5000 [USP'U]/ML
5000 INJECTION, SOLUTION INTRAVENOUS; SUBCUTANEOUS EVERY 8 HOURS SCHEDULED
Status: DISCONTINUED | OUTPATIENT
Start: 2025-04-24 | End: 2025-04-28 | Stop reason: HOSPADM

## 2025-04-24 RX ADMIN — VENLAFAXINE HYDROCHLORIDE 150 MG: 150 CAPSULE, EXTENDED RELEASE ORAL at 16:00

## 2025-04-24 RX ADMIN — Medication 10 ML: at 09:08

## 2025-04-24 RX ADMIN — HEPARIN SODIUM 5000 UNITS: 5000 INJECTION INTRAVENOUS; SUBCUTANEOUS at 21:05

## 2025-04-24 RX ADMIN — NOREPINEPHRINE BITARTRATE 0.02 MCG/KG/MIN: 0.03 INJECTION, SOLUTION INTRAVENOUS at 00:06

## 2025-04-24 RX ADMIN — NOREPINEPHRINE BITARTRATE 0.02 MCG/KG/MIN: 32 SOLUTION INTRAVENOUS at 00:06

## 2025-04-24 RX ADMIN — Medication 10 ML: at 00:06

## 2025-04-24 RX ADMIN — HEPARIN SODIUM 5000 UNITS: 5000 INJECTION INTRAVENOUS; SUBCUTANEOUS at 14:27

## 2025-04-24 RX ADMIN — ATORVASTATIN CALCIUM 20 MG: 20 TABLET, FILM COATED ORAL at 21:05

## 2025-04-24 RX ADMIN — Medication 10 ML: at 21:08

## 2025-04-24 RX ADMIN — PANTOPRAZOLE SODIUM 40 MG: 40 TABLET, DELAYED RELEASE ORAL at 05:54

## 2025-04-24 RX ADMIN — VANCOMYCIN HYDROCHLORIDE 2000 MG: 5 INJECTION, POWDER, LYOPHILIZED, FOR SOLUTION INTRAVENOUS at 00:35

## 2025-04-24 RX ADMIN — CEFTRIAXONE SODIUM 2000 MG: 2 INJECTION, POWDER, FOR SOLUTION INTRAMUSCULAR; INTRAVENOUS at 18:00

## 2025-04-24 RX ADMIN — HEPARIN SODIUM 5000 UNITS: 5000 INJECTION INTRAVENOUS; SUBCUTANEOUS at 05:55

## 2025-04-24 RX ADMIN — SODIUM CHLORIDE, POTASSIUM CHLORIDE, SODIUM LACTATE AND CALCIUM CHLORIDE 1000 ML: 600; 310; 30; 20 INJECTION, SOLUTION INTRAVENOUS at 14:26

## 2025-04-24 RX ADMIN — MAGNESIUM SULFATE HEPTAHYDRATE 4 G: 40 INJECTION, SOLUTION INTRAVENOUS at 10:26

## 2025-04-24 NOTE — OP NOTE
CYSTOSCOPY URETERAL CATHETER/STENT INSERTION  Procedure Report    Patient Name:  Anshul Shook Jr.  YOB: 1950    Date of Surgery:  4/23/2025      Pre-op Diagnosis:   Ureteral stone [N20.1]       Postop diagnosis:    Same    Procedure/CPT® Codes:  VT CYSTO W/INSERT URETERAL STENT [89357]    Procedure(s):    CYSTOSCOPY RIGHT, right URETERAL CATHETER/STENT INSERTION.   6 x 28 with no string    Staff:  Surgeon(s):  Rich Ingram MD         Anesthesia: Monitored Anesthesia Care    Estimated Blood Loss: minimal    Implants:    Implant Name Type Inv. Item Serial No.  Lot No. LRB No. Used Action   STNT URETRL ASCERTA 6F 28CM - [id number] Stent STNT URETRL ASCERTA 6F 28CM  Genomic Expression [id number] Right 1 Implanted       Specimen:          Specimens       ID Source Type Tests Collected By Collected At Frozen?    1 Urethra Urine URINE CULTURE   Rich Ingram MD 4/23/25 1243                 Findings:       2.5 cm prostate  Moderate to severe trabeculated bladder.  No pathology    Stent placed no string    Complications: none    Description of Procedure:     After informed consent patient taken to the operating room.  Patient was laid supine and placed under general anesthesia by the anesthesia team.  At this point patient was placed in dorsal lithotomy position and prepped and draped in normal sterile fashion.  A multidisciplinary timeout was undertaken documenting the correct patient site and procedure.  At this point a 22 rigid cystoscope was placed into the urethra . Bladder was normal.  At this point a Glidewire was placed up the right       ureter without any issue under fluoroscopic guidance.   A 6 x 28 ureteral stent was then placed over the Glidewire through a rigid cystoscope without issue and had a good curl in the bladder under direct vision and a good curl in the right     renal pelvis under fluoroscopy.  Bladder was drained.  Patient tolerated the procedure well, he  was taken to the postanesthesia care unit without issue.    No string      Rich Ingram MD     Date: 4/23/2025  Time: 23:12 EDT

## 2025-04-24 NOTE — CONSULTS
"[facility]      Patient Name:   Anshul Shook Jr  YOB: 1950  Identification Number:   MRN - [id number]  Date of Service:   04/23/2025 20:22:20    Diagnosis:        G93.41 - Encephalopathy Metabolic    Impression:       75M with PMHx CAD s/p biventricular cardioverter/defbrillator, nonischemic cardiomyopathy, HTN, HLD, history spinal cord injury with neurogenic bladder and trach and neurogenic bladder, presents with LLE weakness, \"confusion\" worse the last few hours, LKN per family was 0900.   Patient says when he woke up his lumbar spine hurting   When asked he says he guesses he is a little weaker on one side than the other, maybe the left, then said no the right.   On exam patient does not have any focal deficits, no unilateral drift, ataxia, no aphasia or dysarthria.   CT head with multiple chronic infarcts.   Suspect his presentation is 2/2 toxic metabolic encephalopathy re:etiology of his hypotension, however if this is addressed and patient is not felt to be back to baseline, please proceed with MR brain as he is at high risk for recurrent stroke.       Our recommendations are outlined below.    Recommendations:          Neuro Checks        Initiate or continue Aspirin 81 MG daily        toxic metabolic workup;        If after addressing hypotension, patient is not felt to be back to baseline, please proceed with MR brain w/o as he is at high risk for recurrent stroke.    Sign Out:        Discussed with Emergency Department Provider        ------------------------------------------------------------------------------    Advanced Imaging:  Advanced Imaging Deferred because:    Non-disabling symptoms as verified by the patient; no cortical signs so not consistent with LVO      Metrics:  Last Known Well: 04/23/2025 09:00:00  Dispatch Time: 04/23/2025 20:21:34  Arrival Time: 04/23/2025 20:08:00  Initial Response Time: 04/23/2025 " "20:24:48  Symptoms: \"confusion,\" leg weakness.  Initial patient interaction: 04/23/2025 20:28:48  NIHSS Assessment Completed: 04/23/2025 20:33:36  Patient is not a candidate for Thrombolytic.  Thrombolytic Medical Decision: 04/23/2025 20:33:36  Patient was not deemed candidate for Thrombolytic because of following reasons:  LKW outside 4.5 hr window. .    CT head showed no acute hemorrhage or acute core infarct.    Primary Provider Notified of Diagnostic Impression and Management Plan on: 04/23/2025 20:38:30        ------------------------------------------------------------------------------    History of Present Illness:  Patient is a 75 year old Male.    Patient was brought by private transportation with symptoms of \"confusion,\" leg weakness.  75M with PMHx CAD s/p biventricular cardioverter/defbrillator, nonischemic cardiomyopathy, HTN, HLD, history spinal cord injury with neurogenic bladder and trach and neurogenic bladder, presents with LLE weakness, \"confusion\" worse the last few hours, LKN per family was 0900.  Patient says when he woke up his lumbar spine hurting  When asked he says he guesses he is a little weaker on one side than the other, maybe the left, then said no the right.         Past Medical History:       Hypertension       Hyperlipidemia       Coronary Artery Disease    Medications:    No Anticoagulant use   Antiplatelet use: Yes asa  Reviewed EMR for current medications    Allergies:   Reviewed    Social History:  Smoking: No    Family History:    There is no family history of premature cerebrovascular disease pertinent to this consultation    ROS :  14 Points Review of Systems was performed and was negative except mentioned in HPI.    Past Surgical History:  There Is No Surgical History Contributory To Today’s Visit         Examination:  BP(69/41), Pulse(109), Blood Glucose(137)  1A: Level of Consciousness - Alert; keenly responsive + 0  1B: Ask Month and Age - 1 Question Right + 1  1C: Blink " Eyes & Squeeze Hands - Performs Both Tasks + 0  2: Test Horizontal Extraocular Movements - Normal + 0  3: Test Visual Fields - No Visual Loss + 0  4: Test Facial Palsy (Use Grimace if Obtunded) - Normal symmetry + 0  5A: Test Left Arm Motor Drift - No Drift for 10 Seconds + 0  5B: Test Right Arm Motor Drift - No Drift for 10 Seconds + 0  6A: Test Left Leg Motor Drift - Drift, but doesn't hit bed + 1  6B: Test Right Leg Motor Drift - Drift, but doesn't hit bed + 1  7: Test Limb Ataxia (FNF/Heel-Shin) - No Ataxia + 0  8: Test Sensation - Normal; No sensory loss + 0  9: Test Language/Aphasia - Normal; No aphasia + 0  10: Test Dysarthria - Normal + 0  11: Test Extinction/Inattention - No abnormality + 0    NIHSS Score: 3    NIHSS Free Text : states age 71    Pre-Morbid Modified Vibha Scale:  4 Points = Moderately severe disability; unable to walk and attend to bodily needs without assistance    Spoke with : Dr. Nelson    This consult was conducted in real time using interactive audio and video technology. Patient was informed of the technology being used for this visit and agreed to proceed. Patient located in hospital and provider located at home/office setting.      Patient is being evaluated for possible acute neurologic impairment and high probability of imminent or life-threatening deterioration. I spent total of 35 minutes providing care to this patient, including time for face to face visit via telemedicine, review of medical records, imaging studies and discussion of findings with providers, the patient and/or family.      Dr Nichole Garduno      [facility]  For Inpatient follow-up with [facility] physician please call [facility] at [phone]. As we are not an outpatient service for any post hospital discharge needs please contact the hospital for assistance.  If you have any questions for the [facility] physicians or need to reconsult for clinical or diagnostic changes please contact us via [facility] at  [phone].

## 2025-04-24 NOTE — H&P
H&P for surgery    Right ureteral stone    HPI:  Anshul Shook Jr. is a 75 y.o. male             Hypotension  Possible urinary sepsis  Right obstructive ureteral stone  Acute renal insufficiency      CAD s/p biventricular cardioverter/defbrillator, nonischemic cardiomyopathy, HTN, HLD, history spinal cord injury with neurogenic bladder and trach and neurogenic bladder     4/23/25 presented to ED with confusion-seen by neurology rule out stroke found to be -very hypotensive    4/23/25 CT head without - negative acute process  4/23/2025 CT abdomen/pelvis without - 1.1 cm proximal right ureteral stone.  15 x 9 mm and 6 mm nonobstructing stones in the right kidney, 4 mm and 6 mm nonobstructing stone in the left kidney.  Bladder wall thickening right greater than left..  Small hiatal hernia.  Images reviewed.    4/25 1.8, GFR 76, WBC 25    History of nephrolithiasis treated by  [facility]      No anticoagulation      Review of Systems     10 systems reviewed and are negative other than what is listed in the HPI    Personal History     Past Medical History:   Diagnosis Date    Arthritis     Back injury     RESULTING IN PARAPLEGIA    Essential hypertension 08/11/2021    Irregular heartbeat     SEE'S DR GAY. DENIED  CP/SOB    Kidney stone     Memory loss     Nonischemic cardiomyopathy 08/11/2021    FOLLOWS DR. GAY    Osteomyelitis 08/04/2024    Paraplegic gait     Presence of biventricular implantable cardioverter-defibrillator (ICD) 08/12/2021    [employer] BiV ICD    UTI (urinary tract infection) 05/06/2024       Past Surgical History:   Procedure Laterality Date    BACK SURGERY      CARDIAC DEFIBRILLATOR PLACEMENT      [facility]    COLONOSCOPY      COLONOSCOPY N/A 09/28/2022    Procedure: COLONOSCOPY WITH POLYPECTOMIES, HOT SNARE, CLIP APPLICATION X2;  Surgeon: Audie West MD;  Location: [facility] ENDOSCOPY;  Service: General;  Laterality: N/A;  COLON POLYPS    CYSTOSCOPY, RETROGRADE PYELOGRAM AND STENT INSERTION Left  05/09/2024    Procedure: CYSTOSCOPY RETROGRADE PYELOGRAM AND STENT INSERTION, left;  Surgeon: Sara Montano MD;  Location: [facility] MAIN OR;  Service: Urology;  Laterality: Left;    ORIF ANKLE FRACTURE Right     PACEMAKER REPLACEMENT Bilateral 02/06/2024    Procedure: PPM generator change bi-v;  Surgeon: Renzo Hobbs MD;  Location: [facility] CATH INVASIVE LOCATION;  Service: Cardiovascular;  Laterality: Bilateral;    URETEROSCOPY LASER LITHOTRIPSY WITH STENT INSERTION Left 6/17/2024    Procedure: CYSTOSCOPY URETEROSCOPY RETROGRADE PYELOGRAM HOLMIUM LASER STENT exchange, left;  Surgeon: Sara Montano MD;  Location: [facility] OR;  Service: Urology;  Laterality: Left;       Family History: family history is not on file. Otherwise pertinent FHx was reviewed and not pertinent to current issue.    Social History:  reports that he has never smoked. He has never used smokeless tobacco. He reports that he does not drink alcohol and does not use drugs.    Home Medications:  Hyoscyamine Sulfate ER, atorvastatin, carvedilol, cholecalciferol, donepezil, multivitamin with minerals, pregabalin, and venlafaxine XR    Allergies:  Allergies   Allergen Reactions    Amoxicillin Hives    Ct Contrast Hives    Iodinated Contrast Media Other (See Comments)     Unknown, marked as high severity from,another facility, but reaction was unknown.    Penicillins Rash     Has tolerated Ampicillin/Sulbactam and Amoxicillin/clavulanate 5/2024 -Obed Randhawa EMMANUELLE    Zosyn [Piperacillin-Tazobactam In Dex] Hives     Has tolerated Ampicillin/Sulbactam and Amoxicillin/clavulanate 5/2024 -Obed Randhawa RP       Objective    Objective     Vitals:   Temp:  [98.1 °F (36.7 °C)] 98.1 °F (36.7 °C)  Heart Rate:  [105-109] 105  Resp:  [20] 20  BP: (69-83)/(41-51) 83/47    Physical Exam:   Constitutional: Awake, alert   Eyes:  sclerae anicteric, no conjunctival injection   HENT: NCAT, mucous membranes moist   Respiratory: Clear to auscultation  bilaterally, nonlabored respirations    Cardiovascular: RRR, no murmurs, rubs, or gallops, palpable pedal pulses bilaterally   Gastrointestinal: Positive bowel sounds, soft, nontender, nondistended   Musculoskeletal: No bilateral ankle edema, no clubbing or cyanosis to extremities   Psychiatric: Appropriate affect, cooperative   Neurologic: Oriented x 3, strength symmetric in all extremities, Cranial Nerves grossly intact to confrontation, speech clear   Skin: No rashes     Result Review    Result Review:  I have personally reviewed the results from the time of this admission to 4/23/2025 22:31 EDT and agree with these findings:  []  Laboratory  []  Microbiology  []  Radiology  []  EKG/Telemetry   []  Cardiology/Vascular   []  Pathology  []  Old records  []  Other:      Assessment & Plan   Assessment / Plan     Brief Patient Summary:  Anshul Shook Jr. is a 75 y.o. male     Active Hospital Problems:  Active Hospital Problems    Diagnosis     **Ureteral stone          Hypotension  Possible urinary sepsis  Right obstructive ureteral stone  Acute renal insufficiency      Patient with hypotension obstructive stone and likely urinary sepsis.  We discussed the need for emergent decompression with a right ureteral stent.  Risks and benefits were discussed including bleeding, infection and damage to the urinary system.  We also discussed the risk of anesthesia up to and including death.  Patient voiced understanding and would like to proceed.  Plan for cystoscopy with right ureteral stent placement.    Patient understands this is only temporizing measure, stent cannot stay in place for long-term and cause loss of damage kidney he will need outpatient surgery for stent/stone removal in the coming weeks

## 2025-04-24 NOTE — PROGRESS NOTES
"[facility]: Vancomycin Monitoring Note    Anshul Shook Jr. is a 75 y.o. male who is on day 1 of pharmacy to dose vancomycin for Empiric.    Previous Vancomycin Dose:   1000 mg IV every  24  hours  Imaging Reviewed?: Yes  Updated Cultures and Sensitivities:    bcx: in process   ucx: in process    Vitals/Labs  Ht: 182.9 cm (72\"); Wt: 106 kg (234 lb 5.6 oz)   Temp (24hrs), Av.5 °F (36.4 °C), Min:97 °F (36.1 °C), Max:98.1 °F (36.7 °C)   Estimated Creatinine Clearance: 53.3 mL/min (A) (by C-G formula based on SCr of 1.46 mg/dL (H)).       Results from last 7 days   Lab Units 25  0129 25  2034   CREATININE mg/dL 1.46* 1.89*   WBC 10*3/mm3 33.85* 25.16*     Assessment/Plan    Current Vancomycin Dose:  1250 mg IV every 24 hours; which provides the following predicted parameters:  Exposure target: AUC24 (range)400-600 mg/L.hr   AUC24,ss: 463 mg/L.hr  Probability of AUC24 > 400: 67 %  Ctrough,ss: 14.6 mg/L  Probability of Ctrough,ss > 20: 22 %  Probability of nephrotoxicity (Lodise CHAPINCITO ): 10 %  Next Vanc Random ordered for  at 1300  We will continue to monitor patient changes and renal function     Thank you for involving pharmacy in this patient's care. Please contact pharmacy with any questions or concerns.    Chucho Jesus, [facility]  Clinical Pharmacist   "

## 2025-04-24 NOTE — PROGRESS NOTES
[facility]   Urology Progress Note    Patient Name: Anshul Shook Jr.  : 1950  MRN: [id number]  Primary Care Physician:  Won Mcneill MD  Date of admission: 2025    Subjective   Subjective       No events      Objective   Objective     Vitals:   Temp:  [97 °F (36.1 °C)-98.1 °F (36.7 °C)] 97.6 °F (36.4 °C)  Heart Rate:  [] 100  Resp:  [18-28] 18  BP: ()/(41-74) 105/65  Flow (L/min) (Oxygen Therapy):  [6-8] 8  Physical Exam     Alert and oriented x3  No acute distress  Unlabored respirations  Nontender/nondistended       Catheter in place draining clear/yellow urine      Result Review    Result Review:  I have personally reviewed the results from the time of this admission to 2025 06:48 EDT and agree with these findings:  []  Laboratory  []  Microbiology  []  Radiology  []  EKG/Telemetry   []  Cardiology/Vascular   []  Pathology  []  Old records  []  Other:      Assessment & Plan   Assessment / Plan     Brief Patient Summary:  Anshul Shook Jr. is a 75 y.o. male    Active Hospital Problems:  Active Hospital Problems    Diagnosis     **Ureteral stone     Sepsis secondary to UTI           Hypotension  Possible urinary sepsis  Right obstructive ureteral stone  Acute renal insufficiency    Postop day 1 right ureteral stent placement      Once patient is ambulating  and out of the ICU Kyle catheter can be removed      Patient will need outpatient surgery for stone removal.  I will let Dr. Mccarty now as she seen for nephrolithiasis      Call [facility] questions                Electronically signed by Rich Ingram MD, 25, 6:48 AM EDT.

## 2025-04-24 NOTE — ED PROVIDER NOTES
Time: 11:02 PM EDT  Date of encounter:  4/23/2025  Independent Historian/Clinical History and Information was obtained by:   Patient    History is limited by: N/A    Chief Complaint: Weakness      History of Present Illness:  Patient is a 75 y.o. year old male who presents to the emergency department for evaluation of weakness and abdominal pain noted earlier.  Patient denies chest pain and shortness of breath.  Patient has no cough or hemoptysis.  Patient denies fever and chills.  Patient reports urinary frequency with no dysuria.      Patient Care Team  Primary Care Provider: Won Mcneill MD    Past Medical History:     Allergies   Allergen Reactions    Amoxicillin Hives    Ct Contrast Hives    Iodinated Contrast Media Other (See Comments)     Unknown, marked as high severity from,another facility, but reaction was unknown.    Penicillins Rash     Has tolerated Ampicillin/Sulbactam and Amoxicillin/clavulanate 5/2024 -Obed Randhawa RPH    Zosyn [Piperacillin-Tazobactam In Dex] Hives     Has tolerated Ampicillin/Sulbactam and Amoxicillin/clavulanate 5/2024 -Obed Randhawa RPH     Past Medical History:   Diagnosis Date    Arthritis     Back injury     RESULTING IN PARAPLEGIA    Essential hypertension 08/11/2021    Irregular heartbeat     SEE'S DR GAY. DENIED  CP/SOB    Kidney stone     Memory loss     Nonischemic cardiomyopathy 08/11/2021    FOLLOWS DR. GAY    Osteomyelitis 08/04/2024    Paraplegic gait     Presence of biventricular implantable cardioverter-defibrillator (ICD) 08/12/2021    [employer] BiV ICD    UTI (urinary tract infection) 05/06/2024     Past Surgical History:   Procedure Laterality Date    BACK SURGERY      CARDIAC DEFIBRILLATOR PLACEMENT      [facility]    COLONOSCOPY      COLONOSCOPY N/A 09/28/2022    Procedure: COLONOSCOPY WITH POLYPECTOMIES, HOT SNARE, CLIP APPLICATION X2;  Surgeon: Audie West MD;  Location: [facility] ENDOSCOPY;  Service: General;  Laterality: N/A;  COLON  POLYPS    CYSTOSCOPY, RETROGRADE PYELOGRAM AND STENT INSERTION Left 05/09/2024    Procedure: CYSTOSCOPY RETROGRADE PYELOGRAM AND STENT INSERTION, left;  Surgeon: Sara Montano MD;  Location: [facility] MAIN OR;  Service: Urology;  Laterality: Left;    ORIF ANKLE FRACTURE Right     PACEMAKER REPLACEMENT Bilateral 02/06/2024    Procedure: PPM generator change bi-v;  Surgeon: Renzo Hobbs MD;  Location: [facility] CATH INVASIVE LOCATION;  Service: Cardiovascular;  Laterality: Bilateral;    URETEROSCOPY LASER LITHOTRIPSY WITH STENT INSERTION Left 6/17/2024    Procedure: CYSTOSCOPY URETEROSCOPY RETROGRADE PYELOGRAM HOLMIUM LASER STENT exchange, left;  Surgeon: Sara Montano MD;  Location: [facility] MAIN OR;  Service: Urology;  Laterality: Left;     Family History   Problem Relation Age of Onset    Malig Hyperthermia Neg Hx        Home Medications:  Prior to Admission medications    Medication Sig Start Date End Date Taking? Authorizing Provider   atorvastatin (LIPITOR) 20 MG tablet Take 1 tablet by mouth Every Night. 2/11/25  Yes Naa Gambino APRN   carvedilol (COREG) 3.125 MG tablet Take 1 tablet by mouth 2 (Two) Times a Day With Meals. 4/7/25  Yes Naa Gambino APRN   Cholecalciferol 25 MCG (1000 UT) tablet Take 1 tablet by mouth Daily.   Yes Harpal Liu MD   donepezil (ARICEPT) 10 MG tablet Take 1 tablet by mouth Every Night. 1/9/22  Yes Harpal Liu MD   Effexor  MG 24 hr capsule Take 1 capsule by mouth Daily. 5/21/21  Yes Harpal Liu MD   Hyoscyamine Sulfate ER 0.375 MG tablet controlled-release Take  by mouth 2 (Two) Times a Day.   Yes Harpal Liu MD   multivitamin with minerals tablet tablet Take 1 tablet by mouth Daily.   Yes Harpal Liu MD   Pediatric Multivitamins-Iron (Animal Shapes/Iron) 18 MG chewable tablet Chew 1 tablet Daily.   Yes Harpal Liu MD   pregabalin (LYRICA) 200 MG capsule Take 1 capsule by mouth 2 (Two) Times a  "Day. 7/26/21  Yes Harpal Liu MD   vitamin B-12 (CYANOCOBALAMIN) 500 MCG tablet Take 1 tablet by mouth Daily.   Yes Harpal Liu MD   ciprofloxacin (CIPRO) 500 MG tablet Take 1 tablet by mouth 2 (Two) Times a Day. 4/23/25   Harpal Liu MD        Social History:   Social History     Tobacco Use    Smoking status: Never    Smokeless tobacco: Never   Vaping Use    Vaping status: Never Used   Substance Use Topics    Alcohol use: Never    Drug use: Never         Review of Systems:  Review of Systems   Constitutional:  Negative for chills and fever.   HENT:  Negative for congestion, rhinorrhea and sore throat.    Eyes:  Negative for pain and visual disturbance.   Respiratory:  Negative for apnea, cough, chest tightness and shortness of breath.    Cardiovascular:  Negative for chest pain and palpitations.   Gastrointestinal:  Negative for abdominal pain, diarrhea, nausea and vomiting.   Genitourinary:  Negative for difficulty urinating and dysuria.   Musculoskeletal:  Negative for joint swelling and myalgias.   Skin:  Negative for color change.   Neurological:  Positive for weakness. Negative for seizures and headaches.   Psychiatric/Behavioral: Negative.     All other systems reviewed and are negative.       Physical Exam:  BP (!) 73/49 (Patient Position: Lying)   Pulse 105   Temp 98.1 °F (36.7 °C) (Oral)   Resp 18   Ht 182.9 cm (72\")   Wt 105 kg (232 lb 5.8 oz)   SpO2 97%   BMI 31.51 kg/m²     Physical Exam  Vitals and nursing note reviewed.   Constitutional:       General: He is not in acute distress.     Appearance: Normal appearance. He is not toxic-appearing.   HENT:      Head: Normocephalic and atraumatic.      Jaw: There is normal jaw occlusion.   Eyes:      General: Lids are normal.      Extraocular Movements: Extraocular movements intact.      Conjunctiva/sclera: Conjunctivae normal.      Pupils: Pupils are equal, round, and reactive to light.   Cardiovascular:      Rate and " Rhythm: Normal rate and regular rhythm.      Pulses: Normal pulses.      Heart sounds: Normal heart sounds.   Pulmonary:      Effort: Pulmonary effort is normal. No respiratory distress.      Breath sounds: Normal breath sounds. No wheezing or rhonchi.   Abdominal:      General: Abdomen is flat.      Palpations: Abdomen is soft.      Tenderness: There is no abdominal tenderness. There is no guarding or rebound.   Musculoskeletal:         General: Normal range of motion.      Cervical back: Normal range of motion and neck supple.      Right lower leg: No edema.      Left lower leg: No edema.   Skin:     General: Skin is warm and dry.   Neurological:      Mental Status: He is alert and oriented to person, place, and time. Mental status is at baseline.   Psychiatric:         Mood and Affect: Mood normal.                    Medical Decision Making:      Comorbidities that affect care:    Hypertension    External Notes reviewed:    Previous Clinic Note: Patient was last seen in clinic for blood pressure issues.      The following orders were placed and all results were independently analyzed by me:  Orders Placed This Encounter   Procedures    Blood Culture - Blood,    Blood Culture - Blood,    Urine Culture - Urine, Urethra    CT Abdomen Pelvis Without Contrast    CT Head Without Contrast Stroke Protocol    XR Chest 1 View    FL Surgery Fluoro    [place] Draw    Comprehensive Metabolic Panel    Protime-INR    aPTT    Urinalysis With Microscopic If Indicated (No Culture) - Urine, Clean Catch    CBC Auto Differential    Scan Slide    Manual Differential    Lactic Acid, Plasma    Urinalysis, Microscopic Only - Urine, Clean Catch    NPO Diet NPO Type: Strict NPO    Initiate Department's Acute Stroke Process (Team D, Code 19, etc)    Perform NIH Stroke Scale    Measure Actual Weight    Head of Bed 30 Degrees or Less    Undress and Gown    Continuous Pulse Oximetry    Vital Signs    Neuro Checks    Notify Provider for SBP <80  or >200    Notify Provider for SBP >140 (For Hemorrhagic Stroke)    No Hypotonic Fluids    Nursing Dysphagia Screening (Complete Prior to Giving anything PO)    RN to Place Order SLP Consult (IF swallow screen failed) - Eval & Treat Choosing Reason of RN Dysphagia Screen Failed    Straight Cath    Inpatient Urology Consult    Inpatient Hospitalist Consult    Oxygen Therapy- Nasal Cannula; Titrate 1-6 LPM Per SpO2; 90 - 95%    POC Glucose Once    ECG 12 Lead ED Triage Standing Order; Acute Stroke (Onset <12 hrs)    Type & Screen    ABO RH Specimen Verification    Insert Large Bore Peripheral IV - Right AC Preferred    Inpatient Admission    CBC & Differential    Green Top (Gel)    Lavender Top    Gold Top - SST    Light Blue Top       Medications Given in the Emergency Department:  Medications   sodium chloride 0.9 % flush 10 mL ( Intravenous MAR Hold 4/23/25 2249)   sodium chloride 0.9 % bolus 1,000 mL (0 mL Intravenous Stopped 4/23/25 2224)   cefTRIAXone (ROCEPHIN) in NS 2 GRAMS/20ml IV PUSH syringe (2,000 mg Intravenous Given 4/23/25 2244)        ED Course:         Labs:    Lab Results (last 24 hours)       Procedure Component Value Units Date/Time    CBC & Differential [[id number]]  (Abnormal) Collected: 04/23/25 2034    Specimen: Blood Updated: 04/23/25 2107    Narrative:      The following orders were created for panel order CBC & Differential.  Procedure                               Abnormality         Status                     ---------                               -----------         ------                     CBC Auto Differential[[id number]]        Abnormal            Final result               Scan Slide[[id number]]                                       Final result                 Please view results for these tests on the individual orders.    Comprehensive Metabolic Panel [[id number]]  (Abnormal) Collected: 04/23/25 2034    Specimen: Blood Updated: 04/23/25 2100     Glucose 130 mg/dL      BUN 20 mg/dL       Creatinine 1.89 mg/dL      Sodium 135 mmol/L      Potassium 3.8 mmol/L      Chloride 98 mmol/L      CO2 22.6 mmol/L      Calcium 8.9 mg/dL      Total Protein 6.4 g/dL      Albumin 3.4 g/dL      ALT (SGPT) 9 U/L      AST (SGOT) 17 U/L      Alkaline Phosphatase 72 U/L      Total Bilirubin 0.4 mg/dL      Globulin 3.0 gm/dL      A/G Ratio 1.1 g/dL      BUN/Creatinine Ratio 10.6     Anion Gap 14.4 mmol/L      eGFR 36.1 mL/min/1.73     Narrative:      GFR Categories in Chronic Kidney Disease (CKD)      GFR Category          GFR (mL/min/1.73)    Interpretation  G1                     90 or greater         Normal or high (1)  G2                      60-89                Mild decrease (1)  G3a                   45-59                Mild to moderate decrease  G3b                   30-44                Moderate to severe decrease  G4                    15-29                Severe decrease  G5                    14 or less           Kidney failure          (1)In the absence of evidence of kidney disease, neither GFR category G1 or G2 fulfill the criteria for CKD.    eGFR calculation 2021 CKD-EPI creatinine equation, which does not include race as a factor    Protime-INR [[id number]]  (Abnormal) Collected: 04/23/25 2034    Specimen: Blood Updated: 04/23/25 2105     Protime 17.3 Seconds      INR 1.35    Narrative:      Suggested Therapeutic Ranges For Oral Anticoagulant Therapy:  Level of Therapy                      INR Target Range  Standard Dose                            2.0-3.0  High Dose                                2.5-3.5  Patients not receiving anticoagulant  Therapy Normal Range                     0.86-1.15    aPTT [[id number]]  (Abnormal) Collected: 04/23/25 2034    Specimen: Blood Updated: 04/23/25 2105     PTT 42.4 seconds     CBC Auto Differential [[id number]]  (Abnormal) Collected: 04/23/25 2034    Specimen: Blood Updated: 04/23/25 2107     WBC 25.16 10*3/mm3      RBC 4.72 10*6/mm3      Hemoglobin 12.1 g/dL       Hematocrit 39.1 %      MCV 82.8 fL      MCH 25.6 pg      MCHC 30.9 g/dL      RDW 15.4 %      RDW-SD 46.5 fl      MPV 11.7 fL      Platelets 111 10*3/mm3     Scan Slide [[id number]] Collected: 04/23/25 2034    Specimen: Blood Updated: 04/23/25 2107     Scan Slide --     Comment: See Manual Differential Results       Manual Differential [[id number]]  (Abnormal) Collected: 04/23/25 2034    Specimen: Blood Updated: 04/23/25 2107     Neutrophil % 83.0 %      Lymphocyte % 2.0 %      Monocyte % 3.0 %      Eosinophil % 1.0 %      Bands %  11.0 %      Neutrophils Absolute 23.65 10*3/mm3      Lymphocytes Absolute 0.50 10*3/mm3      Monocytes Absolute 0.75 10*3/mm3      Eosinophils Absolute 0.25 10*3/mm3      Anisocytosis Slight/1+     Hypochromia Slight/1+     Ovalocytes Slight/1+     Polychromasia Slight/1+     Dohle Bodies Present     Platelet Estimate Decreased    Urinalysis With Microscopic If Indicated (No Culture) - Urine, Clean Catch [[id number]]  (Abnormal) Collected: 04/23/25 2208    Specimen: Urine, Clean Catch Updated: 04/23/25 2247     Color, UA Yellow     Appearance, UA Cloudy     pH, UA 5.5     Specific Gravity, UA 1.010     Glucose, UA Negative     Ketones, UA Trace     Bilirubin, UA Negative     Blood, UA Large (3+)     Protein,  mg/dL (2+)     Leuk Esterase, UA Large (3+)     Nitrite, UA Negative     Urobilinogen, UA 0.2 E.U./dL    Urinalysis, Microscopic Only - Urine, Clean Catch [[id number]] Collected: 04/23/25 2208    Specimen: Urine, Clean Catch Updated: 04/23/25 2223    Lactic Acid, Plasma [[id number]] Collected: 04/23/25 2228    Specimen: Blood from Arm, Right Updated: 04/23/25 2241    Blood Culture - Blood, Arm, Right [[id number]] Collected: 04/23/25 2228    Specimen: Blood from Arm, Right Updated: 04/23/25 2241    Blood Culture - Blood, Arm, Left [[id number]] Collected: 04/23/25 2240    Specimen: Blood from Arm, Left Updated: 04/23/25 2245             Imaging:    XR Chest 1 View  Result Date:  4/23/2025  XR CHEST 1 VW Date of Exam: 4/23/2025 8:54 PM EDT Indication: Acute Stroke Protocol (Onset < 12 hrs) Comparison: 5/6/2024 Findings: Cardiomediastinal silhouette is unchanged. Similar tracheostomy and left-sided pacemaker/AICD. Similar blunting of the right costophrenic sulcus. No airspace disease, pneumothorax, nor superimposed effusion. No acute osseous abnormality identified.     Impression: No acute process nor significant change identified Electronically Signed: Phuc Otoole MD  4/23/2025 9:35 PM EDT  Workstation ID: [id number]    CT Abdomen Pelvis Without Contrast  Result Date: 4/23/2025  CT ABDOMEN PELVIS WO CONTRAST Date of exam: 4/23/2025 8:37 PM EDT. Comparisons: 7/31/2024; 5/8/2024; 9/15/2022. INDICATIONS: 75-year-old male c/o flank pain, not otherwise specified (nos); kidney stone suspected; unspecified abdominal pain; + leukocytosis; hypotension; h/o allergy to iodinated intravenous contrast agent. TECHNIQUE: Axial CT images were obtained of the abdomen and pelvis without the administration of contrast. Reconstructed 2D coronal and sagittal images were also obtained. Automated exposure control and iterative construction methods were used. Automated exposure control and iterative reconstruction methods were used. Additionally, the radiation dose reduction device was turned on for each scan per the ALARA (As Low as Reasonably Achievable) protocol. Please see the electronic medical record (EMR) for the recorded radiation dose. No oral contrast agent was administered for the study. FINDINGS: There is obstructive uropathy on the RIGHT due to a 1.1 cm proximal right ureteral calculus (at about the ureteropelvic junction, UPJ, and the superior L2 level) with mild-to-moderate right hydronephrosis and right hydroureter. The obstructing right ureteral calculus is seen on image 112 of series 2, image 73 of series 3, image 147 of series 4, and adjacent images. No left hydronephrosis or ureterolithiasis.  There has been interval resolution of the obstructing left ureteral calculus since 5/8/2024.  Nonobstructing renal calyceal stones are also seen bilaterally, measuring about 1.5 cm in greatest size. There are renal cysts, especially on the right, measuring 6.2 cm or less. There is circumferential mural thickening of the urinary bladder, greater on the right than the left, which may represent an age-indeterminate infectious/inflammatory cystitis. A malignant process cannot be excluded entirely. Please correlate clinically. Consider Urology consultation/follow-up for this finding, as well. The urinary bladder catheter has been removed since 5/8/2024. There is a small-to-moderate hiatal hernia. There is circumferential mural thickening of the hiatal hernia and possibly the lower thoracic esophagus. Esophagitis/gastritis may be present. Gallbladder sludge is possible. Gallstones cannot be excluded but are thought to be less likely. No definite acute cholecystitis or acute pancreatitis. There are scattered colonic diverticula without acute diverticulitis. No acute appendicitis. Otherwise, no significant interval change is seen, and no other significant acute findings are appreciated. There is motion artifact on the exam. Please see the prior exam report for further ED for further detail regarding additional chronic/incidental findings.     1.There is obstructive uropathy on the RIGHT due to a 1.1 cm proximal right ureteral calculus. 2.There is circumferential mural thickening of the urinary bladder, greater on the right than the left, which may represent an age-indeterminate infectious/inflammatory cystitis. A malignant process cannot be excluded entirely, however. Consider Urology consultation/follow-up for these findings. 3.There is a small-to-moderate hiatal hernia. There is circumferential mural thickening of the hiatal hernia and possibly the lower thoracic esophagus. Age-indeterminate esophagitis/gastritis may be  present. 4.Gallbladder sludge is possible. Gallstones cannot be excluded but are thought to be less likely. No acute cholecystitis or pancreatitis. 5.The study is motion limited. 6.No other definite acute findings are seen by nonenhanced CT of the abdomen and pelvis. 7.Please see above comments for further detail. Portions of this note were completed with a voice recognition program. Electronically Signed: Lalito Churchill MD  4/23/2025 9:34 PM EDT  Workstation ID: [id number]    CT Head Without Contrast Stroke Protocol  Result Date: 4/23/2025  CT HEAD WO CONTRAST STROKE PROTOCOL Date of Exam: 4/23/2025 8:28 PM EDT Indication: Neuro Deficit, acute, Stroke suspected Neuro deficit, acute, stroke suspected. Comparison: None available. Technique: Axial CT images were obtained of the head without contrast administration.  Reconstructed coronal and sagittal images were also obtained. Automated exposure control and iterative construction methods were used. Scan Time: Results discussed with at . Findings: There is no evidence of acute territorial infarction. There is no acute intracranial hemorrhage. There are no extra-axial collections. Ventricles and CSF spaces are symmetric. No mass effect nor hydrocephalus. There is an area of encephalomalacia involving the right frontal lobe and high right frontal parietal and left frontal regions suggestive of prior infarctions.  Paranasal sinuses and mastoid air cells are adequately aerated.  Osseous structures and orbits appear intact.     Impression: 1.No acute intracranial process is identified. 2.Likely previous infarctions. Electronically Signed: Phuc Otoole MD  4/23/2025 8:33 PM EDT  Workstation ID: [id number]        Differential Diagnosis and Discussion:    Weakness: Based on the patient's history, signs, and symptoms, the diffential diagnosis includes but is not limited to meningitis, stroke, sepsis, subarachnoid hemorrhage, intracranial bleeding, encephalitis, acute uti,  dehydration, MS, myasthenia gravis, Guillan [place], migraine variant, neuromuscular disorders vertigo, electrolyte imbalance, and metabolic disorders.    PROCEDURES:    Labs were collected in the emergency department and all labs were reviewed and interpreted by me.  CT scan was performed in the emergency department and the CT scan radiology impression was interpreted by me.    ECG 12 Lead ED Triage Standing Order; Acute Stroke (Onset <12 hrs)   Preliminary Result   HEART JNFY=828  bpm   RR Xkwoiidv=446  ms   VT Sbiiswoq=774  ms   P Horizontal Axis=-11  deg   P Front Axis=42  deg   QRSD Axjoidxw=834  ms   QT Tccjfdiy=591  ms   VJoH=500  ms   QRS Axis=219  deg   T Wave Axis=84  deg   - ABNORMAL ECG -   Atrial-sensed ventricular-paced rhythm   No further analysis attempted due to paced rhythm   Date and Time of Study:2025-04-23 21:29:29          Procedures    MDM     Nursing staff had made the patient a stroke alert on arrival.  However the patient is more so complaining of abdominal pain.    The patient´s CBC that was reviewed and interpreted by me shows no abnormalities of critical concern. Of note, there is no anemia requiring a blood transfusion and the platelet count is acceptable.  The patient´s CMP that was reviewed and interpretted by me shows no abnormalities of critical concern. Of note, the patient´s sodium and potassium are acceptable. The patient´s liver enzymes are unremarkable. The patient´s renal function (creatinine) is preserved. The patient has a normal anion gap.  INR is 1.35.  CT scan shows an obstructive stone.  Patient was given fluids in the emergency department and Rocephin.      Total Critical Care time of 45 minutes. Total critical care time documented does not include time spent on separately billed procedures for services of nurses or physician assistants. I personally saw and examined the patient. I have reviewed all diagnostic interpretations and treatment plans as written. I was present  for the key portions of any procedures performed and the inclusive time noted in any critical care statement. Critical care time includes patient management by me, time spent at the patients bedside,  time to review lab and imaging results, discussing patient care, documentation in the medical record, and time spent with family or caregiver.          Patient Care Considerations:    None      Consultants/Shared Management Plan:    Case was discussed with Dr. Ingram who agrees to take the patient to the OR.  Case was discussed with Dr. Dandy who agrees to admit the patient.    Social Determinants of Health:    Patient is independent, reliable, and has access to care.       Disposition and Care Coordination:    Admit:   Through independent evaluation of the patient's history, physical, and imperical data, the patient meets criteria for inpatient admission to the hospital.        Final diagnoses:   Ureteral stone        ED Disposition       ED Disposition   Decision to Admit    Condition   --    Comment   Level of Care: Critical Care [6]   Diagnosis: Sepsis secondary to UTI [195875]   Certification: I Certify That Inpatient Hospital Services Are Medically Necessary For Greater Than 2 Midnights                 This medical record created using voice recognition software.             Karime Nelson MD  04/23/25 [id number]       Karime Nelson MD  04/24/25 0159

## 2025-04-24 NOTE — ANESTHESIA POSTPROCEDURE EVALUATION
Patient: Anshul Shook Jr.    Procedure Summary       Date: 04/23/25 Room / Location: [facility] ED OR 08 / [facility] MAIN OR; [facility]; [facility] XRAY    Anesthesia Start: 2254 Anesthesia Stop: 2321    Procedures:       CT HEAD WO CONTRAST STROKE PROTOCOL      CT ABDOMEN PELVIS WO CONTRAST      XR CHEST 1 VW      CYSTOSCOPY RIGHT, right URETERAL CATHETER/STENT INSERTION.  Placed tube in right ureter (Right)      FL SURGERY FLUORO Diagnosis:       Ureteral stone      (Neuro Deficit, acute, Stroke suspected)      (Abdominal pain)      (Acute Stroke Protocol (Onset < 12 hrs))      (Ureteral stone [N20.1])      (stent placement)    Scheduled Providers: Rich Ingram MD Provider: Zaki Croft MD    Anesthesia Type: general ASA Status: 4 - Emergent            Anesthesia Type: general    Vitals  Vitals Value Taken Time   /74 04/23/25 23:30   Temp 36.1 °C (97 °F) 04/23/25 23:20   Pulse 100 04/23/25 23:33   Resp 22 04/23/25 23:30   SpO2 96 % 04/23/25 23:33   Vitals shown include unfiled device data.        Post Anesthesia Care and Evaluation    Patient location during evaluation: bedside  Patient participation: complete - patient participated  Level of consciousness: awake  Pain score: 0  Pain management: adequate    Airway patency: patent  Anesthetic complications: No anesthetic complications  PONV Status: none  Cardiovascular status: acceptable, stable and hemodynamically stable  Respiratory status: acceptable  Hydration status: acceptable    Comments: Pt pressure stabilized after 2 liters of fluid and small amount of vasopressin, pt awake and alert x 3, maintaining spo2 of 92% on room air (was what he had when he arrived to [facility]), pt w/ ccu 7 bed in waiting, stable for transfer from [facility]

## 2025-04-24 NOTE — PLAN OF CARE
Goal Outcome Evaluation:  Plan of Care Reviewed With: patient        Progress: improving  Outcome Evaluation: Patient went to surgery for a cystoscopy and had one stent placed. Patient on levo gtt for hypotension, titrating down. All other VSS. Patient is alert and oriented x4.     Yolanda Ingram RN

## 2025-04-24 NOTE — PROGRESS NOTES
"[facility] Clinical Pharmacy Services: Vancomycin Pharmacokinetic Initial Consult Note    Anshul Shook Jr. is a 75 y.o. male who is on day 1 of pharmacy to dose vancomycin for Empiric.    Consult Information  Consulting Provider: Norma  Planned Duration of Therapy: 7 days  Was Patient Receiving Prior to Admission/Consult?: No  Loading Dose Ordered or Given: 2000 mg on 25 at 0035  PK/PD Target: -600 mg/L.hr   Relevant ID History:   On 24 Klebsiella pneumoniae ssp pneumoniae Abnormal  ,Kocuria rosea Abnormal    Other Antimicrobials: Ceftriaxone    Imaging Reviewed?: Yes    Microbiology Data  MRSA PCR performed: No; Result: Not ordered due to excluded indication or presence of suspected abscess  Culture/Source:       Vitals/Labs  Ht: 182.9 cm (72\"); Wt: 105 kg (232 lb 5.8 oz)  Temp (24hrs), Av.5 °F (36.4 °C), Min:97 °F (36.1 °C), Max:98.1 °F (36.7 °C)   Estimated Creatinine Clearance: 41 mL/min (A) (by C-G formula based on SCr of 1.89 mg/dL (H)).       Results from last 7 days   Lab Units 25  2034   CREATININE mg/dL 1.89*   WBC 10*3/mm3 25.16*     Assessment/Plan:    Vancomycin Dose:  1000 mg IV every 24 hours; which provides the following predicted parameters:  Exposure target: AUC24 (range)400-600 mg/L.hr   AUC24,ss: 451 mg/L.hr  Probability of AUC24 > 400: 63 %  Ctrough,ss: 14.9 mg/L  Probability of Ctrough,ss > 20: 23 %  Probability of nephrotoxicity (Lodise CHAPINCITO ): 10 %  Vanc Random ordered for 25 at 1300  Patient has order for Complete Metabolic Panel    Pharmacy will follow patient's kidney function and will adjust doses and obtain levels as necessary. Thank you for involving pharmacy in this patient's care. Please contact pharmacy with any questions or concerns.                           Griffin Wade [facility]  Clinical Pharmacist   "

## 2025-04-24 NOTE — CONSULTS
[facility]   UROLOGY Consult Note    Patient Name: Anshul Shook Jr.  : 1950  MRN: [id number]  Primary Care Physician:  Won Mcneill MD  Referring Physician: No ref. provider found  Date of admission: 2025    Subjective   Subjective     Chief Complaint:     Right ureteral stone    HPI:  Anshul Shook Jr. is a 75 y.o. male             Hypotension  Possible urinary sepsis  Right obstructive ureteral stone  Acute renal insufficiency      CAD s/p biventricular cardioverter/defbrillator, nonischemic cardiomyopathy, HTN, HLD, history spinal cord injury with neurogenic bladder and trach and neurogenic bladder     25 presented to ED with confusion-seen by neurology rule out stroke found to be -very hypotensive    25 CT head without - negative acute process  2025 CT abdomen/pelvis without - 1.1 cm proximal right ureteral stone.  15 x 9 mm and 6 mm nonobstructing stones in the right kidney, 4 mm and 6 mm nonobstructing stone in the left kidney.  Bladder wall thickening right greater than left..  Small hiatal hernia.  Images reviewed.     1.8, GFR 76, WBC 25    History of nephrolithiasis treated by  [facility]      No anticoagulation      Review of Systems     10 systems reviewed and are negative other than what is listed in the HPI    Personal History     Past Medical History:   Diagnosis Date    Arthritis     Back injury     RESULTING IN PARAPLEGIA    Essential hypertension 2021    Irregular heartbeat     SEE'S DR GAY. DENIED  CP/SOB    Kidney stone     Memory loss     Nonischemic cardiomyopathy 2021    FOLLOWS DR. GAY    Osteomyelitis 2024    Paraplegic gait     Presence of biventricular implantable cardioverter-defibrillator (ICD) 2021    [employer] BiV ICD    UTI (urinary tract infection) 2024       Past Surgical History:   Procedure Laterality Date    BACK SURGERY      CARDIAC DEFIBRILLATOR PLACEMENT      [facility]    COLONOSCOPY      COLONOSCOPY N/A  09/28/2022    Procedure: COLONOSCOPY WITH POLYPECTOMIES, HOT SNARE, CLIP APPLICATION X2;  Surgeon: Audie West MD;  Location: [facility] ENDOSCOPY;  Service: General;  Laterality: N/A;  COLON POLYPS    CYSTOSCOPY, RETROGRADE PYELOGRAM AND STENT INSERTION Left 05/09/2024    Procedure: CYSTOSCOPY RETROGRADE PYELOGRAM AND STENT INSERTION, left;  Surgeon: Sara Montano MD;  Location: [facility] MAIN OR;  Service: Urology;  Laterality: Left;    ORIF ANKLE FRACTURE Right     PACEMAKER REPLACEMENT Bilateral 02/06/2024    Procedure: PPM generator change bi-v;  Surgeon: Renzo Hobbs MD;  Location: [facility] CATH INVASIVE LOCATION;  Service: Cardiovascular;  Laterality: Bilateral;    URETEROSCOPY LASER LITHOTRIPSY WITH STENT INSERTION Left 6/17/2024    Procedure: CYSTOSCOPY URETEROSCOPY RETROGRADE PYELOGRAM HOLMIUM LASER STENT exchange, left;  Surgeon: Sara Montano MD;  Location: [facility] MAIN OR;  Service: Urology;  Laterality: Left;       Family History: family history is not on file. Otherwise pertinent FHx was reviewed and not pertinent to current issue.    Social History:  reports that he has never smoked. He has never used smokeless tobacco. He reports that he does not drink alcohol and does not use drugs.    Home Medications:  Hyoscyamine Sulfate ER, atorvastatin, carvedilol, cholecalciferol, donepezil, multivitamin with minerals, pregabalin, and venlafaxine XR    Allergies:  Allergies   Allergen Reactions    Amoxicillin Hives    Ct Contrast Hives    Iodinated Contrast Media Other (See Comments)     Unknown, marked as high severity from,another facility, but reaction was unknown.    Penicillins Rash     Has tolerated Ampicillin/Sulbactam and Amoxicillin/clavulanate 5/2024 -Obed Randhawa RPH    Zosyn [Piperacillin-Tazobactam In Dex] Hives     Has tolerated Ampicillin/Sulbactam and Amoxicillin/clavulanate 5/2024 -Obed Randhawa RPH       Objective    Objective     Vitals:   Temp:  [98.1 °F (36.7 °C)] 98.1 °F  (36.7 °C)  Heart Rate:  [105-109] 105  Resp:  [20] 20  BP: (69-83)/(41-51) 83/47    Physical Exam:   Constitutional: Awake, alert   Eyes:  sclerae anicteric, no conjunctival injection   HENT: NCAT, mucous membranes moist   Respiratory: Clear to auscultation bilaterally, nonlabored respirations    Cardiovascular: RRR, no murmurs, rubs, or gallops, palpable pedal pulses bilaterally   Gastrointestinal: Positive bowel sounds, soft, nontender, nondistended   Musculoskeletal: No bilateral ankle edema, no clubbing or cyanosis to extremities   Psychiatric: Appropriate affect, cooperative   Neurologic: Oriented x 3, strength symmetric in all extremities, Cranial Nerves grossly intact to confrontation, speech clear   Skin: No rashes     Result Review    Result Review:  I have personally reviewed the results from the time of this admission to 4/23/2025 22:31 EDT and agree with these findings:  []  Laboratory  []  Microbiology  []  Radiology  []  EKG/Telemetry   []  Cardiology/Vascular   []  Pathology  []  Old records  []  Other:      Assessment & Plan   Assessment / Plan     Brief Patient Summary:  Anshul Shook Jr. is a 75 y.o. male     Active Hospital Problems:  Active Hospital Problems    Diagnosis     **Ureteral stone          Hypotension  Possible urinary sepsis  Right obstructive ureteral stone  Acute renal insufficiency      Patient with hypotension obstructive stone and likely urinary sepsis.  We discussed the need for emergent decompression with a right ureteral stent.  Risks and benefits were discussed including bleeding, infection and damage to the urinary system.  We also discussed the risk of anesthesia up to and including death.  Patient voiced understanding and would like to proceed.  Plan for cystoscopy with right ureteral stent placement.    Patient understands this is only temporizing measure, stent cannot stay in place for long-term and cause loss of damage kidney he will need outpatient surgery for  stent/stone removal in the coming weeks              Electronically signed by Rich Ingram MD, 04/23/25, 10:31 PM EDT.

## 2025-04-24 NOTE — PROGRESS NOTES
[facility]   Hospitalist Progress Note  Date: 2025  Patient Name: Anshul Shook Jr.  : 1950  MRN: [id number]  Date of admission: 2025  Room/Bed: [facility]      Subjective   Subjective     Chief Complaint: Fevers, chills, confusion, hypotension    Summary:Anshul Shook Jr. is a 75 y.o. male with history of traumatic injury to the spine resulting in prolonged hypoxia requiring chronic trach due to vocal cord palsy, nonischemic cardiomyopathy status post AICD with pacemaker, neurogenic bladder, hypertension, hyperlipidemia who presents to the emergency room patient with fevers, chills, confusion and generalized weakness.  Patient was also complaining of abdominal pain.  CT abdomen pelvis without contrast showed obstructive uropathy on the right due to 1.1 cm proximal right ureteral calculus.  Urology consulted.  Patient admitted for management of shock, obstructive right ureteral calculus, moderate right hydro utero nephrosis, cystitis, EDY, thrombocytopenia.  Patient is status post stent placement by urology on .  Patient will need outpatient surgery for stone removal.  Neurology was consulted for concern for stroke and they signed off.    Interval Followup:   Patient was seen in the ICU this afternoon.  Patient is stable enough to transfer to the general floor.  He does not have any acute complaints    All systems reviewed and negative except for what is outlined above.      Objective   Objective     Vitals:   Temp:  [97 °F (36.1 °C)-98.2 °F (36.8 °C)] 98.2 °F (36.8 °C)  Heart Rate:  [] 105  Resp:  [18-28] 18  BP: ()/() 102/83  Flow (L/min) (Oxygen Therapy):  [6-8] 8    Physical Exam   General: NAD  HENT: Trach in place  Cardiovascular: RRR  Pulmonary: Coarse bilateral breath sounds  Gastrointestinal: Soft and nontender  Musculoskeletal: No lower extremity swelling      Result Review    Result Review:  I have personally reviewed these results:  [x]  Laboratory      Lab  04/24/25  0810 04/24/25  0436 04/24/25  0129 04/23/25 2228 04/23/25 2034   WBC  --   --  33.85*  --  25.16*   HEMOGLOBIN  --   --  11.4*  --  12.1*   HEMATOCRIT  --   --  36.7*  --  39.1   PLATELETS  --   --  96*  --  111*   NEUTROS ABS  --   --  30.54*  --  23.65*   IMMATURE GRANS (ABS)  --   --  0.35*  --   --    LYMPHS ABS  --   --  1.02  --   --    MONOS ABS  --   --  1.88*  --   --    EOS ABS  --   --  0.00  --  0.25   MCV  --   --  84.2  --  82.8   LACTATE 1.8 2.1* 2.6*   < >  --    PROTIME  --   --   --   --  17.3*   APTT  --   --   --   --  42.4*    < > = values in this interval not displayed.         Lab 04/24/25 0129 04/23/25 2034   SODIUM 132* 135*   POTASSIUM 4.1 3.8   CHLORIDE 103 98   CO2 18.0* 22.6   ANION GAP 11.0 14.4   BUN 18 20   CREATININE 1.46* 1.89*   EGFR 49.2* 36.1*   GLUCOSE 121* 130*   CALCIUM 8.0* 8.9   MAGNESIUM 1.3*  --    PHOSPHORUS 2.5  --          Lab 04/24/25 0129 04/23/25 2034   TOTAL PROTEIN 5.8* 6.4   ALBUMIN 2.7* 3.4*   GLOBULIN 3.1 3.0   ALT (SGPT) 9 9   AST (SGOT) 15 17   BILIRUBIN 0.3 0.4   ALK PHOS 63 72         Lab 04/23/25 2034   PROTIME 17.3*   INR 1.35*             Lab 04/23/25 2208 04/23/25 2034   ABO TYPING A A   RH TYPING Positive Positive   ANTIBODY SCREEN  --  Negative         Brief Urine Lab Results  (Last result in the past 365 days)        Color   Clarity   Blood   Leuk Est   Nitrite   Protein   CREAT   Urine HCG        04/23/25 2208 Yellow   Cloudy   Large (3+)   Large (3+)   Negative   100 mg/dL (2+)                 [x]  Microbiology   Microbiology Results (last 10 days)       Procedure Component Value - Date/Time    Urine Culture - Urine, Urethra [[id number]]  (Normal) Collected: 04/23/25 6965    Lab Status: Preliminary result Specimen: Urine from Urethra Updated: 04/24/25 1203     Urine Culture Culture in progress          [x]  Radiology  XR Chest 1 View  Result Date: 4/23/2025  Impression: No acute process nor significant change identified  Electronically Signed: Phuc Otoole MD  4/23/2025 9:35 PM EDT  Workstation ID: [id number]    CT Abdomen Pelvis Without Contrast  Result Date: 4/23/2025  1.There is obstructive uropathy on the RIGHT due to a 1.1 cm proximal right ureteral calculus. 2.There is circumferential mural thickening of the urinary bladder, greater on the right than the left, which may represent an age-indeterminate infectious/inflammatory cystitis. A malignant process cannot be excluded entirely, however. Consider Urology consultation/follow-up for these findings. 3.There is a small-to-moderate hiatal hernia. There is circumferential mural thickening of the hiatal hernia and possibly the lower thoracic esophagus. Age-indeterminate esophagitis/gastritis may be present. 4.Gallbladder sludge is possible. Gallstones cannot be excluded but are thought to be less likely. No acute cholecystitis or pancreatitis. 5.The study is motion limited. 6.No other definite acute findings are seen by nonenhanced CT of the abdomen and pelvis. 7.Please see above comments for further detail. Portions of this note were completed with a voice recognition program. Electronically Signed: Lalito Churchill MD  4/23/2025 9:34 PM EDT  Workstation ID: [id number]    CT Head Without Contrast Stroke Protocol  Result Date: 4/23/2025  Impression: 1.No acute intracranial process is identified. 2.Likely previous infarctions. Electronically Signed: Phuc Otoole MD  4/23/2025 8:33 PM EDT  Workstation ID: [id number]    []  EKG/Telemetry   []  Cardiology/Vascular   []  Pathology  []  Old records  []  Other:    Assessment & Plan        Assessment and Plan:    #Sepsis likely secondary to UTI in setting of obstructive right ureteral stone status post stent placement by urology  #EDY-improving  Continue vancomycin and ceftriaxone  Urology consulted, appreciate recommendations  Follow-up urine culture  Follow-up blood culture    #Status post trach due to vocal cord palsy from chronic intubation    Continue to monitor respiratory status    #Nonischemic cardiomyopathy status post biventricular AICD with pacemaker   Continue to monitor volume status  Continue statin    #Thrombocytopenia  Continue to monitor platelets    #Metabolic acidosis  Fluids    #Anxiety  Effexor     #Hypertension  Hold home meds for now    Discussed with RN.    VTE Prophylaxis:  Mechanical VTE prophylaxis orders are signed & held. Pharmacologic & mechanical VTE prophylaxis orders are present.        CODE STATUS:   Code Status (Patient has no pulse and is not breathing): CPR (Attempt to Resuscitate)  Medical Interventions (Patient has pulse or is breathing): Full Support  Level Of Support Discussed With: Patient      Electronically signed by Mehdi Braden MD, 4/24/2025, 13:58 EDT.

## 2025-04-24 NOTE — PLAN OF CARE
Goal Outcome Evaluation:  Plan of Care Reviewed With: patient        Progress: improving  Outcome Evaluation: Patient AOx4, VSS, trach care completed. NS IV bolus completed, blood pressure improved. No complaints of pain. Hearing aids in use at bedside. Patient wife at bedside, supportive of patient and participates in care. No acute changes this shift.

## 2025-04-24 NOTE — H&P
[facility]  Date: 2025   Patient Name: Anshul Shook Jr.  : 1950  MRN: [id number]  Primary Care Physician:  Won Mcneill MD  Date of admission: 2025    Subjective   Subjective     Chief Complaint: Fevers, chills, confusion, hypotension    HPI:    Anshul Shook Jr. is a 75 y.o. male with a past medical history of remote history of traumatic injury to the spine resulting in prolonged hypoxia requiring prolonged ventilator status post chronic trach due to vocal cord palsy, nonischemic cardiomyopathy status post AICD with pacemaker, neurogenic bladder, bilateral lower extremity weakness, hypertension, hyperlipidemia presented to the ED for evaluation of fevers, chills, confusion, generalized weakness that started earlier today.  Patient has history of kidney stone in the past requiring stent placement.  Initially upon arrival to the ED, patient was confused and stroke alert was called.  Patient noted to be hypotensive and was complaining of generalized abdominal pain as well.  Due to neurogenic bladder patient was not able to tell if he has any UTI symptoms.    Upon arrival, vital signs temperature 98.1, pulse 109, respiratory 20, blood pressure 69/41 on room air saturating at 100% labs sodium 135, fattening 1.89, 6 months ago was 0.89, rest of the CMP with no significant findings, INR 1.35, WBC 25.16, hemoglobin 12.1, 2 months ago was 13.6, platelets 111, 2 months ago was 173, chest x-ray showed no acute abnormality.  CT abdomen pelvis without contrast showed obstructive uropathy on the right due to 1.1 cm proximal right ureteral calculus.  There is circumferential mural thickening of the urinary bladder, greater on the right than the left, which may represent an age-indeterminate infectious/inflammatory cystitis.  Malignant process cannot be excluded entirely, however.  Consider urology consultation.  There is small to moderate hiatal hernia.   Circumferential mural thickening of the hiatal hernia and possibly the lower thoracic esophagus.  Age-indeterminate esophagitis/gastritis may be present.  Gallbladder sludge is possible.  No acute cholecystitis or pancreatitis.  Received IV fluids, ceftriaxone in the ED.  Case discussed by ER physician with urologist on-call Dr. Ingram, agreed to consult.  Patient admitted for further evaluation and management of shock, obstructive right ureteral calculus, mild to moderate right hydroureteronephrosis, cystitis, EDY, thrombocytopenia      Personal History     Past Medical History:   Diagnosis Date    Arthritis     Back injury     RESULTING IN PARAPLEGIA    Essential hypertension 08/11/2021    Irregular heartbeat     SEE'S DR HOBBS. DENIED  CP/SOB    Kidney stone     Memory loss     Nonischemic cardiomyopathy 08/11/2021    FOLLOWS DR. HOBBS    Osteomyelitis 08/04/2024    Paraplegic gait     Presence of biventricular implantable cardioverter-defibrillator (ICD) 08/12/2021    [employer] BiV ICD    UTI (urinary tract infection) 05/06/2024         Past Surgical History:   Procedure Laterality Date    BACK SURGERY      CARDIAC DEFIBRILLATOR PLACEMENT      [facility]    COLONOSCOPY      COLONOSCOPY N/A 09/28/2022    Procedure: COLONOSCOPY WITH POLYPECTOMIES, HOT SNARE, CLIP APPLICATION X2;  Surgeon: Audie West MD;  Location: [facility] ENDOSCOPY;  Service: General;  Laterality: N/A;  COLON POLYPS    CYSTOSCOPY, RETROGRADE PYELOGRAM AND STENT INSERTION Left 05/09/2024    Procedure: CYSTOSCOPY RETROGRADE PYELOGRAM AND STENT INSERTION, left;  Surgeon: Sara Montano MD;  Location: [facility] MAIN OR;  Service: Urology;  Laterality: Left;    ORIF ANKLE FRACTURE Right     PACEMAKER REPLACEMENT Bilateral 02/06/2024    Procedure: PPM generator change bi-v;  Surgeon: Renzo Hobbs MD;  Location: [facility] CATH INVASIVE LOCATION;  Service: Cardiovascular;  Laterality: Bilateral;    URETEROSCOPY LASER LITHOTRIPSY WITH STENT INSERTION  Left 6/17/2024    Procedure: CYSTOSCOPY URETEROSCOPY RETROGRADE PYELOGRAM HOLMIUM LASER STENT exchange, left;  Surgeon: Sara Montano MD;  Location: [facility] MAIN OR;  Service: Urology;  Laterality: Left;         Family History   Problem Relation Age of Onset    Malig Hyperthermia Neg Hx          Social History     Socioeconomic History    Marital status:    Tobacco Use    Smoking status: Never    Smokeless tobacco: Never   Vaping Use    Vaping status: Never Used   Substance and Sexual Activity    Alcohol use: Never    Drug use: Never    Sexual activity: Defer         Home Medications:  Animal Shapes/Iron, Hyoscyamine Sulfate ER, atorvastatin, carvedilol, cholecalciferol, ciprofloxacin, donepezil, multivitamin with minerals, pregabalin, venlafaxine XR, and vitamin B-12    Allergies:  Allergies   Allergen Reactions    Amoxicillin Hives    Ct Contrast Hives    Iodinated Contrast Media Other (See Comments)     Unknown, marked as high severity from,another facility, but reaction was unknown.    Penicillins Rash     Has tolerated Ampicillin/Sulbactam and Amoxicillin/clavulanate 5/2024 -[facility]    Zosyn [Piperacillin-Tazobactam In Dex] Hives     Has tolerated Ampicillin/Sulbactam and Amoxicillin/clavulanate 5/2024 -[facility]       Objective   Objective     Vitals:   Temp:  [98.1 °F (36.7 °C)] 98.1 °F (36.7 °C)  Heart Rate:  [105-109] 105  Resp:  [18-20] 18  BP: (69-83)/(41-51) 73/49    Physical Exam    Constitutional: Awake, alert, no acute distress   Eyes: Pupils equal, sclerae anicteric, no conjunctival injection   HENT: NCAT, mucous membranes moist   Respiratory: Clear to auscultation bilaterally, nonlabored respirations    Cardiovascular: Paced rhythm, regular, no murmurs   Gastrointestinal: soft, mild diffuse tenderness, nondistended   Musculoskeletal: No bilateral ankle edema, no clubbing or cyanosis to extremities   Neurologic: Oriented x 3, bilateral lower extremity weakness,  normal strength in the bilateral upper extremities       Result Review    Result Review:  I have personally reviewed the results from the time of this admission to 4/23/2025 23:16 EDT and agree with these findings:  [x]  Laboratory  []  Microbiology  [x]  Radiology  [x]  EKG/Telemetry   []  Cardiology/Vascular   []  Pathology  []  Old records  []  Other:        Imaging Results (Last 24 Hours)       Procedure Component Value Units Date/Time    FL Surgery Fluoro [[id number]] Resulted: 04/23/25 2315     Updated: 04/23/25 2315    XR Chest 1 View [[id number]] Collected: 04/23/25 2134     Updated: 04/23/25 2141    Narrative:      XR CHEST 1 VW    Date of Exam: 4/23/2025 8:54 PM EDT    Indication: Acute Stroke Protocol (Onset < 12 hrs)    Comparison: 5/6/2024    Findings:  Cardiomediastinal silhouette is unchanged. Similar tracheostomy and left-sided pacemaker/AICD. Similar blunting of the right costophrenic sulcus. No airspace disease, pneumothorax, nor superimposed effusion. No acute osseous abnormality identified.      Impression:      Impression:  No acute process nor significant change identified      Electronically Signed: Phuc Otoole MD    4/23/2025 9:35 PM EDT    Workstation ID: [id number]    CT Abdomen Pelvis Without Contrast [[id number]] Collected: 04/23/25 2117     Updated: 04/23/25 2141    Narrative:      CT ABDOMEN PELVIS WO CONTRAST    Date of exam: 4/23/2025 8:37 PM EDT.    Comparisons: 7/31/2024; 5/8/2024; 9/15/2022.    INDICATIONS:   75-year-old male c/o flank pain, not otherwise specified (nos); kidney stone suspected; unspecified abdominal pain; + leukocytosis; hypotension; h/o allergy to iodinated intravenous contrast agent.    TECHNIQUE:  Axial CT images were obtained of the abdomen and pelvis without the administration of contrast. Reconstructed 2D coronal and sagittal images were also obtained. Automated exposure control and iterative construction methods were used. Automated exposure   control and  iterative reconstruction methods were used. Additionally, the radiation dose reduction device was turned on for each scan per the ALARA (As Low as Reasonably Achievable) protocol. Please see the electronic medical record (EMR) for the   recorded radiation dose. No oral contrast agent was administered for the study.    FINDINGS:  There is obstructive uropathy on the RIGHT due to a 1.1 cm proximal right ureteral calculus (at about the ureteropelvic junction, UPJ, and the superior L2 level) with mild-to-moderate right hydronephrosis and right hydroureter. The obstructing right   ureteral calculus is seen on image 112 of series 2, image 73 of series 3, image 147 of series 4, and adjacent images. No left hydronephrosis or ureterolithiasis. There has been interval resolution of the obstructing left ureteral calculus since 5/8/2024.   Nonobstructing renal calyceal stones are also seen bilaterally, measuring about 1.5 cm in greatest size. There are renal cysts, especially on the right, measuring 6.2 cm or less. There is circumferential mural thickening of the urinary bladder, greater   on the right than the left, which may represent an age-indeterminate infectious/inflammatory cystitis. A malignant process cannot be excluded entirely. Please correlate clinically. Consider Urology consultation/follow-up for this finding, as well. The   urinary bladder catheter has been removed since 5/8/2024.     There is a small-to-moderate hiatal hernia. There is circumferential mural thickening of the hiatal hernia and possibly the lower thoracic esophagus. Esophagitis/gastritis may be present. Gallbladder sludge is possible. Gallstones cannot be excluded but   are thought to be less likely. No definite acute cholecystitis or acute pancreatitis. There are scattered colonic diverticula without acute diverticulitis. No acute appendicitis.     Otherwise, no significant interval change is seen, and no other significant acute findings are  appreciated. There is motion artifact on the exam. Please see the prior exam report for further ED for further detail regarding additional chronic/incidental   findings.         Impression:      1.There is obstructive uropathy on the RIGHT due to a 1.1 cm proximal right ureteral calculus.  2.There is circumferential mural thickening of the urinary bladder, greater on the right than the left, which may represent an age-indeterminate infectious/inflammatory cystitis. A malignant process cannot be excluded entirely, however. Consider Urology   consultation/follow-up for these findings.  3.There is a small-to-moderate hiatal hernia. There is circumferential mural thickening of the hiatal hernia and possibly the lower thoracic esophagus. Age-indeterminate esophagitis/gastritis may be present.  4.Gallbladder sludge is possible. Gallstones cannot be excluded but are thought to be less likely. No acute cholecystitis or pancreatitis.  5.The study is motion limited.  6.No other definite acute findings are seen by nonenhanced CT of the abdomen and pelvis.  7.Please see above comments for further detail.      Portions of this note were completed with a voice recognition program.    Electronically Signed: Lalito Churchill MD    4/23/2025 9:34 PM EDT    Workstation ID: [id number]    CT Head Without Contrast Stroke Protocol [[id number]] Collected: 04/23/25 2031     Updated: 04/23/25 2035    Narrative:      CT HEAD WO CONTRAST STROKE PROTOCOL    Date of Exam: 4/23/2025 8:28 PM EDT    Indication: Neuro Deficit, acute, Stroke suspected  Neuro deficit, acute, stroke suspected.    Comparison: None available.    Technique: Axial CT images were obtained of the head without contrast administration.  Reconstructed coronal and sagittal images were also obtained. Automated exposure control and iterative construction methods were used.    Scan Time:  Results discussed with at .      Findings:  There is no evidence of acute territorial  infarction.    There is no acute intracranial hemorrhage. There are no extra-axial collections.    Ventricles and CSF spaces are symmetric. No mass effect nor hydrocephalus.    There is an area of encephalomalacia involving the right frontal lobe and high right frontal parietal and left frontal regions suggestive of prior infarctions.     Paranasal sinuses and mastoid air cells are adequately aerated.     Osseous structures and orbits appear intact.      Impression:      Impression:  1.No acute intracranial process is identified.  2.Likely previous infarctions.        Electronically Signed: Phuc Otoole MD    4/23/2025 8:33 PM EDT    Workstation ID: [id number]                     Assessment & Plan   Assessment / Plan     Assessment/Plan:   Sepsis likely secondary to UTI  Cystitis  Septic shock  Obstructive right ureteral stone, 1.1 cm  Mild to moderate right hydroureteronephrosis  EDY  Thrombocytopenia likely secondary to sepsis  Possible gastritis/esophagitis  Hypertension  Nonalcoholic steatohepatitis  History of ureteral stone  Neurogenic bladder  Status post trach due to vocal cord palsy from chronic intubation  Spinal cord injury resulting in bilateral lower extremity weakness  Nonischemic cardiomyopathy status post biventricular AICD with pacemaker    Plan  Admit to inpatient, critical care  Intensivist consult in the AM  Urologist consulted Dr. Ingram, patient taken to the OR for ureteroscopy with stone removal and possible stent placement  Continue ceftriaxone  Started vancomycin  De-escalate antibiotics based on the culture data  Follow-up on blood cultures, urine cultures  Received 1 L IV fluids in the ED, ordered another liter during my evaluation.  Patient is currently in the OR, will reassess after arrival to the ICU  Monitor urine output  Monitor electrolytes, renal function with a.m. labs  Monitor platelets with a.m. labs.  consider hematology consult based on the platelet levels with repeat  labs  Oral pantoprazole  Heart healthy diet  Pain control with IV analgesics  Resume other appropriate home medications once reconciled    VTE Prophylaxis:  Mechanical & pharmacologic VTE prophylaxis orders are signed & held.     CODE STATUS:    Code Status (Patient has no pulse and is not breathing): CPR (Attempt to Resuscitate)  Medical Interventions (Patient has pulse or is breathing): Full Support  Level Of Support Discussed With: Patient      Admission Status:  I believe this patient meets inpatient status.    Part of this note may be an electronic transcription/translation of spoken language to printed text using the Dragon Dictation System    Celia Green MD

## 2025-04-24 NOTE — ANESTHESIA PREPROCEDURE EVALUATION
Anesthesia Evaluation     Patient summary reviewed and Nursing notes reviewed   no history of anesthetic complications:   NPO Solid Status: > 8 hours  NPO Liquid Status: > 2 hours           Airway   Mallampati: II  TM distance: >3 FB  Neck ROM: full  No difficulty expected  Dental      Pulmonary - negative pulmonary ROS and normal exam    breath sounds clear to auscultation  Cardiovascular - normal exam  Exercise tolerance: good (4-7 METS)    ECG reviewed  Rhythm: regular  Rate: normal    (+) hypertension      Neuro/Psych- negative ROS  GI/Hepatic/Renal/Endo    (+) hepatitis (SANTAMARIA), liver disease (non alcoholic steatohepatitis), renal disease- stones    Musculoskeletal     Abdominal    Substance History - negative use     OB/GYN negative ob/gyn ROS         Other   arthritis,     ROS/Med Hx Other: PAT Nursing Notes unavailable.               Latest Reference Range & Units 04/23/25 20:34   Sodium 136 - 145 mmol/L 135 (L)   Potassium 3.5 - 5.2 mmol/L 3.8   Chloride 98 - 107 mmol/L 98   CO2 22.0 - 29.0 mmol/L 22.6   Anion Gap 5.0 - 15.0 mmol/L 14.4   BUN 8 - 23 mg/dL 20   Creatinine 0.76 - 1.27 mg/dL 1.89 (H)   BUN/Creatinine Ratio 7.0 - 25.0  10.6   eGFR >60.0 mL/min/1.73 36.1 (L)   Glucose 65 - 99 mg/dL 130 (H)   Calcium 8.6 - 10.5 mg/dL 8.9   Alkaline Phosphatase 39 - 117 U/L 72   Total Protein 6.0 - 8.5 g/dL 6.4   Albumin 3.5 - 5.2 g/dL 3.4 (L)   Globulin gm/dL 3.0   A/G Ratio g/dL 1.1   AST (SGOT) 1 - 40 U/L 17   ALT (SGPT) 1 - 41 U/L 9   Total Bilirubin 0.0 - 1.2 mg/dL 0.4   (L): Data is abnormally low  (H): Data is abnormally high     Latest Reference Range & Units 04/23/25 20:34   Protime 11.8 - 14.9 Seconds 17.3 (H)   INR 0.86 - 1.15  1.35 (H)   (H): Data is abnormally high     Latest Reference Range & Units 04/23/25 20:34   Hemoglobin 13.0 - 17.7 g/dL 12.1 (L)   Hematocrit 37.5 - 51.0 % 39.1   (L): Data is abnormally low    4/23/25  ABNORMAL ECG -  Atrial-sensed ventricular-paced rhythm  No further analysis  attempted due to paced rhythm    1/17/24  Interpretation Summary         The study is technically difficult for diagnosis.    Left ventricular systolic function is normal. Calculated left ventricular EF = 60.6%    Left ventricular diastolic function was indeterminate.    There is mild calcification of the aortic valve.     Procedure Narrative    Generator change note    Procedure: BiV ICD battery change and pocket revision  Indication: JONI              Anesthesia Plan    ASA 4 - emergent     general     (Patient understands anesthesia not responsible for dental damage.)  intravenous induction     Anesthetic plan, risks, benefits, and alternatives have been provided, discussed and informed consent has been obtained with: patient.    Use of blood products discussed with patient .    Plan discussed with CRNA.        CODE STATUS:

## 2025-04-24 NOTE — ED NOTES
While direct bedding patient, the patient was unable to keep the left leg up off the floor, stated it was weaker than usual. Family said for the last few hours he has been altered, weaker than normal, and unable to ambulate. Stroke alert called. Patient taken to CT

## 2025-04-24 NOTE — PROGRESS NOTES
[facility]  [facility] Tele[facility]urology Consult Services    Routine Consult Follow-Up    Patient Name:   Anshul Shook Jr  YOB: 1950  Identification Number:   MRN - [id number]  Date of Service:   04/24/2025 11:16:53    Diagnosis        G93.49 - Encephalopathy Multifactorial    Impression  Patient is a 75-year-old male with past medical history of CAD status post biventricular cardioverter/defibrillator, nonischemic cardiomyopathy, hypertension, dyslipidemia, history of spinal cord injury with neurogenic bladder and trach. chronic trach due to vocal cord palsy. History of kidney stone in the past requiring stent placement. Patient admitted with sepsis likely secondary to UTI, EDY, obstructive right ureteral stone and underwent stenting, thrombocytopenia, SANTAMARIA.    Patient currently states he has chronic lower extremity weakness for 30 years. Unclear if defibrillator is MRI compatible. If concerns for acute change in neurological status can consider MRI brain imaging if possible. Patient's initial presentation may have been related to ongoing sepsis. Currently awake, alert, answering questions appropriately, following commands. Please recall in the interim as needed.    Our recommendations are outlined below    DVT Prophylaxis :  Choice of Primary Team    Disposition :  Neurology will sign off. Reconsult if Needed.    Subjective  Patient states that he is doing good. No headaches. Not reporting any current weakness.    Hospital Course  Patient is a 75-year-old male with past medical history of CAD status post biventricular cardioverter/defibrillator, nonischemic cardiomyopathy, hypertension, dyslipidemia, history of spinal cord injury with neurogenic bladder and trach, presenting with left lower extremity weakness, confusion. Records indicate chronic trach due to vocal cord palsy. History of kidney stone in the past requiring stent placement. Stated he woke up with lumbar spine pain. He feels  he is a little weaker on the left side possibly the left but unsure if it was left or right. Patient admitted with sepsis likely secondary to UTI, EDY, obstructive right ureteral stone and underwent stenting, thrombocytopenia, SANTAMARIA.    Imaging  CT head without contrast: Findings:  There is no evidence of acute territorial infarction.    There is no acute intracranial hemorrhage. There are no extra-axial collections.    Ventricles and CSF spaces are symmetric. No mass effect nor hydrocephalus.    There is an area of encephalomalacia involving the right frontal lobe and high right frontal parietal and left frontal regions suggestive of prior infarctions.    Paranasal sinuses and mastoid air cells are adequately aerated.    Osseous structures and orbits appear intact.    IMPRESSION:  Impression:  1.No acute intracranial process is identified.  2.Likely previous infarctions.    Labs  Sodium 132, potassium 4.1, BUN 18, creatinine 1.46, GFR 49.2, glucose 121, WBC 33.85, platelets 96      Examination  BP(93/57), Pulse(100), Temp(97.8), Resp(18),    Neuro Exam:      Patient is awake, alert, oriented x 3. Trach in place. Speech is overall clear, language fluent. Symmetric smile. Able to raise both arms off the bed without drift. Chronic bilateral lower extremity paresis.            This consult was conducted in real time using interactive audio and video technology. Patient was informed of the technology being used for this visit and agreed to proceed. Patient located in hospital and provider located at home/office setting.    Telehealth Neurology consultation was provided. I spent minutes providing telehealth care. This includes time spent for face to face visit via telemedicine, review of medical records, imaging studies and discussion of findings with providers, the patient and/or family.      Dr Colette Humphries      [facility]  For Inpatient follow-up with [facility] physician please call [facility] at [phone]. As  we are not an outpatient service for any post hospital discharge needs please contact the hospital for assistance.  If you have any questions for the [facility] physicians or need to reconsult for clinical or diagnostic changes please contact us via [facility] at [phone]

## 2025-04-24 NOTE — NURSING NOTE
Lab called stating they had a critical Lactic acid level of 3.3 but were unable to reach OR. I called PACU and reached Dr Croft. Results given to him prior to patient going to CCU

## 2025-04-25 LAB
ALBUMIN SERPL-MCNC: 3 G/DL (ref 3.5–5.2)
ALBUMIN/GLOB SERPL: 1.1 G/DL
ALP SERPL-CCNC: 79 U/L (ref 39–117)
ALT SERPL W P-5'-P-CCNC: 7 U/L (ref 1–41)
ANION GAP SERPL CALCULATED.3IONS-SCNC: 11.2 MMOL/L (ref 5–15)
AST SERPL-CCNC: 13 U/L (ref 1–40)
BILIRUB SERPL-MCNC: 0.4 MG/DL (ref 0–1.2)
BUN SERPL-MCNC: 17 MG/DL (ref 8–23)
BUN/CREAT SERPL: 18.3 (ref 7–25)
CALCIUM SPEC-SCNC: 8.3 MG/DL (ref 8.6–10.5)
CHLORIDE SERPL-SCNC: 103 MMOL/L (ref 98–107)
CO2 SERPL-SCNC: 22.8 MMOL/L (ref 22–29)
CREAT SERPL-MCNC: 0.93 MG/DL (ref 0.76–1.27)
DEPRECATED RDW RBC AUTO: 47.4 FL (ref 37–54)
EGFRCR SERPLBLD CKD-EPI 2021: 84.6 ML/MIN/1.73
ERYTHROCYTE [DISTWIDTH] IN BLOOD BY AUTOMATED COUNT: 16.1 % (ref 12.3–15.4)
GLOBULIN UR ELPH-MCNC: 2.8 GM/DL
GLUCOSE SERPL-MCNC: 78 MG/DL (ref 65–99)
HCT VFR BLD AUTO: 33.8 % (ref 37.5–51)
HGB BLD-MCNC: 11 G/DL (ref 13–17.7)
MAGNESIUM SERPL-MCNC: 2.1 MG/DL (ref 1.6–2.4)
MCH RBC QN AUTO: 26.4 PG (ref 26.6–33)
MCHC RBC AUTO-ENTMCNC: 32.5 G/DL (ref 31.5–35.7)
MCV RBC AUTO: 81.3 FL (ref 79–97)
PHOSPHATE SERPL-MCNC: 1.7 MG/DL (ref 2.5–4.5)
PLATELET # BLD AUTO: 91 10*3/MM3 (ref 140–450)
PMV BLD AUTO: 12.2 FL (ref 6–12)
POTASSIUM SERPL-SCNC: 3.7 MMOL/L (ref 3.5–5.2)
PROT SERPL-MCNC: 5.8 G/DL (ref 6–8.5)
RBC # BLD AUTO: 4.16 10*6/MM3 (ref 4.14–5.8)
SODIUM SERPL-SCNC: 137 MMOL/L (ref 136–145)
WBC NRBC COR # BLD AUTO: 26.35 10*3/MM3 (ref 3.4–10.8)

## 2025-04-25 PROCEDURE — 99232 SBSQ HOSP IP/OBS MODERATE 35: CPT | Performed by: STUDENT IN AN ORGANIZED HEALTH CARE EDUCATION/TRAINING PROGRAM

## 2025-04-25 PROCEDURE — 25810000003 SODIUM CHLORIDE 0.9 % SOLUTION: Performed by: STUDENT IN AN ORGANIZED HEALTH CARE EDUCATION/TRAINING PROGRAM

## 2025-04-25 PROCEDURE — 87040 BLOOD CULTURE FOR BACTERIA: CPT | Performed by: STUDENT IN AN ORGANIZED HEALTH CARE EDUCATION/TRAINING PROGRAM

## 2025-04-25 PROCEDURE — 84100 ASSAY OF PHOSPHORUS: CPT | Performed by: PHYSICIAN ASSISTANT

## 2025-04-25 PROCEDURE — 83735 ASSAY OF MAGNESIUM: CPT | Performed by: PHYSICIAN ASSISTANT

## 2025-04-25 PROCEDURE — 94761 N-INVAS EAR/PLS OXIMETRY MLT: CPT

## 2025-04-25 PROCEDURE — 85027 COMPLETE CBC AUTOMATED: CPT | Performed by: PHYSICIAN ASSISTANT

## 2025-04-25 PROCEDURE — 25010000002 CEFTRIAXONE PER 250 MG: Performed by: STUDENT IN AN ORGANIZED HEALTH CARE EDUCATION/TRAINING PROGRAM

## 2025-04-25 PROCEDURE — 25810000003 SODIUM CHLORIDE 0.9 % SOLUTION: Performed by: FAMILY MEDICINE

## 2025-04-25 PROCEDURE — 94799 UNLISTED PULMONARY SVC/PX: CPT

## 2025-04-25 PROCEDURE — 25010000002 HEPARIN (PORCINE) PER 1000 UNITS: Performed by: STUDENT IN AN ORGANIZED HEALTH CARE EDUCATION/TRAINING PROGRAM

## 2025-04-25 PROCEDURE — 80053 COMPREHEN METABOLIC PANEL: CPT | Performed by: PHYSICIAN ASSISTANT

## 2025-04-25 PROCEDURE — 25010000002 VANCOMYCIN 5 G RECONSTITUTED SOLUTION: Performed by: STUDENT IN AN ORGANIZED HEALTH CARE EDUCATION/TRAINING PROGRAM

## 2025-04-25 RX ORDER — FENTANYL/ROPIVACAINE/NS/PF 2-625MCG/1
15 PLASTIC BAG, INJECTION (ML) EPIDURAL ONCE
Status: COMPLETED | OUTPATIENT
Start: 2025-04-25 | End: 2025-04-25

## 2025-04-25 RX ADMIN — VENLAFAXINE HYDROCHLORIDE 150 MG: 150 CAPSULE, EXTENDED RELEASE ORAL at 07:28

## 2025-04-25 RX ADMIN — HEPARIN SODIUM 5000 UNITS: 5000 INJECTION INTRAVENOUS; SUBCUTANEOUS at 14:36

## 2025-04-25 RX ADMIN — PANTOPRAZOLE SODIUM 40 MG: 40 TABLET, DELAYED RELEASE ORAL at 05:33

## 2025-04-25 RX ADMIN — HEPARIN SODIUM 5000 UNITS: 5000 INJECTION INTRAVENOUS; SUBCUTANEOUS at 05:33

## 2025-04-25 RX ADMIN — ATORVASTATIN CALCIUM 20 MG: 20 TABLET, FILM COATED ORAL at 21:35

## 2025-04-25 RX ADMIN — CEFTRIAXONE SODIUM 2000 MG: 2 INJECTION, POWDER, FOR SOLUTION INTRAMUSCULAR; INTRAVENOUS at 17:44

## 2025-04-25 RX ADMIN — Medication 10 ML: at 07:28

## 2025-04-25 RX ADMIN — VANCOMYCIN HYDROCHLORIDE 1250 MG: 5 INJECTION, POWDER, LYOPHILIZED, FOR SOLUTION INTRAVENOUS at 00:47

## 2025-04-25 RX ADMIN — HEPARIN SODIUM 5000 UNITS: 5000 INJECTION INTRAVENOUS; SUBCUTANEOUS at 21:35

## 2025-04-25 RX ADMIN — POTASSIUM PHOSPHATE, MONOBASIC AND POTASSIUM PHOSPHATE, DIBASIC 15 MMOL: 224; 236 INJECTION, SOLUTION, CONCENTRATE INTRAVENOUS at 07:29
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
## 2025-04-25 NOTE — PLAN OF CARE
Goal Outcome Evaluation:  Plan of Care Reviewed With: patient        Progress: no change  Outcome Evaluation: A&Ox4, VSS on room air. IV abx administered as ordered. Denies pain. Kyle catheter in place and draining well. Trach care completed.

## 2025-04-25 NOTE — PLAN OF CARE
Goal Outcome Evaluation:  Plan of Care Reviewed With: patient        Progress: improving  Outcome Evaluation: AOx4, VSS on room air. IV abx. No complaints of pain. F/C in place, draining clear yellow urine. Trach care completed, suctioned x1 this shift.

## 2025-04-26 LAB
ANION GAP SERPL CALCULATED.3IONS-SCNC: 8.9 MMOL/L (ref 5–15)
BACTERIA SPEC AEROBE CULT: ABNORMAL
BACTERIA SPEC AEROBE CULT: ABNORMAL
BUN SERPL-MCNC: 14 MG/DL (ref 8–23)
BUN/CREAT SERPL: 16.5 (ref 7–25)
CALCIUM SPEC-SCNC: 8.3 MG/DL (ref 8.6–10.5)
CHLORIDE SERPL-SCNC: 110 MMOL/L (ref 98–107)
CO2 SERPL-SCNC: 23.1 MMOL/L (ref 22–29)
CREAT SERPL-MCNC: 0.85 MG/DL (ref 0.76–1.27)
DEPRECATED RDW RBC AUTO: 47.2 FL (ref 37–54)
EGFRCR SERPLBLD CKD-EPI 2021: 89.5 ML/MIN/1.73
ERYTHROCYTE [DISTWIDTH] IN BLOOD BY AUTOMATED COUNT: 15.9 % (ref 12.3–15.4)
GLUCOSE SERPL-MCNC: 105 MG/DL (ref 65–99)
GRAM STN SPEC: ABNORMAL
HCT VFR BLD AUTO: 35.1 % (ref 37.5–51)
HGB BLD-MCNC: 11.2 G/DL (ref 13–17.7)
ISOLATED FROM: ABNORMAL
MAGNESIUM SERPL-MCNC: 1.8 MG/DL (ref 1.6–2.4)
MCH RBC QN AUTO: 25.9 PG (ref 26.6–33)
MCHC RBC AUTO-ENTMCNC: 31.9 G/DL (ref 31.5–35.7)
MCV RBC AUTO: 81.3 FL (ref 79–97)
PHOSPHATE SERPL-MCNC: 2.4 MG/DL (ref 2.5–4.5)
PLATELET # BLD AUTO: 90 10*3/MM3 (ref 140–450)
PMV BLD AUTO: 12.3 FL (ref 6–12)
POTASSIUM SERPL-SCNC: 4.2 MMOL/L (ref 3.5–5.2)
RBC # BLD AUTO: 4.32 10*6/MM3 (ref 4.14–5.8)
SODIUM SERPL-SCNC: 142 MMOL/L (ref 136–145)
WBC NRBC COR # BLD AUTO: 24.7 10*3/MM3 (ref 3.4–10.8)

## 2025-04-26 PROCEDURE — 25010000002 HEPARIN (PORCINE) PER 1000 UNITS: Performed by: STUDENT IN AN ORGANIZED HEALTH CARE EDUCATION/TRAINING PROGRAM

## 2025-04-26 PROCEDURE — 97161 PT EVAL LOW COMPLEX 20 MIN: CPT

## 2025-04-26 PROCEDURE — 63710000001 DIPHENHYDRAMINE PER 50 MG: Performed by: STUDENT IN AN ORGANIZED HEALTH CARE EDUCATION/TRAINING PROGRAM

## 2025-04-26 PROCEDURE — 99232 SBSQ HOSP IP/OBS MODERATE 35: CPT | Performed by: STUDENT IN AN ORGANIZED HEALTH CARE EDUCATION/TRAINING PROGRAM

## 2025-04-26 PROCEDURE — 25010000002 CEFTRIAXONE PER 250 MG: Performed by: STUDENT IN AN ORGANIZED HEALTH CARE EDUCATION/TRAINING PROGRAM

## 2025-04-26 PROCEDURE — 83735 ASSAY OF MAGNESIUM: CPT | Performed by: STUDENT IN AN ORGANIZED HEALTH CARE EDUCATION/TRAINING PROGRAM

## 2025-04-26 PROCEDURE — 85027 COMPLETE CBC AUTOMATED: CPT | Performed by: STUDENT IN AN ORGANIZED HEALTH CARE EDUCATION/TRAINING PROGRAM

## 2025-04-26 PROCEDURE — 25010000002 HALOPERIDOL LACTATE PER 5 MG: Performed by: STUDENT IN AN ORGANIZED HEALTH CARE EDUCATION/TRAINING PROGRAM

## 2025-04-26 PROCEDURE — 94761 N-INVAS EAR/PLS OXIMETRY MLT: CPT

## 2025-04-26 PROCEDURE — 84100 ASSAY OF PHOSPHORUS: CPT | Performed by: STUDENT IN AN ORGANIZED HEALTH CARE EDUCATION/TRAINING PROGRAM

## 2025-04-26 PROCEDURE — 94799 UNLISTED PULMONARY SVC/PX: CPT

## 2025-04-26 PROCEDURE — 80048 BASIC METABOLIC PNL TOTAL CA: CPT | Performed by: STUDENT IN AN ORGANIZED HEALTH CARE EDUCATION/TRAINING PROGRAM

## 2025-04-26 PROCEDURE — 25810000003 SODIUM CHLORIDE 0.9 % SOLUTION: Performed by: STUDENT IN AN ORGANIZED HEALTH CARE EDUCATION/TRAINING PROGRAM

## 2025-04-26 RX ORDER — DIPHENHYDRAMINE HCL 25 MG
25 CAPSULE ORAL EVERY 6 HOURS PRN
Status: DISCONTINUED | OUTPATIENT
Start: 2025-04-26 | End: 2025-04-28 | Stop reason: HOSPADM

## 2025-04-26 RX ORDER — PREGABALIN 100 MG/1
200 CAPSULE ORAL 2 TIMES DAILY
Status: DISCONTINUED | OUTPATIENT
Start: 2025-04-26 | End: 2025-04-28 | Stop reason: HOSPADM

## 2025-04-26 RX ORDER — QUETIAPINE FUMARATE 25 MG/1
25 TABLET, FILM COATED ORAL NIGHTLY PRN
Status: DISCONTINUED | OUTPATIENT
Start: 2025-04-26 | End: 2025-04-26

## 2025-04-26 RX ORDER — DONEPEZIL HYDROCHLORIDE 10 MG/1
10 TABLET, FILM COATED ORAL NIGHTLY
Status: DISCONTINUED | OUTPATIENT
Start: 2025-04-26 | End: 2025-04-28 | Stop reason: HOSPADM

## 2025-04-26 RX ORDER — VENLAFAXINE HYDROCHLORIDE 75 MG/1
75 CAPSULE, EXTENDED RELEASE ORAL DAILY
Status: DISCONTINUED | OUTPATIENT
Start: 2025-04-27 | End: 2025-04-28 | Stop reason: HOSPADM

## 2025-04-26 RX ORDER — TRAZODONE HYDROCHLORIDE 50 MG/1
50 TABLET ORAL NIGHTLY PRN
Status: DISCONTINUED | OUTPATIENT
Start: 2025-04-26 | End: 2025-04-28 | Stop reason: HOSPADM

## 2025-04-26 RX ORDER — MULTIVITAMIN WITH IRON
500 TABLET ORAL DAILY
Status: DISCONTINUED | OUTPATIENT
Start: 2025-04-26 | End: 2025-04-28 | Stop reason: HOSPADM

## 2025-04-26 RX ORDER — HYOSCYAMINE SULFATE 0.38 MG/1
375 TABLET, EXTENDED RELEASE ORAL EVERY 12 HOURS PRN
Status: DISCONTINUED | OUTPATIENT
Start: 2025-04-26 | End: 2025-04-28 | Stop reason: HOSPADM

## 2025-04-26 RX ORDER — HALOPERIDOL 5 MG/ML
1 INJECTION INTRAMUSCULAR ONCE
Status: COMPLETED | OUTPATIENT
Start: 2025-04-26 | End: 2025-04-26

## 2025-04-26 RX ORDER — QUETIAPINE FUMARATE 25 MG/1
25 TABLET, FILM COATED ORAL NIGHTLY
Status: DISCONTINUED | OUTPATIENT
Start: 2025-04-26 | End: 2025-04-26

## 2025-04-26 RX ORDER — FENTANYL/ROPIVACAINE/NS/PF 2-625MCG/1
15 PLASTIC BAG, INJECTION (ML) EPIDURAL ONCE
Status: COMPLETED | OUTPATIENT
Start: 2025-04-26 | End: 2025-04-26

## 2025-04-26 RX ADMIN — CEFTRIAXONE SODIUM 2000 MG: 2 INJECTION, POWDER, FOR SOLUTION INTRAMUSCULAR; INTRAVENOUS at 18:52

## 2025-04-26 RX ADMIN — HALOPERIDOL LACTATE 1 MG: 5 INJECTION, SOLUTION INTRAMUSCULAR at 09:38

## 2025-04-26 RX ADMIN — PANTOPRAZOLE SODIUM 40 MG: 40 TABLET, DELAYED RELEASE ORAL at 06:14

## 2025-04-26 RX ADMIN — DONEPEZIL HYDROCHLORIDE 10 MG: 10 TABLET, FILM COATED ORAL at 21:27

## 2025-04-26 RX ADMIN — DIPHENHYDRAMINE HYDROCHLORIDE 25 MG: 25 CAPSULE ORAL at 08:02

## 2025-04-26 RX ADMIN — POTASSIUM PHOSPHATE, MONOBASIC AND POTASSIUM PHOSPHATE, DIBASIC 15 MMOL: 224; 236 INJECTION, SOLUTION, CONCENTRATE INTRAVENOUS at 09:20

## 2025-04-26 RX ADMIN — PREGABALIN 200 MG: 100 CAPSULE ORAL at 09:20

## 2025-04-26 RX ADMIN — VENLAFAXINE HYDROCHLORIDE 150 MG: 150 CAPSULE, EXTENDED RELEASE ORAL at 08:02

## 2025-04-26 RX ADMIN — Medication 10 ML: at 21:27

## 2025-04-26 RX ADMIN — BREXPIPRAZOLE 1 MG: 1 TABLET ORAL at 13:39

## 2025-04-26 RX ADMIN — HEPARIN SODIUM 5000 UNITS: 5000 INJECTION INTRAVENOUS; SUBCUTANEOUS at 21:27

## 2025-04-26 RX ADMIN — CYANOCOBALAMIN TAB 500 MCG 500 MCG: 500 TAB at 09:20

## 2025-04-26 RX ADMIN — PREGABALIN 200 MG: 100 CAPSULE ORAL at 21:27

## 2025-04-26 RX ADMIN — HEPARIN SODIUM 5000 UNITS: 5000 INJECTION INTRAVENOUS; SUBCUTANEOUS at 13:39

## 2025-04-26 RX ADMIN — ATORVASTATIN CALCIUM 20 MG: 20 TABLET, FILM COATED ORAL at 21:27

## 2025-04-26 RX ADMIN — HEPARIN SODIUM 5000 UNITS: 5000 INJECTION INTRAVENOUS; SUBCUTANEOUS at 06:14

## 2025-04-26 NOTE — PLAN OF CARE
Goal Outcome Evaluation:  Plan of Care Reviewed With: patient        Progress: no change  Outcome Evaluation: Q2 turns. Trach care completed. Kyle cath remains in place with clear yellow urine. Patient having hallucinations this morning and also complaining of rash on back.

## 2025-04-26 NOTE — PLAN OF CARE
Goal Outcome Evaluation:            Took over pt care from WILLIE Andre around 1630. No change to status at that time. Will continue to follow the plan of care.

## 2025-04-26 NOTE — CONSULTS
" [facility]   PSYCHIATRIC CONSULTATION    Patient Name: Anshul Shook Jr.  : 1950  MRN: [id number]  Primary Care Physician:  Won Mcneill MD  Date of admission: 2025    Referring Provider: Dr. Magdi Braden  Reason for Consultation: Hallucinations, depression    Subjective   Subjective     Chief Complaint: \"To teach them about a shiley trach\"    HPI:     Anshul Shook Jr. is a 75 y.o. adult with a history of spinal cord injury and resulting paraplegia, heart disease, hypertension, and hyperlipidemia admitted for fevers, chills, confusion, and hypotension.  Patient, cooperative with the interview.  However he has some confusion and forgetfulness is an unreliable historian.  Patient has a urinary tract infection could be contributing to his hallucinations and confusion.  He has been on donepezil for some time but suspect underlying dementia.    The patient had been reporting hallucinations but with me today he denies them.  However during the interview he reports that he believes he is going to be killed in has some paranoia and delusional thinking.  When asked why he believes he is going to be killed he begins talking about things that he is seeing outside is indicating that he may have some underlying hallucinations.    Patient reports being depressed.  He reports depression makes him lazy and he does not want to do things.  States it also makes him looney.  Patient states on numerous occasions that it never makes him feel like he could kill himself.  He denies any suicidal ideations.  He denied suicidal ideations throughout the interview when asked, but also during the course of the conversation mentions on numerous occasions that he would not harm himself.    He denies confusion, but has some forgetfulness and confusion during the interview.  Patient tells me that he just turned 71 years old and he is in fact 75 years old.  Also was not oriented to the place for her at today.  He is able to " "provide some history, and attempts to correct errors made but unable to correct the year.  As an example he tells me that we [facility], and after a couple seconds states, \"this is not that building.\"  Then attempts to name the place we are at but states he has never been there before and cannot name it, but then states it is a [facility] hospital, but appears confused.    Patient has a sitter and is reported he made comments about being depressed, and more depressed because of hallucinations and made the statement that he could see what people would in her life because of this.  He also pulled his IV out because he thought it was a warm crawling into his arm.  However he is pleasant gaging.  He has no acute agitation.        Review of Systems   All systems were reviewed and negative except for: No complaints    Personal History     Past Medical History:   Diagnosis Date    Arthritis     Back injury     RESULTING IN PARAPLEGIA    Essential hypertension 08/11/2021    Irregular heartbeat     SEE'S DR GAY. DENIED  CP/SOB    Kidney stone     Memory loss     Nonischemic cardiomyopathy 08/11/2021    FOLLOWS DR. GAY    Osteomyelitis 08/04/2024    Paraplegic gait     Presence of biventricular implantable cardioverter-defibrillator (ICD) 08/12/2021    [employer] BiV ICD    UTI (urinary tract infection) 05/06/2024       Past Surgical History:   Procedure Laterality Date    BACK SURGERY      CARDIAC DEFIBRILLATOR PLACEMENT      [facility]    COLONOSCOPY      COLONOSCOPY N/A 09/28/2022    Procedure: COLONOSCOPY WITH POLYPECTOMIES, HOT SNARE, CLIP APPLICATION X2;  Surgeon: Audie West MD;  Location: [facility] ENDOSCOPY;  Service: General;  Laterality: N/A;  COLON POLYPS    CYSTOSCOPY W/ URETERAL STENT PLACEMENT Right 4/23/2025    Procedure: CYSTOSCOPY RIGHT, right URETERAL CATHETER/STENT INSERTION.  Placed tube in right ureter;  Surgeon: Rich Ingram MD;  Location: [facility] MAIN OR;  Service: " Urology;  Laterality: Right;    CYSTOSCOPY, RETROGRADE PYELOGRAM AND STENT INSERTION Left 05/09/2024    Procedure: CYSTOSCOPY RETROGRADE PYELOGRAM AND STENT INSERTION, left;  Surgeon: Sara Montano MD;  Location: [facility] OR;  Service: Urology;  Laterality: Left;    ORIF ANKLE FRACTURE Right     PACEMAKER REPLACEMENT Bilateral 02/06/2024    Procedure: PPM generator change bi-v;  Surgeon: Renzo Hobbs MD;  Location: [facility] CATH INVASIVE LOCATION;  Service: Cardiovascular;  Laterality: Bilateral;    URETEROSCOPY LASER LITHOTRIPSY WITH STENT INSERTION Left 6/17/2024    Procedure: CYSTOSCOPY URETEROSCOPY RETROGRADE PYELOGRAM HOLMIUM LASER STENT exchange, left;  Surgeon: Sara Montano MD;  Location: [facility] OR;  Service: Urology;  Laterality: Left;       Past Psychiatric History: Denies any previous psychiatric history or treatment, but this was effective.  Told me he does have depression and has been on antidepressants including venlafaxine.  He also states that he has seen a therapist in the past.    Psychiatric Hospitalizations: None    Suicide Attempts: None    Prior Treatment and Medications Tried: Venlafaxine, and he is also currently on donepezil        Family History: family history is not on file. Otherwise pertinent FHx was reviewed and not pertinent to current issue.    Social History:     Born and raised in [place].  Moved to [place] when he retired to be close to his daughter.  Currently lives with his wife.  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  He has 3 children.    No  service    Is not Sikh or spiritual    Denies any history of abuse    Social History     Socioeconomic History    Marital status:    Tobacco Use    Smoking status: Never    Smokeless tobacco: Never   Vaping Use    Vaping status: Never Used   Substance and Sexual Activity    Alcohol use: Never    Drug use: Never    Sexual activity: Defer  "      Substance Abuse History: reports that he has never smoked. He has never used smokeless tobacco. He reports that he does not drink alcohol and does not use drugs.    Home Medications:  Animal Shapes/Iron, Hyoscyamine Sulfate ER, atorvastatin, carvedilol, cholecalciferol, ciprofloxacin, donepezil, multivitamin with minerals, pregabalin, venlafaxine XR, and vitamin B-12      Allergies:  Allergies   Allergen Reactions    Amoxicillin Hives    Ct Contrast Hives    Iodinated Contrast Media Other (See Comments)     Unknown, marked as high severity from,another facility, but reaction was unknown.    Penicillins Rash     Has tolerated Ampicillin/Sulbactam and Amoxicillin/clavulanate 5/2024 -Obed Randhawa [facility]    Zosyn [Piperacillin-Tazobactam In Dex] Hives     Has tolerated Ampicillin/Sulbactam and Amoxicillin/clavulanate 5/2024 -Obed Randhawa [facility]       Objective   Objective     Vitals:   Temp:  [97.7 °F (36.5 °C)-98.2 °F (36.8 °C)] 98.2 °F (36.8 °C)  Heart Rate:  [79-95] 85  Resp:  [16-18] 16  BP: (115-146)/(59-91) 117/59  Flow (L/min) (Oxygen Therapy):  [8] 8  Physical Exam       Mental Status Exam:     Patient sitting up in bed comfortably in no acute distress.  He participates in interview.  There is no psychomotor restlessness or agitation.    He is awake and alert.  He is oriented to day, month, and year, but takes him a while to come up with all of these answers.  He is not oriented to place.  He believes we are in [place].  He does know where in [place].    Hygiene:   good  Cooperation:  Cooperative  Eye Contact:  Good  Psychomotor Behavior:  Appropriate  Mood: \"Down\"  Affect:  Restricted  Speech:  Normal  Language: Appropriate, relevant  Thought Process:  Tangential and forgetful  Thought Content:  Bizarre at times  Suicidal:  None, denied on numerous occasions  Homicidal:  None  Hallucinations:  Auditory and Visual  Delusion:  Paranoid  Memory:  Deficits  Orientation:  Person  Reliability:  " fair  Insight:  Fair  Judgement:  Good  Impulse Control:  Good    Result Review    Result Review:  I have personally reviewed the results from the time of this admission to 4/26/2025 12:12 EDT and agree with these findings:  [x]  Laboratory  []  Microbiology  []  Radiology  []  EKG/Telemetry   []  Cardiology/Vascular   []  Pathology  []  Old records  []  Other:  Most notable findings include: Current UTI    Assessment & Plan   Assessment / Plan     Brief Patient Summary:  Anshul Shook Jr. is a 75 y.o. male who admitted for fevers, chills, confusion, hypotension.  Appears to have an underlying dementia but could be confused due to toxic metabolic encephalopathy.    Active Hospital Problems:  Active Hospital Problems    Diagnosis     **Ureteral stone     Sepsis secondary to UTI          Plan:   1) patient is on venlafaxine 150 mg daily.  Has been on this for some time and will begin to titrate down and consider another antidepressant since this does not appear to be effective  2) continue Aricept as ordered and consider Namenda for emerging dementia.  She did have neurocognitive testing once cleared from infection to determine degree of dementia or confusion was due to delirium  3) add Rexulti for psychosis and face of suspected dementia  4) trazodone as needed for insomnia  5) does not appear to need acute inpatient psychiatric care at this time  6) we will follow make treatment recommendations as indicated        Part of this note may be an electronic transcription/translation of spoken language to printed text using the Dragon Dictation System.    Electronically signed by Farzad Frazier MD, 04/26/25, 12:12 PM EDT.
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
## 2025-04-26 NOTE — PROGRESS NOTES
[facility]   Hospitalist Progress Note  Date: 2025  Patient Name: Anshul Shook Jr.  : 1950  MRN: [id number]  Date of admission: 2025  Room/Bed: [facility]/      Subjective   Subjective     Chief Complaint: Fevers, chills, confusion, hypotension    Summary:Anshul Shook Jr. is a 75 y.o. male with history of traumatic injury to the spine resulting in prolonged hypoxia requiring chronic trach due to vocal cord palsy, nonischemic cardiomyopathy status post AICD with pacemaker, neurogenic bladder, hypertension, hyperlipidemia who presents to the emergency room patient with fevers, chills, confusion and generalized weakness.  Patient was also complaining of abdominal pain.  CT abdomen pelvis without contrast showed obstructive uropathy on the right due to 1.1 cm proximal right ureteral calculus.  Urology consulted.  Patient admitted for management of shock, obstructive right ureteral calculus, moderate right hydro utero nephrosis, cystitis, EDY, thrombocytopenia.  Patient is status post stent placement by urology on .  Patient will need outpatient surgery for stone removal.  Neurology was consulted for concern for stroke and they signed off.  Patient's blood culture was positive for Klebsiella.  Repeat blood cultures ordered.  Urine culture growing gram-negative bacilli.  Patient is on ceftriaxone.  Hospital course has been complicated by hallucinations.  Psychiatry consulted    Interval Followup:   Patient complaining of hallucinations this morning.  Agitated.  Psychiatry consulted.  His repeat blood cultures appear clear.     All systems reviewed and negative except for what is outlined above.      Objective   Objective     Vitals:   Temp:  [97.5 °F (36.4 °C)-98.2 °F (36.8 °C)] 97.5 °F (36.4 °C)  Heart Rate:  [79-95] 86  Resp:  [16-18] 16  BP: (117-146)/(59-91) 125/85  Flow (L/min) (Oxygen Therapy):  [8] 8    Physical Exam   General: NAD  HENT: Trach in place  Cardiovascular: Regular rate and  rhythm  Pulmonary: No accessory muscle use      Result Review    Result Review:  I have personally reviewed these results:  [x]  Laboratory      Lab 04/26/25 0316 04/25/25 0420 04/24/25  0810 04/24/25 0436 04/24/25 0129 04/23/25 2228 04/23/25 2034   WBC 24.70* 26.35*  --   --  33.85*  --  25.16*   HEMOGLOBIN 11.2* 11.0*  --   --  11.4*  --  12.1*   HEMATOCRIT 35.1* 33.8*  --   --  36.7*  --  39.1   PLATELETS 90* 91*  --   --  96*  --  111*   NEUTROS ABS  --   --   --   --  30.54*  --  23.65*   IMMATURE GRANS (ABS)  --   --   --   --  0.35*  --   --    LYMPHS ABS  --   --   --   --  1.02  --   --    MONOS ABS  --   --   --   --  1.88*  --   --    EOS ABS  --   --   --   --  0.00  --  0.25   MCV 81.3 81.3  --   --  84.2  --  82.8   LACTATE  --   --  1.8 2.1* 2.6*   < >  --    PROTIME  --   --   --   --   --   --  17.3*   APTT  --   --   --   --   --   --  42.4*    < > = values in this interval not displayed.         Lab 04/26/25 0316 04/25/25 0420 04/24/25 0129   SODIUM 142 137 132*   POTASSIUM 4.2 3.7 4.1   CHLORIDE 110* 103 103   CO2 23.1 22.8 18.0*   ANION GAP 8.9 11.2 11.0   BUN 14 17 18   CREATININE 0.85 0.93 1.46*   EGFR 89.5 84.6 49.2*   GLUCOSE 105* 78 121*   CALCIUM 8.3* 8.3* 8.0*   MAGNESIUM 1.8 2.1 1.3*   PHOSPHORUS 2.4* 1.7* 2.5         Lab 04/25/25 0420 04/24/25 0129 04/23/25 2034   TOTAL PROTEIN 5.8* 5.8* 6.4   ALBUMIN 3.0* 2.7* 3.4*   GLOBULIN 2.8 3.1 3.0   ALT (SGPT) 7 9 9   AST (SGOT) 13 15 17   BILIRUBIN 0.4 0.3 0.4   ALK PHOS 79 63 72         Lab 04/23/25 2034   PROTIME 17.3*   INR 1.35*             Lab 04/23/25 2208 04/23/25 2034   ABO TYPING A A   RH TYPING Positive Positive   ANTIBODY SCREEN  --  Negative         Brief Urine Lab Results  (Last result in the past 365 days)        Color   Clarity   Blood   Leuk Est   Nitrite   Protein   CREAT   Urine HCG        04/23/25 2208 Yellow   Cloudy   Large (3+)   Large (3+)   Negative   100 mg/dL (2+)                 [x]  Microbiology    Microbiology Results (last 10 days)       Procedure Component Value - Date/Time    Blood Culture - Blood, Hand, Right [[id number]]  (Normal) Collected: 04/25/25 0849    Lab Status: Preliminary result Specimen: Blood from Hand, Right Updated: 04/26/25 0900     Blood Culture No growth at 24 hours    Blood Culture - Blood, Hand, Left [[id number]]  (Normal) Collected: 04/25/25 0848    Lab Status: Preliminary result Specimen: Blood from Hand, Left Updated: 04/26/25 0900     Blood Culture No growth at 24 hours    Urine Culture - Urine, Urethra [[id number]]  (Abnormal)  (Susceptibility) Collected: 04/23/25 2259    Lab Status: Final result Specimen: Urine from Urethra Updated: 04/26/25 1050     Urine Culture >100,000 CFU/mL Klebsiella oxytoca    Narrative:      Colonization of the urinary tract without infection is common. Treatment is discouraged unless the patient is symptomatic, pregnant, or undergoing an invasive urologic procedure.    Susceptibility        Klebsiella oxytoca      SAL      Amoxicillin + Clavulanate Susceptible      Ampicillin Resistant      Ampicillin + Sulbactam Susceptible      Cefazolin (Urine) Susceptible      Cefepime Susceptible      Ceftazidime Susceptible      Ceftriaxone Susceptible      Gentamicin Susceptible      Levofloxacin Susceptible      Nitrofurantoin Susceptible      Piperacillin + Tazobactam Susceptible      Trimethoprim + Sulfamethoxazole Susceptible                           Blood Culture - Blood, Arm, Left [[id number]]  (Abnormal)  (Susceptibility) Collected: 04/23/25 2240    Lab Status: Final result Specimen: Blood from Arm, Left Updated: 04/26/25 0723     Blood Culture Klebsiella oxytoca     Isolated from Anaerobic Bottle     Gram Stain Anaerobic Bottle Gram negative bacilli    Narrative:      Less than seven (7) mL's of blood was collected.  Insufficient quantity may yield false negative results.    Susceptibility        Klebsiella oxytoca      SAL      Amoxicillin + Clavulanate  Susceptible      Ampicillin Resistant      Ampicillin + Sulbactam Susceptible      Cefazolin (Non Urine) Resistant      Cefepime Susceptible      Ceftazidime Susceptible      Ceftriaxone Susceptible      Gentamicin Susceptible      Levofloxacin Susceptible      Piperacillin + Tazobactam Susceptible      Trimethoprim + Sulfamethoxazole Susceptible                       Susceptibility Comments       Klebsiella oxytoca    With the exception of urinary-sourced infections, aminoglycosides should not be used as monotherapy.               Blood Culture ID, PCR - Blood, Arm, Left [[id number]]  (Abnormal) Collected: 04/23/25 2240    Lab Status: Final result Specimen: Blood from Arm, Left Updated: 04/24/25 1749     BCID, PCR Klebsiella oxytoca. Identification by BCID2 PCR     BOTTLE TYPE Anaerobic Bottle    Narrative:      No resistance genes detected.    Blood Culture - Blood, Arm, Right [[id number]]  (Normal) Collected: 04/23/25 2228    Lab Status: Preliminary result Specimen: Blood from Arm, Right Updated: 04/25/25 2245     Blood Culture No growth at 2 days          [x]  Radiology  XR Chest 1 View  Result Date: 4/23/2025  Impression: No acute process nor significant change identified Electronically Signed: Phuc Otoole MD  4/23/2025 9:35 PM EDT  Workstation ID: [id number]    CT Abdomen Pelvis Without Contrast  Result Date: 4/23/2025  1.There is obstructive uropathy on the RIGHT due to a 1.1 cm proximal right ureteral calculus. 2.There is circumferential mural thickening of the urinary bladder, greater on the right than the left, which may represent an age-indeterminate infectious/inflammatory cystitis. A malignant process cannot be excluded entirely, however. Consider Urology consultation/follow-up for these findings. 3.There is a small-to-moderate hiatal hernia. There is circumferential mural thickening of the hiatal hernia and possibly the lower thoracic esophagus. Age-indeterminate esophagitis/gastritis may be present.  4.Gallbladder sludge is possible. Gallstones cannot be excluded but are thought to be less likely. No acute cholecystitis or pancreatitis. 5.The study is motion limited. 6.No other definite acute findings are seen by nonenhanced CT of the abdomen and pelvis. 7.Please see above comments for further detail. Portions of this note were completed with a voice recognition program. Electronically Signed: Lalito Churchill MD  4/23/2025 9:34 PM EDT  Workstation ID: [id number]    CT Head Without Contrast Stroke Protocol  Result Date: 4/23/2025  Impression: 1.No acute intracranial process is identified. 2.Likely previous infarctions. Electronically Signed: Phuc Otoole MD  4/23/2025 8:33 PM EDT  Workstation ID: [id number]    []  EKG/Telemetry   []  Cardiology/Vascular   []  Pathology  []  Old records  []  Other:    Assessment & Plan        Assessment and Plan:    #Sepsis likely secondary to gram-negative UTI in setting of obstructive right ureteral stone status post stent placement by urology  #EDY-resolved  #Klebsiella bacteremia  Continue ceftriaxone  Urology consulted, appreciate recommendations  Follow-up urine culture  Follow-up blood culture    #Status post trach due to vocal cord palsy from chronic intubation   Continue to monitor respiratory status    #Nonischemic cardiomyopathy status post biventricular AICD with pacemaker   Continue to monitor volume status  Continue statin    #Thrombocytopenia  Continue to monitor platelets    #Anxiety  Effexor     #Hypertension  Hold home meds for now    #Hypophosphatemia  Replete    #Hallucinations  Psychiatry consulted, appreciate recommendation  Brexipiprazole     #Dementia  Aricept    #Chronic pain  Lyrica    Discussed with RN.    VTE Prophylaxis:  Pharmacologic & mechanical VTE prophylaxis orders are present.        CODE STATUS:   Code Status (Patient has no pulse and is not breathing): CPR (Attempt to Resuscitate)  Medical Interventions (Patient has pulse or is breathing): Full  Support  Level Of Support Discussed With: Patient      Electronically signed by Mehdi Braden MD, 4/26/2025, 15:42 EDT.

## 2025-04-26 NOTE — THERAPY EVALUATION
Acute Care - Physical Therapy Initial Evaluation   [facility]     Patient Name: Anshul Shook Jr.  : 1950  MRN: [id number]  Today's Date: 2025      Visit Dx:     ICD-10-CM ICD-9-CM   1. Ureteral stone  N20.1 592.1   2. Kidney stone  N20.0 592.0   3. Difficulty walking  R26.2 719.7     Patient Active Problem List   Diagnosis    Nonischemic cardiomyopathy    Essential hypertension    Presence of biventricular implantable cardioverter-defibrillator (ICD)    SANTAMARIA (nonalcoholic steatohepatitis)    Ureteral stone    Neurogenic bladder    Left carotid bruit    Sepsis secondary to UTI     Past Medical History:   Diagnosis Date    Arthritis     Back injury     RESULTING IN PARAPLEGIA    Essential hypertension 2021    Irregular heartbeat     SEE'S DR GAY. DENIED  CP/SOB    Kidney stone     Memory loss     Nonischemic cardiomyopathy 2021    FOLLOWS DR. GAY    Osteomyelitis 2024    Paraplegic gait     Presence of biventricular implantable cardioverter-defibrillator (ICD) 2021    [facility] Bi[facility] ICD    UTI (urinary tract infection) 2024     Past Surgical History:   Procedure Laterality Date    BACK SURGERY      CARDIAC DEFIBRILLATOR PLACEMENT      [facility]    COLONOSCOPY      COLONOSCOPY N/A 2022    Procedure: COLONOSCOPY WITH POLYPECTOMIES, HOT SNARE, CLIP APPLICATION X2;  Surgeon: Audie West MD;  Location: [facility] ENDOSCOPY;  Service: General;  Laterality: N/A;  COLON POLYPS    CYSTOSCOPY W/ URETERAL STENT PLACEMENT Right 2025    Procedure: CYSTOSCOPY RIGHT, right URETERAL CATHETER/STENT INSERTION.  Placed tube in right ureter;  Surgeon: Rich Ingram MD;  Location: [facility] MAIN OR;  Service: Urology;  Laterality: Right;    CYSTOSCOPY, RETROGRADE PYELOGRAM AND STENT INSERTION Left 2024    Procedure: CYSTOSCOPY RETROGRADE PYELOGRAM AND STENT INSERTION, left;  Surgeon: Sara Montano MD;  Location: [facility] MAIN OR;  Service: Urology;  Laterality: Left;    ORIF  ANKLE FRACTURE Right     PACEMAKER REPLACEMENT Bilateral 02/06/2024    Procedure: PPM generator change bi-v;  Surgeon: Renzo Hobbs MD;  Location: [facility] CATH INVASIVE LOCATION;  Service: Cardiovascular;  Laterality: Bilateral;    URETEROSCOPY LASER LITHOTRIPSY WITH STENT INSERTION Left 6/17/2024    Procedure: CYSTOSCOPY URETEROSCOPY RETROGRADE PYELOGRAM HOLMIUM LASER STENT exchange, left;  Surgeon: Sara Montano MD;  Location: [facility] MAIN OR;  Service: Urology;  Laterality: Left;     PT Assessment (Last 12 Hours)       PT Evaluation and Treatment       Row Name 04/26/25 1200          Physical Therapy Time and Intention    Subjective Information no complaints  -DP     Document Type evaluation  -DP     Mode of Treatment individual therapy;physical therapy  -DP     Patient Effort adequate  -DP       Row Name 04/26/25 1200          General Information    Patient Profile Reviewed yes  -DP     Patient Observations alert;cooperative  -DP     Prior Level of Function independent:;gait;transfer;bed mobility;ADL's  -DP     Existing Precautions/Restrictions fall  -DP     Barriers to Rehab none identified  -DP       Row Name 04/26/25 1200          Living Environment    Current Living Arrangements home  -DP     People in Home spouse  -DP     Primary Care Provided by self  -DP       Row Name 04/26/25 1200          Range of Motion (ROM)    Range of Motion bilateral lower extremities;ROM is WFL  -DP       Row Name 04/26/25 1200          Strength Comprehensive (MMT)    General Manual Muscle Testing (MMT) Assessment lower extremity strength deficits identified  -DP     Comment, General Manual Muscle Testing (MMT) Assessment BLE: 3-/5  -DP       Row Name 04/26/25 1200          Bed Mobility    Bed Mobility supine-sit-supine  -DP     Supine-Sit-Supine Morgan (Bed Mobility) maximum assist (25% patient effort)  -DP       Row Name 04/26/25 1200          Transfers    Transfers sit-stand transfer  -DP       Row Name 04/26/25 1200           Sit-Stand Transfer    Sit-Stand [place] (Transfers) maximum assist (25% patient effort)  -DP     Comment, (Sit-Stand Transfer) Pt was not able to come to a compelte standing  -DP       Row Name 04/26/25 1200          Gait/Stairs (Locomotion)    Patient was able to Ambulate no, other medical factors prevent ambulation  -DP       Row Name 04/26/25 1200          Balance    Balance Assessment sitting static balance  -DP     Static Sitting Balance moderate assist  -DP       Row Name             Wound 04/24/25 Right posterior hand    Wound - Properties Group Placement Date: 04/24/25  -KS Present on Original Admission: Y  -KS Side: Right  -KS Orientation: posterior  -KS Location: hand  -KS Additional Comments: Warts  -KS    Retired Wound - Properties Group Placement Date: 04/24/25  -KS Present on Original Admission: Y  -KS Side: Right  -KS Orientation: posterior  -KS Location: hand  -KS Additional Comments: Warts  -KS    Retired Wound - Properties Group Placement Date: 04/24/25  -KS Present on Original Admission: Y  -KS Side: Right  -KS Orientation: posterior  -KS Location: hand  -KS Additional Comments: Warts  -KS    Retired Wound - Properties Group Date first assessed: 04/24/25  -KS Present on Original Admission: Y  -KS Side: Right  -KS Location: hand  -KS Additional Comments: Warts  -KS      Row Name             Wound 04/24/25 gluteal Pressure Injury    Wound - Properties Group Placement Date: 04/24/25  -KS Present on Original Admission: Y  -KS Location: gluteal  -KS Primary Wound Type: Pressure Inj  -KS Additional Comments: reddened, blanchable  -KS    Retired Wound - Properties Group Placement Date: 04/24/25  -KS Present on Original Admission: Y  -KS Location: gluteal  -KS Additional Comments: reddened, blanchable  -KS    Retired Wound - Properties Group Placement Date: 04/24/25  -KS Present on Original Admission: Y  -KS Location: gluteal  -KS Additional Comments: reddened, blanchable  -KS    Retired  Wound - Properties Group Date first assessed: 04/24/25  -KS Present on Original Admission: Y  -KS Location: gluteal  -KS Additional Comments: reddened, blanchable  -KS      Row Name             Wound 04/24/25 0000 Left anterior great toe Traumatic    Wound - Properties Group Placement Date: 04/24/25  -KS Placement Time: 0000  -KS Present on Original Admission: Y  -KS Side: Left  -KS Orientation: anterior  -KS Location: great toe  -KS Primary Wound Type: Traumatic  -KS    Retired Wound - Properties Group Placement Date: 04/24/25  -KS Placement Time: 0000  -KS Present on Original Admission: Y  -KS Side: Left  -KS Orientation: anterior  -KS Location: great toe  -KS    Retired Wound - Properties Group Placement Date: 04/24/25  -KS Placement Time: 0000  -KS Present on Original Admission: Y  -KS Side: Left  -KS Orientation: anterior  -KS Location: great toe  -KS    Retired Wound - Properties Group Date first assessed: 04/24/25  -KS Time first assessed: 0000  -KS Present on Original Admission: Y  -KS Side: Left  -KS Location: great toe  -KS      Row Name             Wound 04/24/25 0000 Right anterior great toe Traumatic    Wound - Properties Group Placement Date: 04/24/25  -KS Placement Time: 0000  -KS Present on Original Admission: Y  -KS Side: Right  -KS Orientation: anterior  -KS Location: great toe  -KS Primary Wound Type: Traumatic  -KS    Retired Wound - Properties Group Placement Date: 04/24/25  -KS Placement Time: 0000  -KS Present on Original Admission: Y  -KS Side: Right  -KS Orientation: anterior  -KS Location: great toe  -KS    Retired Wound - Properties Group Placement Date: 04/24/25  -KS Placement Time: 0000  -KS Present on Original Admission: Y  -KS Side: Right  -KS Orientation: anterior  -KS Location: great toe  -KS    Retired Wound - Properties Group Date first assessed: 04/24/25  -KS Time first assessed: 0000  -KS Present on Original Admission: Y  -KS Side: Right  -KS Location: great toe  -KS      Row  Name 04/26/25 1200          Plan of Care Review    Plan of Care Reviewed With patient  -DP     Outcome Evaluation Pt presents with decreased strength, transfers and functional mobility. Will benefit from inpatient PT services and continued PT services upon discharge.  -DP       Row Name 04/26/25 1200          Therapy Assessment/Plan (PT)    Criteria for Skilled Interventions Met (PT) yes;meets criteria  -DP     Therapy Frequency (PT) daily  -DP     Predicted Duration of Therapy Intervention (PT) 10 days  -DP     Problem List (PT) problems related to;mobility  -DP     Activity Limitations Related to Problem List (PT) unable to transfer safely;unable to ambulate safely  -DP       Row Name 04/26/25 1200          PT Evaluation Complexity    History, PT Evaluation Complexity no personal factors and/or comorbidities  -DP     Examination of Body Systems (PT Eval Complexity) total of 4 or more elements  -DP     Clinical Presentation (PT Evaluation Complexity) stable  -DP     Clinical Decision Making (PT Evaluation Complexity) low complexity  -DP     Overall Complexity (PT Evaluation Complexity) low complexity  -DP       Row Name 04/26/25 1200          Physical Therapy Goals    Transfer Goal Selection (PT) transfer, PT goal 1  -DP     Gait Training Goal Selection (PT) gait training, PT goal 1  -DP       Row Name 04/26/25 1200          Transfer Goal 1 (PT)    Activity/Assistive Device (Transfer Goal 1, PT) sit-to-stand/stand-to-sit  -DP     [place] Level/Cues Needed (Transfer Goal 1, PT) supervision required  -DP     Time Frame (Transfer Goal 1, PT) 10 days  -DP       Row Name 04/26/25 1200          Gait Training Goal 1 (PT)    Activity/Assistive Device (Gait Training Goal 1, PT) assistive device use;walker, rolling  -DP     [place] Level (Gait Training Goal 1, PT) supervision required  -DP     Distance (Gait Training Goal 1, PT) 200  -DP     Time Frame (Gait Training Goal 1, PT) 10 days  -DP               User Key   (r) = Recorded By, (t) = Taken By, (c) = Cosigned By      Initials Name Provider Type    Yolanda Balderrama RN Registered Nurse    Pooja Castillo, PT Physical Therapist                      PT Recommendation and Plan  Anticipated Discharge Disposition (PT): sub acute care setting  Planned Therapy Interventions (PT): balance training, bed mobility training, gait training, strengthening, transfer training  Therapy Frequency (PT): daily  Plan of Care Reviewed With: patient  Outcome Evaluation: Pt presents with decreased strength, transfers and functional mobility. Will benefit from inpatient PT services and continued PT services upon discharge.   Outcome Measures       Row Name 04/26/25 1200             How much help from another person do you currently need...    Turning from your back to your side while in flat bed without using bedrails? 2  -DP      Moving from lying on back to sitting on the side of a flat bed without bedrails? 2  -DP      Moving to and from a bed to a chair (including a wheelchair)? 2  -DP      Standing up from a chair using your arms (e.g., wheelchair, bedside chair)? 2  -DP      Climbing 3-5 steps with a railing? 1  -DP      To walk in hospital room? 1  -DP      AM-PAC 6 Clicks Score (PT) 10  -DP         Functional Assessment    Outcome Measure Options AM-PAC 6 Clicks Basic Mobility (PT)  -DP                User Key  (r) = Recorded By, (t) = Taken By, (c) = Cosigned By      Initials Name Provider Type    Pooja Castillo, PT Physical Therapist                     Time Calculation:    PT Charges       Row Name 04/26/25 1253             Time Calculation    PT Received On 04/26/25  -DP      PT Goal Re-Cert Due Date 05/05/25  -DP         Untimed Charges    PT Eval/Re-eval Minutes 40  -DP         Total Minutes    Untimed Charges Total Minutes 40  -DP       Total Minutes 40  -DP                User Key  (r) = Recorded By, (t) = Taken By, (c) = Cosigned By      Initials Name Provider Type    ZHENG  Pooja Gil, PT Physical Therapist                      PT G-Codes  Outcome Measure Options: AM-PAC 6 Clicks Basic Mobility (PT)  AM-PAC 6 Clicks Score (PT): 10    Pooja Gil, PT  4/26/2025

## 2025-04-27 LAB
ANION GAP SERPL CALCULATED.3IONS-SCNC: 9.6 MMOL/L (ref 5–15)
BUN SERPL-MCNC: 14 MG/DL (ref 8–23)
BUN/CREAT SERPL: 16.7 (ref 7–25)
CALCIUM SPEC-SCNC: 8.1 MG/DL (ref 8.6–10.5)
CHLORIDE SERPL-SCNC: 106 MMOL/L (ref 98–107)
CO2 SERPL-SCNC: 23.4 MMOL/L (ref 22–29)
CREAT SERPL-MCNC: 0.84 MG/DL (ref 0.76–1.27)
DEPRECATED RDW RBC AUTO: 46.8 FL (ref 37–54)
EGFRCR SERPLBLD CKD-EPI 2021: 89.8 ML/MIN/1.73
ERYTHROCYTE [DISTWIDTH] IN BLOOD BY AUTOMATED COUNT: 15.9 % (ref 12.3–15.4)
GLUCOSE SERPL-MCNC: 88 MG/DL (ref 65–99)
HCT VFR BLD AUTO: 33.5 % (ref 37.5–51)
HGB BLD-MCNC: 10.6 G/DL (ref 13–17.7)
MAGNESIUM SERPL-MCNC: 1.7 MG/DL (ref 1.6–2.4)
MCH RBC QN AUTO: 25.7 PG (ref 26.6–33)
MCHC RBC AUTO-ENTMCNC: 31.6 G/DL (ref 31.5–35.7)
MCV RBC AUTO: 81.1 FL (ref 79–97)
PHOSPHATE SERPL-MCNC: 2.3 MG/DL (ref 2.5–4.5)
PLATELET # BLD AUTO: 95 10*3/MM3 (ref 140–450)
PMV BLD AUTO: 12.2 FL (ref 6–12)
POTASSIUM SERPL-SCNC: 3.7 MMOL/L (ref 3.5–5.2)
RBC # BLD AUTO: 4.13 10*6/MM3 (ref 4.14–5.8)
SODIUM SERPL-SCNC: 139 MMOL/L (ref 136–145)
WBC NRBC COR # BLD AUTO: 11.14 10*3/MM3 (ref 3.4–10.8)

## 2025-04-27 PROCEDURE — 63710000001 DIPHENHYDRAMINE PER 50 MG: Performed by: STUDENT IN AN ORGANIZED HEALTH CARE EDUCATION/TRAINING PROGRAM

## 2025-04-27 PROCEDURE — 84100 ASSAY OF PHOSPHORUS: CPT | Performed by: STUDENT IN AN ORGANIZED HEALTH CARE EDUCATION/TRAINING PROGRAM

## 2025-04-27 PROCEDURE — 97530 THERAPEUTIC ACTIVITIES: CPT

## 2025-04-27 PROCEDURE — 25010000002 CEFTRIAXONE PER 250 MG: Performed by: STUDENT IN AN ORGANIZED HEALTH CARE EDUCATION/TRAINING PROGRAM

## 2025-04-27 PROCEDURE — 25010000002 HEPARIN (PORCINE) PER 1000 UNITS: Performed by: STUDENT IN AN ORGANIZED HEALTH CARE EDUCATION/TRAINING PROGRAM

## 2025-04-27 PROCEDURE — 83735 ASSAY OF MAGNESIUM: CPT | Performed by: STUDENT IN AN ORGANIZED HEALTH CARE EDUCATION/TRAINING PROGRAM

## 2025-04-27 PROCEDURE — 25810000003 SODIUM CHLORIDE 0.9 % SOLUTION: Performed by: STUDENT IN AN ORGANIZED HEALTH CARE EDUCATION/TRAINING PROGRAM

## 2025-04-27 PROCEDURE — 99232 SBSQ HOSP IP/OBS MODERATE 35: CPT | Performed by: STUDENT IN AN ORGANIZED HEALTH CARE EDUCATION/TRAINING PROGRAM

## 2025-04-27 PROCEDURE — 80048 BASIC METABOLIC PNL TOTAL CA: CPT | Performed by: STUDENT IN AN ORGANIZED HEALTH CARE EDUCATION/TRAINING PROGRAM

## 2025-04-27 PROCEDURE — 97110 THERAPEUTIC EXERCISES: CPT

## 2025-04-27 PROCEDURE — 85027 COMPLETE CBC AUTOMATED: CPT | Performed by: STUDENT IN AN ORGANIZED HEALTH CARE EDUCATION/TRAINING PROGRAM

## 2025-04-27 RX ORDER — FENTANYL/ROPIVACAINE/NS/PF 2-625MCG/1
15 PLASTIC BAG, INJECTION (ML) EPIDURAL ONCE
Status: COMPLETED | OUTPATIENT
Start: 2025-04-27 | End: 2025-04-28

## 2025-04-27 RX ADMIN — HEPARIN SODIUM 5000 UNITS: 5000 INJECTION INTRAVENOUS; SUBCUTANEOUS at 15:34

## 2025-04-27 RX ADMIN — DIPHENHYDRAMINE HYDROCHLORIDE 25 MG: 25 CAPSULE ORAL at 00:02

## 2025-04-27 RX ADMIN — DIPHENHYDRAMINE HYDROCHLORIDE 25 MG: 25 CAPSULE ORAL at 08:53

## 2025-04-27 RX ADMIN — HEPARIN SODIUM 5000 UNITS: 5000 INJECTION INTRAVENOUS; SUBCUTANEOUS at 05:49

## 2025-04-27 RX ADMIN — VENLAFAXINE HYDROCHLORIDE 75 MG: 75 CAPSULE, EXTENDED RELEASE ORAL at 08:34

## 2025-04-27 RX ADMIN — CEFTRIAXONE SODIUM 2000 MG: 2 INJECTION, POWDER, FOR SOLUTION INTRAMUSCULAR; INTRAVENOUS at 19:31

## 2025-04-27 RX ADMIN — PREGABALIN 200 MG: 100 CAPSULE ORAL at 21:30

## 2025-04-27 RX ADMIN — PANTOPRAZOLE SODIUM 40 MG: 40 TABLET, DELAYED RELEASE ORAL at 05:49

## 2025-04-27 RX ADMIN — Medication 10 ML: at 08:34

## 2025-04-27 RX ADMIN — ATORVASTATIN CALCIUM 20 MG: 20 TABLET, FILM COATED ORAL at 21:30

## 2025-04-27 RX ADMIN — BREXPIPRAZOLE 1 MG: 1 TABLET ORAL at 08:34

## 2025-04-27 RX ADMIN — DONEPEZIL HYDROCHLORIDE 10 MG: 10 TABLET, FILM COATED ORAL at 21:30

## 2025-04-27 RX ADMIN — CYANOCOBALAMIN TAB 500 MCG 500 MCG: 500 TAB at 08:34

## 2025-04-27 RX ADMIN — HEPARIN SODIUM 5000 UNITS: 5000 INJECTION INTRAVENOUS; SUBCUTANEOUS at 21:29

## 2025-04-27 RX ADMIN — Medication 10 ML: at 21:30

## 2025-04-27 RX ADMIN — POTASSIUM PHOSPHATE, MONOBASIC AND POTASSIUM PHOSPHATE, DIBASIC 15 MMOL: 224; 236 INJECTION, SOLUTION, CONCENTRATE INTRAVENOUS at 08:53

## 2025-04-27 RX ADMIN — PREGABALIN 200 MG: 100 CAPSULE ORAL at 08:34

## 2025-04-27 RX ADMIN — POTASSIUM PHOSPHATE, MONOBASIC AND POTASSIUM PHOSPHATE, DIBASIC 15 MMOL: 224; 236 INJECTION, SOLUTION, CONCENTRATE INTRAVENOUS at 15:34

## 2025-04-27 NOTE — PROGRESS NOTES
[facility]   Hospitalist Progress Note  Date: 2025  Patient Name: Anshul Shook Jr.  : 1950  MRN: [id number]  Date of admission: 2025  Room/Bed: [facility]      Subjective   Subjective     Chief Complaint: Fevers, chills, confusion, hypotension    Summary:Anshul Shook Jr. is a 75 y.o. male with history of traumatic injury to the spine resulting in prolonged hypoxia requiring chronic trach due to vocal cord palsy, nonischemic cardiomyopathy status post AICD with pacemaker, neurogenic bladder, hypertension, hyperlipidemia who presents to the emergency room patient with fevers, chills, confusion and generalized weakness.  Patient was also complaining of abdominal pain.  CT abdomen pelvis without contrast showed obstructive uropathy on the right due to 1.1 cm proximal right ureteral calculus.  Urology consulted.  Patient admitted for management of shock, obstructive right ureteral calculus, moderate right hydro utero nephrosis, cystitis, EDY, thrombocytopenia.  Patient is status post stent placement by urology on .  Patient will need outpatient surgery for stone removal.  Neurology was consulted for concern for stroke and they signed off.  Patient's blood culture was positive for Klebsiella.  Repeat blood cultures ordered.  Urine culture growing gram-negative bacilli.  Patient is on ceftriaxone.  Hospital course has been complicated by hallucinations.  Psychiatry consulted.    Interval Followup:   Hallucinations have resolved.  Blood cultures appear cleared.    All systems reviewed and negative except for what is outlined above.      Objective   Objective     Vitals:   Temp:  [97.3 °F (36.3 °C)-98.2 °F (36.8 °C)] 98.1 °F (36.7 °C)  Heart Rate:  [] 92  Resp:  [16-20] 20  BP: (110-140)/(65-95) 110/78    Physical Exam   General: NAD  HENT: Trach in place  Cardiovascular: Normal S1, S2  Pulmonary: Normal work of breathing      Result Review    Result Review:  I have personally reviewed these  results:  [x]  Laboratory      Lab 04/27/25 0514 04/26/25 0316 04/25/25 0420 04/24/25  0810 04/24/25 0436 04/24/25 0129 04/23/25 2228 04/23/25 2034   WBC 11.14* 24.70* 26.35*  --   --  33.85*  --  25.16*   HEMOGLOBIN 10.6* 11.2* 11.0*  --   --  11.4*  --  12.1*   HEMATOCRIT 33.5* 35.1* 33.8*  --   --  36.7*  --  39.1   PLATELETS 95* 90* 91*  --   --  96*  --  111*   NEUTROS ABS  --   --   --   --   --  30.54*  --  23.65*   IMMATURE GRANS (ABS)  --   --   --   --   --  0.35*  --   --    LYMPHS ABS  --   --   --   --   --  1.02  --   --    MONOS ABS  --   --   --   --   --  1.88*  --   --    EOS ABS  --   --   --   --   --  0.00  --  0.25   MCV 81.1 81.3 81.3  --   --  84.2  --  82.8   LACTATE  --   --   --  1.8 2.1* 2.6*   < >  --    PROTIME  --   --   --   --   --   --   --  17.3*   APTT  --   --   --   --   --   --   --  42.4*    < > = values in this interval not displayed.         Lab 04/27/25 0514 04/26/25 0316 04/25/25 0420   SODIUM 139 142 137   POTASSIUM 3.7 4.2 3.7   CHLORIDE 106 110* 103   CO2 23.4 23.1 22.8   ANION GAP 9.6 8.9 11.2   BUN 14 14 17   CREATININE 0.84 0.85 0.93   EGFR 89.8 89.5 84.6   GLUCOSE 88 105* 78   CALCIUM 8.1* 8.3* 8.3*   MAGNESIUM 1.7 1.8 2.1   PHOSPHORUS 2.3* 2.4* 1.7*         Lab 04/25/25 0420 04/24/25 0129 04/23/25 2034   TOTAL PROTEIN 5.8* 5.8* 6.4   ALBUMIN 3.0* 2.7* 3.4*   GLOBULIN 2.8 3.1 3.0   ALT (SGPT) 7 9 9   AST (SGOT) 13 15 17   BILIRUBIN 0.4 0.3 0.4   ALK PHOS 79 63 72         Lab 04/23/25 2034   PROTIME 17.3*   INR 1.35*             Lab 04/23/25 2208 04/23/25 2034   ABO TYPING A A   RH TYPING Positive Positive   ANTIBODY SCREEN  --  Negative         Brief Urine Lab Results  (Last result in the past 365 days)        Color   Clarity   Blood   Leuk Est   Nitrite   Protein   CREAT   Urine HCG        04/23/25 2208 Yellow   Cloudy   Large (3+)   Large (3+)   Negative   100 mg/dL (2+)                 [x]  Microbiology   Microbiology Results (last 10 days)        Procedure Component Value - Date/Time    Blood Culture - Blood, Hand, Right [[id number]]  (Normal) Collected: 04/25/25 0849    Lab Status: Preliminary result Specimen: Blood from Hand, Right Updated: 04/27/25 0900     Blood Culture No growth at 2 days    Blood Culture - Blood, Hand, Left [[id number]]  (Normal) Collected: 04/25/25 0848    Lab Status: Preliminary result Specimen: Blood from Hand, Left Updated: 04/27/25 0900     Blood Culture No growth at 2 days    Urine Culture - Urine, Urethra [[id number]]  (Abnormal)  (Susceptibility) Collected: 04/23/25 2259    Lab Status: Final result Specimen: Urine from Urethra Updated: 04/26/25 1050     Urine Culture >100,000 CFU/mL Klebsiella oxytoca    Narrative:      Colonization of the urinary tract without infection is common. Treatment is discouraged unless the patient is symptomatic, pregnant, or undergoing an invasive urologic procedure.    Susceptibility        Klebsiella oxytoca      SAL      Amoxicillin + Clavulanate Susceptible      Ampicillin Resistant      Ampicillin + Sulbactam Susceptible      Cefazolin (Urine) Susceptible      Cefepime Susceptible      Ceftazidime Susceptible      Ceftriaxone Susceptible      Gentamicin Susceptible      Levofloxacin Susceptible      Nitrofurantoin Susceptible      Piperacillin + Tazobactam Susceptible      Trimethoprim + Sulfamethoxazole Susceptible                           Blood Culture - Blood, Arm, Left [[id number]]  (Abnormal)  (Susceptibility) Collected: 04/23/25 2240    Lab Status: Final result Specimen: Blood from Arm, Left Updated: 04/26/25 0723     Blood Culture Klebsiella oxytoca     Isolated from Anaerobic Bottle     Gram Stain Anaerobic Bottle Gram negative bacilli    Narrative:      Less than seven (7) mL's of blood was collected.  Insufficient quantity may yield false negative results.    Susceptibility        Klebsiella oxytoca      SAL      Amoxicillin + Clavulanate Susceptible      Ampicillin Resistant       Ampicillin + Sulbactam Susceptible      Cefazolin (Non Urine) Resistant      Cefepime Susceptible      Ceftazidime Susceptible      Ceftriaxone Susceptible      Gentamicin Susceptible      Levofloxacin Susceptible      Piperacillin + Tazobactam Susceptible      Trimethoprim + Sulfamethoxazole Susceptible                       Susceptibility Comments       Klebsiella oxytoca    With the exception of urinary-sourced infections, aminoglycosides should not be used as monotherapy.               Blood Culture ID, PCR - Blood, Arm, Left [[id number]]  (Abnormal) Collected: 04/23/25 2240    Lab Status: Final result Specimen: Blood from Arm, Left Updated: 04/24/25 1749     BCID, PCR Klebsiella oxytoca. Identification by BCID2 PCR     BOTTLE TYPE Anaerobic Bottle    Narrative:      No resistance genes detected.    Blood Culture - Blood, Arm, Right [[id number]]  (Normal) Collected: 04/23/25 2228    Lab Status: Preliminary result Specimen: Blood from Arm, Right Updated: 04/26/25 2246     Blood Culture No growth at 3 days          [x]  Radiology  XR Chest 1 View  Result Date: 4/23/2025  Impression: No acute process nor significant change identified Electronically Signed: Phuc Otoole MD  4/23/2025 9:35 PM EDT  Workstation ID: [id number]    CT Abdomen Pelvis Without Contrast  Result Date: 4/23/2025  1.There is obstructive uropathy on the RIGHT due to a 1.1 cm proximal right ureteral calculus. 2.There is circumferential mural thickening of the urinary bladder, greater on the right than the left, which may represent an age-indeterminate infectious/inflammatory cystitis. A malignant process cannot be excluded entirely, however. Consider Urology consultation/follow-up for these findings. 3.There is a small-to-moderate hiatal hernia. There is circumferential mural thickening of the hiatal hernia and possibly the lower thoracic esophagus. Age-indeterminate esophagitis/gastritis may be present. 4.Gallbladder sludge is possible. Gallstones  cannot be excluded but are thought to be less likely. No acute cholecystitis or pancreatitis. 5.The study is motion limited. 6.No other definite acute findings are seen by nonenhanced CT of the abdomen and pelvis. 7.Please see above comments for further detail. Portions of this note were completed with a voice recognition program. Electronically Signed: Lalito Churchill MD  4/23/2025 9:34 PM EDT  Workstation ID: [id number]    CT Head Without Contrast Stroke Protocol  Result Date: 4/23/2025  Impression: 1.No acute intracranial process is identified. 2.Likely previous infarctions. Electronically Signed: Phuc Otoole MD  4/23/2025 8:33 PM EDT  Workstation ID: [id number]    []  EKG/Telemetry   []  Cardiology/Vascular   []  Pathology  []  Old records  []  Other:    Assessment & Plan        Assessment and Plan:    #Sepsis likely secondary to Klebsiella UTI in setting of obstructive right ureteral stone status post stent placement by urology  #Klebsiella bacteremia  Continue ceftriaxone  Urology consulted, appreciate recommendations  Repeat blood culture no growth to date  Sensitivities appreciated    #Status post trach due to vocal cord palsy from chronic intubation   Continue to monitor respiratory status    #Nonischemic cardiomyopathy status post biventricular AICD with pacemaker   Continue to monitor volume status  Continue statin    #Thrombocytopenia  Continue to monitor platelets    #Anxiety  Effexor     #Hypertension  Hold home meds for now    #Hypophosphatemia  Replete    #Hallucinations  Psychiatry consulted, appreciate recommendation  Brexipiprazole     #Dementia  Aricept    #Chronic pain  Lyrica    Discussed with RN.    VTE Prophylaxis:  Pharmacologic & mechanical VTE prophylaxis orders are present.        CODE STATUS:   Code Status (Patient has no pulse and is not breathing): CPR (Attempt to Resuscitate)  Medical Interventions (Patient has pulse or is breathing): Full Support  Level Of Support Discussed With:  Patient      Electronically signed by Mehdi Braden MD, 4/27/2025, 14:53 EDT.

## 2025-04-27 NOTE — THERAPY TREATMENT NOTE
Acute Care - Physical Therapy Treatment Note   [facility]     Patient Name: Anshul Shook Jr.  : 1950  MRN: [id number]  Today's Date: 2025      Visit Dx:     ICD-10-CM ICD-9-CM   1. Ureteral stone  N20.1 592.1   2. Kidney stone  N20.0 592.0   3. Difficulty walking  R26.2 719.7     Patient Active Problem List   Diagnosis    Nonischemic cardiomyopathy    Essential hypertension    Presence of biventricular implantable cardioverter-defibrillator (ICD)    SANTAMARIA (nonalcoholic steatohepatitis)    Ureteral stone    Neurogenic bladder    Left carotid bruit    Sepsis secondary to UTI     Past Medical History:   Diagnosis Date    Arthritis     Back injury     RESULTING IN PARAPLEGIA    Essential hypertension 2021    Irregular heartbeat     SEE'S DR GAY. DENIED  CP/SOB    Kidney stone     Memory loss     Nonischemic cardiomyopathy 2021    FOLLOWS DR. GAY    Osteomyelitis 2024    Paraplegic gait     Presence of biventricular implantable cardioverter-defibrillator (ICD) 2021    [facility] BiV ICD    UTI (urinary tract infection) 2024     Past Surgical History:   Procedure Laterality Date    BACK SURGERY      CARDIAC DEFIBRILLATOR PLACEMENT      [facility]    COLONOSCOPY      COLONOSCOPY N/A 2022    Procedure: COLONOSCOPY WITH POLYPECTOMIES, HOT SNARE, CLIP APPLICATION X2;  Surgeon: Audie West MD;  Location: [facility] ENDOSCOPY;  Service: General;  Laterality: N/A;  COLON POLYPS    CYSTOSCOPY W/ URETERAL STENT PLACEMENT Right 2025    Procedure: CYSTOSCOPY RIGHT, right URETERAL CATHETER/STENT INSERTION.  Placed tube in right ureter;  Surgeon: Rich Ingram MD;  Location: [facility] MAIN OR;  Service: Urology;  Laterality: Right;    CYSTOSCOPY, RETROGRADE PYELOGRAM AND STENT INSERTION Left 2024    Procedure: CYSTOSCOPY RETROGRADE PYELOGRAM AND STENT INSERTION, left;  Surgeon: Sara Montano MD;  Location: [facility] MAIN OR;  Service: Urology;  Laterality: Left;    ORIF  ANKLE FRACTURE Right     PACEMAKER REPLACEMENT Bilateral 02/06/2024    Procedure: PPM generator change bi-v;  Surgeon: Renzo Hobbs MD;  Location: [facility] CATH INVASIVE LOCATION;  Service: Cardiovascular;  Laterality: Bilateral;    URETEROSCOPY LASER LITHOTRIPSY WITH STENT INSERTION Left 6/17/2024    Procedure: CYSTOSCOPY URETEROSCOPY RETROGRADE PYELOGRAM HOLMIUM LASER STENT exchange, left;  Surgeon: Sara Montano MD;  Location: [facility] MAIN OR;  Service: Urology;  Laterality: Left;     PT Assessment (Last 12 Hours)       PT Evaluation and Treatment       Row Name 04/27/25 1046          Physical Therapy Time and Intention    Subjective Information complains of;weakness;fatigue  -DK     Document Type therapy note (daily note)  -DK     Mode of Treatment individual therapy;physical therapy  -DK     Patient Effort good  -DK     Symptoms Noted During/After Treatment fatigue  -DK     Comment Pt is rather [place], impulsive with transfers to long sitting and during exercises. Pt has a trach and a Passe Broussard valve.  -       Row Name 04/27/25 1046          Pain    Pretreatment Pain Rating 0/10 - no pain  -DK     Posttreatment Pain Rating 0/10 - no pain  -DK       Row Name 04/27/25 1046          Cognition    Affect/Mental Status (Cognition) confused  -DK     Orientation Status (Cognition) oriented to;person;situation  -DK     Follows Commands (Cognition) physical/tactile prompts required;repetition of directions required;verbal cues/prompting required  -DK     Cognitive Function (Cognition) attention deficit  -DK     Attention Deficit (Cognition) minimal deficit;arousal/alertness;concentration;focused/sustained attention  -DK     Personal Safety Interventions nonskid shoes/slippers when out of bed;supervised activity;gait belt  -DK       Row Name 04/27/25 1046          Bed Mobility    Bed Mobility --  long sitting  -DK     Assistive Device (Bed Mobility) bed rails  -DK     Comment, (Bed Mobility) Pt was able to long sit in  the bed x 5 reps by pulling on the bed rails, all with stand by assist.  He returned to the bed on alert post treatment.  -       Row Name 04/27/25 1046          Safety Issues/Impairments Affecting Functional Mobility    Safety Issues Affecting Function (Mobility) impulsivity;awareness of need for assistance;ability to follow commands;judgment;safety precaution awareness  -     Impairments Affecting Function (Mobility) balance;cognition;endurance/activity tolerance;strength  hearing  -DK     Cognitive Impairments, Mobility Safety/Performance impulsivity;attention;judgment;safety precaution awareness  -       Row Name 04/27/25 1046          Balance    Balance Assessment sitting static balance  -DK     Static Sitting Balance standby assist  -DK     Position, Sitting Balance long sitting  with bed rails  -     Balance Interventions sitting;static  -       Row Name 04/27/25 1046          Motor Skills    Motor Skills --  therapeutic exercises  -     Therapeutic Exercise hip;knee;ankle  -       Row Name 04/27/25 1046          Hip (Therapeutic Exercise)    Hip (Therapeutic Exercise) AAROM (active assistive range of motion)  -     Hip AAROM (Therapeutic Exercise) bilateral;flexion;extension;aBduction;aDduction;supine;10 repetitions;2 sets  -       Row Name 04/27/25 1046          Knee (Therapeutic Exercise)    Knee (Therapeutic Exercise) AAROM (active assistive range of motion)  -     Knee AAROM (Therapeutic Exercise) bilateral;flexion;extension;supine;10 repetitions;2 sets  -       Row Name 04/27/25 1046          Ankle (Therapeutic Exercise)    Ankle (Therapeutic Exercise) AAROM (active assistive range of motion)  -     Ankle AAROM (Therapeutic Exercise) bilateral;dorsiflexion;plantarflexion;supine;10 repetitions;2 sets  -       Row Name             Wound 04/24/25 Right posterior hand    Wound - Properties Group Placement Date: 04/24/25  -KS Present on Original Admission: Y  -KS Side: Right  -KS  Orientation: posterior  -KS Location: hand  -KS Additional Comments: Warts  -KS    Retired Wound - Properties Group Placement Date: 04/24/25  -KS Present on Original Admission: Y  -KS Side: Right  -KS Orientation: posterior  -KS Location: hand  -KS Additional Comments: Warts  -KS    Retired Wound - Properties Group Placement Date: 04/24/25  -KS Present on Original Admission: Y  -KS Side: Right  -KS Orientation: posterior  -KS Location: hand  -KS Additional Comments: Warts  -KS    Retired Wound - Properties Group Date first assessed: 04/24/25  -KS Present on Original Admission: Y  -KS Side: Right  -KS Location: hand  -KS Additional Comments: Warts  -KS      Row Name             Wound 04/24/25 gluteal Pressure Injury    Wound - Properties Group Placement Date: 04/24/25  -KS Present on Original Admission: Y  -KS Location: gluteal  -KS Primary Wound Type: Pressure Inj  -KS Additional Comments: reddened, blanchable  -KS    Retired Wound - Properties Group Placement Date: 04/24/25  -KS Present on Original Admission: Y  -KS Location: gluteal  -KS Additional Comments: reddened, blanchable  -KS    Retired Wound - Properties Group Placement Date: 04/24/25  -KS Present on Original Admission: Y  -KS Location: gluteal  -KS Additional Comments: reddened, blanchable  -KS    Retired Wound - Properties Group Date first assessed: 04/24/25  -KS Present on Original Admission: Y  -KS Location: gluteal  -KS Additional Comments: reddened, blanchable  -KS      Row Name             Wound 04/24/25 0000 Left anterior great toe Traumatic    Wound - Properties Group Placement Date: 04/24/25  -KS Placement Time: 0000  -KS Present on Original Admission: Y  -KS Side: Left  -KS Orientation: anterior  -KS Location: great toe  -KS Primary Wound Type: Traumatic  -KS    Retired Wound - Properties Group Placement Date: 04/24/25  -KS Placement Time: 0000  -KS Present on Original Admission: Y  -KS Side: Left  -KS Orientation: anterior  -KS Location: great  toe  -KS    Retired Wound - Properties Group Placement Date: 04/24/25  -KS Placement Time: 0000  -KS Present on Original Admission: Y  -KS Side: Left  -KS Orientation: anterior  -KS Location: great toe  -KS    Retired Wound - Properties Group Date first assessed: 04/24/25  -KS Time first assessed: 0000  -KS Present on Original Admission: Y  -KS Side: Left  -KS Location: great toe  -KS      Row Name             Wound 04/24/25 0000 Right anterior great toe Traumatic    Wound - Properties Group Placement Date: 04/24/25  -KS Placement Time: 0000  -KS Present on Original Admission: Y  -KS Side: Right  -KS Orientation: anterior  -KS Location: great toe  -KS Primary Wound Type: Traumatic  -KS    Retired Wound - Properties Group Placement Date: 04/24/25  -KS Placement Time: 0000  -KS Present on Original Admission: Y  -KS Side: Right  -KS Orientation: anterior  -KS Location: great toe  -KS    Retired Wound - Properties Group Placement Date: 04/24/25  -KS Placement Time: 0000  -KS Present on Original Admission: Y  -KS Side: Right  -KS Orientation: anterior  -KS Location: great toe  -KS    Retired Wound - Properties Group Date first assessed: 04/24/25  -KS Time first assessed: 0000  -KS Present on Original Admission: Y  -KS Side: Right  -KS Location: great toe  -KS      Row Name 04/27/25 1046          Plan of Care Review    Plan of Care Reviewed With patient  -DK     Progress no change  -DK       Row Name 04/27/25 1046          Positioning and Restraints    Pre-Treatment Position in bed  -DK     Post Treatment Position bed  -DK     In Bed supine;call light within reach;encouraged to call for assist;exit alarm on;side rails up x3;legs elevated;heels elevated  -DK               User Key  (r) = Recorded By, (t) = Taken By, (c) = Cosigned By      Initials Name Provider Type    Yolanda Balderrama, RN Registered Nurse    Gali Castellanos PTA Physical Therapist Assistant                      PT Recommendation and Plan     Plan of  Care Reviewed With: patient  Progress: no change   Outcome Measures       Row Name 04/27/25 1046 04/26/25 1200          How much help from another person do you currently need...    Turning from your back to your side while in flat bed without using bedrails? 2  -DK 2  -DP     Moving from lying on back to sitting on the side of a flat bed without bedrails? 2  -DK 2  -DP     Moving to and from a bed to a chair (including a wheelchair)? 2  -DK 2  -DP     Standing up from a chair using your arms (e.g., wheelchair, bedside chair)? 2  -DK 2  -DP     Climbing 3-5 steps with a railing? 1  -DK 1  -DP     To walk in hospital room? 1  -DK 1  -DP     AM-PAC 6 Clicks Score (PT) 10  -DK 10  -DP        Functional Assessment    Outcome Measure Options AM-PAC 6 Clicks Basic Mobility (PT)  -DK AM-PAC 6 Clicks Basic Mobility (PT)  -DP               User Key  (r) = Recorded By, (t) = Taken By, (c) = Cosigned By      Initials Name Provider Type    Gali Castellanos PTA Physical Therapist Assistant    Pooja Castillo, PT Physical Therapist                     Time Calculation:    PT Charges       Row Name 04/27/25 1051             Time Calculation    PT Received On 04/27/25  -DK      PT Goal Re-Cert Due Date 05/05/25  -DK         Timed Charges    05876 - PT Therapeutic Exercise Minutes 14  -DK      56003 - PT Therapeutic Activity Minutes 10  -DK         Total Minutes    Timed Charges Total Minutes 24  -DK       Total Minutes 24  -DK                User Key  (r) = Recorded By, (t) = Taken By, (c) = Cosigned By      Initials Name Provider Type    Gali Castellanos PTA Physical Therapist Assistant                  Therapy Charges for Today       Code Description Service Date Service Provider Modifiers Qty    33989936124 HC PT THER PROC EA 15 MIN 4/27/2025 Gali Martin PTA GP 1    12554807169 HC PT THERAPEUTIC ACT EA 15 MIN 4/27/2025 Gali Martin PTA GP 1            PT G-Codes  Outcome Measure Options: AM-PAC 6 Clicks Basic  Mobility (PT)  AM-PAC 6 Clicks Score (PT): 10    Gali Martin, PTA  4/27/2025

## 2025-04-28 ENCOUNTER — READMISSION MANAGEMENT (OUTPATIENT)
Dept: CALL CENTER | Facility: HOSPITAL | Age: 75
End: 2025-04-28
Payer: MEDICARE

## 2025-04-28 VITALS
HEIGHT: 72 IN | DIASTOLIC BLOOD PRESSURE: 67 MMHG | BODY MASS INDEX: 31.74 KG/M2 | HEART RATE: 85 BPM | WEIGHT: 234.35 LBS | SYSTOLIC BLOOD PRESSURE: 129 MMHG | RESPIRATION RATE: 19 BRPM | TEMPERATURE: 97.9 F | OXYGEN SATURATION: 96 %

## 2025-04-28 LAB
ANION GAP SERPL CALCULATED.3IONS-SCNC: 10.8 MMOL/L (ref 5–15)
BACTERIA SPEC AEROBE CULT: NORMAL
BUN SERPL-MCNC: 15 MG/DL (ref 8–23)
BUN/CREAT SERPL: 17.4 (ref 7–25)
CALCIUM SPEC-SCNC: 8.2 MG/DL (ref 8.6–10.5)
CHLORIDE SERPL-SCNC: 105 MMOL/L (ref 98–107)
CO2 SERPL-SCNC: 22.2 MMOL/L (ref 22–29)
CREAT SERPL-MCNC: 0.86 MG/DL (ref 0.76–1.27)
DEPRECATED RDW RBC AUTO: 45.6 FL (ref 37–54)
EGFRCR SERPLBLD CKD-EPI 2021: 89.2 ML/MIN/1.73
ERYTHROCYTE [DISTWIDTH] IN BLOOD BY AUTOMATED COUNT: 15.6 % (ref 12.3–15.4)
GLUCOSE SERPL-MCNC: 93 MG/DL (ref 65–99)
HCT VFR BLD AUTO: 34 % (ref 37.5–51)
HGB BLD-MCNC: 10.8 G/DL (ref 13–17.7)
MCH RBC QN AUTO: 25.7 PG (ref 26.6–33)
MCHC RBC AUTO-ENTMCNC: 31.8 G/DL (ref 31.5–35.7)
MCV RBC AUTO: 81 FL (ref 79–97)
PLATELET # BLD AUTO: 122 10*3/MM3 (ref 140–450)
PMV BLD AUTO: 11.4 FL (ref 6–12)
POTASSIUM SERPL-SCNC: 4.1 MMOL/L (ref 3.5–5.2)
RBC # BLD AUTO: 4.2 10*6/MM3 (ref 4.14–5.8)
SODIUM SERPL-SCNC: 138 MMOL/L (ref 136–145)
WBC NRBC COR # BLD AUTO: 9.92 10*3/MM3 (ref 3.4–10.8)

## 2025-04-28 PROCEDURE — 85027 COMPLETE CBC AUTOMATED: CPT | Performed by: STUDENT IN AN ORGANIZED HEALTH CARE EDUCATION/TRAINING PROGRAM

## 2025-04-28 PROCEDURE — 25010000002 HEPARIN (PORCINE) PER 1000 UNITS: Performed by: STUDENT IN AN ORGANIZED HEALTH CARE EDUCATION/TRAINING PROGRAM

## 2025-04-28 PROCEDURE — 97110 THERAPEUTIC EXERCISES: CPT

## 2025-04-28 PROCEDURE — 99239 HOSP IP/OBS DSCHRG MGMT >30: CPT | Performed by: STUDENT IN AN ORGANIZED HEALTH CARE EDUCATION/TRAINING PROGRAM

## 2025-04-28 PROCEDURE — 97530 THERAPEUTIC ACTIVITIES: CPT

## 2025-04-28 PROCEDURE — 97165 OT EVAL LOW COMPLEX 30 MIN: CPT

## 2025-04-28 PROCEDURE — 80048 BASIC METABOLIC PNL TOTAL CA: CPT | Performed by: STUDENT IN AN ORGANIZED HEALTH CARE EDUCATION/TRAINING PROGRAM

## 2025-04-28 RX ORDER — CEFDINIR 300 MG/1
300 CAPSULE ORAL 2 TIMES DAILY
Qty: 20 CAPSULE | Refills: 0 | Status: ON HOLD | OUTPATIENT
Start: 2025-04-28 | End: 2025-05-10

## 2025-04-28 RX ORDER — VENLAFAXINE HYDROCHLORIDE 75 MG/1
75 CAPSULE, EXTENDED RELEASE ORAL DAILY
Qty: 30 CAPSULE | Refills: 0 | Status: ON HOLD | OUTPATIENT
Start: 2025-04-29 | End: 2025-05-30

## 2025-04-28 RX ADMIN — Medication 10 ML: at 08:54

## 2025-04-28 RX ADMIN — BREXPIPRAZOLE 1 MG: 1 TABLET ORAL at 10:47

## 2025-04-28 RX ADMIN — PANTOPRAZOLE SODIUM 40 MG: 40 TABLET, DELAYED RELEASE ORAL at 05:44

## 2025-04-28 RX ADMIN — VENLAFAXINE HYDROCHLORIDE 75 MG: 75 CAPSULE, EXTENDED RELEASE ORAL at 08:53

## 2025-04-28 RX ADMIN — CYANOCOBALAMIN TAB 500 MCG 500 MCG: 500 TAB at 08:54

## 2025-04-28 RX ADMIN — HEPARIN SODIUM 5000 UNITS: 5000 INJECTION INTRAVENOUS; SUBCUTANEOUS at 05:44

## 2025-04-28 RX ADMIN — PREGABALIN 200 MG: 100 CAPSULE ORAL at 08:53

## 2025-04-28 NOTE — PLAN OF CARE
Goal Outcome Evaluation:  Plan of Care Reviewed With: patient        Progress: improving     Trach care completed as ordered no acute events

## 2025-04-28 NOTE — THERAPY TREATMENT NOTE
Acute Care - Physical Therapy Treatment Note   [facility]     Patient Name: Anshul Shook Jr.  : 1950  MRN: [id number]  Today's Date: 2025      Visit Dx:     ICD-10-CM ICD-9-CM   1. Ureteral stone  N20.1 592.1   2. Kidney stone  N20.0 592.0   3. Difficulty walking  R26.2 719.7     Patient Active Problem List   Diagnosis    Nonischemic cardiomyopathy    Essential hypertension    Presence of biventricular implantable cardioverter-defibrillator (ICD)    SANTAMARIA (nonalcoholic steatohepatitis)    Ureteral stone    Neurogenic bladder    Left carotid bruit    Sepsis secondary to UTI     Past Medical History:   Diagnosis Date    Arthritis     Back injury     RESULTING IN PARAPLEGIA    Essential hypertension 2021    Irregular heartbeat     SEE'S DR GAY. DENIED  CP/SOB    Kidney stone     Memory loss     Nonischemic cardiomyopathy 2021    FOLLOWS DR. GAY    Osteomyelitis 2024    Paraplegic gait     Presence of biventricular implantable cardioverter-defibrillator (ICD) 2021    [facility] BiV ICD    UTI (urinary tract infection) 2024     Past Surgical History:   Procedure Laterality Date    BACK SURGERY      CARDIAC DEFIBRILLATOR PLACEMENT      [facility]    COLONOSCOPY      COLONOSCOPY N/A 2022    Procedure: COLONOSCOPY WITH POLYPECTOMIES, HOT SNARE, CLIP APPLICATION X2;  Surgeon: Audie West MD;  Location: [facility] ENDOSCOPY;  Service: General;  Laterality: N/A;  COLON POLYPS    CYSTOSCOPY W/ URETERAL STENT PLACEMENT Right 2025    Procedure: CYSTOSCOPY RIGHT, right URETERAL CATHETER/STENT INSERTION.  Placed tube in right ureter;  Surgeon: Rich Ingram MD;  Location: [facility] MAIN OR;  Service: Urology;  Laterality: Right;    CYSTOSCOPY, RETROGRADE PYELOGRAM AND STENT INSERTION Left 2024    Procedure: CYSTOSCOPY RETROGRADE PYELOGRAM AND STENT INSERTION, left;  Surgeon: Sara Montano MD;  Location: [facility] MAIN OR;  Service: Urology;  Laterality: Left;    ORIF  ANKLE FRACTURE Right     PACEMAKER REPLACEMENT Bilateral 02/06/2024    Procedure: PPM generator change bi-v;  Surgeon: Renzo Hobbs MD;  Location: [facility] CATH INVASIVE LOCATION;  Service: Cardiovascular;  Laterality: Bilateral;    URETEROSCOPY LASER LITHOTRIPSY WITH STENT INSERTION Left 6/17/2024    Procedure: CYSTOSCOPY URETEROSCOPY RETROGRADE PYELOGRAM HOLMIUM LASER STENT exchange, left;  Surgeon: Sara Montano MD;  Location: [facility] MAIN OR;  Service: Urology;  Laterality: Left;     PT Assessment (Last 12 Hours)       PT Evaluation and Treatment       Row Name 04/28/25 0908          Physical Therapy Time and Intention    Subjective Information complains of;weakness;fatigue  -DK     Document Type therapy note (daily note)  -DK     Mode of Treatment individual therapy;physical therapy  -DK     Patient Effort good  -DK     Symptoms Noted During/After Treatment fatigue  -DK     Comment Pt was able to transfer to sitting EOB for a few minutes this session.  He appears much more awake / alert this session.  Pt reports he requires bilateral shoes / braces to ambulate, but they are not in his room.  -DK       Row Name 04/28/25 0908          Pain    Pretreatment Pain Rating 0/10 - no pain  -DK     Posttreatment Pain Rating 0/10 - no pain  -DK       Row Name 04/28/25 0908          Cognition    Affect/Mental Status (Cognition) confabulatory;WFL  -DK     Orientation Status (Cognition) oriented to;person;situation  -DK     Follows Commands (Cognition) physical/tactile prompts required;repetition of directions required;verbal cues/prompting required  -DK     Cognitive Function (Cognition) attention deficit  -DK     Attention Deficit (Cognition) minimal deficit;arousal/alertness;concentration;focused/sustained attention  -DK     Personal Safety Interventions gait belt;nonskid shoes/slippers when out of bed;supervised activity  -DK       Row Name 04/28/25 0908          Bed Mobility    Bed Mobility supine-sit-supine  long  sitting  -DK     Supine-Sit-Supine [place] (Bed Mobility) standby assist  -DK     Bed Mobility, Safety Issues decreased use of legs for bridging/pushing  -     Assistive Device (Bed Mobility) bed rails  -     Comment, (Bed Mobility) Pt tolerated sitting EOB x 4-5 minutes with SBA.  Unable to stand without bilateral foot / ankle braces-not in room.  -       Row Name 04/28/25 0908          Safety Issues/Impairments Affecting Functional Mobility    Impairments Affecting Function (Mobility) balance;cognition;endurance/activity tolerance;strength;sensation/sensory awareness;motor control  hearing  -       Row Name 04/28/25 0908          Balance    Balance Assessment sitting static balance;sitting dynamic balance  -     Static Sitting Balance standby assist  -     Dynamic Sitting Balance standby assist  -     Position, Sitting Balance unsupported;sitting edge of bed  -     Balance Interventions sitting;static;dynamic  -       Row Name 04/28/25 0908          Motor Skills    Motor Skills --  therapeutic exercises  -     Therapeutic Exercise hip;knee;ankle  -       Row Name 04/28/25 0908          Hip (Therapeutic Exercise)    Hip (Therapeutic Exercise) AAROM (active assistive range of motion)  -     Hip AAROM (Therapeutic Exercise) bilateral;flexion;extension;aBduction;aDduction;supine;10 repetitions;2 sets  -       Row Name 04/28/25 0908          Knee (Therapeutic Exercise)    Knee (Therapeutic Exercise) AAROM (active assistive range of motion)  -     Knee AAROM (Therapeutic Exercise) bilateral;flexion;extension;supine;10 repetitions;2 sets  -       Row Name 04/28/25 0908          Ankle (Therapeutic Exercise)    Ankle (Therapeutic Exercise) AAROM (active assistive range of motion)  -     Ankle AAROM (Therapeutic Exercise) bilateral;dorsiflexion;plantarflexion;supine;10 repetitions;2 sets  -       Row Name             Wound 04/24/25 Right posterior hand    Wound - Properties Group  Placement Date: 04/24/25  -KS Present on Original Admission: Y  -KS Side: Right  -KS Orientation: posterior  -KS Location: hand  -KS Additional Comments: Warts  -KS    Retired Wound - Properties Group Placement Date: 04/24/25  -KS Present on Original Admission: Y  -KS Side: Right  -KS Orientation: posterior  -KS Location: hand  -KS Additional Comments: Warts  -KS    Retired Wound - Properties Group Placement Date: 04/24/25  -KS Present on Original Admission: Y  -KS Side: Right  -KS Orientation: posterior  -KS Location: hand  -KS Additional Comments: Warts  -KS    Retired Wound - Properties Group Date first assessed: 04/24/25  -KS Present on Original Admission: Y  -KS Side: Right  -KS Location: hand  -KS Additional Comments: Warts  -KS      Row Name             Wound 04/24/25 gluteal Pressure Injury    Wound - Properties Group Placement Date: 04/24/25  -KS Present on Original Admission: Y  -KS Location: gluteal  -KS Primary Wound Type: Pressure Inj  -KS Additional Comments: reddened, blanchable  -KS    Retired Wound - Properties Group Placement Date: 04/24/25  -KS Present on Original Admission: Y  -KS Location: gluteal  -KS Additional Comments: reddened, blanchable  -KS    Retired Wound - Properties Group Placement Date: 04/24/25  -KS Present on Original Admission: Y  -KS Location: gluteal  -KS Additional Comments: reddened, blanchable  -KS    Retired Wound - Properties Group Date first assessed: 04/24/25  -KS Present on Original Admission: Y  -KS Location: gluteal  -KS Additional Comments: reddened, blanchable  -KS      Row Name             Wound 04/24/25 0000 Left anterior great toe Traumatic    Wound - Properties Group Placement Date: 04/24/25  -KS Placement Time: 0000  -KS Present on Original Admission: Y  -KS Side: Left  -KS Orientation: anterior  -KS Location: great toe  -KS Primary Wound Type: Traumatic  -KS    Retired Wound - Properties Group Placement Date: 04/24/25  -KS Placement Time: 0000  -KS Present on  Original Admission: Y  -KS Side: Left  -KS Orientation: anterior  -KS Location: great toe  -KS    Retired Wound - Properties Group Placement Date: 04/24/25  -KS Placement Time: 0000  -KS Present on Original Admission: Y  -KS Side: Left  -KS Orientation: anterior  -KS Location: great toe  -KS    Retired Wound - Properties Group Date first assessed: 04/24/25  -KS Time first assessed: 0000  -KS Present on Original Admission: Y  -KS Side: Left  -KS Location: great toe  -KS      Row Name             Wound 04/24/25 0000 Right anterior great toe Traumatic    Wound - Properties Group Placement Date: 04/24/25  -KS Placement Time: 0000  -KS Present on Original Admission: Y  -KS Side: Right  -KS Orientation: anterior  -KS Location: great toe  -KS Primary Wound Type: Traumatic  -KS    Retired Wound - Properties Group Placement Date: 04/24/25  -KS Placement Time: 0000  -KS Present on Original Admission: Y  -KS Side: Right  -KS Orientation: anterior  -KS Location: great toe  -KS    Retired Wound - Properties Group Placement Date: 04/24/25  -KS Placement Time: 0000  -KS Present on Original Admission: Y  -KS Side: Right  -KS Orientation: anterior  -KS Location: great toe  -KS    Retired Wound - Properties Group Date first assessed: 04/24/25  -KS Time first assessed: 0000  -KS Present on Original Admission: Y  -KS Side: Right  -KS Location: great toe  -KS      Row Name 04/28/25 0908          Plan of Care Review    Plan of Care Reviewed With patient  -DK     Progress improving  -DK       Row Name 04/28/25 0908          Positioning and Restraints    Pre-Treatment Position in bed  -DK     Post Treatment Position bed  -DK     In Bed supine;call light within reach;encouraged to call for assist;exit alarm on;side rails up x3;legs elevated;heels elevated  -DK               User Key  (r) = Recorded By, (t) = Taken By, (c) = Cosigned By      Initials Name Provider Type    Yolanda Balderrama, RN Registered Nurse    Gali Castellanos, CHEMO  Physical Therapist Assistant                      PT Recommendation and Plan     Plan of Care Reviewed With: patient  Progress: improving   Outcome Measures       Row Name 04/28/25 0908 04/27/25 1046 04/26/25 1200       How much help from another person do you currently need...    Turning from your back to your side while in flat bed without using bedrails? 3  -DK 2  -DK 2  -DP    Moving from lying on back to sitting on the side of a flat bed without bedrails? 3  -DK 2  -DK 2  -DP    Moving to and from a bed to a chair (including a wheelchair)? 2  -DK 2  -DK 2  -DP    Standing up from a chair using your arms (e.g., wheelchair, bedside chair)? 2  -DK 2  -DK 2  -DP    Climbing 3-5 steps with a railing? 1  -DK 1  -DK 1  -DP    To walk in hospital room? 1  -DK 1  -DK 1  -DP    AM-PAC 6 Clicks Score (PT) 12  -DK 10  -DK 10  -DP       Functional Assessment    Outcome Measure Options AM-PAC 6 Clicks Basic Mobility (PT)  -DK AM-PAC 6 Clicks Basic Mobility (PT)  -DK AM-PAC 6 Clicks Basic Mobility (PT)  -DP              User Key  (r) = Recorded By, (t) = Taken By, (c) = Cosigned By      Initials Name Provider Type    Gali Castellanos PTA Physical Therapist Assistant    Pooja Castillo, PT Physical Therapist                     Time Calculation:    PT Charges       Row Name 04/28/25 0913             Time Calculation    PT Received On 04/28/25  -DK      PT Goal Re-Cert Due Date 05/05/25  -DK         Timed Charges    11772 - PT Therapeutic Exercise Minutes 14  -DK      21057 - PT Therapeutic Activity Minutes 12  -DK         Total Minutes    Timed Charges Total Minutes 26  -DK       Total Minutes 26  -DK                User Key  (r) = Recorded By, (t) = Taken By, (c) = Cosigned By      Initials Name Provider Type    Gali Castellanos PTA Physical Therapist Assistant                  Therapy Charges for Today       Code Description Service Date Service Provider Modifiers Qty    78290457868 HC PT THER PROC EA 15 MIN 4/27/2025  Gali Martin, PTA GP 1    97495624998 HC PT THERAPEUTIC ACT EA 15 MIN 4/27/2025 Gali Martin, PTA GP 1    96034070595 HC PT THER PROC EA 15 MIN 4/28/2025 Gali Martin, PTA GP 1    16663575903 HC PT THERAPEUTIC ACT EA 15 MIN 4/28/2025 Gali Martin, PTA GP 1            PT G-Codes  Outcome Measure Options: AM-PAC 6 Clicks Basic Mobility (PT)  AM-PAC 6 Clicks Score (PT): 12    Gali Martin PTA  4/28/2025

## 2025-04-28 NOTE — THERAPY EVALUATION
Patient Name: Anshul Shook Jr.  : 1950    MRN: [id number]                              Today's Date: 2025       Admit Date: 2025    Visit Dx:     ICD-10-CM ICD-9-CM   1. Ureteral stone  N20.1 592.1   2. Kidney stone  N20.0 592.0   3. Difficulty walking  R26.2 719.7     Patient Active Problem List   Diagnosis    Nonischemic cardiomyopathy    Essential hypertension    Presence of biventricular implantable cardioverter-defibrillator (ICD)    SANTAMARIA (nonalcoholic steatohepatitis)    Ureteral stone    Neurogenic bladder    Left carotid bruit    Sepsis secondary to UTI     Past Medical History:   Diagnosis Date    Arthritis     Back injury     RESULTING IN PARAPLEGIA    Essential hypertension 2021    Irregular heartbeat     SEE'S DR GAY. DENIED  CP/SOB    Kidney stone     Memory loss     Nonischemic cardiomyopathy 2021    FOLLOWS DR. GAY    Osteomyelitis 2024    Paraplegic gait     Presence of biventricular implantable cardioverter-defibrillator (ICD) 2021    [facility] BiV ICD    UTI (urinary tract infection) 2024     Past Surgical History:   Procedure Laterality Date    BACK SURGERY      CARDIAC DEFIBRILLATOR PLACEMENT      [facility]    COLONOSCOPY      COLONOSCOPY N/A 2022    Procedure: COLONOSCOPY WITH POLYPECTOMIES, HOT SNARE, CLIP APPLICATION X2;  Surgeon: Audie West MD;  Location: [facility] ENDOSCOPY;  Service: General;  Laterality: N/A;  COLON POLYPS    CYSTOSCOPY W/ URETERAL STENT PLACEMENT Right 2025    Procedure: CYSTOSCOPY RIGHT, right URETERAL CATHETER/STENT INSERTION.  Placed tube in right ureter;  Surgeon: Rich Ingram MD;  Location: [facility] MAIN OR;  Service: Urology;  Laterality: Right;    CYSTOSCOPY, RETROGRADE PYELOGRAM AND STENT INSERTION Left 2024    Procedure: CYSTOSCOPY RETROGRADE PYELOGRAM AND STENT INSERTION, left;  Surgeon: Sara Montano MD;  Location: [facility] MAIN OR;  Service: Urology;  Laterality: Left;    ORIF ANKLE  FRACTURE Right     PACEMAKER REPLACEMENT Bilateral 02/06/2024    Procedure: PPM generator change bi-v;  Surgeon: Renzo Hobbs MD;  Location: [facility] CATH INVASIVE LOCATION;  Service: Cardiovascular;  Laterality: Bilateral;    URETEROSCOPY LASER LITHOTRIPSY WITH STENT INSERTION Left 6/17/2024    Procedure: CYSTOSCOPY URETEROSCOPY RETROGRADE PYELOGRAM HOLMIUM LASER STENT exchange, left;  Surgeon: Sara Montano MD;  Location: [facility] MAIN OR;  Service: Urology;  Laterality: Left;      General Information       Row Name 04/28/25 0920 04/28/25 0913       OT Time and Intention    Document Type therapy note (daily note)  -PG evaluation  -PG    Mode of Treatment individual therapy;occupational therapy  -PG individual therapy;occupational therapy  -PG      Row Name 04/28/25 0913          General Information    Patient Profile Reviewed yes  Patient lives with his wife, functional paraplegic.  He is able to transfer using upright walker and AFOs, uses a wheelchair at home and 4 rodarte for outside travel.  -PG     Prior Level of Function mod assist:;ADL's  -PG     Existing Precautions/Restrictions fall  -PG     Barriers to Rehab none identified  -PG       Row Name 04/28/25 0913          Occupational Profile    Reason for Services/Referral (Occupational Profile) Patient is a 75-year-old male admitted for sepsis secondary to UTI.  Patient being evaluated by Occupational Therapy due to recent decline in ADL function.  No previous OT services identified  -PG       Row Name 04/28/25 0913          Living Environment    Current Living Arrangements home  -PG       Row Name 04/28/25 0913          Cognition    Orientation Status (Cognition) oriented to;person;situation  -PG       Row Name 04/28/25 0913          Safety Issues/Impairments Affecting Functional Mobility    Impairments Affecting Function (Mobility) balance;endurance/activity tolerance;strength  -PG               User Key  (r) = Recorded By, (t) = Taken By, (c) = Cosigned  By      Initials Name Provider Type    PG Randolph Saleem, OT Occupational Therapist                     Mobility/ADL's       Row Name 04/28/25 0915          Transfers    Comment, (Transfers) Not attemt.  Patient needs AFOs to stand which he said his wife will bring him  -PG       Row Name 04/28/25 0915          Activities of Daily Living    BADL Assessment/Intervention bathing;upper body dressing;lower body dressing;grooming;toileting  -PG       Row Name 04/28/25 0915          Bathing Assessment/Intervention    [place] Level (Bathing) bathing skills;moderate assist (50% patient effort)  -PG       Row Name 04/28/25 0915          Upper Body Dressing Assessment/Training    [place] Level (Upper Body Dressing) upper body dressing skills;minimum assist (75% patient effort)  -PG       Row Name 04/28/25 0915          Lower Body Dressing Assessment/Training    [place] Level (Lower Body Dressing) lower body dressing skills;dependent (less than 25% patient effort)  -PG       Row Name 04/28/25 0915          Grooming Assessment/Training    Justice Level (Grooming) grooming skills;set up  -[facility] Name 04/28/25 0915          Toileting Assessment/Training    [place] Level (Toileting) toileting skills;dependent (less than 25% patient effort)  -PG               User Key  (r) = Recorded By, (t) = Taken By, (c) = Cosigned By      Initials Name Provider Type    PG Randolph Saleem, OT Occupational Therapist                   Obj/Interventions       Row Name 04/28/25 0916          Sensory Assessment (Somatosensory)    Sensory Assessment (Somatosensory) unable/difficult to assess  -PG       Row Name 04/28/25 0916          Vision Assessment/Intervention    Visual Impairment/Limitations unable/difficult to assess  -PG       Row Name 04/28/25 0916          Range of Motion Comprehensive    General Range of Motion no range of motion deficits identified  -       Row Name 04/28/25 0916          Strength Comprehensive  (MMT)    Comment, General Manual Muscle Testing (MMT) Assessment 4-/5  -PG       Row Name 04/28/25 0920          Shoulder (Therapeutic Exercise)    Shoulder (Therapeutic Exercise) strengthening exercise  -PG     Shoulder Strengthening (Therapeutic Exercise) yellow;resistance band;10 repetitions  -PG       Row Name 04/28/25 0920          Elbow/Forearm (Therapeutic Exercise)    Elbow/Forearm (Therapeutic Exercise) strengthening exercise  -PG     Elbow/Forearm Strengthening (Therapeutic Exercise) yellow;10 repetitions;resistance band  -PG       Row Name 04/28/25 0920 04/28/25 0916       Motor Skills    Motor Skills -- coordination;functional endurance  -PG    Coordination -- WFL  -PG    Functional Endurance -- fair minus  -PG    Therapeutic Exercise shoulder;elbow/forearm  -PG --              User Key  (r) = Recorded By, (t) = Taken By, (c) = Cosigned By      Initials Name Provider Type    PG Randolph Saleem, OT Occupational Therapist                   Goals/Plan       Row Name 04/28/25 0918          Transfer Goal 1 (OT)    Activity/Assistive Device (Transfer Goal 1, OT) transfers, all  -PG     [place] Level/Cues Needed (Transfer Goal 1, OT) modified independence  -PG     Time Frame (Transfer Goal 1, OT) long term goal (LTG);10 days  -PG       Row Name 04/28/25 0918          Bathing Goal 1 (OT)    Activity/Device (Bathing Goal 1, OT) bathing skills, all  -PG     [place] Level/Cues Needed (Bathing Goal 1, OT) minimum assist (75% or more patient effort)  -PG     Time Frame (Bathing Goal 1, OT) long term goal (LTG);10 days  -PG       Row Name 04/28/25 0918          Dressing Goal 1 (OT)    Activity/Device (Dressing Goal 1, OT) dressing skills, all  -PG     [place]/Cues Needed (Dressing Goal 1, OT) minimum assist (75% or more patient effort)  -PG     Time Frame (Dressing Goal 1, OT) long term goal (LTG);10 days  -PG       Row Name 04/28/25 0918          Toileting Goal 1 (OT)    Activity/Device (Toileting Goal  1, OT) toileting skills, all  -PG     [place] Level/Cues Needed (Toileting Goal 1, OT) minimum assist (75% or more patient effort)  -PG     Time Frame (Toileting Goal 1, OT) long term goal (LTG);10 days  -PG       Row Name 04/28/25 0918          Grooming Goal 1 (OT)    Activity/Device (Grooming Goal 1, OT) grooming skills, all  -PG     [place] (Grooming Goal 1, OT) standby assist  -PG     Time Frame (Grooming Goal 1, OT) long term goal (LTG);10 days  -PG       Row Name 04/28/25 0918          Strength Goal 1 (OT)    Strength Goal 1 (OT) Patient will improve bilateral upper extremity strength to 4+/5 to support independence with self-care activities  -PG     Time Frame (Strength Goal 1, OT) long term goal (LTG);10 days  -PG       Row Name 04/28/25 0918          Problem Specific Goal 1 (OT)    Problem Specific Goal 1 (OT) Patient will improve activity tolerance to good minus to support independence with self-care activities  -PG     Time Frame (Problem Specific Goal 1, OT) long term goal (LTG);10 days  -PG       Row Name 04/28/25 0918          Therapy Assessment/Plan (OT)    Planned Therapy Interventions (OT) activity tolerance training;BADL retraining;strengthening exercise;transfer/mobility retraining;patient/caregiver education/training;occupation/activity based interventions  -PG               User Key  (r) = Recorded By, (t) = Taken By, (c) = Cosigned By      Initials Name Provider Type    PG Randolph Saleem, LATONYA Occupational Therapist                   Clinical Impression       Row Name 04/28/25 0917          Pain Assessment    Pretreatment Pain Rating 2/10  -PG     Posttreatment Pain Rating 2/10  -PG     Pain Location back  -PG       Row Name 04/28/25 0917          Plan of Care Review    Plan of Care Reviewed With patient  -PG     Progress no change  -PG     Outcome Evaluation Patient presents with limitations affecting strength, activity tolerance, and balance impacting patient's ability to return home  safely and independently.  The skills of a therapist will be required to safely and effectively implement the following treatment plan to restore maximal level of function  -PG       Row Name 04/28/25 0917          Therapy Assessment/Plan (OT)    Patient/Family Therapy Goal Statement (OT) Get stronger and return home independently  -PG     Rehab Potential (OT) good  -PG     Criteria for Skilled Therapeutic Interventions Met (OT) yes;meets criteria;skilled treatment is necessary  -PG     Therapy Frequency (OT) 5 times/wk  -PG       Row Name 04/28/25 0917          Therapy Plan Review/Discharge Plan (OT)    Anticipated Discharge Disposition (OT) home with home health;home with assist  -PG               User Key  (r) = Recorded By, (t) = Taken By, (c) = Cosigned By      Initials Name Provider Type    PG Randolph Saleem, OT Occupational Therapist                   Outcome Measures       Row Name 04/28/25 0919          How much help from another is currently needed...    Putting on and taking off regular lower body clothing? 1  -PG     Bathing (including washing, rinsing, and drying) 2  -PG     Toileting (which includes using toilet bed pan or urinal) 1  -PG     Putting on and taking off regular upper body clothing 3  -PG     Taking care of personal grooming (such as brushing teeth) 3  -PG     Eating meals 4  -PG     AM-PAC 6 Clicks Score (OT) 14  -PG       Row Name 04/28/25 0908          How much help from another person do you currently need...    Turning from your back to your side while in flat bed without using bedrails? 3  -DK     Moving from lying on back to sitting on the side of a flat bed without bedrails? 3  -DK     Moving to and from a bed to a chair (including a wheelchair)? 2  -DK     Standing up from a chair using your arms (e.g., wheelchair, bedside chair)? 2  -DK     Climbing 3-5 steps with a railing? 1  -DK     To walk in hospital room? 1  -DK     AM-PAC 6 Clicks Score (PT) 12  -DK     Highest Level of  Mobility Goal 4 --> Transfer to chair/commode  -       Row Name 04/28/25 0919 04/28/25 0908       Functional Assessment    Outcome Measure Options AM-PAC 6 Clicks Daily Activity (OT);Optimal Instrument  -PG AM-PAC 6 Clicks Basic Mobility (PT)  -      Row Name 04/28/25 0919          Optimal Instrument    Optimal Instrument Optimal - 3  -PG     Bending/Stooping 5  -PG     Standing 5  -PG     Reaching 2  -PG     From the list, choose the 3 activities you would most like to be able to do without any difficulty Bending/stooping;Standing;Reaching  -PG     Total Score Optimal - 3 12  -PG               User Key  (r) = Recorded By, (t) = Taken By, (c) = Cosigned By      Initials Name Provider Type    Gali Castellanos PTA Physical Therapist Assistant    PG Randolph Saleem OT Occupational Therapist                    Occupational Therapy Education       Title: PT OT SLP Therapies (Done)       Topic: Occupational Therapy (Done)       Point: ADL training (Done)       Learning Progress Summary            Patient Acceptance, E,D, DU by PG at 4/28/2025 0920                      Point: Home exercise program (Done)       Learning Progress Summary            Patient Acceptance, E,D, DU by PG at 4/28/2025 0920                      Point: Precautions (Done)       Learning Progress Summary            Patient Acceptance, E,D, DU by PG at 4/28/2025 0920                      Point: Body mechanics (Done)       Learning Progress Summary            Patient Acceptance, E,D, DU by PG at 4/28/2025 0920                                      User Key       Initials Effective Dates Name Provider Type Discipline    PG 06/16/21 -  Randolph Saleem OT Occupational Therapist OT                  OT Recommendation and Plan  Planned Therapy Interventions (OT): activity tolerance training, BADL retraining, strengthening exercise, transfer/mobility retraining, patient/caregiver education/training, occupation/activity based interventions  Therapy Frequency  (OT): 5 times/wk  Plan of Care Review  Plan of Care Reviewed With: patient  Progress: no change  Outcome Evaluation: Patient presents with limitations affecting strength, activity tolerance, and balance impacting patient's ability to return home safely and independently.  The skills of a therapist will be required to safely and effectively implement the following treatment plan to restore maximal level of function     Time Calculation:   Evaluation Complexity (OT)  Review Occupational Profile/Medical/Therapy History Complexity: brief/low complexity  Assessment, Occupational Performance/Identification of Deficit Complexity: 3-5 performance deficits  Clinical Decision Making Complexity (OT): problem focused assessment/low complexity  Overall Complexity of Evaluation (OT): low complexity     Time Calculation- OT       Row Name 04/28/25 0922             Time Calculation- OT    OT Received On 04/28/25  -PG      OT Goal Re-Cert Due Date 05/07/25  -PG         Timed Charges    52411 - OT Therapeutic Exercise Minutes 10  -PG         Untimed Charges    OT Eval/Re-eval Minutes 30  -PG         Total Minutes    Timed Charges Total Minutes 10  -PG      Untimed Charges Total Minutes 30  -PG       Total Minutes 40  -PG                User Key  (r) = Recorded By, (t) = Taken By, (c) = Cosigned By      Initials Name Provider Type    PG Randolph Saleem OT Occupational Therapist                  Therapy Charges for Today       Code Description Service Date Service Provider Modifiers Qty    57081025985 HC OT THER PROC EA 15 MIN 4/28/2025 Randolph Saleem OT GO 1    65923574793 HC OT EVAL LOW COMPLEXITY 2 4/28/2025 Randolph Saleem OT GO 1                 Randolph Saleem OT  4/28/2025

## 2025-04-28 NOTE — DISCHARGE SUMMARY
[facility]         HOSPITALIST  DISCHARGE SUMMARY    Patient Name: Anshul Shook Jr.  : 1950  MRN: [id number]    Date of Admission: 2025  Date of Discharge:  25  Primary Care Physician: Won Mcneill MD    Consults       Date and Time Order Name Status Description    2025  9:18 AM Inpatient Psychiatrist Consult Completed     2025 10:08 PM Inpatient Hospitalist Consult      2025  9:59 PM Inpatient Urology Consult              Active and Resolved Hospital Problems:  Active Hospital Problems    Diagnosis POA    **Ureteral stone [N20.1] Unknown    Sepsis secondary to UTI [A41.9, N39.0] Yes      Resolved Hospital Problems   No resolved problems to display.       Hospital Course     Hospital Course:  Anshul Shook Jr. is a 75 y.o. male with history of traumatic injury to the spine resulting in prolonged hypoxia requiring chronic trach due to vocal cord palsy, nonischemic cardiomyopathy status post AICD with pacemaker, neurogenic bladder, hypertension, hyperlipidemia who presents to the emergency room patient with fevers, chills, confusion and generalized weakness.  Patient was also complaining of abdominal pain.  CT abdomen pelvis without contrast showed obstructive uropathy on the right due to 1.1 cm proximal right ureteral calculus.  Urology consulted.  Patient admitted for management of shock, obstructive right ureteral calculus, moderate right hydro utero nephrosis, cystitis, EDY, thrombocytopenia.  Patient is status post stent placement by urology on .  Patient will need outpatient surgery for stone removal.  Neurology was consulted for concern for stroke and they signed off.  Patient's blood culture was positive for Klebsiella.  Repeat blood cultures ordered.  Repeat blood cultures with no growth to date.  Urine culture growing Klebsiella as well.  Patient was on ceftriaxone while inpatient.  Hospital course has been complicated by hallucinations.   Psychiatry consulted.  Hallucinations resolved.  Patient will stay on cefdinir as outpatient until he is able to follow-up with his PCP and urology.  Patient is medically stable for discharge home       DISCHARGE Follow Up Recommendations for labs and diagnostics:   - Follow-up with PCP  - Follow-up with urology for stone and stent removal  - Follow-up with psychiatry for hallucination      Day of Discharge     Vital Signs:  Temp:  [97.7 °F (36.5 °C)-98.2 °F (36.8 °C)] 97.9 °F (36.6 °C)  Heart Rate:  [85-91] 85  Resp:  [19-20] 19  BP: (110-139)/(56-79) 129/67  Physical Exam:         Discharge Details        Discharge Medications        New Medications        Instructions Start Date   Brexpiprazole 1 MG tablet   1 mg, Oral, Daily   Start Date: April 29, 2025     cefdinir 300 MG capsule  Commonly known as: OMNICEF   300 mg, Oral, 2 Times Daily             Changes to Medications        Instructions Start Date   venlafaxine XR 75 MG 24 hr capsule  Commonly known as: EFFEXOR-XR  What changed:   medication strength  how much to take   75 mg, Oral, Daily   Start Date: April 29, 2025            Continue These Medications        Instructions Start Date   Animal Shapes/Iron 18 MG chewable tablet   1 tablet, Daily      atorvastatin 20 MG tablet  Commonly known as: LIPITOR   20 mg, Oral, Nightly      carvedilol 3.125 MG tablet  Commonly known as: COREG   3.125 mg, Oral, 2 Times Daily With Meals      cholecalciferol 25 MCG (1000 UT) tablet  Commonly known as: VITAMIN D3   1,000 Units, Daily      donepezil 10 MG tablet  Commonly known as: ARICEPT   10 mg, Nightly      Hyoscyamine Sulfate ER 0.375 MG tablet controlled-release   2 Times Daily      multivitamin with minerals tablet tablet   1 tablet, Daily      pregabalin 200 MG capsule  Commonly known as: LYRICA   200 mg, 2 Times Daily      vitamin B-12 500 MCG tablet  Commonly known as: CYANOCOBALAMIN   500 mcg, Daily             Stop These Medications      ciprofloxacin 500 MG  tablet  Commonly known as: CIPRO              Allergies   Allergen Reactions    Amoxicillin Hives    Ct Contrast Hives    Iodinated Contrast Media Other (See Comments)     Unknown, marked as high severity from,another facility, but reaction was unknown.    Penicillins Rash     Has tolerated Ampicillin/Sulbactam and Amoxicillin/clavulanate [facility]    Zosyn [Piperacillin-Tazobactam In Dex] Hives     Has tolerated Ampicillin/Sulbactam and Amoxicillin/clavulanate 5/2024 -[facility]       Discharge Disposition:  Home or Self Care    Diet:  Hospital:  Diet Order   Procedures    Diet: Cardiac; Healthy Heart (2-3 Na+); Safe Tray; Fluid Consistency: Thin (IDDSI 0)       Discharge Activity:   Activity Instructions       Activity as Tolerated              CODE STATUS:  Code Status and Medical Interventions: CPR (Attempt to Resuscitate); Full Support   Ordered at: 04/23/25 2317     Code Status (Patient has no pulse and is not breathing):    CPR (Attempt to Resuscitate)     Medical Interventions (Patient has pulse or is breathing):    Full Support     Level Of Support Discussed With:    Patient         Future Appointments   Date Time Provider Department Center   7/30/2025  2:00 PM Renzo Hobbs MD [facility] [place] [facility]       Additional Instructions for the Follow-ups that You Need to Schedule       Discharge Follow-up with PCP   As directed       Currently Documented PCP:    Won Mcneill MD    PCP Phone Number:    [phone]     Follow Up Details: 1 week                Pertinent  and/or Most Recent Results     PROCEDURES:   Stent placement by urology on 4/23    LAB RESULTS:      Lab 04/28/25  0455 04/27/25  0514 04/26/25  0316 04/25/25  0420 04/24/25  0810 04/24/25  0436 04/24/25  0129 04/23/25  2228 04/23/25  2034   WBC 9.92 11.14* 24.70* 26.35*  --   --  33.85*  --  25.16*   HEMOGLOBIN 10.8* 10.6* 11.2* 11.0*  --   --  11.4*  --  12.1*   HEMATOCRIT 34.0* 33.5* 35.1* 33.8*  --   --  36.7*   --  39.1   PLATELETS 122* 95* 90* 91*  --   --  96*  --  111*   NEUTROS ABS  --   --   --   --   --   --  30.54*  --  23.65*   IMMATURE GRANS (ABS)  --   --   --   --   --   --  0.35*  --   --    LYMPHS ABS  --   --   --   --   --   --  1.02  --   --    MONOS ABS  --   --   --   --   --   --  1.88*  --   --    EOS ABS  --   --   --   --   --   --  0.00  --  0.25   MCV 81.0 81.1 81.3 81.3  --   --  84.2  --  82.8   LACTATE  --   --   --   --  1.8 2.1* 2.6* 3.3*  --    PROTIME  --   --   --   --   --   --   --   --  17.3*   APTT  --   --   --   --   --   --   --   --  42.4*         Lab 04/28/25  0455 04/27/25  0514 04/26/25  0316 04/25/25  0420 04/24/25  0129   SODIUM 138 139 142 137 132*   POTASSIUM 4.1 3.7 4.2 3.7 4.1   CHLORIDE 105 106 110* 103 103   CO2 22.2 23.4 23.1 22.8 18.0*   ANION GAP 10.8 9.6 8.9 11.2 11.0   BUN 15 14 14 17 18   CREATININE 0.86 0.84 0.85 0.93 1.46*   EGFR 89.2 89.8 89.5 84.6 49.2*   GLUCOSE 93 88 105* 78 121*   CALCIUM 8.2* 8.1* 8.3* 8.3* 8.0*   MAGNESIUM  --  1.7 1.8 2.1 1.3*   PHOSPHORUS  --  2.3* 2.4* 1.7* 2.5         Lab 04/25/25  0420 04/24/25  0129 04/23/25  2034   TOTAL PROTEIN 5.8* 5.8* 6.4   ALBUMIN 3.0* 2.7* 3.4*   GLOBULIN 2.8 3.1 3.0   ALT (SGPT) 7 9 9   AST (SGOT) 13 15 17   BILIRUBIN 0.4 0.3 0.4   ALK PHOS 79 63 72         Lab 04/23/25 2034   PROTIME 17.3*   INR 1.35*             Lab 04/23/25 2208 04/23/25 2034   ABO TYPING A A   RH TYPING Positive Positive   ANTIBODY SCREEN  --  Negative         Brief Urine Lab Results  (Last result in the past 365 days)        Color   Clarity   Blood   Leuk Est   Nitrite   Protein   CREAT   Urine HCG        04/23/25 2208 Yellow   Cloudy   Large (3+)   Large (3+)   Negative   100 mg/dL (2+)                 Microbiology Results (last 10 days)       Procedure Component Value - Date/Time    Blood Culture - Blood, Hand, Right [[id number]]  (Normal) Collected: 04/25/25 0849    Lab Status: Preliminary result Specimen: Blood from Hand, Right  Updated: 04/28/25 0901     Blood Culture No growth at 3 days    Blood Culture - Blood, Hand, Left [[id number]]  (Normal) Collected: 04/25/25 0848    Lab Status: Preliminary result Specimen: Blood from Hand, Left Updated: 04/28/25 0901     Blood Culture No growth at 3 days    Urine Culture - Urine, Urethra [[id number]]  (Abnormal)  (Susceptibility) Collected: 04/23/25 2259    Lab Status: Final result Specimen: Urine from Urethra Updated: 04/26/25 1050     Urine Culture >100,000 CFU/mL Klebsiella oxytoca    Narrative:      Colonization of the urinary tract without infection is common. Treatment is discouraged unless the patient is symptomatic, pregnant, or undergoing an invasive urologic procedure.    Susceptibility        Klebsiella oxytoca      SAL      Amoxicillin + Clavulanate Susceptible      Ampicillin Resistant      Ampicillin + Sulbactam Susceptible      Cefazolin (Urine) Susceptible      Cefepime Susceptible      Ceftazidime Susceptible      Ceftriaxone Susceptible      Gentamicin Susceptible      Levofloxacin Susceptible      Nitrofurantoin Susceptible      Piperacillin + Tazobactam Susceptible      Trimethoprim + Sulfamethoxazole Susceptible                           Blood Culture - Blood, Arm, Left [[id number]]  (Abnormal)  (Susceptibility) Collected: 04/23/25 2240    Lab Status: Final result Specimen: Blood from Arm, Left Updated: 04/26/25 0723     Blood Culture Klebsiella oxytoca     Isolated from Anaerobic Bottle     Gram Stain Anaerobic Bottle Gram negative bacilli    Narrative:      Less than seven (7) mL's of blood was collected.  Insufficient quantity may yield false negative results.    Susceptibility        Klebsiella oxytoca      SAL      Amoxicillin + Clavulanate Susceptible      Ampicillin Resistant      Ampicillin + Sulbactam Susceptible      Cefazolin (Non Urine) Resistant      Cefepime Susceptible      Ceftazidime Susceptible      Ceftriaxone Susceptible      Gentamicin Susceptible       Levofloxacin Susceptible      Piperacillin + Tazobactam Susceptible      Trimethoprim + Sulfamethoxazole Susceptible                       Susceptibility Comments       Klebsiella oxytoca    With the exception of urinary-sourced infections, aminoglycosides should not be used as monotherapy.               Blood Culture ID, PCR - Blood, Arm, Left [[id number]]  (Abnormal) Collected: 04/23/25 2240    Lab Status: Final result Specimen: Blood from Arm, Left Updated: 04/24/25 1749     BCID, PCR Klebsiella oxytoca. Identification by BCID2 PCR     BOTTLE TYPE Anaerobic Bottle    Narrative:      No resistance genes detected.    Blood Culture - Blood, Arm, Right [[id number]]  (Normal) Collected: 04/23/25 2228    Lab Status: Preliminary result Specimen: Blood from Arm, Right Updated: 04/27/25 2246     Blood Culture No growth at 4 days            XR Chest 1 View  Result Date: 4/23/2025  Impression: No acute process nor significant change identified Electronically Signed: Phuc Otoole MD  4/23/2025 9:35 PM EDT  Workstation ID: [id number]    CT Abdomen Pelvis Without Contrast  Result Date: 4/23/2025  1.There is obstructive uropathy on the RIGHT due to a 1.1 cm proximal right ureteral calculus. 2.There is circumferential mural thickening of the urinary bladder, greater on the right than the left, which may represent an age-indeterminate infectious/inflammatory cystitis. A malignant process cannot be excluded entirely, however. Consider Urology consultation/follow-up for these findings. 3.There is a small-to-moderate hiatal hernia. There is circumferential mural thickening of the hiatal hernia and possibly the lower thoracic esophagus. Age-indeterminate esophagitis/gastritis may be present. 4.Gallbladder sludge is possible. Gallstones cannot be excluded but are thought to be less likely. No acute cholecystitis or pancreatitis. 5.The study is motion limited. 6.No other definite acute findings are seen by nonenhanced CT of the abdomen  and pelvis. 7.Please see above comments for further detail. Portions of this note were completed with a voice recognition program. Electronically Signed: Lalito Churchill MD  4/23/2025 9:34 PM EDT  Workstation ID: [id number]    CT Head Without Contrast Stroke Protocol  Result Date: 4/23/2025  Impression: 1.No acute intracranial process is identified. 2.Likely previous infarctions. Electronically Signed: Phuc Otoole MD  4/23/2025 8:33 PM EDT  Workstation ID: [id number]               Results for orders placed during the hospital encounter of 01/17/24    Adult Transthoracic Echo Complete W/ Cont if Necessary Per Protocol    Interpretation Summary    The study is technically difficult for diagnosis.    Left ventricular systolic function is normal. Calculated left ventricular EF = 60.6%    Left ventricular diastolic function was indeterminate.    There is mild calcification of the aortic valve.      Labs Pending at Discharge:  Pending Labs       Order Current Status    Blood Culture - Blood, Arm, Right Preliminary result    Blood Culture - Blood, Hand, Left Preliminary result    Blood Culture - Blood, Hand, Right Preliminary result              Time spent on Discharge including face to face service:  35 minutes    Electronically signed by Mehdi Braden MD, 04/28/25, 12:30 PM EDT.

## 2025-04-29 NOTE — OUTREACH NOTE
Prep Survey      Flowsheet Row Responses   [facility] facility patient discharged from? [facility]   Is LACE score < 7 ? No   Eligibility Readm Mgmt   Discharge diagnosis Ureteral stone,  Sepsis secondary to UTI   Does the patient have one of the following disease processes/diagnoses(primary or secondary)? Sepsis   Does the patient have Home health ordered? No   Is there a DME ordered? No   Prep survey completed? Yes            Althea ROMERO - Registered Nurse

## 2025-04-30 LAB
BACTERIA SPEC AEROBE CULT: NORMAL
BACTERIA SPEC AEROBE CULT: NORMAL

## 2025-05-02 ENCOUNTER — APPOINTMENT (OUTPATIENT)
Dept: CT IMAGING | Facility: HOSPITAL | Age: 75
End: 2025-05-02
Payer: MEDICARE

## 2025-05-02 ENCOUNTER — PREP FOR SURGERY (OUTPATIENT)
Dept: OTHER | Facility: HOSPITAL | Age: 75
End: 2025-05-02
Payer: MEDICARE

## 2025-05-02 ENCOUNTER — READMISSION MANAGEMENT (OUTPATIENT)
Dept: CALL CENTER | Facility: HOSPITAL | Age: 75
End: 2025-05-02
Payer: MEDICARE

## 2025-05-02 ENCOUNTER — HOSPITAL ENCOUNTER (INPATIENT)
Facility: HOSPITAL | Age: 75
LOS: 5 days | Discharge: HOME HEALTH | End: 2025-05-07
Attending: EMERGENCY MEDICINE | Admitting: STUDENT IN AN ORGANIZED HEALTH CARE EDUCATION/TRAINING PROGRAM
Payer: MEDICARE

## 2025-05-02 DIAGNOSIS — R50.9 FEVER, UNSPECIFIED FEVER CAUSE: ICD-10-CM

## 2025-05-02 DIAGNOSIS — Z78.9 DECREASED ACTIVITIES OF DAILY LIVING (ADL): ICD-10-CM

## 2025-05-02 DIAGNOSIS — N20.1 URETERAL STONE: Primary | ICD-10-CM

## 2025-05-02 DIAGNOSIS — R26.2 DIFFICULTY WALKING: ICD-10-CM

## 2025-05-02 DIAGNOSIS — Z96.0 URETERAL STENT PRESENT: Primary | ICD-10-CM

## 2025-05-02 DIAGNOSIS — R13.10 DYSPHAGIA, UNSPECIFIED TYPE: ICD-10-CM

## 2025-05-02 DIAGNOSIS — N20.0 KIDNEY STONE: ICD-10-CM

## 2025-05-02 LAB
ALBUMIN SERPL-MCNC: 3.8 G/DL (ref 3.5–5.2)
ALBUMIN/GLOB SERPL: 0.9 G/DL
ALP SERPL-CCNC: 106 U/L (ref 39–117)
ALT SERPL W P-5'-P-CCNC: 17 U/L (ref 1–41)
ANION GAP SERPL CALCULATED.3IONS-SCNC: 13.1 MMOL/L (ref 5–15)
AST SERPL-CCNC: 17 U/L (ref 1–40)
BACTERIA UR QL AUTO: ABNORMAL /HPF
BASOPHILS # BLD AUTO: 0.19 10*3/MM3 (ref 0–0.2)
BASOPHILS NFR BLD AUTO: 0.7 % (ref 0–1.5)
BILIRUB SERPL-MCNC: 0.6 MG/DL (ref 0–1.2)
BILIRUB UR QL STRIP: NEGATIVE
BUN SERPL-MCNC: 14 MG/DL (ref 8–23)
BUN/CREAT SERPL: 13.5 (ref 7–25)
CALCIUM SPEC-SCNC: 9.1 MG/DL (ref 8.6–10.5)
CHLORIDE SERPL-SCNC: 95 MMOL/L (ref 98–107)
CLARITY UR: ABNORMAL
CO2 SERPL-SCNC: 23.9 MMOL/L (ref 22–29)
COLOR UR: YELLOW
CREAT SERPL-MCNC: 1.04 MG/DL (ref 0.76–1.27)
D-LACTATE SERPL-SCNC: 1.1 MMOL/L (ref 0.5–2)
DEPRECATED RDW RBC AUTO: 47.8 FL (ref 37–54)
EGFRCR SERPLBLD CKD-EPI 2021: 74 ML/MIN/1.73
EOSINOPHIL # BLD AUTO: 0.22 10*3/MM3 (ref 0–0.4)
EOSINOPHIL NFR BLD AUTO: 0.8 % (ref 0.3–6.2)
ERYTHROCYTE [DISTWIDTH] IN BLOOD BY AUTOMATED COUNT: 16.5 % (ref 12.3–15.4)
GLOBULIN UR ELPH-MCNC: 4.1 GM/DL
GLUCOSE SERPL-MCNC: 104 MG/DL (ref 65–99)
GLUCOSE UR STRIP-MCNC: NEGATIVE MG/DL
HCT VFR BLD AUTO: 39.5 % (ref 37.5–51)
HGB BLD-MCNC: 12.7 G/DL (ref 13–17.7)
HGB UR QL STRIP.AUTO: ABNORMAL
HOLD SPECIMEN: NORMAL
HOLD SPECIMEN: NORMAL
HYALINE CASTS UR QL AUTO: ABNORMAL /LPF
IMM GRANULOCYTES # BLD AUTO: 0.36 10*3/MM3 (ref 0–0.05)
IMM GRANULOCYTES NFR BLD AUTO: 1.3 % (ref 0–0.5)
KETONES UR QL STRIP: ABNORMAL
LEUKOCYTE ESTERASE UR QL STRIP.AUTO: ABNORMAL
LIPASE SERPL-CCNC: 38 U/L (ref 13–60)
LYMPHOCYTES # BLD AUTO: 2.01 10*3/MM3 (ref 0.7–3.1)
LYMPHOCYTES NFR BLD AUTO: 7.5 % (ref 19.6–45.3)
MCH RBC QN AUTO: 26.5 PG (ref 26.6–33)
MCHC RBC AUTO-ENTMCNC: 32.2 G/DL (ref 31.5–35.7)
MCV RBC AUTO: 82.5 FL (ref 79–97)
MONOCYTES # BLD AUTO: 1.91 10*3/MM3 (ref 0.1–0.9)
MONOCYTES NFR BLD AUTO: 7.1 % (ref 5–12)
NEUTROPHILS NFR BLD AUTO: 22.24 10*3/MM3 (ref 1.7–7)
NEUTROPHILS NFR BLD AUTO: 82.6 % (ref 42.7–76)
NITRITE UR QL STRIP: NEGATIVE
NRBC BLD AUTO-RTO: 0 /100 WBC (ref 0–0.2)
PH UR STRIP.AUTO: 6.5 [PH] (ref 5–8)
PLATELET # BLD AUTO: 256 10*3/MM3 (ref 140–450)
PMV BLD AUTO: 11.3 FL (ref 6–12)
POTASSIUM SERPL-SCNC: 3.9 MMOL/L (ref 3.5–5.2)
PROT SERPL-MCNC: 7.9 G/DL (ref 6–8.5)
PROT UR QL STRIP: ABNORMAL
RBC # BLD AUTO: 4.79 10*6/MM3 (ref 4.14–5.8)
RBC # UR STRIP: ABNORMAL /HPF
REF LAB TEST METHOD: ABNORMAL
SODIUM SERPL-SCNC: 132 MMOL/L (ref 136–145)
SP GR UR STRIP: 1.01 (ref 1–1.03)
SQUAMOUS #/AREA URNS HPF: ABNORMAL /HPF
UROBILINOGEN UR QL STRIP: ABNORMAL
WBC # UR STRIP: ABNORMAL /HPF
WBC NRBC COR # BLD AUTO: 26.93 10*3/MM3 (ref 3.4–10.8)
WHOLE BLOOD HOLD COAG: NORMAL
WHOLE BLOOD HOLD SPECIMEN: NORMAL

## 2025-05-02 PROCEDURE — 87040 BLOOD CULTURE FOR BACTERIA: CPT | Performed by: EMERGENCY MEDICINE

## 2025-05-02 PROCEDURE — 80053 COMPREHEN METABOLIC PANEL: CPT | Performed by: EMERGENCY MEDICINE

## 2025-05-02 PROCEDURE — 87186 SC STD MICRODIL/AGAR DIL: CPT | Performed by: EMERGENCY MEDICINE

## 2025-05-02 PROCEDURE — 81001 URINALYSIS AUTO W/SCOPE: CPT | Performed by: EMERGENCY MEDICINE

## 2025-05-02 PROCEDURE — 87077 CULTURE AEROBIC IDENTIFY: CPT | Performed by: STUDENT IN AN ORGANIZED HEALTH CARE EDUCATION/TRAINING PROGRAM

## 2025-05-02 PROCEDURE — 99291 CRITICAL CARE FIRST HOUR: CPT

## 2025-05-02 PROCEDURE — 87086 URINE CULTURE/COLONY COUNT: CPT | Performed by: STUDENT IN AN ORGANIZED HEALTH CARE EDUCATION/TRAINING PROGRAM

## 2025-05-02 PROCEDURE — 87154 CUL TYP ID BLD PTHGN 6+ TRGT: CPT | Performed by: EMERGENCY MEDICINE

## 2025-05-02 PROCEDURE — 87186 SC STD MICRODIL/AGAR DIL: CPT | Performed by: STUDENT IN AN ORGANIZED HEALTH CARE EDUCATION/TRAINING PROGRAM

## 2025-05-02 PROCEDURE — 83605 ASSAY OF LACTIC ACID: CPT | Performed by: EMERGENCY MEDICINE

## 2025-05-02 PROCEDURE — 85025 COMPLETE CBC W/AUTO DIFF WBC: CPT | Performed by: EMERGENCY MEDICINE

## 2025-05-02 PROCEDURE — 51702 INSERT TEMP BLADDER CATH: CPT

## 2025-05-02 PROCEDURE — 25010000002 CEFEPIME PER 500 MG: Performed by: EMERGENCY MEDICINE

## 2025-05-02 PROCEDURE — 25810000003 SODIUM CHLORIDE 0.9 % SOLUTION: Performed by: EMERGENCY MEDICINE

## 2025-05-02 PROCEDURE — 83690 ASSAY OF LIPASE: CPT | Performed by: EMERGENCY MEDICINE

## 2025-05-02 PROCEDURE — 36415 COLL VENOUS BLD VENIPUNCTURE: CPT

## 2025-05-02 PROCEDURE — 74176 CT ABD & PELVIS W/O CONTRAST: CPT

## 2025-05-02 PROCEDURE — 87077 CULTURE AEROBIC IDENTIFY: CPT | Performed by: EMERGENCY MEDICINE

## 2025-05-02 RX ORDER — SODIUM CHLORIDE 0.9 % (FLUSH) 0.9 %
10 SYRINGE (ML) INJECTION AS NEEDED
Status: DISCONTINUED | OUTPATIENT
Start: 2025-05-02 | End: 2025-05-07 | Stop reason: HOSPADM

## 2025-05-02 RX ORDER — METRONIDAZOLE 500 MG/100ML
500 INJECTION, SOLUTION INTRAVENOUS ONCE
Status: COMPLETED | OUTPATIENT
Start: 2025-05-02 | End: 2025-05-03

## 2025-05-02 RX ADMIN — SODIUM CHLORIDE 1000 ML: 9 INJECTION, SOLUTION INTRAVENOUS at 21:27

## 2025-05-02 RX ADMIN — CEFEPIME 2000 MG: 2 INJECTION, POWDER, FOR SOLUTION INTRAVENOUS at 23:54

## 2025-05-02 NOTE — OUTREACH NOTE
Sepsis Week 1 Survey      Flowsheet Row Responses   [facility] facility patient discharged from? [facility]   Does the patient have one of the following disease processes/diagnoses(primary or secondary)? Sepsis   Week 1 attempt successful? No   Unsuccessful attempts Attempt 1            PARIS ROACH - Registered Nurse

## 2025-05-03 ENCOUNTER — READMISSION MANAGEMENT (OUTPATIENT)
Dept: CALL CENTER | Facility: HOSPITAL | Age: 75
End: 2025-05-03
Payer: MEDICARE

## 2025-05-03 PROBLEM — E86.0 DEHYDRATION: Status: ACTIVE | Noted: 2025-05-03

## 2025-05-03 LAB
ALBUMIN SERPL-MCNC: 3.1 G/DL (ref 3.5–5.2)
ALBUMIN/GLOB SERPL: 0.9 G/DL
ALP SERPL-CCNC: 88 U/L (ref 39–117)
ALT SERPL W P-5'-P-CCNC: 14 U/L (ref 1–41)
ANION GAP SERPL CALCULATED.3IONS-SCNC: 12.1 MMOL/L (ref 5–15)
AST SERPL-CCNC: 13 U/L (ref 1–40)
BACTERIA BLD CULT: ABNORMAL
BACTERIA ID TEST ISLT QL CULT: ABNORMAL
BASOPHILS # BLD AUTO: 0.15 10*3/MM3 (ref 0–0.2)
BASOPHILS NFR BLD AUTO: 0.6 % (ref 0–1.5)
BILIRUB SERPL-MCNC: 0.7 MG/DL (ref 0–1.2)
BOTTLE TYPE: ABNORMAL
BUN SERPL-MCNC: 14 MG/DL (ref 8–23)
BUN/CREAT SERPL: 15.1 (ref 7–25)
CALCIUM SPEC-SCNC: 8.2 MG/DL (ref 8.6–10.5)
CHLORIDE SERPL-SCNC: 102 MMOL/L (ref 98–107)
CO2 SERPL-SCNC: 20.9 MMOL/L (ref 22–29)
CREAT SERPL-MCNC: 0.93 MG/DL (ref 0.76–1.27)
DEPRECATED RDW RBC AUTO: 49.3 FL (ref 37–54)
EGFRCR SERPLBLD CKD-EPI 2021: 84.6 ML/MIN/1.73
EOSINOPHIL # BLD AUTO: 0.16 10*3/MM3 (ref 0–0.4)
EOSINOPHIL NFR BLD AUTO: 0.7 % (ref 0.3–6.2)
ERYTHROCYTE [DISTWIDTH] IN BLOOD BY AUTOMATED COUNT: 16.5 % (ref 12.3–15.4)
GLOBULIN UR ELPH-MCNC: 3.5 GM/DL
GLUCOSE BLDC GLUCOMTR-MCNC: 105 MG/DL (ref 70–99)
GLUCOSE SERPL-MCNC: 101 MG/DL (ref 65–99)
HCT VFR BLD AUTO: 35.4 % (ref 37.5–51)
HGB BLD-MCNC: 11.2 G/DL (ref 13–17.7)
IMM GRANULOCYTES # BLD AUTO: 0.3 10*3/MM3 (ref 0–0.05)
IMM GRANULOCYTES NFR BLD AUTO: 1.2 % (ref 0–0.5)
LYMPHOCYTES # BLD AUTO: 1.92 10*3/MM3 (ref 0.7–3.1)
LYMPHOCYTES NFR BLD AUTO: 7.8 % (ref 19.6–45.3)
MAGNESIUM SERPL-MCNC: 1.9 MG/DL (ref 1.6–2.4)
MCH RBC QN AUTO: 26.4 PG (ref 26.6–33)
MCHC RBC AUTO-ENTMCNC: 31.6 G/DL (ref 31.5–35.7)
MCV RBC AUTO: 83.3 FL (ref 79–97)
MONOCYTES # BLD AUTO: 2.05 10*3/MM3 (ref 0.1–0.9)
MONOCYTES NFR BLD AUTO: 8.3 % (ref 5–12)
NEUTROPHILS NFR BLD AUTO: 20 10*3/MM3 (ref 1.7–7)
NEUTROPHILS NFR BLD AUTO: 81.4 % (ref 42.7–76)
NRBC BLD AUTO-RTO: 0 /100 WBC (ref 0–0.2)
PHOSPHATE SERPL-MCNC: 2.8 MG/DL (ref 2.5–4.5)
PLATELET # BLD AUTO: 216 10*3/MM3 (ref 140–450)
PMV BLD AUTO: 11.1 FL (ref 6–12)
POTASSIUM SERPL-SCNC: 3.6 MMOL/L (ref 3.5–5.2)
PROT SERPL-MCNC: 6.6 G/DL (ref 6–8.5)
RBC # BLD AUTO: 4.25 10*6/MM3 (ref 4.14–5.8)
SODIUM SERPL-SCNC: 135 MMOL/L (ref 136–145)
WBC NRBC COR # BLD AUTO: 24.58 10*3/MM3 (ref 3.4–10.8)

## 2025-05-03 PROCEDURE — 85025 COMPLETE CBC W/AUTO DIFF WBC: CPT | Performed by: STUDENT IN AN ORGANIZED HEALTH CARE EDUCATION/TRAINING PROGRAM

## 2025-05-03 PROCEDURE — 99223 1ST HOSP IP/OBS HIGH 75: CPT | Performed by: STUDENT IN AN ORGANIZED HEALTH CARE EDUCATION/TRAINING PROGRAM

## 2025-05-03 PROCEDURE — 25010000002 CEFEPIME PER 500 MG: Performed by: STUDENT IN AN ORGANIZED HEALTH CARE EDUCATION/TRAINING PROGRAM

## 2025-05-03 PROCEDURE — 84100 ASSAY OF PHOSPHORUS: CPT | Performed by: STUDENT IN AN ORGANIZED HEALTH CARE EDUCATION/TRAINING PROGRAM

## 2025-05-03 PROCEDURE — 99222 1ST HOSP IP/OBS MODERATE 55: CPT | Performed by: UROLOGY

## 2025-05-03 PROCEDURE — 25010000002 HEPARIN (PORCINE) PER 1000 UNITS: Performed by: STUDENT IN AN ORGANIZED HEALTH CARE EDUCATION/TRAINING PROGRAM

## 2025-05-03 PROCEDURE — 25010000002 MEROPENEM PER 100 MG: Performed by: FAMILY MEDICINE

## 2025-05-03 PROCEDURE — 25010000002 METRONIDAZOLE 500 MG/100ML SOLUTION: Performed by: STUDENT IN AN ORGANIZED HEALTH CARE EDUCATION/TRAINING PROGRAM

## 2025-05-03 PROCEDURE — 80053 COMPREHEN METABOLIC PANEL: CPT | Performed by: STUDENT IN AN ORGANIZED HEALTH CARE EDUCATION/TRAINING PROGRAM

## 2025-05-03 PROCEDURE — 82948 REAGENT STRIP/BLOOD GLUCOSE: CPT

## 2025-05-03 PROCEDURE — 83735 ASSAY OF MAGNESIUM: CPT | Performed by: STUDENT IN AN ORGANIZED HEALTH CARE EDUCATION/TRAINING PROGRAM

## 2025-05-03 RX ORDER — CHOLECALCIFEROL (VITAMIN D3) 25 MCG
1000 TABLET ORAL DAILY
Status: DISCONTINUED | OUTPATIENT
Start: 2025-05-03 | End: 2025-05-07 | Stop reason: HOSPADM

## 2025-05-03 RX ORDER — MULTIVITAMIN WITH IRON
500 TABLET ORAL DAILY
Status: DISCONTINUED | OUTPATIENT
Start: 2025-05-03 | End: 2025-05-07 | Stop reason: HOSPADM

## 2025-05-03 RX ORDER — MULTIPLE VITAMINS W/ MINERALS TAB 9MG-400MCG
1 TAB ORAL DAILY
Status: DISCONTINUED | OUTPATIENT
Start: 2025-05-03 | End: 2025-05-07 | Stop reason: HOSPADM

## 2025-05-03 RX ORDER — IBUPROFEN 600 MG/1
1 TABLET ORAL
Status: DISCONTINUED | OUTPATIENT
Start: 2025-05-03 | End: 2025-05-07 | Stop reason: HOSPADM

## 2025-05-03 RX ORDER — VENLAFAXINE HYDROCHLORIDE 150 MG/1
150 CAPSULE, EXTENDED RELEASE ORAL DAILY
Status: DISCONTINUED | OUTPATIENT
Start: 2025-05-03 | End: 2025-05-07 | Stop reason: HOSPADM

## 2025-05-03 RX ORDER — ATORVASTATIN CALCIUM 20 MG/1
20 TABLET, FILM COATED ORAL NIGHTLY
Status: DISCONTINUED | OUTPATIENT
Start: 2025-05-03 | End: 2025-05-07 | Stop reason: HOSPADM

## 2025-05-03 RX ORDER — HEPARIN SODIUM 5000 [USP'U]/ML
5000 INJECTION, SOLUTION INTRAVENOUS; SUBCUTANEOUS EVERY 8 HOURS SCHEDULED
Status: DISCONTINUED | OUTPATIENT
Start: 2025-05-03 | End: 2025-05-07 | Stop reason: HOSPADM

## 2025-05-03 RX ORDER — METRONIDAZOLE 500 MG/100ML
500 INJECTION, SOLUTION INTRAVENOUS EVERY 8 HOURS
Status: DISCONTINUED | OUTPATIENT
Start: 2025-05-03 | End: 2025-05-03

## 2025-05-03 RX ORDER — POLYETHYLENE GLYCOL 3350 17 G/17G
17 POWDER, FOR SOLUTION ORAL DAILY PRN
Status: DISCONTINUED | OUTPATIENT
Start: 2025-05-03 | End: 2025-05-07 | Stop reason: HOSPADM

## 2025-05-03 RX ORDER — CARVEDILOL 3.12 MG/1
3.12 TABLET ORAL 2 TIMES DAILY WITH MEALS
Status: DISCONTINUED | OUTPATIENT
Start: 2025-05-03 | End: 2025-05-07 | Stop reason: HOSPADM

## 2025-05-03 RX ORDER — SODIUM CHLORIDE 0.9 % (FLUSH) 0.9 %
10 SYRINGE (ML) INJECTION AS NEEDED
Status: DISCONTINUED | OUTPATIENT
Start: 2025-05-03 | End: 2025-05-07 | Stop reason: HOSPADM

## 2025-05-03 RX ORDER — NICOTINE POLACRILEX 4 MG
15 LOZENGE BUCCAL
Status: DISCONTINUED | OUTPATIENT
Start: 2025-05-03 | End: 2025-05-07 | Stop reason: HOSPADM

## 2025-05-03 RX ORDER — NITROGLYCERIN 0.4 MG/1
0.4 TABLET SUBLINGUAL
Status: DISCONTINUED | OUTPATIENT
Start: 2025-05-03 | End: 2025-05-07 | Stop reason: HOSPADM

## 2025-05-03 RX ORDER — HYOSCYAMINE SULFATE 0.38 MG/1
375 TABLET, EXTENDED RELEASE ORAL EVERY 12 HOURS
Status: DISCONTINUED | OUTPATIENT
Start: 2025-05-03 | End: 2025-05-07 | Stop reason: HOSPADM

## 2025-05-03 RX ORDER — DONEPEZIL HYDROCHLORIDE 10 MG/1
10 TABLET, FILM COATED ORAL NIGHTLY
Status: DISCONTINUED | OUTPATIENT
Start: 2025-05-03 | End: 2025-05-07 | Stop reason: HOSPADM

## 2025-05-03 RX ORDER — SODIUM CHLORIDE 9 MG/ML
40 INJECTION, SOLUTION INTRAVENOUS AS NEEDED
Status: DISCONTINUED | OUTPATIENT
Start: 2025-05-03 | End: 2025-05-07 | Stop reason: HOSPADM

## 2025-05-03 RX ORDER — AMOXICILLIN 250 MG
2 CAPSULE ORAL 2 TIMES DAILY PRN
Status: DISCONTINUED | OUTPATIENT
Start: 2025-05-03 | End: 2025-05-07 | Stop reason: HOSPADM

## 2025-05-03 RX ORDER — DEXTROSE MONOHYDRATE 25 G/50ML
25 INJECTION, SOLUTION INTRAVENOUS
Status: DISCONTINUED | OUTPATIENT
Start: 2025-05-03 | End: 2025-05-07 | Stop reason: HOSPADM

## 2025-05-03 RX ORDER — SODIUM CHLORIDE 0.9 % (FLUSH) 0.9 %
10 SYRINGE (ML) INJECTION EVERY 12 HOURS SCHEDULED
Status: DISCONTINUED | OUTPATIENT
Start: 2025-05-03 | End: 2025-05-07 | Stop reason: HOSPADM

## 2025-05-03 RX ORDER — BISACODYL 5 MG/1
5 TABLET, DELAYED RELEASE ORAL DAILY PRN
Status: DISCONTINUED | OUTPATIENT
Start: 2025-05-03 | End: 2025-05-07 | Stop reason: HOSPADM

## 2025-05-03 RX ORDER — BISACODYL 10 MG
10 SUPPOSITORY, RECTAL RECTAL DAILY PRN
Status: DISCONTINUED | OUTPATIENT
Start: 2025-05-03 | End: 2025-05-07 | Stop reason: HOSPADM

## 2025-05-03 RX ORDER — PREGABALIN 100 MG/1
200 CAPSULE ORAL 2 TIMES DAILY
Status: DISCONTINUED | OUTPATIENT
Start: 2025-05-03 | End: 2025-05-07 | Stop reason: HOSPADM

## 2025-05-03 RX ADMIN — CARVEDILOL 3.12 MG: 3.12 TABLET, FILM COATED ORAL at 18:07

## 2025-05-03 RX ADMIN — METRONIDAZOLE 500 MG: 500 INJECTION, SOLUTION INTRAVENOUS at 16:48

## 2025-05-03 RX ADMIN — Medication 10 ML: at 01:05

## 2025-05-03 RX ADMIN — Medication 1 TABLET: at 09:36

## 2025-05-03 RX ADMIN — METRONIDAZOLE 500 MG: 500 INJECTION, SOLUTION INTRAVENOUS at 01:05

## 2025-05-03 RX ADMIN — ATORVASTATIN CALCIUM 20 MG: 20 TABLET, FILM COATED ORAL at 21:28

## 2025-05-03 RX ADMIN — METRONIDAZOLE 500 MG: 500 INJECTION, SOLUTION INTRAVENOUS at 09:42

## 2025-05-03 RX ADMIN — HYOSCYAMINE SULFATE 375 MCG: 0.38 TABLET, EXTENDED RELEASE ORAL at 10:23

## 2025-05-03 RX ADMIN — Medication 10 ML: at 08:19

## 2025-05-03 RX ADMIN — VENLAFAXINE HYDROCHLORIDE 150 MG: 75 CAPSULE, EXTENDED RELEASE ORAL at 09:35

## 2025-05-03 RX ADMIN — HYOSCYAMINE SULFATE 375 MCG: 0.38 TABLET, EXTENDED RELEASE ORAL at 21:25

## 2025-05-03 RX ADMIN — MEROPENEM 1000 MG: 1 INJECTION INTRAVENOUS at 18:07

## 2025-05-03 RX ADMIN — DONEPEZIL HYDROCHLORIDE 10 MG: 10 TABLET, FILM COATED ORAL at 21:25

## 2025-05-03 RX ADMIN — HEPARIN SODIUM 5000 UNITS: 5000 INJECTION INTRAVENOUS; SUBCUTANEOUS at 14:16

## 2025-05-03 RX ADMIN — CEFEPIME 2000 MG: 2 INJECTION, POWDER, FOR SOLUTION INTRAVENOUS at 08:16

## 2025-05-03 RX ADMIN — PREGABALIN 200 MG: 100 CAPSULE ORAL at 09:36

## 2025-05-03 RX ADMIN — PREGABALIN 200 MG: 100 CAPSULE ORAL at 21:25

## 2025-05-03 RX ADMIN — CARVEDILOL 3.12 MG: 3.12 TABLET, FILM COATED ORAL at 09:36

## 2025-05-03 RX ADMIN — CEFEPIME 2000 MG: 2 INJECTION, POWDER, FOR SOLUTION INTRAVENOUS at 16:05

## 2025-05-03 RX ADMIN — HEPARIN SODIUM 5000 UNITS: 5000 INJECTION INTRAVENOUS; SUBCUTANEOUS at 21:25

## 2025-05-03 RX ADMIN — CYANOCOBALAMIN TAB 500 MCG 500 MCG: 500 TAB at 09:36

## 2025-05-03 RX ADMIN — Medication 1000 UNITS: at 09:35

## 2025-05-03 NOTE — SIGNIFICANT NOTE
05/03/25 0907   OTHER   Discipline speech language pathologist   Rehab Time/Intention   Session Not Performed patient unavailable for evaluation   Recommendations   SLP - Next Appointment 05/05/25     N.p.o. for possible procedure.

## 2025-05-03 NOTE — OUTREACH NOTE
Sepsis Week 1 Survey      Flowsheet Row Responses   [employer] facility patient discharged from? [facility]   Does the patient have one of the following disease processes/diagnoses(primary or secondary)? Sepsis   Week 1 attempt successful? No   Unsuccessful attempts Attempt 1   Revoke Readmitted            PARIS ROACH - Registered Nurse

## 2025-05-03 NOTE — ED PROVIDER NOTES
Time: 9:02 PM EDT  Date of encounter:  5/2/2025  Independent Historian/Clinical History and Information was obtained by:   Patient    History is limited by: N/A    Chief Complaint: Fever, flank pain      History of Present Illness:  Patient is a 75 y.o. year old male who presents to the emergency department for evaluation of fever and flank pain.  Recent ureteral stent placement by urology.  Reported oral temperature of 101.5 today.  Generalized weakness.  No vomiting.  Patient states his pain is almost entirely subsided but was primarily to the back and flank with no complaints of anterior abdominal pain.      Patient Care Team  Primary Care Provider: Won Mcneill MD    Past Medical History:     Allergies   Allergen Reactions    Amoxicillin Hives    Ct Contrast Hives    Iodinated Contrast Media Other (See Comments)     Unknown, marked as high severity from,another facility, but reaction was unknown.    Penicillins Rash     Has tolerated Ampicillin/Sulbactam and Amoxicillin/clavulanate 5/2024 -Obed Randhawa RPH    Zosyn [Piperacillin-Tazobactam In Dex] Hives     Has tolerated Ampicillin/Sulbactam and Amoxicillin/clavulanate 5/2024 -Obed Randhawa [facility]     Past Medical History:   Diagnosis Date    Arthritis     Back injury     RESULTING IN PARAPLEGIA    Essential hypertension 08/11/2021    History of transfusion     Hyperlipidemia     Injury of back     Irregular heartbeat     SEE'S DR GAY. DENIED  CP/SOB    Kidney stone     Memory loss     Nonischemic cardiomyopathy 08/11/2021    FOLLOWS DR. GAY    Osteomyelitis 08/04/2024    Paraplegic gait     Presence of biventricular implantable cardioverter-defibrillator (ICD) 08/12/2021    [employer] BiV ICD    UTI (urinary tract infection) 05/06/2024     Past Surgical History:   Procedure Laterality Date    BACK SURGERY      CARDIAC DEFIBRILLATOR PLACEMENT      [facility]    COLONOSCOPY      COLONOSCOPY N/A 09/28/2022    Procedure: COLONOSCOPY WITH POLYPECTOMIES,  HOT SNARE, CLIP APPLICATION X2;  Surgeon: Audie West MD;  Location: [facility] ENDOSCOPY;  Service: General;  Laterality: N/A;  COLON POLYPS    CYSTOSCOPY W/ URETERAL STENT PLACEMENT Right 4/23/2025    Procedure: CYSTOSCOPY RIGHT, right URETERAL CATHETER/STENT INSERTION.  Placed tube in right ureter;  Surgeon: Rich Ingram MD;  Location: [facility] MAIN OR;  Service: Urology;  Laterality: Right;    CYSTOSCOPY, RETROGRADE PYELOGRAM AND STENT INSERTION Left 05/09/2024    Procedure: CYSTOSCOPY RETROGRADE PYELOGRAM AND STENT INSERTION, left;  Surgeon: Sara Montano MD;  Location: [facility] MAIN OR;  Service: Urology;  Laterality: Left;    ORIF ANKLE FRACTURE Right     PACEMAKER REPLACEMENT Bilateral 02/06/2024    Procedure: PPM generator change bi-v;  Surgeon: Renzo Hobbs MD;  Location: [facility] CATH INVASIVE LOCATION;  Service: Cardiovascular;  Laterality: Bilateral;    URETEROSCOPY LASER LITHOTRIPSY WITH STENT INSERTION Left 6/17/2024    Procedure: CYSTOSCOPY URETEROSCOPY RETROGRADE PYELOGRAM HOLMIUM LASER STENT exchange, left;  Surgeon: Sara Montano MD;  Location: [facility] MAIN OR;  Service: Urology;  Laterality: Left;     Family History   Problem Relation Age of Onset    Malig Hyperthermia Neg Hx        Home Medications:  Prior to Admission medications    Medication Sig Start Date End Date Taking? Authorizing Provider   atorvastatin (LIPITOR) 20 MG tablet Take 1 tablet by mouth Every Night. 2/11/25   Naa Gambino APRN   Brexpiprazole 1 MG tablet Take 1 tablet by mouth Daily. 4/29/25   Mehdi Braden MD   carvedilol (COREG) 3.125 MG tablet Take 1 tablet by mouth 2 (Two) Times a Day With Meals. 4/7/25   Naa Gambino APRN   cefdinir (OMNICEF) 300 MG capsule Take 1 capsule by mouth 2 (Two) Times a Day for 10 days. 4/28/25 5/10/25  Mehdi Braden MD   Cholecalciferol 25 MCG (1000 UT) tablet Take 1 tablet by mouth Daily.    Provider, MD Harpal   donepezil (ARICEPT) 10 MG tablet Take 1 tablet  by mouth Every Night. 1/9/22   Harpal Liu MD   Hyoscyamine Sulfate ER 0.375 MG tablet controlled-release Take  by mouth 2 (Two) Times a Day.    Harpal Liu MD   multivitamin with minerals tablet tablet Take 1 tablet by mouth Daily.    Harpal Liu MD   Pediatric Multivitamins-Iron (Animal Shapes/Iron) 18 MG chewable tablet Chew 1 tablet Daily.    Harpal Liu MD   pregabalin (LYRICA) 200 MG capsule Take 1 capsule by mouth 2 (Two) Times a Day. 7/26/21   Harpal Liu MD   venlafaxine XR (EFFEXOR-XR) 75 MG 24 hr capsule Take 1 capsule by mouth Daily for 30 days. 4/29/25 5/30/25  Mehdi Braden MD   vitamin B-12 (CYANOCOBALAMIN) 500 MCG tablet Take 1 tablet by mouth Daily.    Harpal Liu MD        Social History:   Social History     Tobacco Use    Smoking status: Never    Smokeless tobacco: Never   Vaping Use    Vaping status: Never Used   Substance Use Topics    Alcohol use: Never    Drug use: Never         Review of Systems:  Review of Systems   Constitutional:  Positive for fever. Negative for chills.   HENT:  Negative for congestion, rhinorrhea and sore throat.    Eyes:  Negative for photophobia.   Respiratory:  Negative for apnea, cough, chest tightness and shortness of breath.    Cardiovascular:  Negative for chest pain and palpitations.   Gastrointestinal:  Negative for abdominal pain, diarrhea, nausea and vomiting.   Endocrine: Negative.    Genitourinary:  Positive for flank pain. Negative for difficulty urinating and dysuria.   Musculoskeletal:  Positive for back pain. Negative for joint swelling and myalgias.   Skin:  Negative for color change and wound.   Allergic/Immunologic: Negative.    Neurological:  Positive for weakness. Negative for seizures and headaches.   Psychiatric/Behavioral: Negative.     All other systems reviewed and are negative.       Physical Exam:  /61 (Patient Position: Sitting)   Pulse 116   Temp 98.6 °F (37 °C) (Oral)    "Resp 18   Ht 182.9 cm (72\")   Wt 101 kg (222 lb 7.1 oz)   SpO2 96%   BMI 30.17 kg/m²     Physical Exam  Vitals and nursing note reviewed.   Constitutional:       General: He is awake. He is not in acute distress.     Appearance: Normal appearance. He is not toxic-appearing.   HENT:      Head: Normocephalic and atraumatic.      Nose: Nose normal.      Mouth/Throat:      Mouth: Mucous membranes are moist.   Eyes:      Extraocular Movements: Extraocular movements intact.      Pupils: Pupils are equal, round, and reactive to light.   Cardiovascular:      Rate and Rhythm: Normal rate and regular rhythm.      Heart sounds: Normal heart sounds.   Pulmonary:      Effort: Pulmonary effort is normal. No respiratory distress.      Breath sounds: Normal breath sounds. No wheezing, rhonchi or rales.   Abdominal:      General: Bowel sounds are normal.      Palpations: Abdomen is soft.      Tenderness: There is no abdominal tenderness. There is no guarding or rebound.      Comments: No rigidity   Musculoskeletal:         General: No tenderness. Normal range of motion.      Cervical back: Normal range of motion and neck supple.   Skin:     General: Skin is warm and dry.      Coloration: Skin is not jaundiced.   Neurological:      General: No focal deficit present.      Mental Status: He is alert. Mental status is at baseline.   Psychiatric:         Mood and Affect: Mood normal.                    Medical Decision Making:      Comorbidities that affect care:    Hypertension, paraplegic with neurogenic bladder, ureteral calculus    External Notes reviewed:    Previous Operation Note: Surgery 4/23/2025 with urology Dr. Ingram.  Description: Cystoscopy with right ureteral catheter/stent insertion      The following orders were placed and all results were independently analyzed by me:  Orders Placed This Encounter   Procedures    Blood Culture - Blood,    Blood Culture - Blood,    CT Abdomen Pelvis Without Contrast    Perry Draw "    Comprehensive Metabolic Panel    Lipase    Urinalysis With Microscopic If Indicated (No Culture) - Urine, Clean Catch    Lactic Acid, Plasma    CBC Auto Differential    NPO Diet NPO Type: Strict NPO    Undress & Gown    Insert Indwelling Urinary Catheter    Assess Need for Indwelling Urinary Catheter - Follow Removal Protocol    Urinary Catheter Care    Hospitalist (on-call MD unless specified)    Insert Peripheral IV    CBC & Differential    Green Top (Gel)    Lavender Top    Gold Top - SST    Light Blue Top       Medications Given in the Emergency Department:  Medications   sodium chloride 0.9 % flush 10 mL (has no administration in time range)   cefepime 2000 mg IVPB in 100 mL NS (VTB) (has no administration in time range)   metroNIDAZOLE (FLAGYL) IVPB 500 mg (has no administration in time range)   sodium chloride 0.9 % bolus 1,000 mL (0 mL Intravenous Stopped 5/2/25 2303)        ED Course:         Labs:    Lab Results (last 24 hours)       Procedure Component Value Units Date/Time    CBC & Differential [[id number]]  (Abnormal) Collected: 05/02/25 2130    Specimen: Blood Updated: 05/02/25 2200    Narrative:      The following orders were created for panel order CBC & Differential.  Procedure                               Abnormality         Status                     ---------                               -----------         ------                     CBC Auto Differential[[id number]]        Abnormal            Final result                 Please view results for these tests on the individual orders.    Comprehensive Metabolic Panel [[id number]]  (Abnormal) Collected: 05/02/25 2130    Specimen: Blood Updated: 05/02/25 2218     Glucose 104 mg/dL      BUN 14 mg/dL      Creatinine 1.04 mg/dL      Sodium 132 mmol/L      Potassium 3.9 mmol/L      Chloride 95 mmol/L      CO2 23.9 mmol/L      Calcium 9.1 mg/dL      Total Protein 7.9 g/dL      Albumin 3.8 g/dL      ALT (SGPT) 17 U/L      AST (SGOT) 17 U/L      Alkaline  Phosphatase 106 U/L      Total Bilirubin 0.6 mg/dL      Globulin 4.1 gm/dL      A/G Ratio 0.9 g/dL      BUN/Creatinine Ratio 13.5     Anion Gap 13.1 mmol/L      eGFR 74.0 mL/min/1.73     Narrative:      GFR Categories in Chronic Kidney Disease (CKD)              GFR Category          GFR (mL/min/1.73)    Interpretation  G1                    90 or greater        Normal or high (1)  G2                    60-89                Mild decrease (1)  G3a                   45-59                Mild to moderate decrease  G3b                   30-44                Moderate to severe decrease  G4                    15-29                Severe decrease  G5                    14 or less           Kidney failure    (1)In the absence of evidence of kidney disease, neither GFR category G1 or G2 fulfill the criteria for CKD.    eGFR calculation 2021 CKD-EPI creatinine equation, which does not include race as a factor    Lipase [[id number]]  (Normal) Collected: 05/02/25 2130    Specimen: Blood Updated: 05/02/25 2218     Lipase 38 U/L     Lactic Acid, Plasma [[id number]]  (Normal) Collected: 05/02/25 2130    Specimen: Blood Updated: 05/02/25 2216     Lactate 1.1 mmol/L     CBC Auto Differential [[id number]]  (Abnormal) Collected: 05/02/25 2130    Specimen: Blood Updated: 05/02/25 2200     WBC 26.93 10*3/mm3      RBC 4.79 10*6/mm3      Hemoglobin 12.7 g/dL      Hematocrit 39.5 %      MCV 82.5 fL      MCH 26.5 pg      MCHC 32.2 g/dL      RDW 16.5 %      RDW-SD 47.8 fl      MPV 11.3 fL      Platelets 256 10*3/mm3      Neutrophil % 82.6 %      Lymphocyte % 7.5 %      Monocyte % 7.1 %      Eosinophil % 0.8 %      Basophil % 0.7 %      Immature Grans % 1.3 %      Neutrophils, Absolute 22.24 10*3/mm3      Lymphocytes, Absolute 2.01 10*3/mm3      Monocytes, Absolute 1.91 10*3/mm3      Eosinophils, Absolute 0.22 10*3/mm3      Basophils, Absolute 0.19 10*3/mm3      Immature Grans, Absolute 0.36 10*3/mm3      nRBC 0.0 /100 WBC     Blood Culture -  Blood, Arm, Right [[id number]] Collected: 05/02/25 2154    Specimen: Blood from Arm, Right Updated: 05/02/25 2157    Blood Culture - Blood, Arm, Left [[id number]] Collected: 05/02/25 2154    Specimen: Blood from Arm, Left Updated: 05/02/25 2157    Urinalysis With Microscopic If Indicated (No Culture) - Indwelling Urethral Catheter [[id number]] Collected: 05/02/25 2313    Specimen: Urine from Indwelling Urethral Catheter Updated: 05/02/25 2319             Imaging:    CT Abdomen Pelvis Without Contrast  Result Date: 5/2/2025  CT ABDOMEN PELVIS WO CONTRAST Date of exam: 5/2/2025 10:40 PM EDT. Indications: Flank pain (bilateral); kidney stone suspected. Comparisons: 4/23/2025; 5/8/2024; 9/15/2022. TECHNIQUE: Axial CT images were obtained of the abdomen and pelvis without the administration of contrast. Reconstructed 2D coronal and sagittal images were also obtained. Automated exposure control and iterative construction methods were used. Automated exposure control and iterative reconstruction methods were used. Additionally, the radiation dose reduction device was turned on for each scan per the ALARA (As Low as Reasonably Achievable) protocol. Please see the electronic medical record (EMR) for the recorded radiation dose. No oral contrast agent was administered for the study. FINDINGS: There has been interval placement of a right ureteral stent, which is in proper position by CT. The previously seen obstructing proximal right ureteral calculus is no longer identified. Again, there is nonspecific mild circumferential mural thickening of  the urinary bladder, as described previously. Nonobstructing left nephrolithiasis is present, especially involving posterior lower pole calyces. There may be atrophy and scarring of the left kidney. There are bilateral renal cysts. No left ureterolithiasis. No left hydronephrosis. There may be minimal right hydronephrosis. No significant acute retroperitoneal hemorrhage. No significant  perinephric fluid collection is suggested by nonenhanced CT. Also, nonobstructing nephrolithiasis is seen  on the right with calyceal stones estimated at 1.6 cm or less in axial diameter. Additionally, gallstones and/or gallbladder sludge is (are) noted, as before. There is a small-to-moderate hiatal hernia. No acute infiltrate is suggested in the partially imaged lung bases. New nonspecific subcutaneous hyperdensities are seen, which measure about 2 cm or less in axial diameter and exhibit CT numbers of about 52 Hounsfield units (HU) or slightly less. They are seen in the bilateral subcutaneous regions of the lower abdominal and pelvic wall and may be related to recent injection of medication. Please correlate clinically. A sacral/coccygeal decubitus ulcer or impending sacral decubitus ulcer cannot be excluded. Extensive atherosclerotic changes are noted.  Severe degenerative changes involve the imaged spine, especially at L3-4. There are postoperative changes of the thoracolumbar spine, as seen previously. There is a partially visualized cardiac implantable electronic device (CIED) in place. The other incidental/chronic nonemergent findings are as described in the prior exam report from 4/23/2025.     1.There has been interval placement of a right ureteral stent, which is in proper position by CT. The previously seen obstructing proximal right ureteral calculus is no longer identified. 2.There may be minimal right hydronephrosis. 3.No significant perinephric fluid collection is suggested by nonenhanced CT. No significant acute retroperitoneal hemorrhage. 4.No left ureterolithiasis or hydronephrosis. 5.Nonobstructing renal calyceal stones are seen bilaterally. 6.Age-indeterminate infectious/inflammatory cystitis is possible. 7.Gallstones and/or gallbladder sludge is (are) identified. Probably no acute cholecystitis. 8.Please see the above comments for further detail. Portions of this note were completed with a  voice recognition program. Electronically Signed: Lalito Churchill MD  5/2/2025 10:54 PM EDT  Workstation ID: [id number]        Differential Diagnosis and Discussion:    Abdominal Pain: Based on the patient's signs and symptoms, I considered abdominal aortic aneurysm, small bowel obstruction, pancreatitis, acute cholecystitis, acute appendecitis, peptic ulcer disease, gastritis, colitis, endocrine disorders, irritable bowel syndrome and other differential diagnosis an etiology of the patient's abdominal pain.  Fever: Based on the complaint of fever, differential diagnosis includes but is not limited to meningitis, pneumonia, pyelonephritis, acute uti,  systemic immune response syndrome, sepsis, viral syndrome, fungal infection, tick born illness and other bacterial infections.    PROCEDURES:    Labs were collected in the emergency department and all labs were reviewed and interpreted by me.  CT scan was performed in the emergency department and the CT scan radiology impression was interpreted by me.    No orders to display       Procedures    MDM                 Sepsis criteria was met in the emergency department and the Sepsis protocol (including antibiotic administration) was initiated.      SIRS criteria considered:   1.  Temperature > 100.4 or <96.8    2.  Heart Rate > 90    3.  Respiratory Rate > 22    4.  WBC > 12K or <4K.             Severe Sepsis:     Respiratory: Mechanical Ventilation or Bipap  Hypotension: SBP > 90 or MAP < 65  Renal: Creatinine > 2  Metabolic: Lactic Acid > 2  Hematologic: Platelets < 100K or INR > 1.5  Hepatic: BILI  >  2  CNS: Sudden AMS     Septic Shock:     Severe Sepsis + Persistent hypotension or Lactic Acid > 4     Normal saline bolus, Antibiotics, and final disposition was based on these definitions.        Sepsis was recognized at 22:17 EDT      Antibiotics were ordered.     30 mL/kg bolus was not indicated.       Patient did not receive the recommended 30 mL/kg fluid bolus for  sepsis because it would be harmful or detrimental to the patient.    The patient has Concern for Fluid Overload and Blood Pressure Responded to Fluid Given.   The patient was ordered 1 L of fluids.  Patient was reassessed after fluid bolus.    The patient presents with 2 out of the 4 SIRS criteria and a suspected source for sepsis.  Patient was evaluated and placed on a cardiac monitor for fear of worsening tachycardia and life-threatening hypotension.  Patient was monitored for shock and signs of end-organ damage.  Mental status was repeatedly checked throughout the ED stay.  Medications were ordered by me which includes IV cefepime and Flagyl.  The case was discussed at length with admitting physician.     Total Critical Care time of 50 minutes. Total critical care time documented does not include time spent on separately billed procedures for services of nurses or physician assistants. I personally saw and examined the patient. I have reviewed all diagnostic interpretations and treatment plans as written. I was present for the key portions of any procedures performed and the inclusive time noted in any critical care statement. Critical care time includes patient management by me, time spent at the patients bedside,  time to review lab and imaging results, discussing patient care, documentation in the medical record, and time spent with family or caregiver.    Patient Care Considerations:          Consultants/Shared Management Plan:    Hospitalist: I have discussed the case with Dr. Green who agrees to accept the patient for admission.  Consultant: I have discussed the case with Dr. Montano who agrees to consult on the patient.    Social Determinants of Health:    Patient is independent, reliable, and has access to care.       Disposition and Care Coordination:    Admit:   Through independent evaluation of the patient's history, physical, and imperical data, the patient meets criteria for inpatient admission to  the hospitals.        Final diagnoses:   Ureteral stent present   Fever, unspecified fever cause        ED Disposition       ED Disposition   Decision to Admit    Condition   --    Comment   --               This medical record created using voice recognition software.             Jimmy Mays MD  05/02/25 [id number]

## 2025-05-03 NOTE — PLAN OF CARE
Goal Outcome Evaluation:  Plan of Care Reviewed With: patient        Progress: improving  Outcome Evaluation: Seen by urology, no acute urologic intervention warranted. Diet advanced to Heart Healthy diet.q6h blood sugars d/c.  No complaint of pain. Trach care done by patient. + blood cultures.

## 2025-05-03 NOTE — NURSING NOTE
Second set of eyes completed on skin assessment upon admission with Margarita ROACH. Photos taken of toes on left and right foot, and on gluteal region.

## 2025-05-03 NOTE — PLAN OF CARE
Goal Outcome Evaluation:  Plan of Care Reviewed With: patient        Progress: no change  Outcome Evaluation: New admit this shift, VSS, carrera in place, IVAB per orders, awaiting urology consult to determine further plan of care.Kim Reece RN

## 2025-05-03 NOTE — CONSULTS
[facility]   UROLOGY Consult Note    Patient Name: Anshul Shook Jr.  : 1950  MRN: [id number]  Primary Care Physician:  Won Mcneill MD  Referring Physician: No Known Provider  Date of admission: 2025    Subjective   Subjective     Reason for Consult/ Chief Complaint: Nephrolithiasis, neurogenic bladder    HPI:  Anshul Shook Jr. is a 75 y.o. male known to urology with history of nephrolithiasis requiring intervention, neurogenic bladder managed with intermittent self cath who presented to ER overnight with complaint of fever, fatigue, and falling at home.  Patient discharged this week after admission for sepsis of urinary origin secondary to right obstructive uropathy requiring emergent ureteral stent placement, 2025.  Wife called into answering service reporting that despite p.o. antibiotics, he was clinically worsening.  Advised to go to ER was subsequently admitted.    At time of presentation patient noted to have leukocytosis 26.93, this morning 24.58, creatinine 1.04.  CT abdomen pelvis reveals right ureteral stent in good position; previous right ureteral stone has been displaced within the intrarenal collecting system; no perinephric fluid collection or concern for abscess.    Indwelling Kyle catheter placed in ER.  RN reports urine clearing significantly since admission.  Patient reports feeling better since admission.  Denies back pain.    Review of Systems   All systems were reviewed and negative except for: H&P and the above    Personal History     Past Medical History:   Diagnosis Date    Arthritis     Back injury     RESULTING IN PARAPLEGIA    Essential hypertension 2021    History of transfusion     Hyperlipidemia     Injury of back     Irregular heartbeat     SEE'S DR GAY. DENIED  CP/SOB    Kidney stone     Memory loss     Nonischemic cardiomyopathy 2021    FOLLOWS DR. GAY    Osteomyelitis 2024    Paraplegic gait     Presence of biventricular  implantable cardioverter-defibrillator (ICD) 08/12/2021    [employer] BiV ICD    UTI (urinary tract infection) 05/06/2024       Past Surgical History:   Procedure Laterality Date    BACK SURGERY      CARDIAC DEFIBRILLATOR PLACEMENT      [facility]    COLONOSCOPY      COLONOSCOPY N/A 09/28/2022    Procedure: COLONOSCOPY WITH POLYPECTOMIES, HOT SNARE, CLIP APPLICATION X2;  Surgeon: Audie West MD;  Location: [facility] ENDOSCOPY;  Service: General;  Laterality: N/A;  COLON POLYPS    CYSTOSCOPY W/ URETERAL STENT PLACEMENT Right 4/23/2025    Procedure: CYSTOSCOPY RIGHT, right URETERAL CATHETER/STENT INSERTION.  Placed tube in right ureter;  Surgeon: Rich Ingram MD;  Location: [facility] MAIN OR;  Service: Urology;  Laterality: Right;    CYSTOSCOPY, RETROGRADE PYELOGRAM AND STENT INSERTION Left 05/09/2024    Procedure: CYSTOSCOPY RETROGRADE PYELOGRAM AND STENT INSERTION, left;  Surgeon: Sara Montano MD;  Location: [facility] OR;  Service: Urology;  Laterality: Left;    ORIF ANKLE FRACTURE Right     PACEMAKER REPLACEMENT Bilateral 02/06/2024    Procedure: PPM generator change bi-v;  Surgeon: Renzo Hobbs MD;  Location: [facility] CATH INVASIVE LOCATION;  Service: Cardiovascular;  Laterality: Bilateral;    URETEROSCOPY LASER LITHOTRIPSY WITH STENT INSERTION Left 6/17/2024    Procedure: CYSTOSCOPY URETEROSCOPY RETROGRADE PYELOGRAM HOLMIUM LASER STENT exchange, left;  Surgeon: Sara Montano MD;  Location: [facility] OR;  Service: Urology;  Laterality: Left;       Family History: family history is not on file. Otherwise pertinent FHx was reviewed and not pertinent to current issue.    Social History:  reports that he has never smoked. He has never used smokeless tobacco. He reports that he does not drink alcohol and does not use drugs.    Home Medications:  Animal Shapes/Iron, Hyoscyamine Sulfate ER, atorvastatin, carvedilol, cefdinir, cholecalciferol, donepezil, multivitamin with minerals, pregabalin, venlafaxine  XR, and vitamin B-12    Allergies:  Allergies   Allergen Reactions    Amoxicillin Hives    Ct Contrast Hives    Iodinated Contrast Media Other (See Comments)     Unknown, marked as high severity from,another facility, but reaction was unknown.    Penicillins Rash     Has tolerated Ampicillin/Sulbactam and Amoxicillin/clavulanate 5/2024 -[facility]    Zosyn [Piperacillin-Tazobactam In Dex] Hives     Has tolerated Ampicillin/Sulbactam and Amoxicillin/clavulanate 5/2024 -[facility]       Objective    Objective     Vitals:   Temp:  [98.4 °F (36.9 °C)-100 °F (37.8 °C)] 98.4 °F (36.9 °C)  Heart Rate:  [] 90  Resp:  [16-18] 16  BP: (108-131)/(57-68) 108/58    Physical Exam:   No acute distress, well-nourished  Awake alert and oriented  Mood normal; affect normal  Kyle in place, clear urine    Result Review    Result Review:  I have personally reviewed the results from the time of this admission to 5/3/2025 14:22 EDT and agree with these findings:  [x]  Laboratory  [x]  Microbiology  [x]  Radiology  []  EKG/Telemetry   []  Cardiology/Vascular   []  Pathology  []  Old records  []  Other:      Assessment & Plan   Assessment / Plan     Brief Patient Summary:  Anshul Shook Jr. is a 75 y.o. male who presented for fever, fatigue, after falling at home, history of neurogenic bladder, nephrolithiasis requiring intervention status post recent right emergent stent during admission for sepsis of urinary origin.    Active Hospital Problems:  Active Hospital Problems    Diagnosis     **Sepsis secondary to UTI     Dehydration     Kidney stone        Plan:   Labs and chart reviewed  CT scan reviewed; discussed with patient and wife  Clinically improved; suspect significant dehydration prior to admissions.  Was on culture sensitive antibiotics at time of discharge and was taking.  Cultures pending    No acute urologic intervention warranted; okay for diet    Continue medical management    Recommend indwelling  Kyle catheter at time of discharge to maximize decompression and reduce reflux of his ureteral stent given history of neurogenic bladder.  They note understanding of this.    Will follow    Electronically signed by Sara Montano MD, 05/03/25, 2:18 PM EDT.

## 2025-05-03 NOTE — H&P
[facility] HISTORY AND PHYSICAL  Date: 5/3/2025   Patient Name: Anshul Shook Jr.  : 1950  MRN: [id number]  Primary Care Physician:  Won Mcneill MD  Date of admission: 2025    Subjective   Subjective     Chief Complaint: Fever, back pain    HPI:    Anshul Shook Jr. is a 75 y.o. male with past medical history of remote history of traumatic injury to the spine resulting in prolonged hypoxia requiring prolonged ventilator status post chronic trach due to vocal cord palsy, nonischemic cardiomyopathy status post AICD with pacemaker, neurogenic bladder, bilateral lower extremity weakness, hypertension, hyperlipidemia, recent right ureteral calculus status post stent placement without stone removal by Dr. Ingram on , patient discharged home on 2025 on oral antibiotics with cefdinir for 10 days.  Since last 2 days patient noted to have back pain and today noted to have fever with a temperature of 101.5 that has self subsided, also noted to have generalized weakness prompting them to come to the ED.  Denies any abdominal pain, chest pain, shortness of breath.    Upon arrival, vital signs temperature 98.6, pulse 116, respiratory rate 18, blood pressure 122/61 on room air saturating at 96%.  Labs sodium 132, rest of the CMP with no significant findings, normal lactic acid, normal lipase, WBC 26.93, hemoglobin 12.7, platelets 256.  Urinalysis showed cloudy appearance, 4+ bacteria, too numerous to count WBC, RBC, blood cultures in process.  CT abdomen pelvis without contrast showed interval placement of the right ureteral stent which is proper position by CT.  Previously seen obstructing proximal right ureteral calculus is no longer identified.  There may be minimal right hydronephrosis.  Started on cefepime and Flagyl in the ED.  Case discussed by ED physician with urologist on-call Dr. Montano, recommended antibiotics.  Patient admitted for further evaluation and management of  urosepsis.      Personal History     Past Medical History:   Diagnosis Date    Arthritis     Back injury     RESULTING IN PARAPLEGIA    Essential hypertension 08/11/2021    History of transfusion     Hyperlipidemia     Injury of back     Irregular heartbeat     SEE'S DR HOBBS. DENIED  CP/SOB    Kidney stone     Memory loss     Nonischemic cardiomyopathy 08/11/2021    FOLLOWS DR. HOBBS    Osteomyelitis 08/04/2024    Paraplegic gait     Presence of biventricular implantable cardioverter-defibrillator (ICD) 08/12/2021    [employer] BiV ICD    UTI (urinary tract infection) 05/06/2024         Past Surgical History:   Procedure Laterality Date    BACK SURGERY      CARDIAC DEFIBRILLATOR PLACEMENT      [facility]    COLONOSCOPY      COLONOSCOPY N/A 09/28/2022    Procedure: COLONOSCOPY WITH POLYPECTOMIES, HOT SNARE, CLIP APPLICATION X2;  Surgeon: Audie West MD;  Location: [facility] ENDOSCOPY;  Service: General;  Laterality: N/A;  COLON POLYPS    CYSTOSCOPY W/ URETERAL STENT PLACEMENT Right 4/23/2025    Procedure: CYSTOSCOPY RIGHT, right URETERAL CATHETER/STENT INSERTION.  Placed tube in right ureter;  Surgeon: Rich Ingram MD;  Location: [facility] OR;  Service: Urology;  Laterality: Right;    CYSTOSCOPY, RETROGRADE PYELOGRAM AND STENT INSERTION Left 05/09/2024    Procedure: CYSTOSCOPY RETROGRADE PYELOGRAM AND STENT INSERTION, left;  Surgeon: Sara Montano MD;  Location: [facility] OR;  Service: Urology;  Laterality: Left;    ORIF ANKLE FRACTURE Right     PACEMAKER REPLACEMENT Bilateral 02/06/2024    Procedure: PPM generator change bi-v;  Surgeon: Renzo Hobbs MD;  Location: [facility] CATH INVASIVE LOCATION;  Service: Cardiovascular;  Laterality: Bilateral;    URETEROSCOPY LASER LITHOTRIPSY WITH STENT INSERTION Left 6/17/2024    Procedure: CYSTOSCOPY URETEROSCOPY RETROGRADE PYELOGRAM HOLMIUM LASER STENT exchange, left;  Surgeon: Sara Montano MD;  Location: [facility] MAIN OR;  Service: Urology;  Laterality: Left;          Family History   Problem Relation Age of Onset    Malig Hyperthermia Neg Hx          Social History     Socioeconomic History    Marital status:    Tobacco Use    Smoking status: Never    Smokeless tobacco: Never   Vaping Use    Vaping status: Never Used   Substance and Sexual Activity    Alcohol use: Never    Drug use: Never    Sexual activity: Defer         Home Medications:  Animal Shapes/Iron, Hyoscyamine Sulfate ER, atorvastatin, carvedilol, cefdinir, cholecalciferol, donepezil, multivitamin with minerals, pregabalin, venlafaxine XR, and vitamin B-12    Allergies:  Allergies   Allergen Reactions    Amoxicillin Hives    Ct Contrast Hives    Iodinated Contrast Media Other (See Comments)     Unknown, marked as high severity from,another facility, but reaction was unknown.    Penicillins Rash     Has tolerated Ampicillin/Sulbactam and Amoxicillin/clavulanate 5/2024 -[facility]    Zosyn [Piperacillin-Tazobactam In Dex] Hives     Has tolerated Ampicillin/Sulbactam and Amoxicillin/clavulanate 5/2024 -[facility]       Review of Systems     Objective   Objective     Vitals:   Temp:  [98.6 °F (37 °C)] 98.6 °F (37 °C)  Heart Rate:  [111-116] 111  Resp:  [18] 18  BP: (118-122)/(57-61) 118/57    Physical Exam    Constitutional: Awake, alert, no acute distress              Eyes: Pupils equal, sclerae anicteric, no conjunctival injection              HENT: NCAT, mucous membranes moist              Respiratory: Clear to auscultation bilaterally, nonlabored respirations               Cardiovascular: Paced rhythm, regular, no murmurs              Gastrointestinal: soft, nondistended, nontender              Musculoskeletal: No bilateral ankle edema, no clubbing or cyanosis to extremities              Neurologic: Oriented x 3, bilateral lower extremity weakness, normal strength in the bilateral upper extremities     Result Review    Result Review:  I have personally reviewed the results from  the time of this admission to 5/3/2025 00:51 EDT and agree with these findings:  [x]  Laboratory  []  Microbiology  [x]  Radiology  [x]  EKG/Telemetry   []  Cardiology/Vascular   []  Pathology  []  Old records  []  Other:        Imaging Results (Last 24 Hours)       Procedure Component Value Units Date/Time    CT Abdomen Pelvis Without Contrast [[id number]] Collected: 05/02/25 2241     Updated: 05/02/25 2300    Narrative:      CT ABDOMEN PELVIS WO CONTRAST    Date of exam: 5/2/2025 10:40 PM EDT.    Indications: Flank pain (bilateral); kidney stone suspected.    Comparisons: 4/23/2025; 5/8/2024; 9/15/2022.    TECHNIQUE:  Axial CT images were obtained of the abdomen and pelvis without the administration of contrast. Reconstructed 2D coronal and sagittal images were also obtained. Automated exposure control and iterative construction methods were used. Automated exposure   control and iterative reconstruction methods were used. Additionally, the radiation dose reduction device was turned on for each scan per the ALARA (As Low as Reasonably Achievable) protocol. Please see the electronic medical record (EMR) for the   recorded radiation dose. No oral contrast agent was administered for the study.    FINDINGS:  There has been interval placement of a right ureteral stent, which is in proper position by CT. The previously seen obstructing proximal right ureteral calculus is no longer identified. Again, there is nonspecific mild circumferential mural thickening of   the urinary bladder, as described previously. Nonobstructing left nephrolithiasis is present, especially involving posterior lower pole calyces. There may be atrophy and scarring of the left kidney. There are bilateral renal cysts. No left   ureterolithiasis. No left hydronephrosis. There may be minimal right hydronephrosis. No significant acute retroperitoneal hemorrhage. No significant perinephric fluid collection is suggested by nonenhanced CT. Also,  nonobstructing nephrolithiasis is seen   on the right with calyceal stones estimated at 1.6 cm or less in axial diameter. Additionally, gallstones and/or gallbladder sludge is (are) noted, as before. There is a small-to-moderate hiatal hernia. No acute infiltrate is suggested in the partially   imaged lung bases. New nonspecific subcutaneous hyperdensities are seen, which measure about 2 cm or less in axial diameter and exhibit CT numbers of about 52 Hounsfield units (HU) or slightly less. They are seen in the bilateral subcutaneous regions of   the lower abdominal and pelvic wall and may be related to recent injection of medication. Please correlate clinically. A sacral/coccygeal decubitus ulcer or impending sacral decubitus ulcer cannot be excluded. Extensive atherosclerotic changes are noted.   Severe degenerative changes involve the imaged spine, especially at L3-4. There are postoperative changes of the thoracolumbar spine, as seen previously. There is a partially visualized cardiac implantable electronic device (CIED) in place. The other   incidental/chronic nonemergent findings are as described in the prior exam report from 4/23/2025.        Impression:      1.There has been interval placement of a right ureteral stent, which is in proper position by CT. The previously seen obstructing proximal right ureteral calculus is no longer identified.  2.There may be minimal right hydronephrosis.  3.No significant perinephric fluid collection is suggested by nonenhanced CT. No significant acute retroperitoneal hemorrhage.  4.No left ureterolithiasis or hydronephrosis.  5.Nonobstructing renal calyceal stones are seen bilaterally.  6.Age-indeterminate infectious/inflammatory cystitis is possible.  7.Gallstones and/or gallbladder sludge is (are) identified. Probably no acute cholecystitis.   8.Please see the above comments for further detail.        Portions of this note were completed with a voice recognition  program.    Electronically Signed: Lalito Churchill MD    5/2/2025 10:54 PM EDT    Workstation ID: [id number]             atorvastatin, 20 mg, Oral, Nightly  cholecalciferol, 1,000 Units, Oral, Daily  donepezil, 10 mg, Oral, Nightly  heparin (porcine), 5,000 Units, Subcutaneous, Q8H  metroNIDAZOLE, 500 mg, Intravenous, Once  metroNIDAZOLE, 500 mg, Intravenous, Q8H  multivitamin with minerals, 1 tablet, Oral, Daily  pregabalin, 200 mg, Oral, BID  sodium chloride, 10 mL, Intravenous, Q12H  venlafaxine XR, 150 mg, Oral, Daily  vitamin B-12, 500 mcg, Oral, Daily         Assessment & Plan   Assessment / Plan     Assessment/Plan:   Urosepsis  Obstructing right ureteral stone status post stent placement on 4/23/2025  Hypertension  Nonalcoholic steatohepatitis  History of ureteral stone  Neurogenic bladder, self caths at home  Status post trach due to vocal cord palsy from chronic intubation  Spinal cord injury resulting in bilateral lower extremity weakness  Nonischemic cardiomyopathy status post biventricular AICD with pacemaker    Plan  Admit to inpatient, telemetry  Continue cefepime, Flagyl  Follow-up on urine cultures, blood cultures  urology consulted Dr. Montano, agreed to consult  N.p.o. except sips with meds until evaluation by urology in the a.m.  Hypoglycemia protocol  POC glucose every 6 hours  Hold carvedilol  Continue donepezil, pregabalin, venlafaxine, vitamin B12, atorvastatin      VTE Prophylaxis:  Pharmacologic & mechanical VTE prophylaxis orders are present.        CODE STATUS:    Code Status (Patient has no pulse and is not breathing): CPR (Attempt to Resuscitate)  Medical Interventions (Patient has pulse or is breathing): Full Support  Level Of Support Discussed With: Patient      Admission Status:  I believe this patient meets inpatient status.    Part of this note may be an electronic transcription/translation of spoken language to printed text using the Dragon Dictation System    Celia Green,  MD

## 2025-05-04 LAB
ALBUMIN SERPL-MCNC: 2.7 G/DL (ref 3.5–5.2)
ALP SERPL-CCNC: 79 U/L (ref 39–117)
ALT SERPL W P-5'-P-CCNC: 11 U/L (ref 1–41)
ANION GAP SERPL CALCULATED.3IONS-SCNC: 8.9 MMOL/L (ref 5–15)
AST SERPL-CCNC: 10 U/L (ref 1–40)
BASOPHILS # BLD AUTO: 0.07 10*3/MM3 (ref 0–0.2)
BASOPHILS NFR BLD AUTO: 0.4 % (ref 0–1.5)
BILIRUB CONJ SERPL-MCNC: 0.2 MG/DL (ref 0–0.3)
BILIRUB INDIRECT SERPL-MCNC: 0.4 MG/DL
BILIRUB SERPL-MCNC: 0.6 MG/DL (ref 0–1.2)
BUN SERPL-MCNC: 16 MG/DL (ref 8–23)
BUN/CREAT SERPL: 17.4 (ref 7–25)
CALCIUM SPEC-SCNC: 8 MG/DL (ref 8.6–10.5)
CHLORIDE SERPL-SCNC: 103 MMOL/L (ref 98–107)
CO2 SERPL-SCNC: 20.1 MMOL/L (ref 22–29)
CREAT SERPL-MCNC: 0.92 MG/DL (ref 0.76–1.27)
DEPRECATED RDW RBC AUTO: 47.2 FL (ref 37–54)
EGFRCR SERPLBLD CKD-EPI 2021: 85.7 ML/MIN/1.73
EOSINOPHIL # BLD AUTO: 0.48 10*3/MM3 (ref 0–0.4)
EOSINOPHIL NFR BLD AUTO: 3 % (ref 0.3–6.2)
ERYTHROCYTE [DISTWIDTH] IN BLOOD BY AUTOMATED COUNT: 16.4 % (ref 12.3–15.4)
GLUCOSE SERPL-MCNC: 93 MG/DL (ref 65–99)
HCT VFR BLD AUTO: 32.9 % (ref 37.5–51)
HGB BLD-MCNC: 10.5 G/DL (ref 13–17.7)
IMM GRANULOCYTES # BLD AUTO: 0.27 10*3/MM3 (ref 0–0.05)
IMM GRANULOCYTES NFR BLD AUTO: 1.7 % (ref 0–0.5)
LYMPHOCYTES # BLD AUTO: 1.39 10*3/MM3 (ref 0.7–3.1)
LYMPHOCYTES NFR BLD AUTO: 8.8 % (ref 19.6–45.3)
MAGNESIUM SERPL-MCNC: 1.8 MG/DL (ref 1.6–2.4)
MCH RBC QN AUTO: 25.7 PG (ref 26.6–33)
MCHC RBC AUTO-ENTMCNC: 31.9 G/DL (ref 31.5–35.7)
MCV RBC AUTO: 80.4 FL (ref 79–97)
MONOCYTES # BLD AUTO: 1.01 10*3/MM3 (ref 0.1–0.9)
MONOCYTES NFR BLD AUTO: 6.4 % (ref 5–12)
NEUTROPHILS NFR BLD AUTO: 12.63 10*3/MM3 (ref 1.7–7)
NEUTROPHILS NFR BLD AUTO: 79.7 % (ref 42.7–76)
NRBC BLD AUTO-RTO: 0 /100 WBC (ref 0–0.2)
PHOSPHATE SERPL-MCNC: 2.4 MG/DL (ref 2.5–4.5)
PLATELET # BLD AUTO: 199 10*3/MM3 (ref 140–450)
PMV BLD AUTO: 10.6 FL (ref 6–12)
POTASSIUM SERPL-SCNC: 3.7 MMOL/L (ref 3.5–5.2)
PROT SERPL-MCNC: 6.1 G/DL (ref 6–8.5)
RBC # BLD AUTO: 4.09 10*6/MM3 (ref 4.14–5.8)
SODIUM SERPL-SCNC: 132 MMOL/L (ref 136–145)
WBC NRBC COR # BLD AUTO: 15.85 10*3/MM3 (ref 3.4–10.8)

## 2025-05-04 PROCEDURE — 25010000002 MEROPENEM PER 100 MG: Performed by: FAMILY MEDICINE

## 2025-05-04 PROCEDURE — 83735 ASSAY OF MAGNESIUM: CPT | Performed by: FAMILY MEDICINE

## 2025-05-04 PROCEDURE — 99231 SBSQ HOSP IP/OBS SF/LOW 25: CPT | Performed by: UROLOGY

## 2025-05-04 PROCEDURE — 99232 SBSQ HOSP IP/OBS MODERATE 35: CPT | Performed by: FAMILY MEDICINE

## 2025-05-04 PROCEDURE — 84100 ASSAY OF PHOSPHORUS: CPT | Performed by: FAMILY MEDICINE

## 2025-05-04 PROCEDURE — 87040 BLOOD CULTURE FOR BACTERIA: CPT | Performed by: FAMILY MEDICINE

## 2025-05-04 PROCEDURE — 80048 BASIC METABOLIC PNL TOTAL CA: CPT | Performed by: FAMILY MEDICINE

## 2025-05-04 PROCEDURE — 25010000002 HEPARIN (PORCINE) PER 1000 UNITS: Performed by: STUDENT IN AN ORGANIZED HEALTH CARE EDUCATION/TRAINING PROGRAM

## 2025-05-04 PROCEDURE — 80076 HEPATIC FUNCTION PANEL: CPT | Performed by: FAMILY MEDICINE

## 2025-05-04 PROCEDURE — 85025 COMPLETE CBC W/AUTO DIFF WBC: CPT | Performed by: FAMILY MEDICINE

## 2025-05-04 RX ADMIN — PREGABALIN 200 MG: 100 CAPSULE ORAL at 09:39

## 2025-05-04 RX ADMIN — Medication 1 TABLET: at 09:39

## 2025-05-04 RX ADMIN — VENLAFAXINE HYDROCHLORIDE 150 MG: 75 CAPSULE, EXTENDED RELEASE ORAL at 09:39

## 2025-05-04 RX ADMIN — CARVEDILOL 3.12 MG: 3.12 TABLET, FILM COATED ORAL at 09:39

## 2025-05-04 RX ADMIN — HEPARIN SODIUM 5000 UNITS: 5000 INJECTION INTRAVENOUS; SUBCUTANEOUS at 14:36

## 2025-05-04 RX ADMIN — Medication 10 MG: at 22:14

## 2025-05-04 RX ADMIN — CYANOCOBALAMIN TAB 500 MCG 500 MCG: 500 TAB at 09:39

## 2025-05-04 RX ADMIN — HEPARIN SODIUM 5000 UNITS: 5000 INJECTION INTRAVENOUS; SUBCUTANEOUS at 21:02

## 2025-05-04 RX ADMIN — Medication 1000 UNITS: at 09:40

## 2025-05-04 RX ADMIN — DONEPEZIL HYDROCHLORIDE 10 MG: 10 TABLET, FILM COATED ORAL at 21:02

## 2025-05-04 RX ADMIN — MEROPENEM 1000 MG: 1 INJECTION INTRAVENOUS at 00:05

## 2025-05-04 RX ADMIN — PREGABALIN 200 MG: 100 CAPSULE ORAL at 21:02

## 2025-05-04 RX ADMIN — CARVEDILOL 3.12 MG: 3.12 TABLET, FILM COATED ORAL at 21:02

## 2025-05-04 RX ADMIN — MEROPENEM 1000 MG: 1 INJECTION INTRAVENOUS at 09:39

## 2025-05-04 RX ADMIN — HYOSCYAMINE SULFATE 375 MCG: 0.38 TABLET, EXTENDED RELEASE ORAL at 21:02

## 2025-05-04 RX ADMIN — ATORVASTATIN CALCIUM 20 MG: 20 TABLET, FILM COATED ORAL at 21:02

## 2025-05-04 RX ADMIN — MEROPENEM 1000 MG: 1 INJECTION INTRAVENOUS at 18:39

## 2025-05-04 RX ADMIN — Medication 10 ML: at 09:40

## 2025-05-04 RX ADMIN — HEPARIN SODIUM 5000 UNITS: 5000 INJECTION INTRAVENOUS; SUBCUTANEOUS at 05:47

## 2025-05-04 RX ADMIN — HYOSCYAMINE SULFATE 375 MCG: 0.38 TABLET, EXTENDED RELEASE ORAL at 09:39

## 2025-05-04 NOTE — PLAN OF CARE
Goal Outcome Evaluation:              Outcome Evaluation: No acute events overnight. No complaints of pain or nausea during shift. Patient rested well. Continues with IV antibiotics.

## 2025-05-04 NOTE — PROGRESS NOTES
[facility]   Urology Progress Note    Patient Name: Anshul Shook Jr.  : 1950  MRN: [id number]  Primary Care Physician:  Won Mcneill MD  Date of admission: 2025    Subjective   Subjective       No acute events; feeling much better    Objective   Objective     Vitals:   Temp:  [98.4 °F (36.9 °C)-99.9 °F (37.7 °C)] 99.1 °F (37.3 °C)  Heart Rate:  [90-93] 91  Resp:  [16] 16  BP: (105-111)/(52-61) 105/54  Physical Exam     Alert and oriented x3  No acute distress  Unlabored respirations  Kyle placed, clear       Result Review    Result Review:  I have personally reviewed the results from the time of this admission to 2025 07:47 EDT and agree with these findings:  [x]  Laboratory  [x]  Microbiology  []  Radiology  []  EKG/Telemetry   []  Cardiology/Vascular   []  Pathology  []  Old records  []  Other:      Assessment & Plan   Assessment / Plan     Brief Patient Summary:  Anshul Shook Jr. is a 75 y.o. male who presented for fever, fatigue, after falling at home, history of neurogenic bladder, nephrolithiasis requiring intervention status post recent right emergent stent during admission for sepsis of urinary origin.     Active Hospital Problems:  Active Hospital Problems    Diagnosis     **Sepsis secondary to UTI     Dehydration     Kidney stone      Plan:    Labs reviewed; white count trending down; creatinine stable  Blood cultures 1 of 2 Klebsiella bacteremia       Continue medical management     Kyle catheter at time of discharge to maximize drainage and reduce reflux up stent    Outpatient stone management once acute issues resolving infection completely treated    Electronically signed by Sara Montano MD, 25, 7:47 AM EDT.

## 2025-05-04 NOTE — PROGRESS NOTES
[facility]   Hospitalist Progress Note  Date: 2025  Patient Name: Anshul Shook Jr.  : 1950  MRN: [id number]  Date of admission: 2025      Subjective   Subjective     Chief complaint: Fever, back pain    Summary:  75-year-old male with history of traumatic injury to the spine that resulted in prolonged hypoxemia resulting in prolonged ventilator status status post chronic tracheostomy and vocal cord palsy, nonischemic cardiomyopathy, neurogenic bladder with home self catheterizations, nonalcoholic steatohepatitis, AICD with pacemaker in place, neurogenic bladder, bilateral lower extremity weakness, hypertension, dyslipidemia, recent right ureteral calculus status post stent placement without stone removal, rehospitalized on 5/3/2025, with chief complaint of fevers and back pain, found to have urinalysis suggestive of UTI, blood cultures positive for ESBL Klebsiella, bacteremic likely secondary to UTI, placed on broad-spectrum antibiotics, urology consulted for complicated UTI, on meropenem IV    Interval follow-up: Seen and examined, much more alert and awake today, no acute distress, no acute major overnight event, remains weak and fatigued, Kyle catheter in place, 2 L of urine output, net -1.4, creatinine 0.92, white blood cell count still elevated at 15,000, sodium 132, potassium 3.7, bicarb 20.  No chest pain or palpitations.    Review of systems:  All systems reviewed and negative except for weakness, fatigue    Objective   Objective     Vitals:   Temp:  [97.9 °F (36.6 °C)-99.9 °F (37.7 °C)] 97.9 °F (36.6 °C)  Heart Rate:  [86-93] 86  Resp:  [16-20] 19  BP: (105-112)/(52-65) 108/60  Physical Exam               Constitutional: Awake, alert, no acute distress              Eyes: Pupils equal, sclerae anicteric, no conjunctival injection              HENT: NCAT, mucous membranes moist              Respiratory: Clear to auscultation bilaterally, nonlabored respirations                Cardiovascular: Paced rhythm, regular, no murmurs              Gastrointestinal: soft, nondistended, nontender              Musculoskeletal: No bilateral ankle edema, no clubbing or cyanosis to extremities, Kyle catheter in place              Neurologic: Oriented x 3, bilateral lower extremity weakness, normal strength in the bilateral upper extremities         Result Review    Result Review:  I have personally reviewed the pertinent results from the past 24 hours to 5/4/2025 13:45 EDT and agree with these findings:  [x]  Laboratory   CBC          5/2/2025    21:30 5/3/2025    04:39 5/4/2025    03:13   CBC   WBC 26.93  24.58  15.85    RBC 4.79  4.25  4.09    Hemoglobin 12.7  11.2  10.5    Hematocrit 39.5  35.4  32.9    MCV 82.5  83.3  80.4    MCH 26.5  26.4  25.7    MCHC 32.2  31.6  31.9    RDW 16.5  16.5  16.4    Platelets 256  216  199      BMP          5/2/2025    21:30 5/3/2025    04:39 5/4/2025    03:13   BMP   BUN 14  14  16    Creatinine 1.04  0.93  0.92    Sodium 132  135  132    Potassium 3.9  3.6  3.7    Chloride 95  102  103    CO2 23.9  20.9  20.1    Calcium 9.1  8.2  8.0      LIVER FUNCTION TESTS:      Lab 05/04/25  0313 05/03/25  0439 05/02/25  2130   TOTAL PROTEIN 6.1 6.6 7.9   ALBUMIN 2.7* 3.1* 3.8   GLOBULIN  --  3.5 4.1   ALT (SGPT) 11 14 17   AST (SGOT) 10 13 17   BILIRUBIN 0.6 0.7 0.6   INDIRECT BILIRUBIN 0.4  --   --    BILIRUBIN DIRECT 0.2  --   --    ALK PHOS 79 88 106   LIPASE  --   --  38       [x]  Microbiology   Microbiology Results (last 10 days)       Procedure Component Value - Date/Time    Urine Culture - Urine, Indwelling Urethral Catheter [[id number]]  (Abnormal) Collected: 05/02/25 9027    Lab Status: Preliminary result Specimen: Urine from Indwelling Urethral Catheter Updated: 05/04/25 1144     Urine Culture >100,000 CFU/mL Gram Negative Bacilli    Narrative:      Colonization of the urinary tract without infection is common. Treatment is discouraged unless the patient is  symptomatic, pregnant, or undergoing an invasive urologic procedure.    Blood Culture - Blood, Arm, Right [[id number]]  (Abnormal) Collected: 05/02/25 2154    Lab Status: Preliminary result Specimen: Blood from Arm, Right Updated: 05/03/25 1657     Blood Culture Abnormal Stain     Gram Stain Aerobic Bottle Gram negative bacilli    Narrative:      Less than seven (7) mL's of blood was collected.  Insufficient quantity may yield false negative results.    Blood Culture - Blood, Arm, Left [[id number]]  (Normal) Collected: 05/02/25 2154    Lab Status: Preliminary result Specimen: Blood from Arm, Left Updated: 05/03/25 2200     Blood Culture No growth at 24 hours    Narrative:      Less than seven (7) mL's of blood was collected.  Insufficient quantity may yield false negative results.    Blood Culture ID, PCR - Blood, Arm, Right [[id number]]  (Abnormal) Collected: 05/02/25 2154    Lab Status: Final result Specimen: Blood from Arm, Right Updated: 05/03/25 1702     BCID, PCR Klebsiella pneumoniae group. Identification by BCID2 PCR.     BCID, PCR 2 CTX-M (ESBL) detected. Identification by BCID2 PCR     BOTTLE TYPE Aerobic Bottle    Blood Culture - Blood, Hand, Right [[id number]]  (Normal) Collected: 04/25/25 0849    Lab Status: Final result Specimen: Blood from Hand, Right Updated: 04/30/25 0900     Blood Culture No growth at 5 days    Blood Culture - Blood, Hand, Left [[id number]]  (Normal) Collected: 04/25/25 0848    Lab Status: Final result Specimen: Blood from Hand, Left Updated: 04/30/25 0900     Blood Culture No growth at 5 days              [x]  Radiology CT Abdomen Pelvis Without Contrast  Result Date: 5/2/2025  1.There has been interval placement of a right ureteral stent, which is in proper position by CT. The previously seen obstructing proximal right ureteral calculus is no longer identified. 2.There may be minimal right hydronephrosis. 3.No significant perinephric fluid collection is suggested by nonenhanced CT. No  significant acute retroperitoneal hemorrhage. 4.No left ureterolithiasis or hydronephrosis. 5.Nonobstructing renal calyceal stones are seen bilaterally. 6.Age-indeterminate infectious/inflammatory cystitis is possible. 7.Gallstones and/or gallbladder sludge is (are) identified. Probably no acute cholecystitis. 8.Please see the above comments for further detail. Portions of this note were completed with a voice recognition program. Electronically Signed: Lalito Churchill MD  5/2/2025 10:54 PM EDT  Workstation ID: [id number]        []  EKG/Telemetry   No orders to display       []  Cardiology/Vascular   []  Pathology  [x]  Old records  []  Other:    Assessment & Plan   Assessment / Plan     Assessment/Plan:  Assessment:  Sepsis secondary to ESBL Klebsiella bacteremia  ESBL Klebsiella bacteremia  Complicated UTI  Obstructing right ureteral stone status post stent placement on 4/23/2025  Hypertension  Nonalcoholic steatohepatitis  History of ureteral stone  Neurogenic bladder, self caths at home  Status post trach due to vocal cord palsy from chronic intubation  Spinal cord injury resulting in bilateral lower extremity weakness  Nonischemic cardiomyopathy status post biventricular AICD with pacemaker    Plan:  Labs and imaging reviewed  Antibiotics switched to meropenem to cover ESBL in blood  DC Flagyl  Repeat blood cultures  Continue carvedilol resumed at 3.125 mg twice a day  Continue Lipitor 20 mg nightly  Continue Aricept 10 mg nightly  Urology recommendations appreciated  Continue with Lyrica 200 mg twice a day  Continue venlafaxine 150 mg daily  Maintain Kyle catheter  Diet per tolerance  A.m. labs  Full code  DVT prophylaxis with heparin  Clinical course dictate further management  Discussed with nurse at the bedside    VTE Prophylaxis:  Pharmacologic & mechanical VTE prophylaxis orders are present.        CODE STATUS:   Code Status (Patient has no pulse and is not breathing): CPR (Attempt to Resuscitate)  Medical  Interventions (Patient has pulse or is breathing): Full Support  Level Of Support Discussed With: Patient        Electronically signed by Noe Power MD, 5/4/2025, 13:45 EDT.    Portions of this documentation were transcribed electronically from a voice recognition software.  I confirm all data accurately represents the service(s) I performed at today's visit.

## 2025-05-04 NOTE — PLAN OF CARE
Goal Outcome Evaluation:   Patient alert and able to make needs known, rested through the day, carrera to bedside drainage without complaint. Will continue with plan of care

## 2025-05-05 LAB
ALBUMIN SERPL-MCNC: 2.8 G/DL (ref 3.5–5.2)
ALP SERPL-CCNC: 75 U/L (ref 39–117)
ALT SERPL W P-5'-P-CCNC: 12 U/L (ref 1–41)
ANION GAP SERPL CALCULATED.3IONS-SCNC: 9 MMOL/L (ref 5–15)
AST SERPL-CCNC: 13 U/L (ref 1–40)
BACTERIA SPEC AEROBE CULT: ABNORMAL
BASOPHILS # BLD AUTO: 0.1 10*3/MM3 (ref 0–0.2)
BASOPHILS NFR BLD AUTO: 0.8 % (ref 0–1.5)
BILIRUB CONJ SERPL-MCNC: 0.2 MG/DL (ref 0–0.3)
BILIRUB INDIRECT SERPL-MCNC: 0.2 MG/DL
BILIRUB SERPL-MCNC: 0.4 MG/DL (ref 0–1.2)
BUN SERPL-MCNC: 17 MG/DL (ref 8–23)
BUN/CREAT SERPL: 19.1 (ref 7–25)
CALCIUM SPEC-SCNC: 8.3 MG/DL (ref 8.6–10.5)
CHLORIDE SERPL-SCNC: 105 MMOL/L (ref 98–107)
CO2 SERPL-SCNC: 22 MMOL/L (ref 22–29)
CREAT SERPL-MCNC: 0.89 MG/DL (ref 0.76–1.27)
DEPRECATED RDW RBC AUTO: 47.6 FL (ref 37–54)
EGFRCR SERPLBLD CKD-EPI 2021: 88.3 ML/MIN/1.73
EOSINOPHIL # BLD AUTO: 0.91 10*3/MM3 (ref 0–0.4)
EOSINOPHIL NFR BLD AUTO: 7.7 % (ref 0.3–6.2)
ERYTHROCYTE [DISTWIDTH] IN BLOOD BY AUTOMATED COUNT: 16.3 % (ref 12.3–15.4)
GLUCOSE SERPL-MCNC: 96 MG/DL (ref 65–99)
HCT VFR BLD AUTO: 33.5 % (ref 37.5–51)
HGB BLD-MCNC: 10.6 G/DL (ref 13–17.7)
IMM GRANULOCYTES # BLD AUTO: 0.09 10*3/MM3 (ref 0–0.05)
IMM GRANULOCYTES NFR BLD AUTO: 0.8 % (ref 0–0.5)
LYMPHOCYTES # BLD AUTO: 1.24 10*3/MM3 (ref 0.7–3.1)
LYMPHOCYTES NFR BLD AUTO: 10.5 % (ref 19.6–45.3)
MAGNESIUM SERPL-MCNC: 1.9 MG/DL (ref 1.6–2.4)
MCH RBC QN AUTO: 25.8 PG (ref 26.6–33)
MCHC RBC AUTO-ENTMCNC: 31.6 G/DL (ref 31.5–35.7)
MCV RBC AUTO: 81.5 FL (ref 79–97)
MONOCYTES # BLD AUTO: 0.89 10*3/MM3 (ref 0.1–0.9)
MONOCYTES NFR BLD AUTO: 7.5 % (ref 5–12)
NEUTROPHILS NFR BLD AUTO: 72.7 % (ref 42.7–76)
NEUTROPHILS NFR BLD AUTO: 8.58 10*3/MM3 (ref 1.7–7)
NRBC BLD AUTO-RTO: 0 /100 WBC (ref 0–0.2)
PHOSPHATE SERPL-MCNC: 2 MG/DL (ref 2.5–4.5)
PLATELET # BLD AUTO: 239 10*3/MM3 (ref 140–450)
PMV BLD AUTO: 11.3 FL (ref 6–12)
POTASSIUM SERPL-SCNC: 3.9 MMOL/L (ref 3.5–5.2)
PROT SERPL-MCNC: 6.5 G/DL (ref 6–8.5)
RBC # BLD AUTO: 4.11 10*6/MM3 (ref 4.14–5.8)
SODIUM SERPL-SCNC: 136 MMOL/L (ref 136–145)
WBC NRBC COR # BLD AUTO: 11.81 10*3/MM3 (ref 3.4–10.8)

## 2025-05-05 PROCEDURE — C1751 CATH, INF, PER/CENT/MIDLINE: HCPCS

## 2025-05-05 PROCEDURE — 84100 ASSAY OF PHOSPHORUS: CPT | Performed by: FAMILY MEDICINE

## 2025-05-05 PROCEDURE — 83735 ASSAY OF MAGNESIUM: CPT | Performed by: FAMILY MEDICINE

## 2025-05-05 PROCEDURE — 25010000002 MEROPENEM PER 100 MG: Performed by: FAMILY MEDICINE

## 2025-05-05 PROCEDURE — 25010000002 HEPARIN (PORCINE) PER 1000 UNITS: Performed by: STUDENT IN AN ORGANIZED HEALTH CARE EDUCATION/TRAINING PROGRAM

## 2025-05-05 PROCEDURE — 92610 EVALUATE SWALLOWING FUNCTION: CPT

## 2025-05-05 PROCEDURE — 85025 COMPLETE CBC W/AUTO DIFF WBC: CPT | Performed by: FAMILY MEDICINE

## 2025-05-05 PROCEDURE — 80048 BASIC METABOLIC PNL TOTAL CA: CPT | Performed by: FAMILY MEDICINE

## 2025-05-05 PROCEDURE — 97165 OT EVAL LOW COMPLEX 30 MIN: CPT

## 2025-05-05 PROCEDURE — 99232 SBSQ HOSP IP/OBS MODERATE 35: CPT | Performed by: FAMILY MEDICINE

## 2025-05-05 PROCEDURE — 97161 PT EVAL LOW COMPLEX 20 MIN: CPT

## 2025-05-05 PROCEDURE — 80076 HEPATIC FUNCTION PANEL: CPT | Performed by: FAMILY MEDICINE

## 2025-05-05 PROCEDURE — 36410 VNPNXR 3YR/> PHY/QHP DX/THER: CPT

## 2025-05-05 PROCEDURE — 99231 SBSQ HOSP IP/OBS SF/LOW 25: CPT | Performed by: UROLOGY

## 2025-05-05 RX ORDER — SODIUM CHLORIDE 0.9 % (FLUSH) 0.9 %
10 SYRINGE (ML) INJECTION AS NEEDED
Status: DISCONTINUED | OUTPATIENT
Start: 2025-05-05 | End: 2025-05-07 | Stop reason: HOSPADM

## 2025-05-05 RX ORDER — NYSTATIN 100000 U/G
1 OINTMENT TOPICAL
Status: DISCONTINUED | OUTPATIENT
Start: 2025-05-05 | End: 2025-05-07 | Stop reason: HOSPADM

## 2025-05-05 RX ORDER — SODIUM CHLORIDE 0.9 % (FLUSH) 0.9 %
10 SYRINGE (ML) INJECTION EVERY 12 HOURS SCHEDULED
Status: DISCONTINUED | OUTPATIENT
Start: 2025-05-05 | End: 2025-05-07 | Stop reason: HOSPADM

## 2025-05-05 RX ADMIN — NYSTATIN 1 APPLICATION: 100000 OINTMENT TOPICAL at 21:20

## 2025-05-05 RX ADMIN — VENLAFAXINE HYDROCHLORIDE 150 MG: 75 CAPSULE, EXTENDED RELEASE ORAL at 10:10

## 2025-05-05 RX ADMIN — Medication 10 ML: at 12:31

## 2025-05-05 RX ADMIN — DONEPEZIL HYDROCHLORIDE 10 MG: 10 TABLET, FILM COATED ORAL at 21:23

## 2025-05-05 RX ADMIN — MEROPENEM 1000 MG: 1 INJECTION INTRAVENOUS at 17:40

## 2025-05-05 RX ADMIN — Medication 1000 UNITS: at 10:10

## 2025-05-05 RX ADMIN — CARVEDILOL 3.12 MG: 3.12 TABLET, FILM COATED ORAL at 17:40

## 2025-05-05 RX ADMIN — CARVEDILOL 3.12 MG: 3.12 TABLET, FILM COATED ORAL at 10:11

## 2025-05-05 RX ADMIN — Medication 10 ML: at 21:23

## 2025-05-05 RX ADMIN — PREGABALIN 200 MG: 100 CAPSULE ORAL at 10:10

## 2025-05-05 RX ADMIN — Medication 10 ML: at 21:24

## 2025-05-05 RX ADMIN — HEPARIN SODIUM 5000 UNITS: 5000 INJECTION INTRAVENOUS; SUBCUTANEOUS at 21:23

## 2025-05-05 RX ADMIN — CYANOCOBALAMIN TAB 500 MCG 500 MCG: 500 TAB at 10:10

## 2025-05-05 RX ADMIN — MEROPENEM 1000 MG: 1 INJECTION INTRAVENOUS at 00:03

## 2025-05-05 RX ADMIN — ATORVASTATIN CALCIUM 20 MG: 20 TABLET, FILM COATED ORAL at 21:23

## 2025-05-05 RX ADMIN — PREGABALIN 200 MG: 100 CAPSULE ORAL at 21:23

## 2025-05-05 RX ADMIN — Medication 10 MG: at 21:32

## 2025-05-05 RX ADMIN — HEPARIN SODIUM 5000 UNITS: 5000 INJECTION INTRAVENOUS; SUBCUTANEOUS at 04:36

## 2025-05-05 RX ADMIN — MEROPENEM 1000 MG: 1 INJECTION INTRAVENOUS at 10:11

## 2025-05-05 RX ADMIN — HEPARIN SODIUM 5000 UNITS: 5000 INJECTION INTRAVENOUS; SUBCUTANEOUS at 13:15

## 2025-05-05 RX ADMIN — Medication 10 ML: at 10:12

## 2025-05-05 RX ADMIN — HYOSCYAMINE SULFATE 375 MCG: 0.38 TABLET, EXTENDED RELEASE ORAL at 21:23

## 2025-05-05 RX ADMIN — HYOSCYAMINE SULFATE 375 MCG: 0.38 TABLET, EXTENDED RELEASE ORAL at 10:11

## 2025-05-05 RX ADMIN — Medication 1 TABLET: at 10:11

## 2025-05-05 NOTE — PLAN OF CARE
Goal Outcome Evaluation:  Plan of Care Reviewed With: patient        Progress: no change  Outcome Evaluation: no c/o pain.  Kyle catheter remain in place, yellow urine output with sediments.  No acute event this shift.

## 2025-05-05 NOTE — PLAN OF CARE
Goal Outcome Evaluation:  Plan of Care Reviewed With: patient, spouse        Progress: no change  Outcome Evaluation: Patient presents with limitations in self-care, functional transfers, balance, and endurance. He would benefit from continued skilled occupational therapy services to maximize independence with ADLs/functional transfers.    Anticipated Discharge Disposition (OT): home with home health, home with assist                         Chronic systolic heart failure

## 2025-05-05 NOTE — THERAPY EVALUATION
Acute Care - Speech Language Pathology   Swallow Initial Evaluation  Larry     Patient Name: Anshul Shook Jr.  : 1950  MRN: [id number]  Today's Date: 2025               Admit Date: 2025    Visit Dx:     ICD-10-CM ICD-9-CM   1. Ureteral stent present  Z96.0 V45.89   2. Fever, unspecified fever cause  R50.9 780.60   3. Dysphagia, unspecified type  R13.10 787.20     Patient Active Problem List   Diagnosis    Nonischemic cardiomyopathy    Essential hypertension    Presence of biventricular implantable cardioverter-defibrillator (ICD)    SANTAMARIA (nonalcoholic steatohepatitis)    Ureteral stone    Neurogenic bladder    Left carotid bruit    Sepsis secondary to UTI    Kidney stone    Dehydration     Past Medical History:   Diagnosis Date    Arthritis     Back injury     RESULTING IN PARAPLEGIA    Essential hypertension 2021    History of transfusion     Hyperlipidemia     Injury of back     Irregular heartbeat     SEE'S DR GAY. DENIED  CP/SOB    Kidney stone     Memory loss     Nonischemic cardiomyopathy 2021    FOLLOWS DR. GAY    Osteomyelitis 2024    Paraplegic gait     Presence of biventricular implantable cardioverter-defibrillator (ICD) 2021    [facility] BiV ICD    UTI (urinary tract infection) 2024     Past Surgical History:   Procedure Laterality Date    BACK SURGERY      CARDIAC DEFIBRILLATOR PLACEMENT      [facility]    COLONOSCOPY      COLONOSCOPY N/A 2022    Procedure: COLONOSCOPY WITH POLYPECTOMIES, HOT SNARE, CLIP APPLICATION X2;  Surgeon: Audie West MD;  Location: [facility] ENDOSCOPY;  Service: General;  Laterality: N/A;  COLON POLYPS    CYSTOSCOPY W/ URETERAL STENT PLACEMENT Right 2025    Procedure: CYSTOSCOPY RIGHT, right URETERAL CATHETER/STENT INSERTION.  Placed tube in right ureter;  Surgeon: Rich Ingram MD;  Location: [facility] MAIN OR;  Service: Urology;  Laterality: Right;    CYSTOSCOPY, RETROGRADE PYELOGRAM AND STENT INSERTION Left  05/09/2024    Procedure: CYSTOSCOPY RETROGRADE PYELOGRAM AND STENT INSERTION, left;  Surgeon: Sara Montano MD;  Location: [facility] MAIN OR;  Service: Urology;  Laterality: Left;    ORIF ANKLE FRACTURE Right     PACEMAKER REPLACEMENT Bilateral 02/06/2024    Procedure: PPM generator change bi-v;  Surgeon: Renzo Hobbs MD;  Location: [facility] CATH INVASIVE LOCATION;  Service: Cardiovascular;  Laterality: Bilateral;    URETEROSCOPY LASER LITHOTRIPSY WITH STENT INSERTION Left 6/17/2024    Procedure: CYSTOSCOPY URETEROSCOPY RETROGRADE PYELOGRAM HOLMIUM LASER STENT exchange, left;  Surgeon: Sara Montano MD;  [place] [facility] [place];  Service: Urology;  Laterality: Left;       Inpatient Speech Pathology Dysphagia Evaluation        PAIN SCALE: None indicated.    PRECAUTIONS/CONTRAINDICATIONS: Contact    SUSPECTED ABUSE/NEGLECT/EXPLOITATION: None indicated.    SOCIAL/PSYCHOLOGICAL NEEDS/BARRIERS: None indicated.    PAST SOCIAL HISTORY: 75-year-old male lives at home with family    PRIOR FUNCTION: Independent    PATIENT GOALS/EXPECTATIONS: Return home    HISTORY: 75-year-old male with the above diagnosis is referred for speech therapy evaluation.  Patient with longstanding tracheostomy nearly 20 years.  Patient utilizes a type of committed filter on trach.  Patient noted with adequate voicing in short phrases.  Patient reports having utilize [place] valve 17 years ago for a few months but did not continue with it, he states he is happy with communication utilizing a standard filter.    CURRENT DIET LEVEL: Regular, thin    OBJECTIVE:    TEST ADMINISTERED: Clinical dysphagia evaluation    COGNITION/SAFETY AWARENESS:  patient followed directions and answered questions without difficulty.    BEHAVIORAL OBSERVATIONS: Alert and cooperative    ORAL MOTOR EXAM:Within functional limits    VOICE QUALITY: Patient noted with airway stitch around trach site with trach appearing loose in the stoma.  Vocal quality is adequate  for 1-2 word phrases with patient utilizing phrasing techniques.    REFLEX EXAM: Deferred    POSTURE: Sitting upright in bed    FEEDING/SWALLOWING FUNCTION: Assessed with  thin liquids, puréed solids, crunchy solid.    CLINICAL OBSERVATIONS:  Thin liquid by cup and by straw appeared timely with vocal quality remaining clear to cervical auscultation.  Purée solid with swallow completed with laryngeal elevation noted to palpation.  Crunchy solid with adequate chewing followed by swallow completed clearing the oral cavity.    DYSPHAGIA CRITERIA: Swallow appears functional for nutritional needs.  No overt clinical signs or symptoms of aspiration were noted at the bedside.    FUNCTIONAL ASSESSMENT INSTRUMENT: Patient currently scored a level 7 of 7 on Functional Communication Measures for swallowing indicating a 0% limitation in function.    ASSESSMENT/ PLAN OF CARE:  No direct speech therapy is recommended at this time for dysphagia. Recommend rereferral should patient demonstrate change in status.  Patient declining assessment for possible [place] valve use as he appears functional for communication at this time.    RECOMMENDATIONS:   1.   DIET: Regular solids, thin liquid.    2.  POSITION: Positioning fully upright for all p.o. intake and 30 minutes following.    3.  COMPENSATORY STRATEGIES: Alternate small bites and small sips of solids and liquids at a slow rate.    Pt/responsible party agrees with plan of care and has been informed of all alternatives, risks and benefits.                              Anticipated Discharge Disposition (SLP): home (05/05/25 1101)                                                               EDUCATION  The patient has been educated in the following areas:   Communication Impairment Speaking Valve Modified Diet Instruction.                Time Calculation:    Time Calculation- SLP       Row Name 05/05/25 1101             Time Calculation- SLP    SLP Start Time 0900  -      SLP  Stop Time 1000  -TB      SLP Time Calculation (min) 60 min  -TB      SLP Received On 05/05/25  -TB         Untimed Charges    SLP Eval/Re-eval  ST Eval Oral Pharyng Swallow - 39773  -TB      61303-BM Eval Oral Pharyng Swallow Minutes 60  -TB         Total Minutes    Untimed Charges Total Minutes 60  -TB       Total Minutes 60  -TB                User Key  (r) = Recorded By, (t) = Taken By, (c) = Cosigned By      Initials Name Provider Type    TB Kaylynn Lawrence SLP Speech and Language Pathologist                    Therapy Charges for Today       Code Description Service Date Service Provider Modifiers Qty    [id number] HC ST EVAL ORAL PHARYNG SWALLOW 4 5/5/2025 Kaylynn Lawrence SLP GN 1                 LORENZA Segura  5/5/2025

## 2025-05-05 NOTE — PROGRESS NOTES
[facility]   Urology Progress Note    Patient Name: Anshul Shook Jr.  : 1950  MRN: [id number]  Primary Care Physician:  Won Mcneill MD  Date of admission: 2025    Subjective   Subjective       No complaints    Objective   Objective     Vitals:   Temp:  [97.9 °F (36.6 °C)-98.6 °F (37 °C)] 98.4 °F (36.9 °C)  Heart Rate:  [79-88] 83  Resp:  [18-20] 18  BP: (104-114)/(60-70) 104/70  Physical Exam       No acute distress  Unlabored respirations  Kyle placed, clear       Result Review    Result Review:  I have personally reviewed the results from the time of this admission to 2025 05:13 EDT and agree with these findings:  [x]  Laboratory  [x]  Microbiology  []  Radiology  []  EKG/Telemetry   []  Cardiology/Vascular   []  Pathology  []  Old records  []  Other:      Assessment & Plan   Assessment / Plan     Brief Patient Summary:  Anshul Shook Jr. is a 75 y.o. male who presented for fever, fatigue, after falling at home, history of neurogenic bladder, nephrolithiasis requiring intervention status post recent right emergent stent during admission for sepsis of urinary origin.     Active Hospital Problems:  Active Hospital Problems    Diagnosis     **Sepsis secondary to UTI     Dehydration     Kidney stone      Plan:    Labs pending   blood cultures 1 of 2 Klebsiella bacteremia; repeat cultures pending     medical management     Kyle catheter at time of discharge to maximize drainage and reduce reflux up stent    Outpatient stone management once acute issues resolving infection completely treated    Electronically signed by Sara Monatno MD, 25, 5:13 AM EDT.

## 2025-05-05 NOTE — CONSULTS
Midline Line Insertion Procedure Note    Procedure: Insertion of Midline Catheter    Indications:  Long Term IV therapy    Procedure Details     Patient's pertinent medical history was reviewed.    Ultrasound used to identify an appropriate vein.Sterile technique was used including antiseptics, cap, gloves, hand hygiene, mask, and sheet.    #20G/10CM PowerGlide inserted to the R Cephalic vein per hospital protocol.   Blood return:  yes    Findings:  Catheter inserted to 10 cm, with 0 cm. Exposed.   Catheter was flushed with 20 cc NS. Patient did tolerate procedure well.    LOT:[id number]  Expiration date:10/2025    Recommendations:  Midline Brochure given to patient with teaching instruction.     Deep Cummins RN

## 2025-05-05 NOTE — SIGNIFICANT NOTE
Wound Eval / Progress Noted    [facility]     Patient Name: Anshul Shook Jr.  : 1950  MRN: [id number]  Today's Date: 2025                 Admit Date: 2025    Visit Dx:    ICD-10-CM ICD-9-CM   1. Ureteral stent present  Z96.0 V45.89   2. Fever, unspecified fever cause  R50.9 780.60   3. Dysphagia, unspecified type  R13.10 787.20   4. Difficulty walking  R26.2 719.7   5. Decreased activities of daily living (ADL)  Z78.9 V49.89         Sepsis secondary to UTI    Kidney stone    Dehydration        Past Medical History:   Diagnosis Date    Arthritis     Back injury     RESULTING IN PARAPLEGIA    Essential hypertension 2021    History of transfusion     Hyperlipidemia     Injury of back     Irregular heartbeat     SEE'S DR GAY. DENIED  CP/SOB    Kidney stone     Memory loss     Nonischemic cardiomyopathy 2021    FOLLOWS DR. GAY    Osteomyelitis 2024    Paraplegic gait     Presence of biventricular implantable cardioverter-defibrillator (ICD) 2021    [employer] BiV ICD    UTI (urinary tract infection) 2024        Past Surgical History:   Procedure Laterality Date    BACK SURGERY      CARDIAC DEFIBRILLATOR PLACEMENT      [facility]    COLONOSCOPY      COLONOSCOPY N/A 2022    Procedure: COLONOSCOPY WITH POLYPECTOMIES, HOT SNARE, CLIP APPLICATION X2;  Surgeon: Audie West MD;  Location: [facility] ENDOSCOPY;  Service: General;  Laterality: N/A;  COLON POLYPS    CYSTOSCOPY W/ URETERAL STENT PLACEMENT Right 2025    Procedure: CYSTOSCOPY RIGHT, right URETERAL CATHETER/STENT INSERTION.  Placed tube in right ureter;  Surgeon: Rich Ingram MD;  Location: [facility] MAIN OR;  Service: Urology;  Laterality: Right;    CYSTOSCOPY, RETROGRADE PYELOGRAM AND STENT INSERTION Left 2024    Procedure: CYSTOSCOPY RETROGRADE PYELOGRAM AND STENT INSERTION, left;  Surgeon: Sara Montano MD;  Location: [facility] MAIN OR;  Service: Urology;  Laterality: Left;    ORIF ANKLE FRACTURE  Right     PACEMAKER REPLACEMENT Bilateral 02/06/2024    Procedure: PPM generator change bi-v;  Surgeon: Renzo Hobbs MD;  Location: [facility] CATH INVASIVE LOCATION;  Service: Cardiovascular;  Laterality: Bilateral;    URETEROSCOPY LASER LITHOTRIPSY WITH STENT INSERTION Left 6/17/2024    Procedure: CYSTOSCOPY URETEROSCOPY RETROGRADE PYELOGRAM HOLMIUM LASER STENT exchange, left;  Surgeon: Sara Montano MD;  Location: [facility] MAIN OR;  Service: Urology;  Laterality: Left;         Physical Assessment:  Wound 04/24/25 gluteal Pressure Injury (Active)   Wound Image   05/05/25 1159   Dressing Appearance open to air 05/05/25 1159   Closure None 05/05/25 1159   Base moist;red 05/05/25 1159   Periwound dry;maroon/purple;redness 05/05/25 1159   Periwound Temperature warm 05/05/25 1159   Periwound Skin Turgor soft 05/05/25 1159   Edges open 05/05/25 1159   Drainage Characteristics/Odor serosanguineous 05/05/25 1159   Drainage Amount small 05/05/25 1159   Care, Wound cleansed with;soap and water 05/05/25 1159   Dressing Care open to air 05/05/25 1159   Periwound Care moisturizer applied 05/05/25 1159       Wound 04/24/25 0000 Left anterior great toe Traumatic (Active)   Dressing Appearance open to air 05/05/25 1159   Closure None 05/05/25 1159   Base dry;red 05/05/25 1159   Periwound dry;pink 05/05/25 1159   Periwound Temperature warm 05/05/25 1159   Periwound Skin Turgor soft 05/05/25 1159   Edges rolled/closed 05/05/25 1159   Drainage Amount none 05/05/25 1159   Care, Wound cleansed with;sterile normal saline 05/05/25 1159   Dressing Care open to air 05/05/25 1159   Periwound Care moisturizer applied 05/05/25 1159       Wound 04/24/25 0000 Right anterior great toe Traumatic (Active)   Dressing Appearance open to air 05/05/25 1159   Closure None 05/05/25 1159   Base dry;red 05/05/25 1159   Periwound dry;pink 05/05/25 1159   Periwound Temperature warm 05/05/25 1159   Periwound Skin Turgor soft 05/05/25 1159   Edges  rolled/closed 05/05/25 1159   Drainage Amount none 05/05/25 1159   Care, Wound cleansed with;sterile normal saline 05/05/25 1159   Dressing Care open to air 05/05/25 1159   Periwound Care moisturizer applied 05/05/25 1159       Wound 05/05/25 1159 Left posterior thigh Traumatic (Active)   Wound Image   05/05/25 1159   Dressing Appearance open to air 05/05/25 1159   Closure None 05/05/25 1159   Base dry;brown;tan;red 05/05/25 1159   Periwound dry;redness 05/05/25 1159   Periwound Temperature warm 05/05/25 1159   Periwound Skin Turgor soft 05/05/25 1159   Edges open 05/05/25 1159   Drainage Amount none 05/05/25 1159   Care, Wound cleansed with;sterile normal saline 05/05/25 1159   Dressing Care open to air 05/05/25 1159   Periwound Care moisturizer applied 05/05/25 1159       Wound 05/05/25 1159 Left anterior second toe Traumatic (Active)   Wound Image   05/05/25 1159   Dressing Appearance open to air 05/05/25 1159   Closure None 05/05/25 1159   Base dry;scab;maroon/purple 05/05/25 1159   Periwound dry;redness 05/05/25 1159   Periwound Temperature warm 05/05/25 1159   Periwound Skin Turgor soft 05/05/25 1159   Edges rolled/closed 05/05/25 1159   Drainage Amount none 05/05/25 1159   Care, Wound cleansed with;sterile normal saline 05/05/25 1159   Dressing Care open to air 05/05/25 1159   Periwound Care moisturizer applied 05/05/25 1159       Wound 05/05/25 1159 Right anterior second toe Traumatic (Active)   Wound Image   05/05/25 1159   Dressing Appearance open to air 05/05/25 1159   Closure None 05/05/25 1159   Base dry;scab;brown 05/05/25 1159   Periwound dry;pink 05/05/25 1159   Periwound Temperature warm 05/05/25 1159   Periwound Skin Turgor soft 05/05/25 1159   Edges open 05/05/25 1159   Drainage Amount none 05/05/25 1159   Care, Wound cleansed with;sterile normal saline 05/05/25 1159   Dressing Care open to air 05/05/25 1159   Periwound Care moisturizer applied 05/05/25 1159       Wound 05/05/25 1159 Right  anterior fourth toe Traumatic (Active)   Dressing Appearance open to air 05/05/25 1159   Closure None 05/05/25 1159   Base dry;red 05/05/25 1159   Periwound dry;pink 05/05/25 1159   Periwound Temperature warm 05/05/25 1159   Periwound Skin Turgor soft 05/05/25 1159   Edges open 05/05/25 1159   Drainage Amount none 05/05/25 1159   Care, Wound cleansed with;sterile normal saline 05/05/25 1159   Dressing Care open to air 05/05/25 1159   Periwound Care moisturizer applied 05/05/25 1159     Abbreviated Note     Wound Check / Follow-up:  Patient seen today for wound consult. Patient is awake, alert, and oriented. Patient reports he lives at home with his wife and utilizes a upright walker for ambulation. Patient states the injury to his left proximal posterior thigh was caused by shearing during a fall. Patient states the injuries to his anterior left great, left second, right great, right second, and right fourth toes were caused by his toes rubbing against a pair of stiff leather moccasins he tried to wear but has since stopped wearing. Patient declines the addition of a nutritional supplement.  Patient was noted to have been incontinent of stool prior to assessment.  Incontinence care and linen change provided with assistance per Physical Therapist. Patient was placed in a right side lying position upon completion of assessment.    Please see wound assessments above:    Moisture associated skin damage with a fungal presentation noted to gluteal aspects. Areas of erosion present with moist red tissue. Periwound is dry with redness and a light purple coloration. Cleanse with foam soap and water, blotted dry. Recommending quality skin care and hygiene with application of nystatin ointment two times a day, alternating with blue top moisture barrier two times a day, and as needed for incontinence.  Implement every 2 hour turns, and offload at all times.  Keep patient clean, dry, and free from all moisture. Discussed  findings with Attending MD and orders obtained.    Traumatic injury to left proximal posterior thigh. Wound base is primarily covered with tan to brown crusting with dry red tissue visible. Periwound is dry and red. Cleansed with normal saline and gauze, blotted dry. Recommending quality skin care and hygiene with application of nystatin ointment two times a day, alternating with blue top moisture barrier two times a day, and as needed for incontinence.  Implement every 2 hour turns, and offload at all times.  Keep patient clean, dry, and free from all moisture.     Traumatic injuries to left great and second toes, as well as right great, second, and fourth toes. Dry wound bases noted. Cleansed with normal saline and gauze, blotted dry. Recommending quality skin care and hygiene with application of Aveeno moisturizer twice a day.     Impression: MASD with fungal presentation and erosion to gluteal aspects. Traumatic injury to left proximal posterior thigh. Traumatic injuries to toes.    Short term goals:  Regain skin integrity, skin protection, moisture prevention, pressure reduction, quality skin care and hygiene, topical treatment.    Judit Choi RN    5/5/2025    17:56 EDT

## 2025-05-05 NOTE — THERAPY EVALUATION
Acute Care - Physical Therapy Initial Evaluation   [facility]     Patient Name: Anshul Shook Jr.  : 1950  MRN: [id number]  Today's Date: 2025      Visit Dx:     ICD-10-CM ICD-9-CM   1. Ureteral stent present  Z96.0 V45.89   2. Fever, unspecified fever cause  R50.9 780.60   3. Dysphagia, unspecified type  R13.10 787.20   4. Difficulty walking  R26.2 719.7     Patient Active Problem List   Diagnosis    Nonischemic cardiomyopathy    Essential hypertension    Presence of biventricular implantable cardioverter-defibrillator (ICD)    SANTAMARIA (nonalcoholic steatohepatitis)    Ureteral stone    Neurogenic bladder    Left carotid bruit    Sepsis secondary to UTI    Kidney stone    Dehydration     Past Medical History:   Diagnosis Date    Arthritis     Back injury     RESULTING IN PARAPLEGIA    Essential hypertension 2021    History of transfusion     Hyperlipidemia     Injury of back     Irregular heartbeat     SEE'S DR GAY. DENIED  CP/SOB    Kidney stone     Memory loss     Nonischemic cardiomyopathy 2021    FOLLOWS DR. GAY    Osteomyelitis 2024    Paraplegic gait     Presence of biventricular implantable cardioverter-defibrillator (ICD) 2021    [employer] BiV ICD    UTI (urinary tract infection) 2024     Past Surgical History:   Procedure Laterality Date    BACK SURGERY      CARDIAC DEFIBRILLATOR PLACEMENT      [facility]    COLONOSCOPY      COLONOSCOPY N/A 2022    Procedure: COLONOSCOPY WITH POLYPECTOMIES, HOT SNARE, CLIP APPLICATION X2;  Surgeon: Audie West MD;  Location: [facility] ENDOSCOPY;  Service: General;  Laterality: N/A;  COLON POLYPS    CYSTOSCOPY W/ URETERAL STENT PLACEMENT Right 2025    Procedure: CYSTOSCOPY RIGHT, right URETERAL CATHETER/STENT INSERTION.  Placed tube in right ureter;  Surgeon: Rich Ingram MD;  Location: [facility] MAIN OR;  Service: Urology;  Laterality: Right;    CYSTOSCOPY, RETROGRADE PYELOGRAM AND STENT INSERTION Left 2024     Procedure: CYSTOSCOPY RETROGRADE PYELOGRAM AND STENT INSERTION, left;  Surgeon: Sara Montano MD;  Location: [facility] MAIN OR;  Service: Urology;  Laterality: Left;    ORIF ANKLE FRACTURE Right     PACEMAKER REPLACEMENT Bilateral 02/06/2024    Procedure: PPM generator change bi-v;  Surgeon: Renzo Hobbs MD;  Location: [facility] CATH INVASIVE LOCATION;  Service: Cardiovascular;  Laterality: Bilateral;    URETEROSCOPY LASER LITHOTRIPSY WITH STENT INSERTION Left 6/17/2024    Procedure: CYSTOSCOPY URETEROSCOPY RETROGRADE PYELOGRAM HOLMIUM LASER STENT exchange, left;  Surgeon: Sara Montano MD;  Location: [facility] MAIN OR;  Service: Urology;  Laterality: Left;     PT Assessment (Last 12 Hours)       PT Evaluation and Treatment       Row Name 05/05/25 1500          Physical Therapy Time and Intention    Document Type evaluation  -AV     Mode of Treatment individual therapy;physical therapy  -AV       Row Name 05/05/25 1500          General Information    Patient Profile Reviewed yes  -AV     Patient Observations alert;cooperative;agree to therapy  -AV     Prior Level of Function --  Reports assist with ADLs. Primarily completes squat pivot transfers to manual w/c, which he is able to self propel. Also reports being able to ambulate short distances with an upright walker. No home O2. No family at bedside to verify prior level.  -AV     Equipment Currently Used at Home wheelchair;other (see comments)  upright walker  -AV     Existing Precautions/Restrictions fall  -AV       Row Name 05/05/25 1500          Living Environment    Current Living Arrangements home  -AV     Home Accessibility stairs to enter home  -AV     People in Home spouse;child(mina), adult  Daughter  -AV       Row Name 05/05/25 1500          Home Main Entrance    Number of Stairs, Main Entrance other (see comments)  Ramp  -AV       Row Name 05/05/25 1500          Range of Motion (ROM)    Range of Motion bilateral lower extremities;ROM is WFL  -AV        Row Name 05/05/25 1500          Strength (Manual Muscle Testing)    Strength (Manual Muscle Testing) right lower extremity strength detail;left lower extremity strength detail  -AV     Left Lower Extremity Strength hip;knee;ankle  -AV     Right Lower Extremity Strength hip;knee;ankle  -AV     Hip, Left (Strength) 3+/5  -AV     Knee, Left (Strength) 4-/5  -AV     Ankle, Left (Strength) 0/5  -AV     Hip, Right (Strength) 3/5  -AV     Knee, Right (Strength) 4-/5  -AV     Ankle, Right (Strength) 0/5  -AV       Row Name 05/05/25 1500          Bed Mobility    Bed Mobility supine-sit;sit-supine  -AV     Supine-Sit [place] (Bed Mobility) minimum assist (75% patient effort)  -AV     Sit-Supine [place] (Bed Mobility) minimum assist (75% patient effort)  -AV     Bed Mobility, Safety Issues decreased use of legs for bridging/pushing  -AV     Assistive Device (Bed Mobility) head of bed elevated;bed rails  -AV       Row Name 05/05/25 1500          Transfers    Transfers sit-stand transfer;stand-sit transfer  -AV     Comment, (Transfers) Patient maintains standing with minimal assit for pericare  -AV       Row Name 05/05/25 1500          Sit-Stand Transfer    Sit-Stand [place] (Transfers) moderate assist (50% patient effort)  -AV     Assistive Device (Sit-Stand Transfers) walker, front-wheeled  -AV       Row Name 05/05/25 1500          Stand-Sit Transfer    Stand-Sit [place] (Transfers) moderate assist (50% patient effort)  -AV     Assistive Device (Stand-Sit Transfers) walker, front-wheeled  -AV       Row Name 05/05/25 1500          Gait/Stairs (Locomotion)    Comment, (Gait/Stairs) Attempted to side step along EOB to move towards head of bed before return to supine but patient unable  -AV       Row Name 05/05/25 1500          Safety Issues/Impairments Affecting Functional Mobility    Impairments Affecting Function (Mobility) balance;endurance/activity tolerance;postural/trunk control;strength  -AV       Row  Name 05/05/25 1500          Balance    Balance Assessment standing static balance  -AV     Static Standing Balance minimal assist  -AV     Position/Device Used, Standing Balance supported;walker, front-wheeled  -AV       Row Name             Wound 04/24/25 0000 Right anterior great toe Traumatic    Wound - Properties Group Placement Date: 04/24/25  -KS Placement Time: 0000  -KS Present on Original Admission: Y  -KS Side: Right  -KS Orientation: anterior  -KS Location: great toe  -KS Primary Wound Type: Traumatic  -KS Removal Date: --  -RT Removal Time: --  -RT    Retired Wound - Properties Group Placement Date: 04/24/25  -KS Placement Time: 0000  -KS Present on Original Admission: Y  -KS Side: Right  -KS Orientation: anterior  -KS Location: great toe  -KS Removal Date: --  -RT Removal Time: --  -RT    Retired Wound - Properties Group Placement Date: 04/24/25  -KS Placement Time: 0000  -KS Present on Original Admission: Y  -KS Side: Right  -KS Orientation: anterior  -KS Location: great toe  -KS Removal Date: --  -RT Removal Time: --  -RT    Retired Wound - Properties Group Date first assessed: 04/24/25  -KS Time first assessed: 0000  -KS Present on Original Admission: Y  -KS Side: Right  -KS Location: great toe  -KS Resolution Date: --  -RT Resolution Time: --  -RT      Row Name             Wound 04/24/25 0000 Left anterior great toe Traumatic    Wound - Properties Group Placement Date: 04/24/25  -KS Placement Time: 0000  -KS Present on Original Admission: Y  -KS Side: Left  -KS Orientation: anterior  -KS Location: great toe  -KS Primary Wound Type: Traumatic  -KS Removal Date: --  -RT Removal Time: --  -RT    Retired Wound - Properties Group Placement Date: 04/24/25  -KS Placement Time: 0000  -KS Present on Original Admission: Y  -KS Side: Left  -KS Orientation: anterior  -KS Location: great toe  -KS Removal Date: --  -RT Removal Time: --  -RT    Retired Wound - Properties Group Placement Date: 04/24/25  -KS  Placement Time: 0000  -KS Present on Original Admission: Y  -KS Side: Left  -KS Orientation: anterior  -KS Location: great toe  -KS Removal Date: --  -RT Removal Time: --  -RT    Retired Wound - Properties Group Date first assessed: 04/24/25  -KS Time first assessed: 0000  -KS Present on Original Admission: Y  -KS Side: Left  -KS Location: great toe  -KS Resolution Date: --  -RT Resolution Time: --  -RT      Row Name             Wound 04/24/25 gluteal Pressure Injury    Wound - Properties Group Placement Date: 04/24/25  -KS Present on Original Admission: Y  -KS Location: gluteal  -KS Primary Wound Type: Pressure Inj  -KS Additional Comments: reddened, blanchable  -KS Removal Date: --  -RT Removal Time: --  -RT    Retired Wound - Properties Group Placement Date: 04/24/25  -KS Present on Original Admission: Y  -KS Location: gluteal  -KS Additional Comments: reddened, blanchable  -KS Removal Date: --  -RT Removal Time: --  -RT    Retired Wound - Properties Group Placement Date: 04/24/25  -KS Present on Original Admission: Y  -KS Location: gluteal  -KS Additional Comments: reddened, blanchable  -KS Removal Date: --  -RT Removal Time: --  -RT    Retired Wound - Properties Group Date first assessed: 04/24/25  -KS Present on Original Admission: Y  -KS Location: gluteal  -KS Additional Comments: reddened, blanchable  -KS Resolution Date: --  -RT Resolution Time: --  -RT      Row Name 05/05/25 1500          Plan of Care Review    Plan of Care Reviewed With patient  -AV     Progress no change  -AV     Outcome Evaluation Patient presents with deficits in balance, endurance, transfers, and ambulation. Patient will benefit from skilled PT services to address these mobility deficits and decrease risk of falls.  -AV       Row Name 05/05/25 1500          Positioning and Restraints    Pre-Treatment Position in bed  -AV     Post Treatment Position bed  -AV     In Bed supine;call light within reach;encouraged to call for assist;exit  alarm on;with other staff  wound care  -AV       Row Name 05/05/25 1500          Therapy Assessment/Plan (PT)    Rehab Potential (PT) good  -AV     Criteria for Skilled Interventions Met (PT) yes;meets criteria  -AV     Therapy Frequency (PT) daily  -AV     Predicted Duration of Therapy Intervention (PT) 10 days  -AV     Problem List (PT) problems related to;balance;mobility;strength;postural control  -AV     Activity Limitations Related to Problem List (PT) unable to transfer safely;unable to ambulate safely  -AV       Row Name 05/05/25 1500          PT Evaluation Complexity    History, PT Evaluation Complexity 1-2 personal factors and/or comorbidities  -AV     Examination of Body Systems (PT Eval Complexity) total of 4 or more elements  -AV     Clinical Presentation (PT Evaluation Complexity) stable  -AV     Clinical Decision Making (PT Evaluation Complexity) low complexity  -AV     Overall Complexity (PT Evaluation Complexity) low complexity  -AV       Row Name 05/05/25 1500          Therapy Plan Review/Discharge Plan (PT)    Therapy Plan Review (PT) evaluation/treatment results reviewed;patient  -AV       Row Name 05/05/25 1500          Physical Therapy Goals    Bed Mobility Goal Selection (PT) bed mobility, PT goal 1  -AV     Transfer Goal Selection (PT) transfer, PT goal 1  -AV     Gait Training Goal Selection (PT) gait training, PT goal 1  -AV       Row Name 05/05/25 1500          Bed Mobility Goal 1 (PT)    Activity/Assistive Device (Bed Mobility Goal 1, PT) sit to supine/supine to sit  -AV     [place] Level/Cues Needed (Bed Mobility Goal 1, PT) standby assist  -AV     Time Frame (Bed Mobility Goal 1, PT) 10 days  -AV       Row Name 05/05/25 1500          Transfer Goal 1 (PT)    Activity/Assistive Device (Transfer Goal 1, PT) sit-to-stand/stand-to-sit;bed-to-chair/chair-to-bed;walker, rolling  -AV     [place] Level/Cues Needed (Transfer Goal 1, PT) standby assist  -AV     Time Frame (Transfer Goal  1, PT) 10 days  -AV       Row Name 05/05/25 1500          Gait Training Goal 1 (PT)    Activity/Assistive Device (Gait Training Goal 1, PT) gait (walking locomotion);assistive device use;walker, rolling  -AV     [place] Level (Gait Training Goal 1, PT) standby assist  -AV     Distance (Gait Training Goal 1, PT) 15  -AV     Time Frame (Gait Training Goal 1, PT) 10 days  -AV               User Key  (r) = Recorded By, (t) = Taken By, (c) = Cosigned By      Initials Name Provider Type    Yolanda Balderrama, RN Registered Nurse    RT Kim Reece RN Registered Nurse    AV Sky Busch, PT Physical Therapist                    Physical Therapy Education       Title: PT OT SLP Therapies (In Progress)       Topic: Physical Therapy (In Progress)       Point: Mobility training (In Progress)       Learning Progress Summary            Patient Acceptance, E,D, NR by AV at 5/5/2025 1533                      Point: Home exercise program (Not Started)       Learner Progress:  Not documented in this visit.              Point: Body mechanics (In Progress)       Learning Progress Summary            Patient Acceptance, E,D, NR by AV at 5/5/2025 1533                      Point: Precautions (In Progress)       Learning Progress Summary            Patient Acceptance, E,D, NR by AV at 5/5/2025 1533                                      User Key       Initials Effective Dates Name Provider Type Discipline    AV 06/11/21 -  Sky Busch, EVITA Physical Therapist PT                  PT Recommendation and Plan  Anticipated Discharge Disposition (PT): sub acute care setting  Planned Therapy Interventions (PT): balance training, bed mobility training, gait training, home exercise program, neuromuscular re-education, strengthening, transfer training  Therapy Frequency (PT): daily  Plan of Care Reviewed With: patient  Progress: no change  Outcome Evaluation: Patient presents with deficits in balance, endurance, transfers, and  ambulation. Patient will benefit from skilled PT services to address these mobility deficits and decrease risk of falls.       Time Calculation:    PT Charges       Row Name 05/05/25 1532             Time Calculation    PT Received On 05/05/25  -AV      PT Goal Re-Cert Due Date 05/14/25  -AV         Untimed Charges    PT Eval/Re-eval Minutes 48  -AV         Total Minutes    Untimed Charges Total Minutes 48  -AV       Total Minutes 48  -AV                User Key  (r) = Recorded By, (t) = Taken By, (c) = Cosigned By      Initials Name Provider Type    AV Sky Busch, PT Physical Therapist                  Therapy Charges for Today       Code Description Service Date Service Provider Modifiers Qty    [id number] HC PT EVAL LOW COMPLEXITY 4 5/5/2025 Sky Busch, PT GP 1            PT G-Codes  AM-PAC 6 Clicks Score (PT): 11    Sky Busch, PT  5/5/2025

## 2025-05-05 NOTE — THERAPY EVALUATION
Patient Name: Anshul Shook Jr.  : 1950    MRN: [id number]                              Today's Date: 2025       Admit Date: 2025    Visit Dx:     ICD-10-CM ICD-9-CM   1. Ureteral stent present  Z96.0 V45.89   2. Fever, unspecified fever cause  R50.9 780.60   3. Dysphagia, unspecified type  R13.10 787.20   4. Difficulty walking  R26.2 719.7   5. Decreased activities of daily living (ADL)  Z78.9 V49.89     Patient Active Problem List   Diagnosis    Nonischemic cardiomyopathy    Essential hypertension    Presence of biventricular implantable cardioverter-defibrillator (ICD)    SANTAMARIA (nonalcoholic steatohepatitis)    Ureteral stone    Neurogenic bladder    Left carotid bruit    Sepsis secondary to UTI    Kidney stone    Dehydration     Past Medical History:   Diagnosis Date    Arthritis     Back injury     RESULTING IN PARAPLEGIA    Essential hypertension 2021    History of transfusion     Hyperlipidemia     Injury of back     Irregular heartbeat     SEE'S DR GAY. DENIED  CP/SOB    Kidney stone     Memory loss     Nonischemic cardiomyopathy 2021    FOLLOWS DR. GAY    Osteomyelitis 2024    Paraplegic gait     Presence of biventricular implantable cardioverter-defibrillator (ICD) 2021    [facility] BiV ICD    UTI (urinary tract infection) 2024     Past Surgical History:   Procedure Laterality Date    BACK SURGERY      CARDIAC DEFIBRILLATOR PLACEMENT      [facility]    COLONOSCOPY      COLONOSCOPY N/A 2022    Procedure: COLONOSCOPY WITH POLYPECTOMIES, HOT SNARE, CLIP APPLICATION X2;  Surgeon: Audie West MD;  Location: [facility] ENDOSCOPY;  Service: General;  Laterality: N/A;  COLON POLYPS    CYSTOSCOPY W/ URETERAL STENT PLACEMENT Right 2025    Procedure: CYSTOSCOPY RIGHT, right URETERAL CATHETER/STENT INSERTION.  Placed tube in right ureter;  Surgeon: Rich Ingram MD;  Location: [facility] MAIN OR;  Service: Urology;  Laterality: Right;    CYSTOSCOPY, RETROGRADE  PYELOGRAM AND STENT INSERTION Left 05/09/2024    Procedure: CYSTOSCOPY RETROGRADE PYELOGRAM AND STENT INSERTION, left;  Surgeon: Sara Montano MD;  Location: [facility] MAIN OR;  Service: Urology;  Laterality: Left;    ORIF ANKLE FRACTURE Right     PACEMAKER REPLACEMENT Bilateral 02/06/2024    Procedure: PPM generator change bi-v;  Surgeon: Renzo Hobbs MD;  Location: [facility] CATH INVASIVE LOCATION;  Service: Cardiovascular;  Laterality: Bilateral;    URETEROSCOPY LASER LITHOTRIPSY WITH STENT INSERTION Left 6/17/2024    Procedure: CYSTOSCOPY URETEROSCOPY RETROGRADE PYELOGRAM HOLMIUM LASER STENT exchange, left;  Surgeon: Sara Montano MD;  Location: [facility] MAIN OR;  Service: Urology;  Laterality: Left;      General Information       Row Name 05/05/25 1559          OT Time and Intention    Document Type evaluation  -     Mode of Treatment individual therapy;occupational therapy  -       Row Name 05/05/25 1552          General Information    Patient Profile Reviewed yes  -     Prior Level of Function --  Assist with ADLs, squat pivots transfers, able to self-propel manual w/c using BUEs, has not walked recently per wife, sponge bathes, uses an elevated commode, straight caths, no home O2, and has a hospital bed.  -       Row Name 05/05/25 1552          Occupational Profile    Reason for Services/Referral (Occupational Profile) Patient is a 75 year old male admitted to [facility] for fever and back pain on May 2nd, 2025. Occupational therapy consulted due to recent decline in ADLs/functional transfers. No previous occupational therapy services for current condition.  -       Row Name 05/05/25 1553          Living Environment    Current Living Arrangements home  -     People in Home spouse;child(mina), adult  daughter  -       Row Name 05/05/25 155          Home Main Entrance    Number of Stairs, Main Entrance none  ramp  -       Row Name 05/05/25 4805          Cognition    Orientation  Status (Cognition) oriented x 3  -[facility] Name 05/05/25 1559          Safety Issues/Impairments Affecting Functional Mobility    Impairments Affecting Function (Mobility) balance;endurance/activity tolerance;pain;strength  -               User Key  (r) = Recorded By, (t) = Taken By, (c) = Cosigned By      Initials Name Provider Type     Katie Dueñas OT Occupational Therapist                     Mobility/ADL's       [facility] 05/05/25 1602          Bed Mobility    Bed Mobility supine-sit;sit-supine  -     Supine-Sit [place] (Bed Mobility) minimum assist (75% patient effort)  -     Sit-Supine [place] (Bed Mobility) minimum assist (75% patient effort)  -     Bed Mobility, Safety Issues decreased use of legs for bridging/pushing  -     Assistive Device (Bed Mobility) head of bed elevated;bed rails  -[facility] Name 05/05/25 1602          Transfers    Transfers sit-stand transfer;stand-sit transfer  -LF       Row Name 05/05/25 1602          Sit-Stand Transfer    Sit-Stand [place] (Transfers) moderate assist (50% patient effort)  -     Assistive Device (Sit-Stand Transfers) walker, front-wheeled  -[facility] Name 05/05/25 1602          Stand-Sit Transfer    Stand-Sit [place] (Transfers) moderate assist (50% patient effort)  -     Assistive Device (Stand-Sit Transfers) walker, front-wheeled  -[facility] Name 05/05/25 1602          Activities of Daily Living    BADL Assessment/Intervention bathing;upper body dressing;lower body dressing;grooming;feeding;toileting  -LF       Row Name 05/05/25 1602          Bathing Assessment/Intervention    [place] Level (Bathing) bathing skills;upper body;set up;lower body;dependent (less than 25% patient effort)  -[facility] Name 05/05/25 1602          Upper Body Dressing Assessment/Training    [place] Level (Upper Body Dressing) upper body dressing skills;set up  -[facility] Name 05/05/25 1602          Lower Body Dressing  Assessment/Training    [place] Level (Lower Body Dressing) lower body dressing skills;dependent (less than 25% patient effort)  -       Row Name 05/05/25 1602          Grooming Assessment/Training    [place] Level (Grooming) grooming skills;set up  -       Row Name 05/05/25 1602          Self-Feeding Assessment/Training    [place] Level (Feeding) feeding skills;set up  -       Row Name 05/05/25 1602          Toileting Assessment/Training    [place] Level (Toileting) toileting skills;dependent (less than 25% patient effort)  -               User Key  (r) = Recorded By, (t) = Taken By, (c) = Cosigned By      Initials Name Provider Type     Katie Dueñas OT Occupational Therapist                   Obj/Interventions       Row Name 05/05/25 1602          Sensory Assessment (Somatosensory)    Sensory Assessment (Somatosensory) UE sensation intact  -[facility] Name 05/05/25 1602          Vision Assessment/Intervention    Visual Impairment/Limitations WFL;corrective lenses full-time  -[facility] Name 05/05/25 1602          Range of Motion Comprehensive    General Range of Motion bilateral upper extremity ROM WFL  -[facility] Name 05/05/25 1602          Strength Comprehensive (MMT)    Comment, General Manual Muscle Testing (MMT) Assessment 4+/5 BUEs  -[facility] Name 05/05/25 1602          Motor Skills    Motor Skills coordination;functional endurance  -     Coordination bilateral;upper extremity;WFL  -LF     Functional Endurance Fair  -[facility] Name 05/05/25 1602          Balance    Balance Assessment standing static balance;sitting static balance  -LF     Static Sitting Balance standby assist  -LF     Position, Sitting Balance unsupported;sitting edge of bed  -     Static Standing Balance minimal assist  -LF     Position/Device Used, Standing Balance supported;walker, front-wheeled  -               User Key  (r) = Recorded By, (t) = Taken By, (c) = Cosigned By       Initials Name Provider Type     Katie Dueñas, OT Occupational Therapist                   Goals/Plan       Row Name 05/05/25 1604          Bed Mobility Goal 1 (OT)    Activity/Assistive Device (Bed Mobility Goal 1, OT) bed mobility activities, all  -LF     [place] Level/Cues Needed (Bed Mobility Goal 1, OT) standby assist  -LF     Time Frame (Bed Mobility Goal 1, OT) long term goal (LTG);10 days  -       Row Name 05/05/25 1604          Transfer Goal 1 (OT)    Activity/Assistive Device (Transfer Goal 1, OT) transfers, all  -LF     [place] Level/Cues Needed (Transfer Goal 1, OT) standby assist  -LF     Time Frame (Transfer Goal 1, OT) long term goal (LTG);10 days  -       Row Name 05/05/25 1604          Bathing Goal 1 (OT)    Activity/Device (Bathing Goal 1, OT) bathing skills, all  -LF     [place] Level/Cues Needed (Bathing Goal 1, OT) minimum assist (75% or more patient effort)  -LF     Time Frame (Bathing Goal 1, OT) long term goal (LTG);10 days  -       Row Name 05/05/25 1604          Dressing Goal 1 (OT)    Activity/Device (Dressing Goal 1, OT) dressing skills, all  -LF     [place]/Cues Needed (Dressing Goal 1, OT) minimum assist (75% or more patient effort)  -LF     Time Frame (Dressing Goal 1, OT) 10 days;long term goal (LTG)  -       Row Name 05/05/25 1604          Toileting Goal 1 (OT)    Activity/Device (Toileting Goal 1, OT) toileting skills, all  -LF     [place] Level/Cues Needed (Toileting Goal 1, OT) minimum assist (75% or more patient effort)  -LF     Time Frame (Toileting Goal 1, OT) long term goal (LTG);10 days  -       Row Name 05/05/25 1604          Problem Specific Goal 1 (OT)    Problem Specific Goal 1 (OT) Patient will demonstrate good endurance to support ADLs/functional transfers.  -LF     Time Frame (Problem Specific Goal 1, OT) long term goal (LTG);10 days  -       Row Name 05/05/25 1604          Therapy Assessment/Plan (OT)    Planned Therapy  Interventions (OT) activity tolerance training;BADL retraining;functional balance retraining;occupation/activity based interventions;patient/caregiver education/training;strengthening exercise;transfer/mobility retraining  -               User Key  (r) = Recorded By, (t) = Taken By, (c) = Cosigned By      Initials Name Provider Type     Katie Dueñas OT Occupational Therapist                   Clinical Impression       Row Name 05/05/25 1603          Pain Assessment    Additional Documentation Pain Scale: FACES Pre/Post-Treatment (Group)  -       Row Name 05/05/25 1603          Pain Scale: FACES Pre/Post-Treatment    Pain: FACES Scale, Pretreatment 2-->hurts little bit  -     Posttreatment Pain Rating 2-->hurts little bit  -       Row Name 05/05/25 1603          Plan of Care Review    Plan of Care Reviewed With patient;spouse  -     Progress no change  -     Outcome Evaluation Patient presents with limitations in self-care, functional transfers, balance, and endurance. He would benefit from continued skilled occupational therapy services to maximize independence with ADLs/functional transfers.  -       Row Name 05/05/25 1603          Therapy Assessment/Plan (OT)    Patient/Family Therapy Goal Statement (OT) To maximize independence.  -     Rehab Potential (OT) good  -     Criteria for Skilled Therapeutic Interventions Met (OT) yes;meets criteria;skilled treatment is necessary  -     Therapy Frequency (OT) 5 times/wk  -       Row Name 05/05/25 1603          Therapy Plan Review/Discharge Plan (OT)    Anticipated Discharge Disposition (OT) home with home health;home with assist  -       Row Name 05/05/25 1603          Vital Signs    O2 Delivery Pre Treatment room air  -     O2 Delivery Intra Treatment room air  -     O2 Delivery Post Treatment room air  -       Row Name 05/05/25 1603          Positioning and Restraints    Pre-Treatment Position in bed  -     Post Treatment Position  bed  -LF     In Bed fowlers;call light within reach;encouraged to call for assist;exit alarm on;with family/caregiver  -LF               User Key  (r) = Recorded By, (t) = Taken By, (c) = Cosigned By      Initials Name Provider Type    LF Katie Dueñas, OT Occupational Therapist                   Outcome Measures       Row Name 05/05/25 1604          How much help from another is currently needed...    Putting on and taking off regular lower body clothing? 1  -LF     Bathing (including washing, rinsing, and drying) 2  -LF     Toileting (which includes using toilet bed pan or urinal) 1  -LF     Putting on and taking off regular upper body clothing 4  -LF     Taking care of personal grooming (such as brushing teeth) 4  -LF     Eating meals 4  -LF     AM-PAC 6 Clicks Score (OT) 16  -LF       Row Name 05/05/25 0816          How much help from another person do you currently need...    Turning from your back to your side while in flat bed without using bedrails? 3  -SL     Moving from lying on back to sitting on the side of a flat bed without bedrails? 2  -SL     Moving to and from a bed to a chair (including a wheelchair)? 2  -SL     Standing up from a chair using your arms (e.g., wheelchair, bedside chair)? 2  -SL     Climbing 3-5 steps with a railing? 1  -SL     To walk in hospital room? 1  -SL     AM-PAC 6 Clicks Score (PT) 11  -SL       Row Name 05/05/25 1604          Functional Assessment    Outcome Measure Options AM-PAC 6 Clicks Daily Activity (OT);Optimal Instrument  -LF       Row Name 05/05/25 1604          Optimal Instrument    Optimal Instrument Optimal - 3  -LF     Bending/Stooping 4  -LF     Standing 3  -LF     Reaching 1  -LF     From the list, choose the 3 activities you would most like to be able to do without any difficulty Bending/stooping;Standing;Reaching  -LF     Total Score Optimal - 3 8  -LF               User Key  (r) = Recorded By, (t) = Taken By, (c) = Cosigned By      Initials Name Provider  Type    LF Katie Dueñas, LATONYA Occupational Therapist    SL Lee, Soo Kyeong, RN Registered Nurse                    Occupational Therapy Education       Title: PT OT SLP Therapies (In Progress)       Topic: Occupational Therapy (In Progress)       Point: ADL training (In Progress)       Learning Progress Summary            Patient Acceptance, E,TB, NR by  at 5/5/2025 1605                      Point: Precautions (In Progress)       Learning Progress Summary            Patient Acceptance, E,TB, NR by  at 5/5/2025 1605                      Point: Body mechanics (In Progress)       Learning Progress Summary            Patient Acceptance, E,TB, NR by  at 5/5/2025 1605                                      User Key       Initials Effective Dates Name Provider Type Discipline     06/16/21 -  Katie Dueñas OT Occupational Therapist OT                  OT Recommendation and Plan  Planned Therapy Interventions (OT): activity tolerance training, BADL retraining, functional balance retraining, occupation/activity based interventions, patient/caregiver education/training, strengthening exercise, transfer/mobility retraining  Therapy Frequency (OT): 5 times/wk  Plan of Care Review  Plan of Care Reviewed With: patient, spouse  Progress: no change  Outcome Evaluation: Patient presents with limitations in self-care, functional transfers, balance, and endurance. He would benefit from continued skilled occupational therapy services to maximize independence with ADLs/functional transfers.     Time Calculation:   Evaluation Complexity (OT)  Review Occupational Profile/Medical/Therapy History Complexity: brief/low complexity  Assessment, Occupational Performance/Identification of Deficit Complexity: 3-5 performance deficits  Clinical Decision Making Complexity (OT): problem focused assessment/low complexity  Overall Complexity of Evaluation (OT): low complexity     Time Calculation- OT       Row Name 05/05/25 7305              Time Calculation- OT    OT Received On 05/05/25  -LF      OT Goal Re-Cert Due Date 05/14/25  -LF         Untimed Charges    OT Eval/Re-eval Minutes 46  -LF         Total Minutes    Untimed Charges Total Minutes 46  -LF       Total Minutes 46  -LF                User Key  (r) = Recorded By, (t) = Taken By, (c) = Cosigned By      Initials Name Provider Type     Katie Dueñas OT Occupational Therapist                  Therapy Charges for Today       Code Description Service Date Service Provider Modifiers Qty    [id number] HC OT EVAL LOW COMPLEXITY 4 5/5/2025 Katie Dueñas OT GO 1                 Katie Dueñas OT  5/5/2025

## 2025-05-05 NOTE — PLAN OF CARE
Goal Outcome Evaluation:   Outcome evaluation: VSS. Patient a/o x4, able to make needs know to nursing staff. No c/o pain or discomfort. F/C with dark yellow urine output. Continuation of IV antibiotic meropenem. Trach care completed by patient. Patient room air.

## 2025-05-05 NOTE — PROGRESS NOTES
[facility]   Hospitalist Progress Note  Date: 2025  Patient Name: Anshul Shook Jr.  : 1950  MRN: [id number]  Date of admission: 2025      Subjective   Subjective     Chief complaint: Fever, back pain    Summary:  75-year-old male with history of traumatic injury to the spine that resulted in prolonged hypoxemia resulting in prolonged ventilator status status post chronic tracheostomy and vocal cord palsy, nonischemic cardiomyopathy, neurogenic bladder with home self catheterizations, nonalcoholic steatohepatitis, AICD with pacemaker in place, neurogenic bladder, bilateral lower extremity weakness, hypertension, dyslipidemia, recent right ureteral calculus status post stent placement without stone removal, rehospitalized on 5/3/2025, with chief complaint of fevers and back pain, found to have urinalysis suggestive of UTI, blood cultures positive for ESBL Klebsiella, bacteremic likely secondary to UTI, placed on broad-spectrum antibiotics, urology consulted for complicated UTI, on meropenem IV, will need to transition to IV Invanz, patient is familiar with IV antibiotics as outpatient, has had to do this before, midline placement 2025    Interval follow-up: Seen and examined, at baseline with mentation, no fevers, chills, sweats.  Repeat blood cultures negative, midline placement today, white blood cell count still elevated 11,000, hemoglobin 10.6, creatinine 0.89, BUN 17, potassium 3.9, sodium up to 136.  Patient is familiar with IV antibiotics at home, has been able to do this on his own previously, still need final sensitivities to arrange outpatient antibiotics.    Review of systems:  All systems reviewed and negative except for weakness, fatigue    Objective   Objective     Vitals:   Temp:  [98.1 °F (36.7 °C)-98.6 °F (37 °C)] 98.1 °F (36.7 °C)  Heart Rate:  [79-88] 85  Resp:  [18-19] 18  BP: (104-116)/(63-98) 113/98  Physical Exam               Constitutional: Awake, alert, no acute  distress laying in bed              Eyes: Pupils equal, sclerae anicteric, no conjunctival injection              HENT: NCAT, mucous membranes moist, trach in place              Respiratory: Clear to auscultation bilaterally, nonlabored respirations               Cardiovascular: Paced rhythm, regular, no murmurs              Gastrointestinal: soft, nondistended, nontender              Musculoskeletal: No bilateral ankle edema, no clubbing or cyanosis to extremities, Kyle catheter in place              Neurologic: Oriented x 3, bilateral lower extremity weakness, normal strength in the bilateral upper extremities         Result Review    Result Review:  I have personally reviewed the pertinent results from the past 24 hours to 5/5/2025 12:59 EDT and agree with these findings:  [x]  Laboratory   CBC          5/3/2025    04:39 5/4/2025    03:13 5/5/2025    04:27   CBC   WBC 24.58  15.85  11.81    RBC 4.25  4.09  4.11    Hemoglobin 11.2  10.5  10.6    Hematocrit 35.4  32.9  33.5    MCV 83.3  80.4  81.5    MCH 26.4  25.7  25.8    MCHC 31.6  31.9  31.6    RDW 16.5  16.4  16.3    Platelets 216  199  239      BMP          5/3/2025    04:39 5/4/2025    03:13 5/5/2025    04:27   BMP   BUN 14  16  17    Creatinine 0.93  0.92  0.89    Sodium 135  132  136    Potassium 3.6  3.7  3.9    Chloride 102  103  105    CO2 20.9  20.1  22.0    Calcium 8.2  8.0  8.3      LIVER FUNCTION TESTS:      Lab 05/05/25  0427 05/04/25  0313 05/03/25  0439 05/02/25  2130   TOTAL PROTEIN 6.5 6.1 6.6 7.9   ALBUMIN 2.8* 2.7* 3.1* 3.8   GLOBULIN  --   --  3.5 4.1   ALT (SGPT) 12 11 14 17   AST (SGOT) 13 10 13 17   BILIRUBIN 0.4 0.6 0.7 0.6   INDIRECT BILIRUBIN 0.2 0.4  --   --    BILIRUBIN DIRECT 0.2 0.2  --   --    ALK PHOS 75 [phone]   LIPASE  --   --   --  38       [x]  Microbiology   Microbiology Results (last 10 days)       Procedure Component Value - Date/Time    Blood Culture - Blood, Hand, Left [[id number]]  (Normal) Collected: 05/04/25 0808     Lab Status: Preliminary result Specimen: Blood from Hand, Left Updated: 05/05/25 0815     Blood Culture No growth at 24 hours    Blood Culture - Blood, Arm, Right [[id number]]  (Normal) Collected: 05/04/25 0808    Lab Status: Preliminary result Specimen: Blood from Arm, Right Updated: 05/05/25 0815     Blood Culture No growth at 24 hours    Urine Culture - Urine, Indwelling Urethral Catheter [[id number]]  (Abnormal) Collected: 05/02/25 2313    Lab Status: Preliminary result Specimen: Urine from Indwelling Urethral Catheter Updated: 05/04/25 1144     Urine Culture >100,000 CFU/mL Gram Negative Bacilli    Narrative:      Colonization of the urinary tract without infection is common. Treatment is discouraged unless the patient is symptomatic, pregnant, or undergoing an invasive urologic procedure.    Blood Culture - Blood, Arm, Right [[id number]]  (Abnormal) Collected: 05/02/25 2154    Lab Status: Preliminary result Specimen: Blood from Arm, Right Updated: 05/05/25 0852     Blood Culture Gram Negative Bacilli     Isolated from Aerobic Bottle     Gram Stain Aerobic Bottle Gram negative bacilli    Narrative:      Less than seven (7) mL's of blood was collected.  Insufficient quantity may yield false negative results.    Blood Culture - Blood, Arm, Left [[id number]]  (Normal) Collected: 05/02/25 2154    Lab Status: Preliminary result Specimen: Blood from Arm, Left Updated: 05/04/25 2200     Blood Culture No growth at 2 days    Narrative:      Less than seven (7) mL's of blood was collected.  Insufficient quantity may yield false negative results.    Blood Culture ID, PCR - Blood, Arm, Right [[id number]]  (Abnormal) Collected: 05/02/25 2154    Lab Status: Final result Specimen: Blood from Arm, Right Updated: 05/03/25 1702     BCID, PCR Klebsiella pneumoniae group. Identification by BCID2 PCR.     BCID, PCR 2 CTX-M (ESBL) detected. Identification by BCID2 PCR     BOTTLE TYPE Aerobic Bottle              [x]  Radiology CT  Abdomen Pelvis Without Contrast  Result Date: 5/2/2025  1.There has been interval placement of a right ureteral stent, which is in proper position by CT. The previously seen obstructing proximal right ureteral calculus is no longer identified. 2.There may be minimal right hydronephrosis. 3.No significant perinephric fluid collection is suggested by nonenhanced CT. No significant acute retroperitoneal hemorrhage. 4.No left ureterolithiasis or hydronephrosis. 5.Nonobstructing renal calyceal stones are seen bilaterally. 6.Age-indeterminate infectious/inflammatory cystitis is possible. 7.Gallstones and/or gallbladder sludge is (are) identified. Probably no acute cholecystitis. 8.Please see the above comments for further detail. Portions of this note were completed with a voice recognition program. Electronically Signed: Lalito Churchill MD  5/2/2025 10:54 PM EDT  Workstation ID: [id number]        []  EKG/Telemetry   No orders to display       []  Cardiology/Vascular   []  Pathology  [x]  Old records  []  Other:    Assessment & Plan   Assessment / Plan     Assessment/Plan:  Assessment:  Sepsis secondary to ESBL Klebsiella bacteremia  ESBL Klebsiella bacteremia  Complicated UTI  Obstructing right ureteral stone status post stent placement on 4/23/2025  Hypertension  Nonalcoholic steatohepatitis  History of ureteral stone  Neurogenic bladder, self caths at home  Status post trach due to vocal cord palsy from chronic intubation  Spinal cord injury resulting in bilateral lower extremity weakness  Nonischemic cardiomyopathy status post biventricular AICD with pacemaker    Plan:  Labs and imaging reviewed  Antibiotics switched to meropenem to cover ESBL in blood  Midline placement today  Follow-up final sensitivities  Continue carvedilol resumed at 3.125 mg twice a day  Continue Lipitor 20 mg nightly  Continue Aricept 10 mg nightly  Urology recommendations appreciated  Continue with Lyrica 200 mg twice a day  Continue venlafaxine  150 mg daily  Maintain Kyle catheter  Diet per tolerance  A.m. labs  Full code  DVT prophylaxis with heparin  Clinical course dictate further management  Discussed with nurse at the bedside    VTE Prophylaxis:  Pharmacologic & mechanical VTE prophylaxis orders are present.        CODE STATUS:   Code Status (Patient has no pulse and is not breathing): CPR (Attempt to Resuscitate)  Medical Interventions (Patient has pulse or is breathing): Full Support  Level Of Support Discussed With: Patient        Electronically signed by Noe Power MD, 5/5/2025, 12:59 EDT.    Portions of this documentation were transcribed electronically from a voice recognition software.  I confirm all data accurately represents the service(s) I performed at today's visit.

## 2025-05-06 LAB
ALBUMIN SERPL-MCNC: 3 G/DL (ref 3.5–5.2)
ALP SERPL-CCNC: 71 U/L (ref 39–117)
ALT SERPL W P-5'-P-CCNC: 14 U/L (ref 1–41)
ANION GAP SERPL CALCULATED.3IONS-SCNC: 8.2 MMOL/L (ref 5–15)
AST SERPL-CCNC: 15 U/L (ref 1–40)
BACTERIA SPEC AEROBE CULT: ABNORMAL
BASOPHILS # BLD AUTO: 0.12 10*3/MM3 (ref 0–0.2)
BASOPHILS NFR BLD AUTO: 1.7 % (ref 0–1.5)
BILIRUB CONJ SERPL-MCNC: 0.1 MG/DL (ref 0–0.3)
BILIRUB INDIRECT SERPL-MCNC: 0.2 MG/DL
BILIRUB SERPL-MCNC: 0.3 MG/DL (ref 0–1.2)
BUN SERPL-MCNC: 15 MG/DL (ref 8–23)
BUN/CREAT SERPL: 20.5 (ref 7–25)
CALCIUM SPEC-SCNC: 8.3 MG/DL (ref 8.6–10.5)
CHLORIDE SERPL-SCNC: 105 MMOL/L (ref 98–107)
CO2 SERPL-SCNC: 22.8 MMOL/L (ref 22–29)
CREAT SERPL-MCNC: 0.73 MG/DL (ref 0.76–1.27)
DEPRECATED RDW RBC AUTO: 47.4 FL (ref 37–54)
EGFRCR SERPLBLD CKD-EPI 2021: 93.7 ML/MIN/1.73
EOSINOPHIL # BLD AUTO: 0.82 10*3/MM3 (ref 0–0.4)
EOSINOPHIL NFR BLD AUTO: 11.7 % (ref 0.3–6.2)
ERYTHROCYTE [DISTWIDTH] IN BLOOD BY AUTOMATED COUNT: 16.1 % (ref 12.3–15.4)
GLUCOSE SERPL-MCNC: 94 MG/DL (ref 65–99)
GRAM STN SPEC: ABNORMAL
HCT VFR BLD AUTO: 34.2 % (ref 37.5–51)
HGB BLD-MCNC: 10.7 G/DL (ref 13–17.7)
IMM GRANULOCYTES # BLD AUTO: 0.06 10*3/MM3 (ref 0–0.05)
IMM GRANULOCYTES NFR BLD AUTO: 0.9 % (ref 0–0.5)
ISOLATED FROM: ABNORMAL
LYMPHOCYTES # BLD AUTO: 1.3 10*3/MM3 (ref 0.7–3.1)
LYMPHOCYTES NFR BLD AUTO: 18.6 % (ref 19.6–45.3)
MAGNESIUM SERPL-MCNC: 2 MG/DL (ref 1.6–2.4)
MCH RBC QN AUTO: 25.5 PG (ref 26.6–33)
MCHC RBC AUTO-ENTMCNC: 31.3 G/DL (ref 31.5–35.7)
MCV RBC AUTO: 81.6 FL (ref 79–97)
MONOCYTES # BLD AUTO: 0.76 10*3/MM3 (ref 0.1–0.9)
MONOCYTES NFR BLD AUTO: 10.9 % (ref 5–12)
NEUTROPHILS NFR BLD AUTO: 3.92 10*3/MM3 (ref 1.7–7)
NEUTROPHILS NFR BLD AUTO: 56.2 % (ref 42.7–76)
NRBC BLD AUTO-RTO: 0 /100 WBC (ref 0–0.2)
PHOSPHATE SERPL-MCNC: 2.4 MG/DL (ref 2.5–4.5)
PLATELET # BLD AUTO: 231 10*3/MM3 (ref 140–450)
PMV BLD AUTO: 10.8 FL (ref 6–12)
POTASSIUM SERPL-SCNC: 4 MMOL/L (ref 3.5–5.2)
PROT SERPL-MCNC: 6.4 G/DL (ref 6–8.5)
RBC # BLD AUTO: 4.19 10*6/MM3 (ref 4.14–5.8)
SODIUM SERPL-SCNC: 136 MMOL/L (ref 136–145)
WBC NRBC COR # BLD AUTO: 6.98 10*3/MM3 (ref 3.4–10.8)

## 2025-05-06 PROCEDURE — 83735 ASSAY OF MAGNESIUM: CPT | Performed by: FAMILY MEDICINE

## 2025-05-06 PROCEDURE — 99232 SBSQ HOSP IP/OBS MODERATE 35: CPT | Performed by: INTERNAL MEDICINE

## 2025-05-06 PROCEDURE — 80076 HEPATIC FUNCTION PANEL: CPT | Performed by: FAMILY MEDICINE

## 2025-05-06 PROCEDURE — 97110 THERAPEUTIC EXERCISES: CPT

## 2025-05-06 PROCEDURE — 84100 ASSAY OF PHOSPHORUS: CPT | Performed by: FAMILY MEDICINE

## 2025-05-06 PROCEDURE — 25010000002 MEROPENEM PER 100 MG: Performed by: FAMILY MEDICINE

## 2025-05-06 PROCEDURE — 25010000002 HEPARIN (PORCINE) PER 1000 UNITS: Performed by: STUDENT IN AN ORGANIZED HEALTH CARE EDUCATION/TRAINING PROGRAM

## 2025-05-06 PROCEDURE — 80048 BASIC METABOLIC PNL TOTAL CA: CPT | Performed by: FAMILY MEDICINE

## 2025-05-06 PROCEDURE — 85025 COMPLETE CBC W/AUTO DIFF WBC: CPT | Performed by: FAMILY MEDICINE

## 2025-05-06 PROCEDURE — 99231 SBSQ HOSP IP/OBS SF/LOW 25: CPT | Performed by: UROLOGY

## 2025-05-06 RX ADMIN — NYSTATIN 1 APPLICATION: 100000 OINTMENT TOPICAL at 21:35

## 2025-05-06 RX ADMIN — PREGABALIN 200 MG: 100 CAPSULE ORAL at 09:53

## 2025-05-06 RX ADMIN — CARVEDILOL 3.12 MG: 3.12 TABLET, FILM COATED ORAL at 17:47

## 2025-05-06 RX ADMIN — VENLAFAXINE HYDROCHLORIDE 150 MG: 75 CAPSULE, EXTENDED RELEASE ORAL at 09:53

## 2025-05-06 RX ADMIN — ATORVASTATIN CALCIUM 20 MG: 20 TABLET, FILM COATED ORAL at 21:30

## 2025-05-06 RX ADMIN — Medication 10 ML: at 09:52

## 2025-05-06 RX ADMIN — MEROPENEM 1000 MG: 1 INJECTION INTRAVENOUS at 09:51

## 2025-05-06 RX ADMIN — HEPARIN SODIUM 5000 UNITS: 5000 INJECTION INTRAVENOUS; SUBCUTANEOUS at 21:30

## 2025-05-06 RX ADMIN — Medication 1000 UNITS: at 09:53

## 2025-05-06 RX ADMIN — MEROPENEM 1000 MG: 1 INJECTION INTRAVENOUS at 02:10

## 2025-05-06 RX ADMIN — HEPARIN SODIUM 5000 UNITS: 5000 INJECTION INTRAVENOUS; SUBCUTANEOUS at 05:22

## 2025-05-06 RX ADMIN — Medication 1 APPLICATION: at 21:35

## 2025-05-06 RX ADMIN — HEPARIN SODIUM 5000 UNITS: 5000 INJECTION INTRAVENOUS; SUBCUTANEOUS at 14:09

## 2025-05-06 RX ADMIN — NYSTATIN 1 APPLICATION: 100000 OINTMENT TOPICAL at 09:54

## 2025-05-06 RX ADMIN — HYOSCYAMINE SULFATE 375 MCG: 0.38 TABLET, EXTENDED RELEASE ORAL at 09:53

## 2025-05-06 RX ADMIN — HYOSCYAMINE SULFATE 375 MCG: 0.38 TABLET, EXTENDED RELEASE ORAL at 21:30

## 2025-05-06 RX ADMIN — MEROPENEM 1000 MG: 1 INJECTION INTRAVENOUS at 17:47

## 2025-05-06 RX ADMIN — Medication 10 ML: at 21:35

## 2025-05-06 RX ADMIN — PREGABALIN 200 MG: 100 CAPSULE ORAL at 21:30

## 2025-05-06 RX ADMIN — CYANOCOBALAMIN TAB 500 MCG 500 MCG: 500 TAB at 09:53

## 2025-05-06 RX ADMIN — DONEPEZIL HYDROCHLORIDE 10 MG: 10 TABLET, FILM COATED ORAL at 21:30

## 2025-05-06 RX ADMIN — Medication 1 TABLET: at 09:53

## 2025-05-06 RX ADMIN — Medication 1 APPLICATION: at 09:54

## 2025-05-06 RX ADMIN — Medication 1 APPLICATION: at 05:19

## 2025-05-06 RX ADMIN — CARVEDILOL 3.12 MG: 3.12 TABLET, FILM COATED ORAL at 09:53

## 2025-05-06 NOTE — PROGRESS NOTES
[facility]   Hospitalist Progress Note  Date: 2025  Patient Name: Anshul Shook Jr.  : 1950  MRN: [id number]  Date of admission: 2025      Subjective   Subjective     Chief complaint: Fever, back pain    Summary:  75-year-old male with history of traumatic injury to the spine that resulted in prolonged hypoxemia resulting in prolonged ventilator status status post chronic tracheostomy and vocal cord palsy, nonischemic cardiomyopathy, neurogenic bladder with home self catheterizations, nonalcoholic steatohepatitis, AICD with pacemaker in place, neurogenic bladder, bilateral lower extremity weakness, hypertension, dyslipidemia, recent right ureteral calculus status post stent placement without stone removal, rehospitalized on 5/3/2025, with chief complaint of fevers and back pain, found to have urinalysis suggestive of UTI, blood cultures positive for ESBL Klebsiella, bacteremic likely secondary to UTI, placed on broad-spectrum antibiotics, urology consulted for complicated UTI, on meropenem IV, will need to transition to IV Invanz, patient is familiar with IV antibiotics as outpatient, has had to do this before, midline placement 2025    Interval follow-up: feeling better    Objective   Objective     Vitals:   Temp:  [97.7 °F (36.5 °C)-99 °F (37.2 °C)] 97.9 °F (36.6 °C)  Heart Rate:  [78-85] 85  Resp:  [16] 16  BP: (101-133)/(59-73) 118/65  Physical Exam               Constitutional: Awake, alert, no acute distress laying in bed              Eyes: Pupils equal, sclerae anicteric, no conjunctival injection              HENT: NCAT, mucous membranes moist, trach in place              Respiratory: Clear to auscultation bilaterally, nonlabored respirations               Cardiovascular: Paced rhythm, regular, no murmurs              Gastrointestinal: soft, nondistended, nontender              Musculoskeletal: No bilateral ankle edema, no clubbing or cyanosis to extremities, Kyle catheter in  place              Neurologic: Oriented x 3, bilateral lower extremity weakness, normal strength in the bilateral upper extremities         Result Review    Result Review:  I have personally reviewed the pertinent results from the past 24 hours to 5/6/2025 19:29 EDT and agree with these findings:  [x]  Laboratory   CBC          5/4/2025    03:13 5/5/2025    04:27 5/6/2025    02:56   CBC   WBC 15.85  11.81  6.98    RBC 4.09  4.11  4.19    Hemoglobin 10.5  10.6  10.7    Hematocrit 32.9  33.5  34.2    MCV 80.4  81.5  81.6    MCH 25.7  25.8  25.5    MCHC 31.9  31.6  31.3    RDW 16.4  16.3  16.1    Platelets 199  239  231      BMP          5/4/2025    03:13 5/5/2025    04:27 5/6/2025    02:56   BMP   BUN 16  17  15    Creatinine 0.92  0.89  0.73    Sodium 132  136  136    Potassium 3.7  3.9  4.0    Chloride 103  105  105    CO2 20.1  22.0  22.8    Calcium 8.0  8.3  8.3      LIVER FUNCTION TESTS:      Lab 05/06/25  0256 05/05/25  0427 05/04/25  0313 05/03/25  0439 05/02/25  2130   TOTAL PROTEIN 6.4 6.5 6.1 6.6 7.9   ALBUMIN 3.0* 2.8* 2.7* 3.1* 3.8   GLOBULIN  --   --   --  3.5 4.1   ALT (SGPT) 14 12 11 14 17   AST (SGOT) 15 13 10 13 17   BILIRUBIN 0.3 0.4 0.6 0.7 0.6   INDIRECT BILIRUBIN 0.2 0.2 0.4  --   --    BILIRUBIN DIRECT 0.1 0.2 0.2  --   --    ALK PHOS 71 [phone]   LIPASE  --   --   --   --  38       [x]  Microbiology   Microbiology Results (last 10 days)       Procedure Component Value - Date/Time    Blood Culture - Blood, Hand, Left [[id number]]  (Normal) Collected: 05/04/25 0808    Lab Status: Preliminary result Specimen: Blood from Hand, Left Updated: 05/06/25 0815     Blood Culture No growth at 2 days    Blood Culture - Blood, Arm, Right [[id number]]  (Normal) Collected: 05/04/25 0808    Lab Status: Preliminary result Specimen: Blood from Arm, Right Updated: 05/06/25 0815     Blood Culture No growth at 2 days    Urine Culture - Urine, Indwelling Urethral Catheter [[id number]]  (Abnormal)  (Susceptibility)  Collected: 05/02/25 2313    Lab Status: Final result Specimen: Urine from Indwelling Urethral Catheter Updated: 05/05/25 1347     Urine Culture >100,000 CFU/mL Klebsiella pneumoniae ESBL    Narrative:      Colonization of the urinary tract without infection is common. Treatment is discouraged unless the patient is symptomatic, pregnant, or undergoing an invasive urologic procedure.  Recent outcomes data supports the use of pip/tazo in the treatment of susceptible ESBL infections for uncomplicated UTI. Consider use of pip/tazo as a carbapenem-sparing regimen in applicable patients.    Susceptibility        Klebsiella pneumoniae ESBL      SAL      Ertapenem Susceptible      Gentamicin Susceptible      Levofloxacin Intermediate      Meropenem Susceptible      Nitrofurantoin Resistant      Piperacillin + Tazobactam Resistant      Trimethoprim + Sulfamethoxazole Resistant                           Blood Culture - Blood, Arm, Right [[id number]]  (Abnormal)  (Susceptibility) Collected: 05/02/25 2154    Lab Status: Final result Specimen: Blood from Arm, Right Updated: 05/06/25 0706     Blood Culture Klebsiella pneumoniae ESBL     Comment:   Consider infectious disease consult.  Susceptibility results may not correlate to clinical outcomes.  For ESBL-producing infections in the blood, a carbapenem is recommended as first-line therapy for optimal clinical outcomes.        Isolated from Aerobic Bottle     Gram Stain Aerobic Bottle Gram negative bacilli    Narrative:      Less than seven (7) mL's of blood was collected.  Insufficient quantity may yield false negative results.    Susceptibility        Klebsiella pneumoniae ESBL      SAL      Ertapenem Susceptible      Meropenem Susceptible                       Susceptibility Comments       Klebsiella pneumoniae ESBL    With the exception of urinary-sourced infections, aminoglycosides should not be used as monotherapy.               Blood Culture - Blood, Arm, Left [[id number]]   (Normal) Collected: 05/02/25 2154    Lab Status: Preliminary result Specimen: Blood from Arm, Left Updated: 05/05/25 2200     Blood Culture No growth at 3 days    Narrative:      Less than seven (7) mL's of blood was collected.  Insufficient quantity may yield false negative results.    Blood Culture ID, PCR - Blood, Arm, Right [[id number]]  (Abnormal) Collected: 05/02/25 2154    Lab Status: Final result Specimen: Blood from Arm, Right Updated: 05/03/25 1702     BCID, PCR Klebsiella pneumoniae group. Identification by BCID2 PCR.     BCID, PCR 2 CTX-M (ESBL) detected. Identification by BCID2 PCR     BOTTLE TYPE Aerobic Bottle              [x]  Radiology CT Abdomen Pelvis Without Contrast  Result Date: 5/2/2025  1.There has been interval placement of a right ureteral stent, which is in proper position by CT. The previously seen obstructing proximal right ureteral calculus is no longer identified. 2.There may be minimal right hydronephrosis. 3.No significant perinephric fluid collection is suggested by nonenhanced CT. No significant acute retroperitoneal hemorrhage. 4.No left ureterolithiasis or hydronephrosis. 5.Nonobstructing renal calyceal stones are seen bilaterally. 6.Age-indeterminate infectious/inflammatory cystitis is possible. 7.Gallstones and/or gallbladder sludge is (are) identified. Probably no acute cholecystitis. 8.Please see the above comments for further detail. Portions of this note were completed with a voice recognition program. Electronically Signed: Lalito Churchill MD  5/2/2025 10:54 PM EDT  Workstation ID: [id number]        []  EKG/Telemetry   No orders to display       []  Cardiology/Vascular   []  Pathology  [x]  Old records  []  Other:    Assessment & Plan   Assessment / Plan     Assessment:  Sepsis secondary to ESBL Klebsiella bacteremia  ESBL Klebsiella bacteremia  Complicated UTI  Obstructing right ureteral stone status post stent placement on 4/23/2025  Hypertension  Nonalcoholic  steatohepatitis  History of ureteral stone  Neurogenic bladder, self caths at home  Status post trach due to vocal cord palsy from chronic intubation  Spinal cord injury resulting in bilateral lower extremity weakness  Nonischemic cardiomyopathy status post biventricular AICD with pacemaker    Plan:  Labs and imaging reviewed  Antibiotics switched to meropenem to cover ESBL in blood  Midline placement today  Follow-up final sensitivities  Continue carvedilol resumed at 3.125 mg twice a day  Continue Lipitor 20 mg nightly  Continue Aricept 10 mg nightly  Urology recommendations appreciated  Continue with Lyrica 200 mg twice a day  Continue venlafaxine 150 mg daily  Maintain Kyle catheter  Diet per tolerance  A.m. labs  Full code  DVT prophylaxis with heparin  Clinical course dictate further management  Discussed with nurse at the bedside  DC when outpt abx arranged, hopefully tomorrow      VTE Prophylaxis:  Pharmacologic & mechanical VTE prophylaxis orders are present.        CODE STATUS:   Code Status (Patient has no pulse and is not breathing): CPR (Attempt to Resuscitate)  Medical Interventions (Patient has pulse or is breathing): Full Support  Level Of Support Discussed With: Patient      Electronically signed by Renzo Waller MD, 05/06/25, 7:29 PM EDT.

## 2025-05-06 NOTE — THERAPY TREATMENT NOTE
Acute Care - Physical Therapy Treatment Note   Larry     Patient Name: Anshul Shook Jr.  : 1950  MRN: [id number]  Today's Date: 2025      Visit Dx:     ICD-10-CM ICD-9-CM   1. Ureteral stent present  Z96.0 V45.89   2. Fever, unspecified fever cause  R50.9 780.60   3. Dysphagia, unspecified type  R13.10 787.20   4. Difficulty walking  R26.2 719.7   5. Decreased activities of daily living (ADL)  Z78.9 V49.89     Patient Active Problem List   Diagnosis    Nonischemic cardiomyopathy    Essential hypertension    Presence of biventricular implantable cardioverter-defibrillator (ICD)    SANTAMARIA (nonalcoholic steatohepatitis)    Ureteral stone    Neurogenic bladder    Left carotid bruit    Sepsis secondary to UTI    Kidney stone    Dehydration     Past Medical History:   Diagnosis Date    Arthritis     Back injury     RESULTING IN PARAPLEGIA    Essential hypertension 2021    History of transfusion     Hyperlipidemia     Injury of back     Irregular heartbeat     SEE'S DR GAY. DENIED  CP/SOB    Kidney stone     Memory loss     Nonischemic cardiomyopathy 2021    FOLLOWS DR. GAY    Osteomyelitis 2024    Paraplegic gait     Presence of biventricular implantable cardioverter-defibrillator (ICD) 2021    [facility] BiV ICD    UTI (urinary tract infection) 2024     Past Surgical History:   Procedure Laterality Date    BACK SURGERY      CARDIAC DEFIBRILLATOR PLACEMENT      [facility]    COLONOSCOPY      COLONOSCOPY N/A 2022    Procedure: COLONOSCOPY WITH POLYPECTOMIES, HOT SNARE, CLIP APPLICATION X2;  Surgeon: Audie West MD;  Location: [facility] ENDOSCOPY;  Service: General;  Laterality: N/A;  COLON POLYPS    CYSTOSCOPY W/ URETERAL STENT PLACEMENT Right 2025    Procedure: CYSTOSCOPY RIGHT, right URETERAL CATHETER/STENT INSERTION.  Placed tube in right ureter;  Surgeon: Rich Ingram MD;  Location: [facility] MAIN OR;  Service: Urology;  Laterality: Right;    CYSTOSCOPY,  RETROGRADE PYELOGRAM AND STENT INSERTION Left 05/09/2024    Procedure: CYSTOSCOPY RETROGRADE PYELOGRAM AND STENT INSERTION, left;  Surgeon: Sara Montano MD;  Location: [facility] MAIN OR;  Service: Urology;  Laterality: Left;    ORIF ANKLE FRACTURE Right     PACEMAKER REPLACEMENT Bilateral 02/06/2024    Procedure: PPM generator change bi-v;  Surgeon: Renzo Hobbs MD;  Location: [facility] CATH INVASIVE LOCATION;  Service: Cardiovascular;  Laterality: Bilateral;    URETEROSCOPY LASER LITHOTRIPSY WITH STENT INSERTION Left 6/17/2024    Procedure: CYSTOSCOPY URETEROSCOPY RETROGRADE PYELOGRAM HOLMIUM LASER STENT exchange, left;  Surgeon: Sara Montano MD;  Location: [facility] MAIN OR;  Service: Urology;  Laterality: Left;     PT Assessment (Last 12 Hours)       PT Evaluation and Treatment       Row Name 05/06/25 1441          Physical Therapy Time and Intention    Subjective Information no complaints (P)   -RA     Document Type therapy note (daily note) (P)   -RA     Mode of Treatment individual therapy;physical therapy (P)   -RA     Patient Effort good (P)   -RA     Symptoms Noted During/After Treatment none (P)   -RA       Row Name 05/06/25 1441          General Information    Patient Profile Reviewed yes (P)   -RA     Patient Observations alert;cooperative;agree to therapy (P)   -RA       Row Name 05/06/25 1441          Bed Mobility    Bed Mobility supine-sit;sit-supine (P)   -RA     Supine-Sit [place] (Bed Mobility) minimum assist (75% patient effort) (P)   -RA     Sit-Supine [place] (Bed Mobility) minimum assist (75% patient effort) (P)   -RA     Bed Mobility, Safety Issues decreased use of legs for bridging/pushing (P)   -RA     Assistive Device (Bed Mobility) head of bed elevated;bed rails (P)   -RA       Row Name 05/06/25 1441          Transfers    Transfers sit-stand transfer;stand-sit transfer (P)   -RA       Row Name 05/06/25 1441          Sit-Stand Transfer    Sit-Stand [place] (Transfers)  minimum assist (75% patient effort) (P)   -RA     Assistive Device (Sit-Stand Transfers) walker, front-wheeled (P)   -RA       Row Name 05/06/25 1441          Stand-Sit Transfer    Stand-Sit [place] (Transfers) minimum assist (75% patient effort) (P)   -RA     Assistive Device (Stand-Sit Transfers) walker, front-wheeled (P)   -RA       Row Name 05/06/25 1441          Gait/Stairs (Locomotion)    Gait/Stairs Locomotion gait/ambulation assistive device (P)   -RA     [place] Level (Gait) minimum assist (75% patient effort) (P)   -RA     Assistive Device (Gait) walker, front-wheeled (P)   -RA     Patient was able to Ambulate yes (P)   -RA     Distance in Feet (Gait) 2 (P)   -RA     Deviations/Abnormal Patterns (Gait) base of support, narrow;festinating/shuffling;scissoring (P)   -RA       Row Name 05/06/25 1441          Balance    Balance Assessment standing dynamic balance (P)   -RA     Dynamic Standing Balance minimal assist (P)   -RA     Position/Device Used, Standing Balance walker, rolling (P)   -RA       Row Name 05/06/25 1441          Motor Skills    Therapeutic Exercise other (see comments) (P)   supine SLR x10; supine hip abduction/adduction x10  -RA       Row Name             Wound 04/24/25 0000 Right anterior great toe Traumatic    Wound - Properties Group Placement Date: 04/24/25  -KS Placement Time: 0000  -KS Present on Original Admission: Y  -KS Side: Right  -KS Orientation: anterior  -KS Location: great toe  -KS Primary Wound Type: Traumatic  -KS Removal Date: --  -RT Removal Time: --  -RT    Retired Wound - Properties Group Placement Date: 04/24/25  -KS Placement Time: 0000  -KS Present on Original Admission: Y  -KS Side: Right  -KS Orientation: anterior  -KS Location: great toe  -KS Removal Date: --  -RT Removal Time: --  -RT    Retired Wound - Properties Group Placement Date: 04/24/25  -KS Placement Time: 0000  -KS Present on Original Admission: Y  -KS Side: Right  -KS Orientation: anterior   -KS Location: great toe  -KS Removal Date: --  -RT Removal Time: --  -RT    Retired Wound - Properties Group Date first assessed: 04/24/25  -KS Time first assessed: 0000  -KS Present on Original Admission: Y  -KS Side: Right  -KS Location: great toe  -KS Resolution Date: --  -RT Resolution Time: --  -RT      Row Name             Wound 04/24/25 0000 Left anterior great toe Traumatic    Wound - Properties Group Placement Date: 04/24/25  -KS Placement Time: 0000  -KS Present on Original Admission: Y  -KS Side: Left  -KS Orientation: anterior  -KS Location: great toe  -KS Primary Wound Type: Traumatic  -KS Removal Date: --  -RT Removal Time: --  -RT    Retired Wound - Properties Group Placement Date: 04/24/25  -KS Placement Time: 0000  -KS Present on Original Admission: Y  -KS Side: Left  -KS Orientation: anterior  -KS Location: great toe  -KS Removal Date: --  -RT Removal Time: --  -RT    Retired Wound - Properties Group Placement Date: 04/24/25  -KS Placement Time: 0000  -KS Present on Original Admission: Y  -KS Side: Left  -KS Orientation: anterior  -KS Location: great toe  -KS Removal Date: --  -RT Removal Time: --  -RT    Retired Wound - Properties Group Date first assessed: 04/24/25  -KS Time first assessed: 0000  -KS Present on Original Admission: Y  -KS Side: Left  -KS Location: great toe  -KS Resolution Date: --  -RT Resolution Time: --  -RT      Row Name             Wound 04/24/25 gluteal Pressure Injury    Wound - Properties Group Placement Date: 04/24/25  -KS Present on Original Admission: Y  -KS Location: gluteal  -KS Primary Wound Type: Pressure Inj  -KS Additional Comments: reddened, blanchable  -KS Removal Date: --  -RT Removal Time: --  -RT    Retired Wound - Properties Group Placement Date: 04/24/25  -KS Present on Original Admission: Y  -KS Location: gluteal  -KS Additional Comments: reddened, blanchable  -KS Removal Date: --  -RT Removal Time: --  -RT    Retired Wound - Properties Group Placement  Date: 04/24/25  -KS Present on Original Admission: Y  -KS Location: gluteal  -KS Additional Comments: reddened, blanchable  -KS Removal Date: --  -RT Removal Time: --  -RT    Retired Wound - Properties Group Date first assessed: 04/24/25  -KS Present on Original Admission: Y  -KS Location: gluteal  -KS Additional Comments: reddened, blanchable  -KS Resolution Date: --  -RT Resolution Time: --  -RT      Row Name             Wound 05/05/25 1159 Left posterior thigh Traumatic    Wound - Properties Group Placement Date: 05/05/25  -KE Placement Time: 1159  -KE Side: Left  -KE Orientation: posterior  -KE Location: thigh  -KE Primary Wound Type: Traumatic  -KE    Retired Wound - Properties Group Placement Date: 05/05/25  -KE Placement Time: 1159  -KE Side: Left  -KE Orientation: posterior  -KE Location: thigh  -KE    Retired Wound - Properties Group Placement Date: 05/05/25  -KE Placement Time: 1159  -KE Side: Left  -KE Orientation: posterior  -KE Location: thigh  -KE    Retired Wound - Properties Group Date first assessed: 05/05/25  -KE Time first assessed: 1159  -KE Side: Left  -KE Location: thigh  -KE      Row Name             Wound 05/05/25 1159 Left anterior second toe Traumatic    Wound - Properties Group Placement Date: 05/05/25  -KE Placement Time: 1159  -KE Side: Left  -KE Orientation: anterior  -KE Location: second toe  -KE Primary Wound Type: Traumatic  -KE    Retired Wound - Properties Group Placement Date: 05/05/25  -KE Placement Time: 1159  -KE Side: Left  -KE Orientation: anterior  -KE Location: second toe  -KE    Retired Wound - Properties Group Placement Date: 05/05/25  -KE Placement Time: 1159  -KE Side: Left  -KE Orientation: anterior  -KE Location: second toe  -KE    Retired Wound - Properties Group Date first assessed: 05/05/25  -KE Time first assessed: 1159  -KE Side: Left  -KE Location: second toe  -KE      Row Name             Wound 05/05/25 1159 Right anterior second toe Traumatic    Wound -  Properties Group Placement Date: 05/05/25  -KE Placement Time: 1159  -KE Side: Right  -KE Orientation: anterior  -KE Location: second toe  -KE Primary Wound Type: Traumatic  -KE    Retired Wound - Properties Group Placement Date: 05/05/25  -KE Placement Time: 1159  -KE Side: Right  -KE Orientation: anterior  -KE Location: second toe  -KE    Retired Wound - Properties Group Placement Date: 05/05/25  -KE Placement Time: 1159  -KE Side: Right  -KE Orientation: anterior  -KE Location: second toe  -KE    Retired Wound - Properties Group Date first assessed: 05/05/25  -KE Time first assessed: 1159  -KE Side: Right  -KE Location: second toe  -KE      Row Name             Wound 05/05/25 1159 Right anterior fourth toe Traumatic    Wound - Properties Group Placement Date: 05/05/25  -KE Placement Time: 1159  -KE Side: Right  -KE Orientation: anterior  -KE Location: fourth toe  -KE Primary Wound Type: Traumatic  -KE    Retired Wound - Properties Group Placement Date: 05/05/25  -KE Placement Time: 1159  -KE Side: Right  -KE Orientation: anterior  -KE Location: fourth toe  -KE    Retired Wound - Properties Group Placement Date: 05/05/25  -KE Placement Time: 1159  -KE Side: Right  -KE Orientation: anterior  -KE Location: fourth toe  -KE    Retired Wound - Properties Group Date first assessed: 05/05/25  -KE Time first assessed: 1159  -KE Side: Right  -KE Location: fourth toe  -KE      Row Name 05/06/25 1441          Progress Summary (PT)    Progress Toward Functional Goals (PT) progress toward functional goals is good (P)   -RA     Daily Progress Summary (PT) Patient is demonstrating good progress towards his goals. He tolerated transfers, supine there ex, and 2 steps of ambulation well with assist. Continue POC. (P)   -RA               User Key  (r) = Recorded By, (t) = Taken By, (c) = Cosigned By      Initials Name Provider Type    Yolanda Balderrama, RN Registered Nurse    Kim Nagel RN Registered Nurse    MARTIN Choi  Judit, RN Registered Nurse    Nichole Albarran, PT Student PT Student                    Physical Therapy Education       Title: PT OT SLP Therapies (In Progress)       Topic: Physical Therapy (In Progress)       Point: Mobility training (In Progress)       Learning Progress Summary            Patient Acceptance, E,D, NR by AV at 5/5/2025 1533                      Point: Home exercise program (Not Started)       Learner Progress:  Not documented in this visit.              Point: Body mechanics (In Progress)       Learning Progress Summary            Patient Acceptance, E,D, NR by AV at 5/5/2025 1533                      Point: Precautions (In Progress)       Learning Progress Summary            Patient Acceptance, E,D, NR by AV at 5/5/2025 1533                                      User Key       Initials Effective Dates Name Provider Type Discipline    AV 06/11/21 -  Sky Busch, PT Physical Therapist PT                  PT Recommendation and Plan     Progress Summary (PT)  Progress Toward Functional Goals (PT): (P) progress toward functional goals is good  Daily Progress Summary (PT): (P) Patient is demonstrating good progress towards his goals. He tolerated transfers, supine there ex, and 2 steps of ambulation well with assist. Continue POC.   Outcome Measures       Row Name 05/06/25 1445             How much help from another person do you currently need...    Turning from your back to your side while in flat bed without using bedrails? 3 (P)   -RA      Moving from lying on back to sitting on the side of a flat bed without bedrails? 3 (P)   -RA      Moving to and from a bed to a chair (including a wheelchair)? 3 (P)   -RA      Standing up from a chair using your arms (e.g., wheelchair, bedside chair)? 3 (P)   -RA      Climbing 3-5 steps with a railing? 2 (P)   -RA      To walk in hospital room? 2 (P)   -RA      AM-PAC 6 Clicks Score (PT) 16 (P)   -RA                User Key  (r) = Recorded By, (t) = Taken  By, (c) = Cosigned By      Initials Name Provider Type    Nichole Albarran, PT Student PT Student                     Time Calculation:    PT Charges       Row Name 05/06/25 1447             Time Calculation    PT Received On 05/06/25 (P)   -RA         Timed Charges    75633 - PT Therapeutic Exercise Minutes 8 (P)   -RA      75312 - PT Therapeutic Activity Minutes 8 (P)   -RA         Total Minutes    Timed Charges Total Minutes 16 (P)   -RA       Total Minutes 16 (P)   -RA                User Key  (r) = Recorded By, (t) = Taken By, (c) = Cosigned By      Initials Name Provider Type    Nichole Albarran, PT Student PT Student                      PT G-Codes  Outcome Measure Options: AM-PAC 6 Clicks Daily Activity (OT), Optimal Instrument  AM-PAC 6 Clicks Score (PT): (P) 16  AM-PAC 6 Clicks Score (OT): 16    Nichole Berg PT Student  5/6/2025

## 2025-05-06 NOTE — PROGRESS NOTES
[facility]   Urology Progress Note    Patient Name: Anshul Shook Jr.  : 1950  MRN: [id number]  Primary Care Physician:  Won Mcneill MD  Date of admission: 2025    Subjective   Subjective       No acute events    Objective   Objective     Vitals:   Temp:  [97.7 °F (36.5 °C)-98.1 °F (36.7 °C)] 97.7 °F (36.5 °C)  Heart Rate:  [82-86] 83  Resp:  [16-18] 16  BP: (107-122)/(59-98) 122/59  Physical Exam       No acute distress  Unlabored respirations  Kyle placed, clear       Result Review    Result Review:  I have personally reviewed the results from the time of this admission to 2025 08:27 EDT and agree with these findings:  [x]  Laboratory  [x]  Microbiology  []  Radiology  []  EKG/Telemetry   []  Cardiology/Vascular   []  Pathology  []  Old records  []  Other:      Assessment & Plan   Assessment / Plan     Brief Patient Summary:  Anshul Shook Jr. is a 75 y.o. male who presented for fever, fatigue, after falling at home, history of neurogenic bladder, nephrolithiasis requiring intervention status post recent right emergent stent during admission for sepsis of urinary origin.     Active Hospital Problems:  Active Hospital Problems    Diagnosis     **Sepsis secondary to UTI     Dehydration     Kidney stone      Plan:    Labs reviewed  blood cultures 1 of 2 Klebsiella bacteremia; repeat cultures negative     Medical management     Kyle catheter at time of discharge to maximize drainage and reduce reflux up stent    Okay for discharge from a urology standpoint once appropriate per primary service; office to arrange outpatient stone management once infection treated acute issues resolved    Outpatient stone management once acute issues resolving infection completely treated      Electronically signed by Sara Montano MD, 25, 8:27 AM EDT.

## 2025-05-06 NOTE — PLAN OF CARE
Goal Outcome Evaluation:  Plan of Care Reviewed With: patient        Progress: improving  Outcome Evaluation: pt awaiting for transport to [facility].  no c/o pain or discomfort.  no acute event this shift.

## 2025-05-06 NOTE — THERAPY TREATMENT NOTE
Patient Name: Anshul Shook Jr.  : 1950    MRN: [id number]                              Today's Date: 2025       Admit Date: 2025    Visit Dx:     ICD-10-CM ICD-9-CM   1. Ureteral stent present  Z96.0 V45.89   2. Fever, unspecified fever cause  R50.9 780.60   3. Dysphagia, unspecified type  R13.10 787.20   4. Difficulty walking  R26.2 719.7   5. Decreased activities of daily living (ADL)  Z78.9 V49.89     Patient Active Problem List   Diagnosis    Nonischemic cardiomyopathy    Essential hypertension    Presence of biventricular implantable cardioverter-defibrillator (ICD)    SANTAMARIA (nonalcoholic steatohepatitis)    Ureteral stone    Neurogenic bladder    Left carotid bruit    Sepsis secondary to UTI    Kidney stone    Dehydration     Past Medical History:   Diagnosis Date    Arthritis     Back injury     RESULTING IN PARAPLEGIA    Essential hypertension 2021    History of transfusion     Hyperlipidemia     Injury of back     Irregular heartbeat     SEE'S DR GAY. DENIED  CP/SOB    Kidney stone     Memory loss     Nonischemic cardiomyopathy 2021    FOLLOWS DR. GAY    Osteomyelitis 2024    Paraplegic gait     Presence of biventricular implantable cardioverter-defibrillator (ICD) 2021    [employer] BiV ICD    UTI (urinary tract infection) 2024     Past Surgical History:   Procedure Laterality Date    BACK SURGERY      CARDIAC DEFIBRILLATOR PLACEMENT      [facility]    COLONOSCOPY      COLONOSCOPY N/A 2022    Procedure: COLONOSCOPY WITH POLYPECTOMIES, HOT SNARE, CLIP APPLICATION X2;  Surgeon: Audie West MD;  Location: [facility] ENDOSCOPY;  Service: General;  Laterality: N/A;  COLON POLYPS    CYSTOSCOPY W/ URETERAL STENT PLACEMENT Right 2025    Procedure: CYSTOSCOPY RIGHT, right URETERAL CATHETER/STENT INSERTION.  Placed tube in right ureter;  Surgeon: Rich Ingram MD;  Location: [facility] MAIN OR;  Service: Urology;  Laterality: Right;    CYSTOSCOPY, RETROGRADE  PYELOGRAM AND STENT INSERTION Left 05/09/2024    Procedure: CYSTOSCOPY RETROGRADE PYELOGRAM AND STENT INSERTION, left;  Surgeon: Sara Montano MD;  Location: [facility] MAIN OR;  Service: Urology;  Laterality: Left;    ORIF ANKLE FRACTURE Right     PACEMAKER REPLACEMENT Bilateral 02/06/2024    Procedure: PPM generator change bi-v;  Surgeon: Renzo Hobbs MD;  Location: [facility] CATH INVASIVE LOCATION;  Service: Cardiovascular;  Laterality: Bilateral;    URETEROSCOPY LASER LITHOTRIPSY WITH STENT INSERTION Left 6/17/2024    Procedure: CYSTOSCOPY URETEROSCOPY RETROGRADE PYELOGRAM HOLMIUM LASER STENT exchange, left;  Surgeon: Sara Montano MD;  Location: [facility] MAIN OR;  Service: Urology;  Laterality: Left;      General Information       Row Name 05/06/25 1444          OT Time and Intention    Document Type therapy note (daily note)  -     Mode of Treatment individual therapy;occupational therapy  -               User Key  (r) = Recorded By, (t) = Taken By, (c) = Cosigned By      Initials Name Provider Type     Katie Dueñas OT Occupational Therapist                     Mobility/ADL's    No documentation.                  Obj/Interventions       Row Name 05/06/25 1444          Shoulder (Therapeutic Exercise)    Shoulder (Therapeutic Exercise) strengthening exercise  -     Shoulder Strengthening (Therapeutic Exercise) bilateral;flexion;extension;horizontal aBduction/aDduction;resistance band;red;3 sets;10 repetitions  -       Row Name 05/06/25 1444          Elbow/Forearm (Therapeutic Exercise)    Elbow/Forearm (Therapeutic Exercise) strengthening exercise  -     Elbow/Forearm Strengthening (Therapeutic Exercise) bilateral;flexion;extension;resistance band;red;3 sets;10 repetitions  -       Row Name 05/06/25 1444          Motor Skills    Motor Skills functional endurance  -LF     Functional Endurance Fair  -     Therapeutic Exercise shoulder;elbow/forearm  -               User Key  (r) = Recorded  By, (t) = Taken By, (c) = Cosigned By      Initials Name Provider Type     Katie Dueñas OT Occupational Therapist                   Goals/Plan    No documentation.                  Clinical Impression       Row Name 05/06/25 1444          Pain Assessment    Additional Documentation Pain Scale: FACES Pre/Post-Treatment (Group)  -[facility] Name 05/06/25 1444          Pain Scale: FACES Pre/Post-Treatment    Pain: FACES Scale, Pretreatment 0-->no hurt  -LF     Posttreatment Pain Rating 0-->no hurt  -LF       Row Name 05/06/25 1444          Plan of Care Review    Plan of Care Reviewed With patient  -LF     Progress improving  -     Outcome Evaluation Pt agreeable to BUE exercises, demonstrating fair endurance. He would benefit from continued OT services until safe d/c.  -       Row Name 05/06/25 1444          Vital Signs    O2 Delivery Pre Treatment room air  -     O2 Delivery Intra Treatment room air  -     O2 Delivery Post Treatment room air  -[facility] Name 05/06/25 1444          Positioning and Restraints    Pre-Treatment Position in bed  -LF     Post Treatment Position bed  -LF     In Bed fowlers;call light within reach;encouraged to call for assist;exit alarm on  -LF               User Key  (r) = Recorded By, (t) = Taken By, (c) = Cosigned By      Initials Name Provider Type     Katie Dueñas OT Occupational Therapist                   Outcome Measures       Row Name 05/06/25 1445          How much help from another is currently needed...    Putting on and taking off regular lower body clothing? 1  -LF     Bathing (including washing, rinsing, and drying) 2  -LF     Toileting (which includes using toilet bed pan or urinal) 1  -LF     Putting on and taking off regular upper body clothing 4  -LF     Taking care of personal grooming (such as brushing teeth) 4  -LF     Eating meals 4  -LF     AM-PAC 6 Clicks Score (OT) 16  -LF       Row Name 05/06/25 1445 05/06/25 0811       How much help from  another person do you currently need...    Turning from your back to your side while in flat bed without using bedrails? 3 (P)   -RA 3  -SL    Moving from lying on back to sitting on the side of a flat bed without bedrails? 3 (P)   -RA 2  -SL    Moving to and from a bed to a chair (including a wheelchair)? 3 (P)   -RA 2  -SL    Standing up from a chair using your arms (e.g., wheelchair, bedside chair)? 3 (P)   -RA 2  -SL    Climbing 3-5 steps with a railing? 2 (P)   -RA 1  -SL    To walk in hospital room? 2 (P)   -RA 1  -SL    AM-PAC 6 Clicks Score (PT) 16 (P)   -RA 11  -SL      Row Name 05/06/25 1445          Functional Assessment    Outcome Measure Options AM-PAC 6 Clicks Daily Activity (OT);Optimal Instrument  -LF       Row Name 05/06/25 1445          Optimal Instrument    Optimal Instrument Optimal - 3  -LF     Bending/Stooping 4  -LF     Standing 3  -LF     Reaching 1  -LF     From the list, choose the 3 activities you would most like to be able to do without any difficulty Bending/stooping;Standing;Reaching  -LF     Total Score Optimal - 3 8  -LF               User Key  (r) = Recorded By, (t) = Taken By, (c) = Cosigned By      Initials Name Provider Type    LF Katie Dueñas, OT Occupational Therapist    SL Lee, Soo Kyeong, RN Registered Nurse    Nichole Albarran, PT Student PT Student                    Occupational Therapy Education       Title: PT OT SLP Therapies (In Progress)       Topic: Occupational Therapy (In Progress)       Point: ADL training (In Progress)       Learning Progress Summary            Patient Acceptance, E,TB, NR by LF at 5/5/2025 1605                      Point: Precautions (In Progress)       Learning Progress Summary            Patient Acceptance, E,TB, NR by LF at 5/5/2025 1605                      Point: Body mechanics (In Progress)       Learning Progress Summary            Patient Acceptance, E,TB, NR by LF at 5/5/2025 1605                                      User Key        Initials Effective Dates Name Provider Type Discipline    LF 06/16/21 -  Katie Dueñas OT Occupational Therapist OT                  OT Recommendation and Plan  Planned Therapy Interventions (OT): activity tolerance training, BADL retraining, functional balance retraining, occupation/activity based interventions, patient/caregiver education/training, strengthening exercise, transfer/mobility retraining  Therapy Frequency (OT): 5 times/wk  Plan of Care Review  Plan of Care Reviewed With: patient  Progress: improving  Outcome Evaluation: Pt agreeable to BUE exercises, demonstrating fair endurance. He would benefit from continued OT services until safe d/c.     Time Calculation:   Evaluation Complexity (OT)  Review Occupational Profile/Medical/Therapy History Complexity: brief/low complexity  Assessment, Occupational Performance/Identification of Deficit Complexity: 3-5 performance deficits  Clinical Decision Making Complexity (OT): problem focused assessment/low complexity  Overall Complexity of Evaluation (OT): low complexity     Time Calculation- OT       Row Name 05/06/25 1445             Time Calculation- OT    OT Received On 05/06/25  -LF      OT Goal Re-Cert Due Date 05/14/25  -LF         Timed Charges    22610 - OT Therapeutic Exercise Minutes 15  -LF         Total Minutes    Timed Charges Total Minutes 15  -LF       Total Minutes 15  -LF                User Key  (r) = Recorded By, (t) = Taken By, (c) = Cosigned By      Initials Name Provider Type    LF Katie Dueñas OT Occupational Therapist                  Therapy Charges for Today       Code Description Service Date Service Provider Modifiers Qty    [id number]  OT EVAL LOW COMPLEXITY 4 5/5/2025 Katie Dueñas OT GO 1    [id number]  OT THER PROC EA 15 MIN 5/6/2025 Katie Dueñas OT GO 1                 Katie Dueñas OT  5/6/2025

## 2025-05-07 VITALS
RESPIRATION RATE: 16 BRPM | OXYGEN SATURATION: 97 % | BODY MASS INDEX: 30.16 KG/M2 | WEIGHT: 222.66 LBS | TEMPERATURE: 98.4 F | DIASTOLIC BLOOD PRESSURE: 70 MMHG | HEIGHT: 72 IN | SYSTOLIC BLOOD PRESSURE: 95 MMHG | HEART RATE: 91 BPM

## 2025-05-07 LAB
ALBUMIN SERPL-MCNC: 3.3 G/DL (ref 3.5–5.2)
ANION GAP SERPL CALCULATED.3IONS-SCNC: 5.3 MMOL/L (ref 5–15)
BACTERIA SPEC AEROBE CULT: NORMAL
BASOPHILS # BLD AUTO: 0.15 10*3/MM3 (ref 0–0.2)
BASOPHILS NFR BLD AUTO: 1.8 % (ref 0–1.5)
BUN SERPL-MCNC: 17 MG/DL (ref 8–23)
BUN/CREAT SERPL: 20.5 (ref 7–25)
CALCIUM SPEC-SCNC: 8.7 MG/DL (ref 8.6–10.5)
CHLORIDE SERPL-SCNC: 106 MMOL/L (ref 98–107)
CO2 SERPL-SCNC: 25.7 MMOL/L (ref 22–29)
CREAT SERPL-MCNC: 0.83 MG/DL (ref 0.76–1.27)
DEPRECATED RDW RBC AUTO: 47.2 FL (ref 37–54)
EGFRCR SERPLBLD CKD-EPI 2021: 90.1 ML/MIN/1.73
EOSINOPHIL # BLD AUTO: 0.85 10*3/MM3 (ref 0–0.4)
EOSINOPHIL NFR BLD AUTO: 10.2 % (ref 0.3–6.2)
ERYTHROCYTE [DISTWIDTH] IN BLOOD BY AUTOMATED COUNT: 16.1 % (ref 12.3–15.4)
GLUCOSE SERPL-MCNC: 94 MG/DL (ref 65–99)
HCT VFR BLD AUTO: 36.6 % (ref 37.5–51)
HGB BLD-MCNC: 11.3 G/DL (ref 13–17.7)
IMM GRANULOCYTES # BLD AUTO: 0.09 10*3/MM3 (ref 0–0.05)
IMM GRANULOCYTES NFR BLD AUTO: 1.1 % (ref 0–0.5)
LYMPHOCYTES # BLD AUTO: 1.53 10*3/MM3 (ref 0.7–3.1)
LYMPHOCYTES NFR BLD AUTO: 18.3 % (ref 19.6–45.3)
MAGNESIUM SERPL-MCNC: 2.4 MG/DL (ref 1.6–2.4)
MCH RBC QN AUTO: 25.3 PG (ref 26.6–33)
MCHC RBC AUTO-ENTMCNC: 30.9 G/DL (ref 31.5–35.7)
MCV RBC AUTO: 81.9 FL (ref 79–97)
MONOCYTES # BLD AUTO: 0.85 10*3/MM3 (ref 0.1–0.9)
MONOCYTES NFR BLD AUTO: 10.2 % (ref 5–12)
NEUTROPHILS NFR BLD AUTO: 4.89 10*3/MM3 (ref 1.7–7)
NEUTROPHILS NFR BLD AUTO: 58.4 % (ref 42.7–76)
NRBC BLD AUTO-RTO: 0 /100 WBC (ref 0–0.2)
PHOSPHATE SERPL-MCNC: 2.6 MG/DL (ref 2.5–4.5)
PLATELET # BLD AUTO: 264 10*3/MM3 (ref 140–450)
PMV BLD AUTO: 11.2 FL (ref 6–12)
POTASSIUM SERPL-SCNC: 4.3 MMOL/L (ref 3.5–5.2)
RBC # BLD AUTO: 4.47 10*6/MM3 (ref 4.14–5.8)
SODIUM SERPL-SCNC: 137 MMOL/L (ref 136–145)
WBC NRBC COR # BLD AUTO: 8.36 10*3/MM3 (ref 3.4–10.8)

## 2025-05-07 PROCEDURE — 85025 COMPLETE CBC W/AUTO DIFF WBC: CPT | Performed by: INTERNAL MEDICINE

## 2025-05-07 PROCEDURE — 80069 RENAL FUNCTION PANEL: CPT | Performed by: INTERNAL MEDICINE

## 2025-05-07 PROCEDURE — 83735 ASSAY OF MAGNESIUM: CPT | Performed by: INTERNAL MEDICINE

## 2025-05-07 PROCEDURE — 25010000002 MEROPENEM PER 100 MG: Performed by: FAMILY MEDICINE

## 2025-05-07 PROCEDURE — 25010000002 HEPARIN (PORCINE) PER 1000 UNITS: Performed by: STUDENT IN AN ORGANIZED HEALTH CARE EDUCATION/TRAINING PROGRAM

## 2025-05-07 PROCEDURE — 99239 HOSP IP/OBS DSCHRG MGMT >30: CPT | Performed by: INTERNAL MEDICINE

## 2025-05-07 PROCEDURE — 25010000002 ERTAPENEM PER 500 MG: Performed by: INTERNAL MEDICINE

## 2025-05-07 RX ADMIN — CARVEDILOL 3.12 MG: 3.12 TABLET, FILM COATED ORAL at 08:18

## 2025-05-07 RX ADMIN — Medication 10 ML: at 08:23

## 2025-05-07 RX ADMIN — CYANOCOBALAMIN TAB 500 MCG 500 MCG: 500 TAB at 08:18

## 2025-05-07 RX ADMIN — HYOSCYAMINE SULFATE 375 MCG: 0.38 TABLET, EXTENDED RELEASE ORAL at 10:31

## 2025-05-07 RX ADMIN — ERTAPENEM 1000 MG: 1 INJECTION INTRAMUSCULAR; INTRAVENOUS at 14:29

## 2025-05-07 RX ADMIN — MEROPENEM 1000 MG: 1 INJECTION INTRAVENOUS at 10:31

## 2025-05-07 RX ADMIN — Medication 10 ML: at 08:22

## 2025-05-07 RX ADMIN — VENLAFAXINE HYDROCHLORIDE 150 MG: 75 CAPSULE, EXTENDED RELEASE ORAL at 08:18

## 2025-05-07 RX ADMIN — Medication 1 APPLICATION: at 10:39

## 2025-05-07 RX ADMIN — NYSTATIN 1 APPLICATION: 100000 OINTMENT TOPICAL at 10:32

## 2025-05-07 RX ADMIN — HEPARIN SODIUM 5000 UNITS: 5000 INJECTION INTRAVENOUS; SUBCUTANEOUS at 14:29

## 2025-05-07 RX ADMIN — MEROPENEM 1000 MG: 1 INJECTION INTRAVENOUS at 02:01

## 2025-05-07 RX ADMIN — PREGABALIN 200 MG: 100 CAPSULE ORAL at 08:18

## 2025-05-07 RX ADMIN — Medication 1000 UNITS: at 08:18

## 2025-05-07 RX ADMIN — Medication 1 TABLET: at 08:18

## 2025-05-07 RX ADMIN — HEPARIN SODIUM 5000 UNITS: 5000 INJECTION INTRAVENOUS; SUBCUTANEOUS at 06:55

## 2025-05-07 NOTE — PLAN OF CARE
Goal Outcome Evaluation:              Outcome Evaluation: Pt alert and oriented x 4. No complaints of pain during this shift. Pt has trach and does trach care himself and will ask for supplies when needed. Skin care and catheter care completed as ordered. VSS with no complaints at this time, will continue to monitor.

## 2025-05-07 NOTE — DISCHARGE SUMMARY
[facility]         HOSPITALIST  DISCHARGE SUMMARY    Patient Name: Anshul Shook Jr.  : 1950  MRN: [id number]    Date of Admission: 2025  Date of Discharge:  2025  Primary Care Physician: Won Mcneill MD  Admitting: Medicine  Consultants: Urology    Final Diagnoses:  Sepsis secondary to ESBL Klebsiella bacteremia  ESBL Klebsiella bacteremia  Complicated UTI  Obstructing right ureteral stone status post stent placement on 2025  Hypertension  Nonalcoholic steatohepatitis  History of ureteral stone  Neurogenic bladder, self caths at home  Status post trach due to vocal cord palsy from chronic intubation  Spinal cord injury resulting in bilateral lower extremity weakness  Nonischemic cardiomyopathy status post biventricular AICD with pacemaker      Hospital Course     Hospital Course:  75-year-old male with history of traumatic injury to the spine that resulted in prolonged hypoxemia resulting in prolonged ventilator status status post chronic tracheostomy and vocal cord palsy, nonischemic cardiomyopathy, neurogenic bladder with home self catheterizations, nonalcoholic steatohepatitis, AICD with pacemaker in place, neurogenic bladder, bilateral lower extremity weakness, hypertension, dyslipidemia, recent right ureteral calculus status post stent placement without stone removal, rehospitalized on 5/3/2025, with chief complaint of fevers and back pain, found to have urinalysis suggestive of UTI, blood cultures positive for ESBL Klebsiella, bacteremic likely secondary to UTI, placed on broad-spectrum antibiotics, urology consulted for complicated UTI, on meropenem IV, will transition to Invanz on dc to complete 2 weeks abx since first negative culture.     Will followup with urology and PCP.      DISCHARGE Follow Up Recommendations for labs and diagnostics: f/u urology, pcp; midline could be removed after abx completed.      Day of Discharge     Vital Signs:  Temp:  [97.9 °F  (36.6 °C)-98.6 °F (37 °C)] 98.4 °F (36.9 °C)  Heart Rate:  [77-98] 91  Resp:  [16] 16  BP: ()/(50-70) 95/70  Physical Exam: nad s1s2      Discharge Details        Discharge Medications        New Medications        Instructions Start Date   ertapenem ([facility]Vanz) MBP 1 g in 100 NS   1 g, Intravenous, Every 24 Hours      ertapenem 1,000 mg in sodium chloride 0.9 % 100 mL IVPB   1,000 mg, Intravenous, Every 24 Hours   Start Date: May 8, 2025            Continue These Medications        Instructions Start Date   Animal Shapes/Iron 18 MG chewable tablet   1 tablet, Daily      atorvastatin 20 MG tablet  Commonly known as: LIPITOR   20 mg, Oral, Nightly      carvedilol 3.125 MG tablet  Commonly known as: COREG   3.125 mg, Oral, 2 Times Daily With Meals      cholecalciferol 25 MCG (1000 UT) tablet  Commonly known as: VITAMIN D3   1,000 Units, Daily      donepezil 10 MG tablet  Commonly known as: ARICEPT   10 mg, Nightly      Hyoscyamine Sulfate ER 0.375 MG tablet controlled-release   2 Times Daily      multivitamin with minerals tablet tablet   1 tablet, Daily      pregabalin 200 MG capsule  Commonly known as: LYRICA   200 mg, 2 Times Daily      venlafaxine XR 75 MG 24 hr capsule  Commonly known as: EFFEXOR-XR   75 mg, Oral, Daily      vitamin B-12 500 MCG tablet  Commonly known as: CYANOCOBALAMIN   500 mcg, Daily             Stop These Medications      cefdinir 300 MG capsule  Commonly known as: OMNICEF              Allergies   Allergen Reactions    Amoxicillin Hives    Ct Contrast Hives    Iodinated Contrast Media Other (See Comments)     Unknown, marked as high severity from,another facility, but reaction was unknown.    Penicillins Rash     Has tolerated Ampicillin/Sulbactam and Amoxicillin/clavulanate 5/2024 -Obed Randhawa RPH    Zosyn [Piperacillin-Tazobactam In Dex] Hives     Has tolerated Ampicillin/Sulbactam and Amoxicillin/clavulanate 5/2024 -Obed Randhawa RPH       Discharge Disposition:  Home-Health  Care [facility]    Diet:  Hospital:  Diet Order   Procedures    Diet: Cardiac; Healthy Heart (2-3 Na+); Fluid Consistency: Thin (IDDSI 0)       Discharge Activity:       CODE STATUS:  Code Status and Medical Interventions: CPR (Attempt to Resuscitate); Full Support   Ordered at: 05/03/25 0049     Code Status (Patient has no pulse and is not breathing):    CPR (Attempt to Resuscitate)     Medical Interventions (Patient has pulse or is breathing):    Full Support     Level Of Support Discussed With:    Patient         Future Appointments   Date Time Provider Department Center   7/30/2025  2:00 PM Renzo Hobbs MD [facility]           Pertinent  and/or Most Recent Results     PROCEDURES:   none    LAB RESULTS:      Lab 05/07/25 0525 05/06/25 0256 05/05/25 0427 05/04/25 0313 05/03/25 0439 05/02/25  2130   WBC 8.36 6.98 11.81* 15.85* 24.58* 26.93*   HEMOGLOBIN 11.3* 10.7* 10.6* 10.5* 11.2* 12.7*   HEMATOCRIT 36.6* 34.2* 33.5* 32.9* 35.4* 39.5   PLATELETS 264 231 239 199 216 256   NEUTROS ABS 4.89 3.92 8.58* 12.63* 20.00* 22.24*   IMMATURE GRANS (ABS) 0.09* 0.06* 0.09* 0.27* 0.30* 0.36*   LYMPHS ABS 1.53 1.30 1.24 1.39 1.92 2.01   MONOS ABS 0.85 0.76 0.89 1.01* 2.05* 1.91*   EOS ABS 0.85* 0.82* 0.91* 0.48* 0.16 0.22   MCV 81.9 81.6 81.5 80.4 83.3 82.5   LACTATE  --   --   --   --   --  1.1         Lab 05/07/25 0525 05/06/25 0256 05/05/25 0427 05/04/25 0313 05/03/25 0439   SODIUM 137 136 136 132* 135*   POTASSIUM 4.3 4.0 3.9 3.7 3.6   CHLORIDE 106 105 105 103 102   CO2 25.7 22.8 22.0 20.1* 20.9*   ANION GAP 5.3 8.2 9.0 8.9 12.1   BUN 17 15 17 16 14   CREATININE 0.83 0.73* 0.89 0.92 0.93   EGFR 90.1 93.7 88.3 85.7 84.6   GLUCOSE 94 94 96 93 101*   CALCIUM 8.7 8.3* 8.3* 8.0* 8.2*   MAGNESIUM 2.4 2.0 1.9 1.8 1.9   PHOSPHORUS 2.6 2.4* 2.0* 2.4* 2.8         Lab 05/07/25  0525 05/06/25  0256 05/05/25  0427 05/04/25  0313 05/03/25  0439 05/02/25  2130   TOTAL PROTEIN  --  6.4 6.5 6.1 6.6 7.9   ALBUMIN 3.3* 3.0* 2.8* 2.7*  3.1* 3.8   GLOBULIN  --   --   --   --  3.5 4.1   ALT (SGPT)  --  14 12 11 14 17   AST (SGOT)  --  15 13 10 13 17   BILIRUBIN  --  0.3 0.4 0.6 0.7 0.6   INDIRECT BILIRUBIN  --  0.2 0.2 0.4  --   --    BILIRUBIN DIRECT  --  0.1 0.2 0.2  --   --    ALK PHOS  --  71 75 79 88 106   LIPASE  --   --   --   --   --  38                     Brief Urine Lab Results  (Last result in the past 365 days)        Color   Clarity   Blood   Leuk Est   Nitrite   Protein   CREAT   Urine HCG        05/02/25 2313 Yellow   Cloudy   Moderate (2+)   Large (3+)   Negative   100 mg/dL (2+)                 Microbiology Results (last 10 days)       Procedure Component Value - Date/Time    Blood Culture - Blood, Hand, Left [[id number]]  (Normal) Collected: 05/04/25 0808    Lab Status: Preliminary result Specimen: Blood from Hand, Left Updated: 05/07/25 0815     Blood Culture No growth at 3 days    Blood Culture - Blood, Arm, Right [[id number]]  (Normal) Collected: 05/04/25 0808    Lab Status: Preliminary result Specimen: Blood from Arm, Right Updated: 05/07/25 0815     Blood Culture No growth at 3 days    Urine Culture - Urine, Indwelling Urethral Catheter [[id number]]  (Abnormal)  (Susceptibility) Collected: 05/02/25 2313    Lab Status: Final result Specimen: Urine from Indwelling Urethral Catheter Updated: 05/05/25 1347     Urine Culture >100,000 CFU/mL Klebsiella pneumoniae ESBL    Narrative:      Colonization of the urinary tract without infection is common. Treatment is discouraged unless the patient is symptomatic, pregnant, or undergoing an invasive urologic procedure.  Recent outcomes data supports the use of pip/tazo in the treatment of susceptible ESBL infections for uncomplicated UTI. Consider use of pip/tazo as a carbapenem-sparing regimen in applicable patients.    Susceptibility        Klebsiella pneumoniae ESBL      SAL      Ertapenem Susceptible      Gentamicin Susceptible      Levofloxacin Intermediate      Meropenem Susceptible       Nitrofurantoin Resistant      Piperacillin + Tazobactam Resistant      Trimethoprim + Sulfamethoxazole Resistant                           Blood Culture - Blood, Arm, Right [[id number]]  (Abnormal)  (Susceptibility) Collected: 05/02/25 2154    Lab Status: Final result Specimen: Blood from Arm, Right Updated: 05/06/25 0706     Blood Culture Klebsiella pneumoniae ESBL     Comment:   Consider infectious disease consult.  Susceptibility results may not correlate to clinical outcomes.  For ESBL-producing infections in the blood, a carbapenem is recommended as first-line therapy for optimal clinical outcomes.        Isolated from Aerobic Bottle     Gram Stain Aerobic Bottle Gram negative bacilli    Narrative:      Less than seven (7) mL's of blood was collected.  Insufficient quantity may yield false negative results.    Susceptibility        Klebsiella pneumoniae ESBL      SAL      Ertapenem Susceptible      Meropenem Susceptible                       Susceptibility Comments       Klebsiella pneumoniae ESBL    With the exception of urinary-sourced infections, aminoglycosides should not be used as monotherapy.               Blood Culture - Blood, Arm, Left [[id number]]  (Normal) Collected: 05/02/25 2154    Lab Status: Preliminary result Specimen: Blood from Arm, Left Updated: 05/06/25 2200     Blood Culture No growth at 4 days    Narrative:      Less than seven (7) mL's of blood was collected.  Insufficient quantity may yield false negative results.    Blood Culture ID, PCR - Blood, Arm, Right [[id number]]  (Abnormal) Collected: 05/02/25 2154    Lab Status: Final result Specimen: Blood from Arm, Right Updated: 05/03/25 1702     BCID, PCR Klebsiella pneumoniae group. Identification by BCID2 PCR.     BCID, PCR 2 CTX-M (ESBL) detected. Identification by BCID2 PCR     BOTTLE TYPE Aerobic Bottle            CT Abdomen Pelvis Without Contrast  Result Date: 5/2/2025  1.There has been interval placement of a right ureteral stent,  which is in proper position by CT. The previously seen obstructing proximal right ureteral calculus is no longer identified. 2.There may be minimal right hydronephrosis. 3.No significant perinephric fluid collection is suggested by nonenhanced CT. No significant acute retroperitoneal hemorrhage. 4.No left ureterolithiasis or hydronephrosis. 5.Nonobstructing renal calyceal stones are seen bilaterally. 6.Age-indeterminate infectious/inflammatory cystitis is possible. 7.Gallstones and/or gallbladder sludge is (are) identified. Probably no acute cholecystitis. 8.Please see the above comments for further detail. Portions of this note were completed with a voice recognition program. Electronically Signed: Lalito Churchill MD  5/2/2025 10:54 PM EDT  Workstation ID: [id number]               Results for orders placed during the hospital encounter of 01/17/24    Adult Transthoracic Echo Complete W/ Cont if Necessary Per Protocol    Interpretation Summary    The study is technically difficult for diagnosis.    Left ventricular systolic function is normal. Calculated left ventricular EF = 60.6%    Left ventricular diastolic function was indeterminate.    There is mild calcification of the aortic valve.      Labs Pending at Discharge:  Pending Labs       Order Current Status    Blood Culture - Blood, Arm, Left Preliminary result    Blood Culture - Blood, Arm, Right Preliminary result    Blood Culture - Blood, Hand, Left Preliminary result              Time spent on Discharge including face to face service:  ~49 minutes    Electronically signed by Renzo Waller MD, 05/07/25, 4:25 PM EDT.

## 2025-05-07 NOTE — PROGRESS NOTES
[facility]   Hospitalist Progress Note  Date: 2025  Patient Name: Anshul Shook Jr.  : 1950  MRN: [id number]  Date of admission: 2025      Subjective   Subjective     Chief complaint: Fever, back pain    Summary:  75-year-old male with history of traumatic injury to the spine that resulted in prolonged hypoxemia resulting in prolonged ventilator status status post chronic tracheostomy and vocal cord palsy, nonischemic cardiomyopathy, neurogenic bladder with home self catheterizations, nonalcoholic steatohepatitis, AICD with pacemaker in place, neurogenic bladder, bilateral lower extremity weakness, hypertension, dyslipidemia, recent right ureteral calculus status post stent placement without stone removal, rehospitalized on 5/3/2025, with chief complaint of fevers and back pain, found to have urinalysis suggestive of UTI, blood cultures positive for ESBL Klebsiella, bacteremic likely secondary to UTI, placed on broad-spectrum antibiotics, urology consulted for complicated UTI, on meropenem IV, will need to transition to IV Invanz, patient is familiar with IV antibiotics as outpatient, has had to do this before, midline placement 2025    Interval follow-up: no complaints overnight    Objective   Objective     Vitals:   Temp:  [97.9 °F (36.6 °C)-99 °F (37.2 °C)] 97.9 °F (36.6 °C)  Heart Rate:  [77-98] 98  Resp:  [16] 16  BP: ()/(57-66) 105/58  Physical Exam               Constitutional: Awake, alert, no acute distress, sleeping but awakens easily              Eyes: Pupils equal, sclerae anicteric, no conjunctival injection              HENT: NCAT, mucous membranes moist, trach in place              Respiratory: Clear to auscultation bilaterally, nonlabored respirations               Cardiovascular: Paced rhythm, regular, no murmurs              Gastrointestinal: soft, nondistended, nontender                 Result Review    Result Review:  I have personally reviewed the pertinent results  from the past 24 hours to 5/7/2025 10:59 EDT and agree with these findings:  [x]  Laboratory   CBC          5/5/2025    04:27 5/6/2025    02:56 5/7/2025    05:25   CBC   WBC 11.81  6.98  8.36    RBC 4.11  4.19  4.47    Hemoglobin 10.6  10.7  11.3    Hematocrit 33.5  34.2  36.6    MCV 81.5  81.6  81.9    MCH 25.8  25.5  25.3    MCHC 31.6  31.3  30.9    RDW 16.3  16.1  16.1    Platelets 239  231  264      BMP          5/5/2025    04:27 5/6/2025    02:56 5/7/2025    05:25   BMP   BUN 17  15  17    Creatinine 0.89  0.73  0.83    Sodium 136  136  137    Potassium 3.9  4.0  4.3    Chloride 105  105  106    CO2 22.0  22.8  25.7    Calcium 8.3  8.3  8.7      LIVER FUNCTION TESTS:      Lab 05/07/25  0525 05/06/25  0256 05/05/25  0427 05/04/25  0313 05/03/25  0439 05/02/25  2130   TOTAL PROTEIN  --  6.4 6.5 6.1 6.6 7.9   ALBUMIN 3.3* 3.0* 2.8* 2.7* 3.1* 3.8   GLOBULIN  --   --   --   --  3.5 4.1   ALT (SGPT)  --  14 12 11 14 17   AST (SGOT)  --  15 13 10 13 17   BILIRUBIN  --  0.3 0.4 0.6 0.7 0.6   INDIRECT BILIRUBIN  --  0.2 0.2 0.4  --   --    BILIRUBIN DIRECT  --  0.1 0.2 0.2  --   --    ALK PHOS  --  71 75 79 88 106   LIPASE  --   --   --   --   --  38       [x]  Microbiology   Microbiology Results (last 10 days)       Procedure Component Value - Date/Time    Blood Culture - Blood, Hand, Left [[id number]]  (Normal) Collected: 05/04/25 0808    Lab Status: Preliminary result Specimen: Blood from Hand, Left Updated: 05/07/25 0815     Blood Culture No growth at 3 days    Blood Culture - Blood, Arm, Right [[id number]]  (Normal) Collected: 05/04/25 0808    Lab Status: Preliminary result Specimen: Blood from Arm, Right Updated: 05/07/25 0815     Blood Culture No growth at 3 days    Urine Culture - Urine, Indwelling Urethral Catheter [[id number]]  (Abnormal)  (Susceptibility) Collected: 05/02/25 2313    Lab Status: Final result Specimen: Urine from Indwelling Urethral Catheter Updated: 05/05/25 1347     Urine Culture >100,000 CFU/mL  Klebsiella pneumoniae ESBL    Narrative:      Colonization of the urinary tract without infection is common. Treatment is discouraged unless the patient is symptomatic, pregnant, or undergoing an invasive urologic procedure.  Recent outcomes data supports the use of pip/tazo in the treatment of susceptible ESBL infections for uncomplicated UTI. Consider use of pip/tazo as a carbapenem-sparing regimen in applicable patients.    Susceptibility        Klebsiella pneumoniae ESBL      SAL      Ertapenem Susceptible      Gentamicin Susceptible      Levofloxacin Intermediate      Meropenem Susceptible      Nitrofurantoin Resistant      Piperacillin + Tazobactam Resistant      Trimethoprim + Sulfamethoxazole Resistant                           Blood Culture - Blood, Arm, Right [[id number]]  (Abnormal)  (Susceptibility) Collected: 05/02/25 2154    Lab Status: Final result Specimen: Blood from Arm, Right Updated: 05/06/25 0706     Blood Culture Klebsiella pneumoniae ESBL     Comment:   Consider infectious disease consult.  Susceptibility results may not correlate to clinical outcomes.  For ESBL-producing infections in the blood, a carbapenem is recommended as first-line therapy for optimal clinical outcomes.        Isolated from Aerobic Bottle     Gram Stain Aerobic Bottle Gram negative bacilli    Narrative:      Less than seven (7) mL's of blood was collected.  Insufficient quantity may yield false negative results.    Susceptibility        Klebsiella pneumoniae ESBL      SAL      Ertapenem Susceptible      Meropenem Susceptible                       Susceptibility Comments       Klebsiella pneumoniae ESBL    With the exception of urinary-sourced infections, aminoglycosides should not be used as monotherapy.               Blood Culture - Blood, Arm, Left [[id number]]  (Normal) Collected: 05/02/25 2154    Lab Status: Preliminary result Specimen: Blood from Arm, Left Updated: 05/06/25 2200     Blood Culture No growth at 4 days     Narrative:      Less than seven (7) mL's of blood was collected.  Insufficient quantity may yield false negative results.    Blood Culture ID, PCR - Blood, Arm, Right [[id number]]  (Abnormal) Collected: 05/02/25 2154    Lab Status: Final result Specimen: Blood from Arm, Right Updated: 05/03/25 1702     BCID, PCR Klebsiella pneumoniae group. Identification by BCID2 PCR.     BCID, PCR 2 CTX-M (ESBL) detected. Identification by BCID2 PCR     BOTTLE TYPE Aerobic Bottle              [x]  Radiology CT Abdomen Pelvis Without Contrast  Result Date: 5/2/2025  1.There has been interval placement of a right ureteral stent, which is in proper position by CT. The previously seen obstructing proximal right ureteral calculus is no longer identified. 2.There may be minimal right hydronephrosis. 3.No significant perinephric fluid collection is suggested by nonenhanced CT. No significant acute retroperitoneal hemorrhage. 4.No left ureterolithiasis or hydronephrosis. 5.Nonobstructing renal calyceal stones are seen bilaterally. 6.Age-indeterminate infectious/inflammatory cystitis is possible. 7.Gallstones and/or gallbladder sludge is (are) identified. Probably no acute cholecystitis. 8.Please see the above comments for further detail. Portions of this note were completed with a voice recognition program. Electronically Signed: Lalito Churchill MD  5/2/2025 10:54 PM EDT  Workstation ID: [id number]        []  EKG/Telemetry   No orders to display       []  Cardiology/Vascular   []  Pathology  [x]  Old records  []  Other:    Assessment & Plan   Assessment / Plan     Assessment:  Sepsis secondary to ESBL Klebsiella bacteremia  ESBL Klebsiella bacteremia  Complicated UTI  Obstructing right ureteral stone status post stent placement on 4/23/2025  Hypertension  Nonalcoholic steatohepatitis  History of ureteral stone  Neurogenic bladder, self caths at home  Status post trach due to vocal cord palsy from chronic intubation  Spinal cord injury  resulting in bilateral lower extremity weakness  Nonischemic cardiomyopathy status post biventricular AICD with pacemaker    Plan:  Labs and imaging reviewed  Antibiotics switched to meropenem to cover ESBL in blood  Midline placement today  Follow-up final sensitivities  Continue carvedilol resumed at 3.125 mg twice a day  Continue Lipitor 20 mg nightly  Continue Aricept 10 mg nightly  Urology recommendations appreciated  Continue with Lyrica 200 mg twice a day  Continue venlafaxine 150 mg daily  Maintain Kyle catheter  Diet per tolerance  A.m. labs  Full code  DVT prophylaxis with heparin  Clinical course dictate further management  DC when abx arranged    VTE Prophylaxis:  Pharmacologic & mechanical VTE prophylaxis orders are present.        CODE STATUS:   Code Status (Patient has no pulse and is not breathing): CPR (Attempt to Resuscitate)  Medical Interventions (Patient has pulse or is breathing): Full Support  Level Of Support Discussed With: Patient    Electronically signed by Renzo Waller MD, 05/07/25, 11:08 AM EDT.

## 2025-05-07 NOTE — PLAN OF CARE
Goal Outcome Evaluation:                            Patient is discharging this shift. His wife will transport to home. He is able to make needs known. He is fully oriented.

## 2025-05-07 NOTE — CASE MANAGEMENT/SOCIAL WORK
Discharge Planning Assessment  [facility]     Patient Name: Anshul Shook Jr.  MRN: [id number]  Today's Date: 5/7/2025    Admit Date: 5/2/2025     Discharge Needs Assessment    No documentation.                  Discharge Plan       Row Name 05/07/25 1414       Plan    Final Discharge Disposition Code 06 - home with home health care    Final Note [facility] states copay is $5/week. SW has discussed with pt wife and she is agreeable to copay. [facility] states pt needs a dose of the Invanz before discharge. [facility] will be delivering pt medications to pt residence before noon tomorrow. [facility] will be contacting the family to discuss. Pt can discharge afte dose of Invanz today. MARY has contacted [facility] and provided update.                  Continued Care and Services - Admitted Since 5/2/2025       Dialysis/Infusion       Service Provider Request Status Services Address Phone Fax Patient Preferred    [facility] Accepted -- [address] [address] [phone] --              Home Medical Care       Service Provider Request Status Services Address Phone Fax Patient Preferred    [facility] Pending - Request Sent -- [facility] [address] [phone] --                  Expected Discharge Date and Time              JASSI Fontanez

## 2025-05-08 ENCOUNTER — READMISSION MANAGEMENT (OUTPATIENT)
Dept: CALL CENTER | Facility: HOSPITAL | Age: 75
End: 2025-05-08
Payer: MEDICARE

## 2025-05-08 NOTE — PROGRESS NOTES
Enter Query Response Below      Query Response: UTI not due to recent ureteral stent placement 4/28/2025 or self-catheterizations     Electronically signed by Sara Montano MD, 05/07/25, 9:22 PM EDT.               If applicable, please update the problem list.

## 2025-05-08 NOTE — OUTREACH NOTE
Prep Survey      Flowsheet Row Responses   [facility]   Is LACE score < 7 ? No   Eligibility Readm Mgmt   Discharge diagnosis Sepsis secondary to ESBL Klebsiella bacteremia   Does the patient have one of the following disease processes/diagnoses(primary or secondary)? Sepsis   Does the patient have Home health ordered? Yes   What is the Home health agency?  [facility]   Is there a DME ordered? No   Prep survey completed? Yes            PARIS ROACH - Registered Nurse          
Principal Discharge DX:	Cellulitis of left lower extremity  Secondary Diagnosis:	Multiple sclerosis

## 2025-05-09 LAB
BACTERIA SPEC AEROBE CULT: NORMAL
BACTERIA SPEC AEROBE CULT: NORMAL

## 2025-05-12 ENCOUNTER — TELEPHONE (OUTPATIENT)
Dept: UROLOGY | Age: 75
End: 2025-05-12
Payer: MEDICARE

## 2025-05-12 ENCOUNTER — TELEPHONE (OUTPATIENT)
Dept: CARDIOLOGY | Facility: CLINIC | Age: 75
End: 2025-05-12
Payer: MEDICARE

## 2025-05-12 NOTE — TELEPHONE ENCOUNTER
Procedure: CYSTOSCOPY URETEROSCOPY RETROGRADE PYELOGRAM HOLMIUM LASER STENT     Med Directive: NA    PMH: NICM, ICD, HTN, HLD    Last Seen: 1/27/2025

## 2025-05-12 NOTE — TELEPHONE ENCOUNTER
Call made to pt to inform of surgery for Wednesday; sp w/ spouse; states Wednesday is fine; RN states that the surgery will be after 12 but same day will call the day before to inform when to be there exactly and it will be in the medical pavilion; RN also states that PAT will be calling day before to go over medications; RN verified that pt is not on anti-coags or injectable weight loss/DM medications; RN states that we have sent a clearance letter to his cardiologist to verify ok for surgery; RN instructs nothing to eat or drink after midnight day of surgery; spouse asks about catheter; RN states that MD will decide if catheter stays after surgery but catheter will be fine to be in prior to surgery; spouse is agreeable and understanding with no further questions.

## 2025-05-13 NOTE — PRE-PROCEDURE INSTRUCTIONS
IMPORTANT INSTRUCTIONS - PRE-ADMISSION TESTING  DO NOT EAT OR CHEW anything after midnight the night before your procedure.    You may have CLEAR liquids up to 2__ hours prior to ARRIVAL time.   Take the following medications the morning of your procedure with JUST A SIP OF WATER:  _COREG, LYRICA , VENLFAXINE, HYOSCYAMINE ______________________________________________________________________________________________________________________________________________________________________________________    DO NOT BRING your medications to the hospital with you, UNLESS something has changed since your PRE-Admission Testing appointment.  Hold all vitamins, supplements, and NSAIDS (Non- steroidal anti-inflammatory meds) for one week prior to surgery (you MAY take Tylenol or Acetaminophen).  If you are diabetic, check your blood sugar the morning of your procedure. If it is less than 70 or if you are feeling symptomatic, call the following number for further instructions: [phone]_.  Use your inhalers/nebulizers as usual, the morning of your procedure. BRING YOUR INHALERS with you.   Bring your CPAP or BIPAP to hospital, ONLY IF YOU WILL BE SPENDING THE NIGHT.   Make sure you have a ride home and have someone who will stay with you the day of your procedure after you go home.  If you have any questions, please call your Pre-Admission Testing Nurse, DAVID____ at [phone]___.   Per anesthesia request, do not smoke for 24 hours before your procedure or as instructed by your surgeon.

## 2025-05-14 ENCOUNTER — ANESTHESIA EVENT (OUTPATIENT)
Dept: PERIOP | Facility: HOSPITAL | Age: 75
End: 2025-05-14
Payer: MEDICARE

## 2025-05-14 ENCOUNTER — READMISSION MANAGEMENT (OUTPATIENT)
Dept: CALL CENTER | Facility: HOSPITAL | Age: 75
End: 2025-05-14
Payer: MEDICARE

## 2025-05-14 ENCOUNTER — ANESTHESIA (OUTPATIENT)
Dept: PERIOP | Facility: HOSPITAL | Age: 75
End: 2025-05-14
Payer: MEDICARE

## 2025-05-14 ENCOUNTER — APPOINTMENT (OUTPATIENT)
Dept: GENERAL RADIOLOGY | Facility: HOSPITAL | Age: 75
End: 2025-05-14
Payer: MEDICARE

## 2025-05-14 ENCOUNTER — HOSPITAL ENCOUNTER (OUTPATIENT)
Facility: HOSPITAL | Age: 75
Setting detail: HOSPITAL OUTPATIENT SURGERY
Discharge: HOME | End: 2025-05-14
Attending: UROLOGY | Admitting: UROLOGY
Payer: MEDICARE

## 2025-05-14 VITALS
HEIGHT: 72 IN | HEART RATE: 75 BPM | BODY MASS INDEX: 29.11 KG/M2 | RESPIRATION RATE: 17 BRPM | DIASTOLIC BLOOD PRESSURE: 82 MMHG | SYSTOLIC BLOOD PRESSURE: 142 MMHG | OXYGEN SATURATION: 97 % | TEMPERATURE: 97 F | WEIGHT: 214.95 LBS

## 2025-05-14 DIAGNOSIS — N20.1 URETERAL STONE: ICD-10-CM

## 2025-05-14 DIAGNOSIS — N13.30 HYDRONEPHROSIS, UNSPECIFIED HYDRONEPHROSIS TYPE: Primary | ICD-10-CM

## 2025-05-14 DIAGNOSIS — N20.0 KIDNEY STONE: ICD-10-CM

## 2025-05-14 LAB
QT INTERVAL: 439 MS
QTC INTERVAL: 502 MS

## 2025-05-14 PROCEDURE — 76000 FLUOROSCOPY <1 HR PHYS/QHP: CPT

## 2025-05-14 PROCEDURE — 25010000002 LIDOCAINE PF 2% 2 % SOLUTION: Performed by: NURSE ANESTHETIST, CERTIFIED REGISTERED

## 2025-05-14 PROCEDURE — 82365 CALCULUS SPECTROSCOPY: CPT | Performed by: UROLOGY

## 2025-05-14 PROCEDURE — 25010000002 MIDAZOLAM PER 1MG: Performed by: ANESTHESIOLOGY

## 2025-05-14 PROCEDURE — 25510000001 IOPAMIDOL PER 1 ML: Performed by: UROLOGY

## 2025-05-14 PROCEDURE — C1758 CATHETER, URETERAL: HCPCS | Performed by: UROLOGY

## 2025-05-14 PROCEDURE — 25010000002 CEFAZOLIN PER 500 MG: Performed by: UROLOGY

## 2025-05-14 PROCEDURE — 25010000002 DEXAMETHASONE PER 1 MG: Performed by: NURSE ANESTHETIST, CERTIFIED REGISTERED

## 2025-05-14 PROCEDURE — 25010000002 PROPOFOL 10 MG/ML EMULSION: Performed by: NURSE ANESTHETIST, CERTIFIED REGISTERED

## 2025-05-14 PROCEDURE — C2617 STENT, NON-COR, TEM W/O DEL: HCPCS | Performed by: UROLOGY

## 2025-05-14 PROCEDURE — 74420 UROGRAPHY RTRGR +-KUB: CPT | Performed by: UROLOGY

## 2025-05-14 PROCEDURE — S0260 H&P FOR SURGERY: HCPCS | Performed by: UROLOGY

## 2025-05-14 PROCEDURE — 93005 ELECTROCARDIOGRAM TRACING: CPT | Performed by: ANESTHESIOLOGY

## 2025-05-14 PROCEDURE — 25810000003 LACTATED RINGERS PER 1000 ML: Performed by: ANESTHESIOLOGY

## 2025-05-14 PROCEDURE — 88300 SURGICAL PATH GROSS: CPT | Performed by: UROLOGY

## 2025-05-14 PROCEDURE — C1769 GUIDE WIRE: HCPCS | Performed by: UROLOGY

## 2025-05-14 PROCEDURE — 25010000002 ONDANSETRON PER 1 MG: Performed by: NURSE ANESTHETIST, CERTIFIED REGISTERED

## 2025-05-14 PROCEDURE — 52356 CYSTO/URETERO W/LITHOTRIPSY: CPT | Performed by: UROLOGY

## 2025-05-14 PROCEDURE — 25010000002 FENTANYL CITRATE (PF) 50 MCG/ML SOLUTION: Performed by: NURSE ANESTHETIST, CERTIFIED REGISTERED

## 2025-05-14 DEVICE — URETERAL STENT
Type: IMPLANTABLE DEVICE | Site: URETER | Status: FUNCTIONAL
Brand: CONTOUR™

## 2025-05-14 RX ORDER — OXYCODONE HYDROCHLORIDE 5 MG/1
5 TABLET ORAL
Status: DISCONTINUED | OUTPATIENT
Start: 2025-05-14 | End: 2025-05-14 | Stop reason: HOSPADM

## 2025-05-14 RX ORDER — ONDANSETRON 2 MG/ML
4 INJECTION INTRAMUSCULAR; INTRAVENOUS ONCE AS NEEDED
Status: DISCONTINUED | OUTPATIENT
Start: 2025-05-14 | End: 2025-05-14 | Stop reason: HOSPADM

## 2025-05-14 RX ORDER — ACETAMINOPHEN 325 MG/1
650 TABLET ORAL ONCE
Status: DISCONTINUED | OUTPATIENT
Start: 2025-05-14 | End: 2025-05-14 | Stop reason: HOSPADM

## 2025-05-14 RX ORDER — PROMETHAZINE HYDROCHLORIDE 25 MG/1
25 TABLET ORAL ONCE AS NEEDED
Status: DISCONTINUED | OUTPATIENT
Start: 2025-05-14 | End: 2025-05-14 | Stop reason: HOSPADM

## 2025-05-14 RX ORDER — ONDANSETRON 2 MG/ML
INJECTION INTRAMUSCULAR; INTRAVENOUS AS NEEDED
Status: DISCONTINUED | OUTPATIENT
Start: 2025-05-14 | End: 2025-05-14 | Stop reason: SURG

## 2025-05-14 RX ORDER — DEXAMETHASONE SODIUM PHOSPHATE 4 MG/ML
INJECTION, SOLUTION INTRA-ARTICULAR; INTRALESIONAL; INTRAMUSCULAR; INTRAVENOUS; SOFT TISSUE AS NEEDED
Status: DISCONTINUED | OUTPATIENT
Start: 2025-05-14 | End: 2025-05-14 | Stop reason: SURG

## 2025-05-14 RX ORDER — PROMETHAZINE HYDROCHLORIDE 12.5 MG/1
12.5 TABLET ORAL ONCE AS NEEDED
Status: DISCONTINUED | OUTPATIENT
Start: 2025-05-14 | End: 2025-05-14 | Stop reason: HOSPADM

## 2025-05-14 RX ORDER — OXYBUTYNIN CHLORIDE 5 MG/1
5 TABLET ORAL 3 TIMES DAILY PRN
Qty: 12 TABLET | Refills: 0 | Status: SHIPPED | OUTPATIENT
Start: 2025-05-14

## 2025-05-14 RX ORDER — FENTANYL CITRATE 50 UG/ML
INJECTION, SOLUTION INTRAMUSCULAR; INTRAVENOUS AS NEEDED
Status: DISCONTINUED | OUTPATIENT
Start: 2025-05-14 | End: 2025-05-14 | Stop reason: SURG

## 2025-05-14 RX ORDER — IBUPROFEN 600 MG/1
600 TABLET, FILM COATED ORAL EVERY 6 HOURS PRN
Status: DISCONTINUED | OUTPATIENT
Start: 2025-05-14 | End: 2025-05-14 | Stop reason: HOSPADM

## 2025-05-14 RX ORDER — ONDANSETRON 4 MG/1
4 TABLET, ORALLY DISINTEGRATING ORAL ONCE AS NEEDED
Status: DISCONTINUED | OUTPATIENT
Start: 2025-05-14 | End: 2025-05-14 | Stop reason: HOSPADM

## 2025-05-14 RX ORDER — PROPOFOL 10 MG/ML
VIAL (ML) INTRAVENOUS AS NEEDED
Status: DISCONTINUED | OUTPATIENT
Start: 2025-05-14 | End: 2025-05-14 | Stop reason: SURG

## 2025-05-14 RX ORDER — TAMSULOSIN HYDROCHLORIDE 0.4 MG/1
1 CAPSULE ORAL DAILY
Qty: 14 CAPSULE | Refills: 0 | Status: SHIPPED | OUTPATIENT
Start: 2025-05-14

## 2025-05-14 RX ORDER — LIDOCAINE HYDROCHLORIDE 20 MG/ML
INJECTION, SOLUTION EPIDURAL; INFILTRATION; INTRACAUDAL; PERINEURAL AS NEEDED
Status: DISCONTINUED | OUTPATIENT
Start: 2025-05-14 | End: 2025-05-14 | Stop reason: SURG

## 2025-05-14 RX ORDER — MIDAZOLAM HYDROCHLORIDE 2 MG/2ML
0.5 INJECTION, SOLUTION INTRAMUSCULAR; INTRAVENOUS ONCE
Status: COMPLETED | OUTPATIENT
Start: 2025-05-14 | End: 2025-05-14

## 2025-05-14 RX ORDER — IOPAMIDOL 510 MG/ML
INJECTION, SOLUTION INTRAVASCULAR AS NEEDED
Status: DISCONTINUED | OUTPATIENT
Start: 2025-05-14 | End: 2025-05-14 | Stop reason: HOSPADM

## 2025-05-14 RX ORDER — KETOROLAC TROMETHAMINE 10 MG/1
10 TABLET, FILM COATED ORAL EVERY 6 HOURS PRN
Qty: 8 TABLET | Refills: 0 | Status: SHIPPED | OUTPATIENT
Start: 2025-05-14

## 2025-05-14 RX ORDER — PHENAZOPYRIDINE HYDROCHLORIDE 200 MG/1
200 TABLET, FILM COATED ORAL 3 TIMES DAILY PRN
Qty: 14 TABLET | Refills: 0 | Status: SHIPPED | OUTPATIENT
Start: 2025-05-14

## 2025-05-14 RX ORDER — PROMETHAZINE HYDROCHLORIDE 25 MG/1
25 SUPPOSITORY RECTAL ONCE AS NEEDED
Status: DISCONTINUED | OUTPATIENT
Start: 2025-05-14 | End: 2025-05-14 | Stop reason: HOSPADM

## 2025-05-14 RX ORDER — SODIUM CHLORIDE, SODIUM LACTATE, POTASSIUM CHLORIDE, CALCIUM CHLORIDE 600; 310; 30; 20 MG/100ML; MG/100ML; MG/100ML; MG/100ML
9 INJECTION, SOLUTION INTRAVENOUS CONTINUOUS PRN
Status: DISCONTINUED | OUTPATIENT
Start: 2025-05-14 | End: 2025-05-14 | Stop reason: HOSPADM

## 2025-05-14 RX ORDER — OXYCODONE AND ACETAMINOPHEN 5; 325 MG/1; MG/1
.5-2 TABLET ORAL EVERY 6 HOURS PRN
Qty: 14 TABLET | Refills: 0 | Status: SHIPPED | OUTPATIENT
Start: 2025-05-14

## 2025-05-14 RX ADMIN — MIDAZOLAM HYDROCHLORIDE 0.5 MG: 1 INJECTION, SOLUTION INTRAMUSCULAR; INTRAVENOUS at 12:52

## 2025-05-14 RX ADMIN — FENTANYL CITRATE 25 MCG: 50 INJECTION, SOLUTION INTRAMUSCULAR; INTRAVENOUS at 13:56

## 2025-05-14 RX ADMIN — DEXAMETHASONE SODIUM PHOSPHATE 4 MG: 4 INJECTION, SOLUTION INTRAMUSCULAR; INTRAVENOUS at 14:02

## 2025-05-14 RX ADMIN — LIDOCAINE HYDROCHLORIDE 50 MG: 20 INJECTION, SOLUTION INTRAVENOUS at 13:56

## 2025-05-14 RX ADMIN — PROPOFOL 20 MG: 10 INJECTION, EMULSION INTRAVENOUS at 14:14

## 2025-05-14 RX ADMIN — PROPOFOL 50 MG: 10 INJECTION, EMULSION INTRAVENOUS at 13:58

## 2025-05-14 RX ADMIN — FENTANYL CITRATE 25 MCG: 50 INJECTION, SOLUTION INTRAMUSCULAR; INTRAVENOUS at 14:10

## 2025-05-14 RX ADMIN — ONDANSETRON 4 MG: 2 INJECTION INTRAMUSCULAR; INTRAVENOUS at 14:10

## 2025-05-14 RX ADMIN — SODIUM CHLORIDE, POTASSIUM CHLORIDE, SODIUM LACTATE AND CALCIUM CHLORIDE 9 ML/HR: 600; 310; 30; 20 INJECTION, SOLUTION INTRAVENOUS at 12:51

## 2025-05-14 RX ADMIN — SODIUM CHLORIDE 2000 MG: 9 INJECTION, SOLUTION INTRAVENOUS at 14:02

## 2025-05-14 NOTE — ANESTHESIA POSTPROCEDURE EVALUATION
Patient: Anshul Shook Jr.    Procedure Summary       Date: 05/14/25 Room / Location: [facility] OR 08 / [facility] MAIN OR    Anesthesia Start: 1352 Anesthesia Stop: 1452    Procedure: CYSTOSCOPY URETEROSCOPY RETROGRADE PYELOGRAM HOLMIUM LASER STENT exchange, right  Plain text: Scope bladder and ureter, shoot x-ray, treat stone, exchange stent, right (Right) Diagnosis:       Ureteral stone      Kidney stone      (Ureteral stone [N20.1])      (Kidney stone [N20.0])    Surgeons: Sara Montano MD Provider: Elie Armenta MD    Anesthesia Type: general ASA Status: 3            Anesthesia Type: general    Vitals  Vitals Value Taken Time   /81 05/14/25 15:36   Temp     Pulse 78 05/14/25 15:40   Resp     SpO2 83 % 05/14/25 15:40   Vitals shown include unfiled device data.        Post Anesthesia Care and Evaluation    Patient location during evaluation: bedside  Patient participation: complete - patient participated  Level of consciousness: awake  Pain management: adequate    Airway patency: patent  PONV Status: none  Cardiovascular status: acceptable and stable  Respiratory status: acceptable  Hydration status: acceptable

## 2025-05-14 NOTE — H&P
[facility]   Urology Preop H&P Note    Patient Name: Anshul Shook Jr.  : 1950  MRN: [id number]  Primary Care Physician:  Won Mcneill MD  Referring Physician: Sara Montano MD  Date of admission: 2025    Subjective   Subjective     Reason for Consult/ Chief Complaint: Ureteral stone [N20.1]  Kidney stone [N20.0]    HPI:  Anshul Shook Jr. is a 75 y.o. male history ofUreteral stone [N20.1]  Kidney stone [N20.0] who presents for further management OR.  Presents for planned Procedure(s):  CYSTOSCOPY URETEROSCOPY RETROGRADE PYELOGRAM HOLMIUM LASER STENT exchange, right  Plain text: Scope bladder and ureter, shoot x-ray, treat stone, exchange stent, right;  .  Procedure to be preformed on the right.  Risk, benefits, and alternatives discussed with patient prior to today.All questions were addressed after providing time for discussion.  Patient denies significant changes since last visit.  No new complaints today.    Review of Systems   All systems were reviewed and negative except for the above  Personal History     Past Medical History:   Diagnosis Date    Arthritis     Back injury     RESULTING IN PARAPLEGIA    Essential hypertension 2021    History of transfusion     Hyperlipidemia     Injury of back     Irregular heartbeat     SEE'S DR GAY. DENIED  CP/SOB    Kidney stone     Memory loss     Nonischemic cardiomyopathy 2021    FOLLOWS DR. GAY    Osteomyelitis 2024    Paraplegic gait     Presence of biventricular implantable cardioverter-defibrillator (ICD) 2021    [employer] BiV ICD    UTI (urinary tract infection) 2024       Past Surgical History:   Procedure Laterality Date    BACK SURGERY      CARDIAC DEFIBRILLATOR PLACEMENT      [facility]    COLONOSCOPY      COLONOSCOPY N/A 2022    Procedure: COLONOSCOPY WITH POLYPECTOMIES, HOT SNARE, CLIP APPLICATION X2;  Surgeon: Audie West MD;  Location: [facility] ENDOSCOPY;  Service: General;  Laterality:  N/A;  COLON POLYPS    CYSTOSCOPY W/ URETERAL STENT PLACEMENT Right 4/23/2025    Procedure: CYSTOSCOPY RIGHT, right URETERAL CATHETER/STENT INSERTION.  Placed tube in right ureter;  Surgeon: Rich Ingram MD;  Location: [facility] MAIN OR;  Service: Urology;  Laterality: Right;    CYSTOSCOPY, RETROGRADE PYELOGRAM AND STENT INSERTION Left 05/09/2024    Procedure: CYSTOSCOPY RETROGRADE PYELOGRAM AND STENT INSERTION, left;  Surgeon: Sara Montano MD;  Location: [facility] MAIN OR;  Service: Urology;  Laterality: Left;    ORIF ANKLE FRACTURE Right     PACEMAKER REPLACEMENT Bilateral 02/06/2024    Procedure: PPM generator change bi-v;  Surgeon: Renzo Hobbs MD;  Location: [facility] CATH INVASIVE LOCATION;  Service: Cardiovascular;  Laterality: Bilateral;    URETEROSCOPY LASER LITHOTRIPSY WITH STENT INSERTION Left 6/17/2024    Procedure: CYSTOSCOPY URETEROSCOPY RETROGRADE PYELOGRAM HOLMIUM LASER STENT exchange, left;  Surgeon: Sara Montano MD;  Location: [facility] OR;  Service: Urology;  Laterality: Left;       Family History: family history is not on file. Otherwise pertinent FHx was reviewed and not pertinent to current issue.    Social History:  reports that he has never smoked. He has never used smokeless tobacco. He reports that he does not drink alcohol and does not use drugs.    Home Medications:  Animal Shapes/Iron, Hyoscyamine Sulfate ER, atorvastatin, carvedilol, cholecalciferol, donepezil, (ertapenem 1,000 mg in sodium chloride 0.9 % 100 mL IVPB), multivitamin with minerals, pregabalin, venlafaxine XR, and vitamin B-12    Allergies:  Allergies   Allergen Reactions    Amoxicillin Hives    Ct Contrast Hives    Iodinated Contrast Media Other (See Comments)     Unknown, marked as high severity from,another facility, but reaction was unknown.    Penicillins Rash     Has tolerated Ampicillin/Sulbactam and Amoxicillin/clavulanate 5/2024 -[facility]    Zosyn [Piperacillin-Tazobactam In Dex] Hives      Has tolerated Ampicillin/Sulbactam and Amoxicillin/clavulanate 5/2024 -[facility]       Objective    Objective     Vitals:        Physical Exam:   Constitutional: Awake, alert   Respiratory: Clear, nonlabored respirations    Cardiovascular: Regular rate, no chest retractions   gastrointestinal: Appears soft, nontender     Results:    Assessment & Plan   Assessment / Plan     Brief Patient Summary:  Anshul Shook Jr. is a 75 y.o. male who     Active Hospital Problems:  Active Hospital Problems    Diagnosis     **Kidney stone     Ureteral stone        Plan:   Proceed to the OR for planned procedure, Procedure(s):  CYSTOSCOPY URETEROSCOPY RETROGRADE PYELOGRAM HOLMIUM LASER STENT exchange, right  Plain text: Scope bladder and ureter, shoot x-ray, treat stone, exchange stent, right,  ,   Risk, benefits, and alternatives discussed with patient at length he is agreeable to proceed  Laterality identified, right  All questions addressed      Electronically signed by Sara Montano MD, 05/14/25, 11:21 AM EDT.

## 2025-05-14 NOTE — DISCHARGE INSTRUCTIONS
DISCHARGE INSTRUCTIONS  Ureteroscopy Lasertripsy  Stent Placement      For your surgery you had:  General anesthesia (you may have a sore throat for the first 24 hours)    You may experience dizziness, drowsiness, or lightheadedness for several hours following surgery.  Do not stay alone today or tonight.  Limit your activity for 24 hours.  You should not drive or operate machinery, drink alcohol, or sign legally binding documents for 24 hours or while you are taking pain medication.  Resume your diet slowly.  Follow any special dietary instructions you may have been given by your doctor.     NOTIFY YOUR DOCTOR IF YOU EXPERIENCE ANY OF THE FOLLOWING:  Temperature greater than 101 degrees Fahrenheit  Shaking Chills  Redness or excessive drainage from incision  Nausea, vomiting and/or pain that is not controlled by prescribed medications  Increase in bleeding or bleeding that is excessive  Unable to urinate in 6 hours after surgery  If unable to reach your doctor, please go to the closest Emergency Room  Strain urine if instructed by physician.  Collect any fragments and take with you on your scheduled appointment. You may pass stone pieces or small blood clots.  Blood in your urine is normal.  It could be light pink to cherry color.  Drink 6-8 glasses of fluid each day to assist with passing of stone fragments.  Back pain is common.  It may feel like a dull ache or back spasm.  Urine will be bloody for several days.  Slight redness or bruising may be noticed on treated side.  If you have difficulty urinating, try sitting in a bathtub of warm water.    If you have a stent, it must be managed by your urologist.  Do NOT forget.  Medications per physician instructions as indicated on Discharge Medication Information Sheet.    Last dose of pain medication was given at:   .    SPECIAL INSTRUCTIONS:                                              URETERAL STENT TEACHING SHEET   Frequently Asked Questions about Ureteral  Stents          What is a ureteral stent?  A ureteral stent is a small, soft tube that is about 10-12 inches long and about as big around as a swizzle stick. It is placed in the ureter, which is the muscular tube that drains urine from the kidney to the bladder.  Each end of the stent is shaped like a pigtail. One end of the tube sits inside the kidney, and the other end sits in the bladder. The purpose of the stent is to hold the ureter open and maintain proper drainage of urine.  It usually is temporary but may be used long term.  This decision will be made by your doctor.  When is the stent used?  A stent is used in a number of situations.  A stent is placed if the doctor is concerned that urine might not drain well through the ureter, due to blockage or as a reaction to surgery.  Stents usually will be placed in a ureter that has been irritated during a procedure that involves removal of a stone.  Stents for this reason are usually left in place for about a week.  These stents ensure that the ureter does not spasm and collapse after the trauma of the procedure.  Does the stent cause any symptoms?  Many patients do feel the stent.  Most commonly there is bladder irritation, typically causing frequent and/or uncomfortable urination and a sensation of pressure over the bladder or in the lower abdomen. Bloody urine is common. Some patients experience pain in the kidney during urination. There can be urinary tract infections associated with the stent so it is very important that you practice good hygiene. Once the stent is removed, all of the symptoms associated with the stent will quite rapidly disappear (oftentimes immediately). While the stent is in place, you may carry on with most normal activities, including work.  Strenuous activity may cause discomfort. Showering is preferred to tub baths while your stent is in place. If you must take a tub bath, do not use bubble baths or oils as they may lead to infection.        When should the stent be removed?  In some cases the stent can be removed just a few days after the procedure, while in other cases your doctor may recommend that it stay in place for longer periods of time.  You must follow-up with your doctor as directed when you have a stent since stents left in place for too long can lead to blockage, stone formation, urinary infections and ultimately, kidney failure if they are not removed. If your stent passes by itself when you urinate, or you inadvertently pull the string and begin to have large amounts of urine leakage, follow the procedure below to remove the stent.  How is the stent removed?  In some instances, your doctor may decide to leave a string on the stent.  The string is attached to the stent and the string comes out of the urethra (the natural hole where urine exits the body).  The doctor may tell you to remove the stent at home on a given day.  Stents with the string may be removed in a matter of seconds by pulling on the string.  When removing the stent, constant, steady force should be applied, to avoid starting and stopping. Something should be placed below the patient to catch any urine that leaks during removal.  You may choose to remove the stent in the shower, just be sure to have someone close at hand in case you become weak or dizzy.  After the stent is removed, it should be placed in a sealed bag and taken with you to your next office visit.  On the rare occasion that the string breaks and the stent doesn't come out, you should call your doctors office. You should urinate within 4-6 hours after your stent is removed. For this reason, your stent should be removed early in the day.  If you are unable to urinate within 6 hours, call your doctor.   Stents without a string can be removed in the office using a cystoscope. Cystoscopy involves placement of a small flexible tube through the urethra (the hole where urine exits the body). The procedure,  which usually only takes a few minutes and causes little discomfort, is performed in the office. Most patients tolerate having the stent removed using only a topical anesthetic instilled into the urethra. Since no IV line is inserted and there is no anesthesia, you do not have to be accompanied by anyone else and you can eat normally before and after the procedure.  Your doctor may choose to have you return to the hospital to have your stent removed. In this case, you will receive anesthesia and must not eat or drink anything after midnight.

## 2025-05-14 NOTE — OUTREACH NOTE
Sepsis Week 1 Survey      Flowsheet Row Responses   [facility] facility patient discharged from? [facility]   Does the patient have one of the following disease processes/diagnoses(primary or secondary)? Sepsis   Week 1 attempt successful? No   Unsuccessful attempts Attempt 1   Revoke Readmitted  [readmitted for kidney stone.]            Cheyenne MARSHALL - Registered Nurse      Cheyenne MARSHALL - Registered Nurse

## 2025-05-14 NOTE — OP NOTE
Preoperative diagnosis  Right ureteral stone, nephrolithiasis    Postoperative diagnosis  Right ureteral stone, nephrolithiasis    Procedure performed  Cystoscopy, right retrograde pyelogram, ureteroscopy,  laser lithotripsy, ureteral stent exchange    Attending surgeon  Sara Montano MD     Anesthesia  General    EBL  0 mL    Complications  None    Specimen  Right renal Stones    Findings  Cystoscopy without abnormality, bilateral orthotopic ureters  Treatment of right renal stones; no significant stone burden at conclusion of case  6 x 28 ureteral stent with string; 16 Ukrainian indwelling Kyle catheter    Indications  75 y.o. male agreed to undergo the above named procedure after discussion of the alternatives, risks and benefits.   Informed consent was obtained.      Procedure  After informed consent was obtained, the patient was taken back to the  operating suite where general anesthesia was administered. Once the patient  was adequately anesthetized, he was placed in the dorsal lithotomy position.  His genitals were prepped and draped in a normal sterile fashion.  Rigid  cystoscope was inserted gently in the patient's urethra meatus, which passed  atraumatically into the bladder.  The previously placed right ureteral stent was easily visible.  It was grasped and retrieved through the urethral meatus.  Sensor wire was threaded through it into the renal pelvis and the stent reduced.  Dual-lumen catheter was then placed over the wire and retrograde  pyelogram was performed revealing no significant hydronephrosis.    Second wire was then placed after retrograde pyelogram, the dual-lumen reduced.  One of the wires was secured to the drape as a safety wire for the remainder of the case.  The  access sheath was passed level of the mid ureter.  The ureteroscope was then assembled and ureteroscopy  proceeded in a systematic manner.  The ureteral stone had been displaced within the intrarenal collecting system.  Once  the stone was encountered, laser  lithotripsy was then initiated.  Laser lithotripsy was utilized to dust and  fragment the calculus into tiny pieces.  There was some other intrarenal stones requiring treatment.  Any sizable  fragments were then systematically basketed out. After adequate treatment of the stone, pyeloscopy then proceeded in a systematic manner.  Once there were no further stone fragments only sediment dust, ureteroscopy proceeded down the length of the ureter  with retrieval of the access sheath, leaving the safety wire in place. There were no further ureteral calculi  or fragments. After withdrawing the  ureteroscope, as well as the access sheath, the wire was back loaded through  the cystoscope and 6 x 28 double J stent was placed over the wire under  direct visualization.  Upon retrieval of the wire, there was good proximal  coil noted on x-ray, as well as distal coil within the bladder. At this  point, the procedure was deemed terminated.  The cystoscope was removed leaving bladder full.  16 Hebrew catheter was inserted into the bladder without difficulty.  Balloon inflated and catheter secured.  The stent string was then secured to the Kyle catheter.    He was then placed back in the supine position and awakened from general  anesthesia and transferred to the PACU in good condition.      Signed:  Sara Montano MD  05/14/25  15:13 EDT

## 2025-05-14 NOTE — ANESTHESIA PREPROCEDURE EVALUATION
Anesthesia Evaluation     Patient summary reviewed and Nursing notes reviewed   no history of anesthetic complications:   NPO Solid Status: > 8 hours  NPO Liquid Status: > 2 hours           Airway   Mallampati: II  TM distance: >3 FB  Neck ROM: full  Possible difficult intubation and Large neck circumference  Dental      Pulmonary - negative pulmonary ROS and normal exam    breath sounds clear to auscultation    ROS comment: Trach stoma x 20 yrs, breathing stable  Cardiovascular - normal exam    ECG reviewed  Rhythm: regular  Rate: normal    (+) pacemaker (is pacer dependent, but has never been defib) ICD, hypertension well controlled, hyperlipidemia      Neuro/Psych- negative ROS  GI/Hepatic/Renal/Endo    (+) obesity, hepatitis, liver disease fatty liver disease, renal disease- stones    Musculoskeletal     Abdominal    Substance History - negative use     OB/GYN negative ob/gyn ROS         Other   arthritis,     ROS/Med Hx Other: 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  Patient also has a trach and chronic indwelling Kyle catheter.    Echo ef 60%, mild calcification of the AV    Left carotid bruit  SANTAMARIA (nonalcoholic steatohepatitis)          Phys Exam Other: Tracheostomy present                  Anesthesia Plan    ASA 3     general     (Presence of biventricular implantable cardioverter-defibrillator (ICD)  Patient understands anesthesia not responsible for dental damage.  )  intravenous induction     Anesthetic plan, risks, benefits, and alternatives have been provided, discussed and informed consent has been obtained with: patient.  Pre-procedure education provided      CODE STATUS:          <<--- Click to launch

## 2025-05-15 NOTE — PLAN OF CARE
Goal Outcome Evaluation:  Plan of Care Reviewed With: patient           Outcome Evaluation: Patient AOx4. No complaints of pain. cystoscopy today urine is yellow, patient denies any discomfort. Diet resumed, tolerated well. Continuing plan of care.                                Name: Pan Gaming      : 1954      MRN: [id number]  Encounter Provider: Aubrey Patel DO  Encounter Date: 5/15/2025   Encounter department: [facility]  :  Assessment & Plan  Status post total replacement of left hip    Orders:    XR hip/pelv 2-3 vws left if performed; Future    Leg edema, left    Orders:    XR hip/pelv 2-3 vws left if performed; Future         Pan Gaming is a pleasant 71 y.o. male presenting today for follow-up 5 months after a direct anterior left total hip arthroplasty.  The prosthesis remains stable on imaging and exam, and his incision has healed very nicely.  He does have some residual left lower extremity edema, and we feel that the tightness he is experiencing could be due to overactivity.  He does admit that he does not sit and rest often and is usually on the go.  We discussed that his hip is still maturing, and he may be doing too much at this time.  We encouraged him to mitigate his activities and use ice and Tylenol as needed.  We do not have any concerns for the integrity of the prosthesis or any concern for infection.  He will continue with antibiotics before dental appointments and will notify us if he needs a prescription.  Will plan to see him back in 7 months for the anniversary of his surgery with an x-ray on arrival of his left hip.  All questions addressed    I have personally reviewed pertinent films in PACS of the updated x-rays taken today of his left hip and compared them to previous films.  The total hip prosthesis remains well aligned and well-fixed with no signs of loosening, periprosthetic fracture, or other interval complication.  He does have a small cortical irregularity distal to the lesser trochanter and calcification lateral to the tip of the stem on the AP view, but these are stable and unchanged from preoperative films.      Subjective: 5 months status post direct anterior left total of arthroplasty    History:  "Pan Gaming is a 71 y.o. male presenting today for follow-up of his left hip.  He reports that he has had a tightness in his left thigh.  He has been able to walk and cut the grass and perform activities of daily living and enjoyment, but has had some discomfort towards the end of the day.  He feels it throughout the day, but Tylenol does help.  He denies any significant groin pain.  He denies any fevers or chills.  He denies any recent injuries, falls, or paresthesias    Estimated body mass index is 36.7 kg/m² as calculated from the following:    Height as of this encounter: 5' 8\" (1.727 m).    Weight as of this encounter: 109 kg (241 lb 6.4 oz).    Lab Results   Component Value Date    HGBA1C 5.7 (H) 11/25/2024       Social History     Occupational History    Not on file   Tobacco Use    Smoking status: Never     Passive exposure: Never    Smokeless tobacco: Never   Vaping Use    Vaping status: Never Used   Substance and Sexual Activity    Alcohol use: Yes     Comment: social    Drug use: Never    Sexual activity: Not on file       Objective:  Left Hip Exam     Tenderness   The patient is experiencing no tenderness.     Range of Motion   Abduction:  50 normal   Adduction:  20 normal   Extension:  0 normal   Flexion:  130 normal   External rotation:  50 normal   Internal rotation: 20 normal     Muscle Strength   Abduction: 5/5   Adduction: 5/5   Flexion: 5/5     Tests   ALEXANDRA: negative    Other   Erythema: absent  Scars: present  Sensation: normal  Pulse: present    Comments:  Well healed anterior surgical scar  Excellent range of motion without pain  Negative logroll, [place]inch[place], ALEXANDRA, CARLA  Thigh and calf nontender  Mild left lower extremity edema compared to contralateral side  Grossly distally neurovascular intact            There were no vitals filed for this visit.    Past Medical History:   Diagnosis Date    Arthritis     DJD    CPAP (continuous positive airway pressure) dependence     Disease of " thyroid gland     Dizziness     Ear problems     Hypertension     Hypothyroidism     Sleep apnea, obstructive     Sleep difficulties     Wears glasses     for reading       Past Surgical History:   Procedure Laterality Date    APPENDECTOMY      COLONOSCOPY      FOOT SURGERY Left     scar tissue removal from previous injury    INGUINAL HERNIA REPAIR Left     KNEE ARTHROSCOPY Bilateral     NV ARTHROCENTESIS ASPIR&/INJ MAJOR JT/BURSA W/O US Left 07/19/2024    Procedure: LEFT INTRA-ARTICULAR HIP INJECTION;  Surgeon: Sergey Brewer MD;  Location: [facility] MAIN OR;  Service: Pain Management     NV ARTHRP ACETBLR/PROX FEM PROSTC AGRFT/ALGRFT Left 12/17/2024    Procedure: ARTHROPLASTY HIP TOTAL ANTERIOR,NAVIGATED - LEFT - SAME DAY;  Surgeon: Aubrey Patel DO;  Location: [facility] MAIN OR;  Service: Orthopedics    ROTATOR CUFF REPAIR Bilateral     TONSILLECTOMY         Family History   Problem Relation Age of Onset    Diabetes Father     Hypertension Father        Current Medications[1]    Allergies[2]      Review of Systems   Constitutional:  Positive for activity change.   HENT: Negative.     Eyes: Negative.    Respiratory: Negative.     Cardiovascular: Negative.    Gastrointestinal: Negative.    Endocrine: Negative.    Genitourinary: Negative.    Musculoskeletal:  Positive for arthralgias, joint swelling and myalgias.   Skin: Negative.    Allergic/Immunologic: Negative.    Neurological: Negative.    Hematological: Negative.    Psychiatric/Behavioral: Negative.       Physical Exam  Vitals and nursing note reviewed.   Constitutional:       Appearance: Normal appearance. He is well-developed.      Comments: Body mass index is 36.7 kg/m².   HENT:      Head: Normocephalic and atraumatic.      Right Ear: External ear normal.      Left Ear: External ear normal.     Eyes:      Extraocular Movements: Extraocular movements intact.      Conjunctiva/sclera: Conjunctivae normal.       Cardiovascular:      Rate and Rhythm: Normal rate.       Pulses: Normal pulses.   Pulmonary:      Effort: Pulmonary effort is normal.   Abdominal:      Palpations: Abdomen is soft.     Musculoskeletal:      Cervical back: Normal range of motion.      Comments: See ortho exam     Skin:     General: Skin is warm and dry.     Neurological:      General: No focal deficit present.      Mental Status: He is alert and oriented to person, place, and time. Mental status is at baseline.     Psychiatric:         Mood and Affect: Mood normal.         Behavior: Behavior normal.         Thought Content: Thought content normal.         Judgment: Judgment normal.         Scribe Attestation      I,:  Chris Canales PA-C am acting as a scribe while in the presence of the attending physician.:       I,:  Aubrey Patel, DO personally performed the services described in this documentation    as scribed in my presence.:             This document was created using speech voice recognition software.   Grammatical errors, random word insertions, pronoun errors, and incomplete sentences are an occasional consequence of this system due to software limitations, ambient noise, and hardware issues.   Any formal questions or concerns about content, text, or information contained within the body of this dictation should be directly addressed to the provider for clarification.         [1]   Current Outpatient Medications:     acetaminophen (TYLENOL) 325 mg tablet, Take 3 tablets (975 mg total) by mouth every 8 (eight) hours, Disp: , Rfl:     amoxicillin (AMOXIL) 500 MG tablet, Take 4 tablets (2,000 mg total) by mouth 60 minutes pre-procedure, Disp: 4 tablet, Rfl: 2    atorvastatin (LIPITOR) 10 mg tablet, Take 1 tablet (10 mg total) by mouth daily, Disp: 90 tablet, Rfl: 1    levothyroxine 75 mcg tablet, Take 75 mcg by mouth every morning, Disp: , Rfl:     losartan-hydrochlorothiazide (HYZAAR) 100-12.5 MG per tablet, Take 1 tablet by mouth daily at bedtime, Disp: , Rfl:     metFORMIN  (GLUCOPHAGE) 500 mg tablet, Take 500 mg by mouth daily with breakfast, Disp: , Rfl:     Multiple Vitamins-Minerals (MACUVITE EYE CARE PO), Take 1 capsule by mouth daily at bedtime, Disp: , Rfl:   [2]   Allergies  Allergen Reactions    Sulfamethoxazole-Trimethoprim Itching     Bactrim.

## 2025-05-19 LAB
QT INTERVAL: 368 MS
QTC INTERVAL: 479 MS

## 2025-05-22 ENCOUNTER — TELEPHONE (OUTPATIENT)
Dept: UROLOGY | Age: 75
End: 2025-05-22

## 2025-05-22 NOTE — TELEPHONE ENCOUNTER
Caller: GABO HAMILTON    Relationship to patient: Emergency Contact    Best call back number: [phone]    Patient is needing: WE WERE GIVEN THE 14 [employer] SILICONE CATHETER TO TRY AND THEY WORK WONDERFUL. MR HAMILTON WOULD LIKE AN ORDER PLACED TO RECEIVE THESE CATHETERS. THANK YOU

## 2025-05-25 DIAGNOSIS — N20.0 KIDNEY STONE: ICD-10-CM

## 2025-05-25 DIAGNOSIS — N20.1 URETERAL STONE: ICD-10-CM

## 2025-05-27 RX ORDER — TAMSULOSIN HYDROCHLORIDE 0.4 MG/1
1 CAPSULE ORAL DAILY
Qty: 14 CAPSULE | Refills: 0 | OUTPATIENT
Start: 2025-05-27

## 2025-05-28 LAB
QT INTERVAL: 439 MS
QTC INTERVAL: 502 MS

## 2025-06-02 LAB
CA CARBONATE CRY STONE QL IR: 40 %
COLOR STONE: NORMAL
COM MFR STONE: 40 %
SIZE STONE: NORMAL MM
SPEC SOURCE SUBJ: NORMAL
TRI-PHOS MFR STONE: 20 %
WT STONE: 154 MG

## 2025-06-18 ENCOUNTER — HOSPITAL ENCOUNTER (OUTPATIENT)
Dept: ULTRASOUND IMAGING | Facility: HOSPITAL | Age: 75
Discharge: HOME | End: 2025-06-18
Admitting: UROLOGY
Payer: MEDICARE

## 2025-06-18 DIAGNOSIS — N13.30 HYDRONEPHROSIS, UNSPECIFIED HYDRONEPHROSIS TYPE: ICD-10-CM

## 2025-06-18 PROCEDURE — 76775 US EXAM ABDO BACK WALL LIM: CPT

## 2025-06-19 RX ORDER — VENLAFAXINE HYDROCHLORIDE 150 MG/1
CAPSULE, EXTENDED RELEASE ORAL
COMMUNITY
Start: 2025-06-18

## 2025-06-19 RX ORDER — ERTAPENEM 1 G/1
INJECTION, POWDER, LYOPHILIZED, FOR SOLUTION INTRAMUSCULAR; INTRAVENOUS
COMMUNITY
Start: 2025-05-13

## 2025-06-23 LAB
MC_CV_MDC_IDC_RATE_1: 185
MC_CV_MDC_IDC_RATE_1: 210
MC_CV_MDC_IDC_SHOCK_MEASURED_IMPEDANCE: 47
MC_CV_MDC_IDC_THERAPIES: NORMAL
MC_CV_MDC_IDC_THERAPIES: NORMAL
MC_CV_MDC_IDC_ZONE_ID: 1
MC_CV_MDC_IDC_ZONE_ID: 2
MDC_IDC_MSMT_BATTERY_REMAINING_LONGEVITY: 78 MO
MDC_IDC_MSMT_BATTERY_REMAINING_PERCENTAGE: 88 %
MDC_IDC_MSMT_BATTERY_STATUS: NORMAL
MDC_IDC_MSMT_CAP_CHARGE_TIME: 10.3
MDC_IDC_MSMT_LEADCHNL_LV_DTM: NORMAL
MDC_IDC_MSMT_LEADCHNL_LV_IMPEDANCE_VALUE: 617
MDC_IDC_MSMT_LEADCHNL_LV_PACING_THRESHOLD_POLARITY: NORMAL
MDC_IDC_MSMT_LEADCHNL_LV_SENSING_INTR_AMPL: 19.3
MDC_IDC_MSMT_LEADCHNL_RA_DTM: NORMAL
MDC_IDC_MSMT_LEADCHNL_RA_IMPEDANCE_VALUE: 482
MDC_IDC_MSMT_LEADCHNL_RA_PACING_THRESHOLD_POLARITY: NORMAL
MDC_IDC_MSMT_LEADCHNL_RA_SENSING_INTR_AMPL: 7.5
MDC_IDC_MSMT_LEADCHNL_RV_DTM: NORMAL
MDC_IDC_MSMT_LEADCHNL_RV_IMPEDANCE_VALUE: 450
MDC_IDC_MSMT_LEADCHNL_RV_PACING_THRESHOLD_POLARITY: NORMAL
MDC_IDC_MSMT_LEADCHNL_RV_SENSING_INTR_AMPL: 20.1
MDC_IDC_PG_IMPLANT_DTM: NORMAL
MDC_IDC_PG_MFG: NORMAL
MDC_IDC_PG_MODEL: NORMAL
MDC_IDC_PG_SERIAL: NORMAL
MDC_IDC_PG_TYPE: NORMAL
MDC_IDC_SESS_DTM: NORMAL
MDC_IDC_SESS_TYPE: NORMAL
MDC_IDC_SET_BRADY_AT_MODE_SWITCH_RATE: 170
MDC_IDC_SET_BRADY_LOWRATE: 50
MDC_IDC_SET_BRADY_MAX_TRACKING_RATE: 130
MDC_IDC_SET_BRADY_MODE: NORMAL
MDC_IDC_SET_BRADY_PAV_DELAY: 150
MDC_IDC_SET_BRADY_SAV_DELAY: 100
MDC_IDC_SET_CRT_LVRV_DELAY: 0
MDC_IDC_SET_CRT_PACED_CHAMBERS: NORMAL
MDC_IDC_SET_LEADCHNL_LV_PACING_AMPLITUDE: 3
MDC_IDC_SET_LEADCHNL_LV_PACING_PULSEWIDTH: 1.5
MDC_IDC_SET_LEADCHNL_RA_PACING_AMPLITUDE: 1.8
MDC_IDC_SET_LEADCHNL_RA_PACING_POLARITY: NORMAL
MDC_IDC_SET_LEADCHNL_RA_PACING_PULSEWIDTH: 0.4
MDC_IDC_SET_LEADCHNL_RA_SENSING_POLARITY: NORMAL
MDC_IDC_SET_LEADCHNL_RA_SENSING_SENSITIVITY: 0.25
MDC_IDC_SET_LEADCHNL_RV_PACING_AMPLITUDE: 2
MDC_IDC_SET_LEADCHNL_RV_PACING_POLARITY: NORMAL
MDC_IDC_SET_LEADCHNL_RV_PACING_PULSEWIDTH: 0.4
MDC_IDC_SET_LEADCHNL_RV_SENSING_POLARITY: NORMAL
MDC_IDC_SET_LEADCHNL_RV_SENSING_SENSITIVITY: 0.6
MDC_IDC_SET_ZONE_STATUS: NORMAL
MDC_IDC_SET_ZONE_STATUS: NORMAL
MDC_IDC_SET_ZONE_TYPE: NORMAL
MDC_IDC_SET_ZONE_TYPE: NORMAL
MDC_IDC_STAT_AT_BURDEN_PERCENT: 0
MDC_IDC_STAT_BRADY_RA_PERCENT_PACED: 0
MDC_IDC_STAT_BRADY_RV_PERCENT_PACED: 93
MDC_IDC_STAT_CRT_LV_PERCENT_PACED: 93
MDC_IDC_STAT_TACHYTHERAPY_ATP_DELIVERED_RECENT: 0
MDC_IDC_STAT_TACHYTHERAPY_SHOCKS_ABORTED_RECENT: 0
MDC_IDC_STAT_TACHYTHERAPY_SHOCKS_DELIVERED_RECENT: 0

## 2025-06-26 ENCOUNTER — OFFICE VISIT (OUTPATIENT)
Dept: UROLOGY | Age: 75
End: 2025-06-26
Payer: MEDICARE

## 2025-06-26 VITALS — WEIGHT: 214 LBS | BODY MASS INDEX: 28.99 KG/M2 | HEIGHT: 72 IN

## 2025-06-26 DIAGNOSIS — N20.0 MIXED CALCIUM OXALATE KIDNEY STONES: Primary | ICD-10-CM

## 2025-06-26 NOTE — PROGRESS NOTES
"    UROLOGY OFFICE follow-up NOTE    Subjective   HPI  Anshul Shook Jr. is a 75 y.o. male.  Presents for follow-up status post right ureteroscopy, stone treatment, 5/14/2025.  Patient subsequently removed stent as instructed and presents with renal ultrasound prior to today's appointment.Patient states that he has been doing well since the procedure.  Currently denies any urinary symptoms.  Denies hematuria or flank pain.  No complaints today.    Wife is a vegetarian; states they ate a lot of nut protein; also believes he could be drinking more water    Self caths.  Former clinical pathologist.  ______  Stone analysis   5/14/2025: 40% calcium oxalate; 40% carbonate appetite; 20% magnesium ammonium phosphate  6/17/2024: 90% calcium oxalate    Right URS stone treatment 5/14/2025   left URS, stone treatment 6/17/2024       Renal ultrasound 6/18/2025: No evidence of hydronephrosis; simple cyst on right    Renal ultrasound 7/31/2024: no Hydronephrosis; simple right cyst        CT abdomen pelvis without contrast 5/8/2024: several proximal LEFT ureteral calculi at about the level of the LEFT  ureterovesical junction (UPJ) with moderate LEFT hydronephrosis.  Small nonobstructing right intrarenal stones.  No  ureterolithiasis or hydronephrosis is seen on the right.             Review of systems  A review of systems was performed, and positive findings are noted in the HPI.    Objective     Vital Signs:   Ht 182.9 cm (72\")   Wt 97.1 kg (214 lb)   BMI 29.02 kg/m²       Physical exam  No acute distress, well-nourished  Awake alert and oriented  Mood normal; affect normal    Problem List:  Patient Active Problem List   Diagnosis    Nonischemic cardiomyopathy    Essential hypertension    Presence of biventricular implantable cardioverter-defibrillator (ICD)    SANTAMARIA (nonalcoholic steatohepatitis)    Ureteral stone    Neurogenic bladder    Left carotid bruit    Sepsis secondary to UTI    Kidney stone    Dehydration "       Assessment & Plan   Diagnoses and all orders for this visit:    1. Mixed calcium oxalate kidney stones (Primary)  -     XR Abdomen KUB; Future          Renal ultrasound imaging reviewed and discussed with patient at length, no hydronephrosis, known nonobstructing intrarenal calcification noted.  No further intervention necessary at this time.  Continue expectant management    Renal ultrasound imaging reviewed and discussed with patient at length, no hydronephrosis, or evidence of residual stone.  No further intervention necessary.       Discussed dietary modifications for prevention of stone growth and recurrence including increased hydration, decrease sodium, normal calcium, low animal protein diet.  Informational handouts provided.    Given patient complex history and stone formation, offered to perform metabolic stone workup be a 24-hour urine for consideration of nephrology consultation; plan to continue to monitor via annual KUB and consider metabolic workup in the future as indicated.    Patient to follow-up in 1 year with KUB prior      Informational handouts provided  All questions addressed

## 2025-07-09 LAB
MC_CV_MDC_IDC_RATE_1: 185
MC_CV_MDC_IDC_RATE_1: 210
MC_CV_MDC_IDC_SHOCK_MEASURED_IMPEDANCE: 49
MC_CV_MDC_IDC_THERAPIES: NORMAL
MC_CV_MDC_IDC_THERAPIES: NORMAL
MC_CV_MDC_IDC_ZONE_ID: 1
MC_CV_MDC_IDC_ZONE_ID: 2
MDC_IDC_MSMT_BATTERY_REMAINING_LONGEVITY: 78 MO
MDC_IDC_MSMT_BATTERY_REMAINING_PERCENTAGE: 87 %
MDC_IDC_MSMT_BATTERY_STATUS: NORMAL
MDC_IDC_MSMT_CAP_CHARGE_TIME: 10.3
MDC_IDC_MSMT_LEADCHNL_LV_DTM: NORMAL
MDC_IDC_MSMT_LEADCHNL_LV_IMPEDANCE_VALUE: 626
MDC_IDC_MSMT_LEADCHNL_LV_PACING_THRESHOLD_POLARITY: NORMAL
MDC_IDC_MSMT_LEADCHNL_LV_SENSING_INTR_AMPL: 17.8
MDC_IDC_MSMT_LEADCHNL_RA_DTM: NORMAL
MDC_IDC_MSMT_LEADCHNL_RA_IMPEDANCE_VALUE: 484
MDC_IDC_MSMT_LEADCHNL_RA_PACING_THRESHOLD_POLARITY: NORMAL
MDC_IDC_MSMT_LEADCHNL_RA_SENSING_INTR_AMPL: 7.7
MDC_IDC_MSMT_LEADCHNL_RV_DTM: NORMAL
MDC_IDC_MSMT_LEADCHNL_RV_IMPEDANCE_VALUE: 442
MDC_IDC_MSMT_LEADCHNL_RV_PACING_THRESHOLD_POLARITY: NORMAL
MDC_IDC_MSMT_LEADCHNL_RV_SENSING_INTR_AMPL: 19.3
MDC_IDC_PG_IMPLANT_DTM: NORMAL
MDC_IDC_PG_MFG: NORMAL
MDC_IDC_PG_MODEL: NORMAL
MDC_IDC_PG_SERIAL: NORMAL
MDC_IDC_PG_TYPE: NORMAL
MDC_IDC_SESS_DTM: NORMAL
MDC_IDC_SESS_TYPE: NORMAL
MDC_IDC_SET_BRADY_AT_MODE_SWITCH_RATE: 170
MDC_IDC_SET_BRADY_LOWRATE: 50
MDC_IDC_SET_BRADY_MAX_TRACKING_RATE: 130
MDC_IDC_SET_BRADY_MODE: NORMAL
MDC_IDC_SET_BRADY_PAV_DELAY: 150
MDC_IDC_SET_BRADY_SAV_DELAY: 100
MDC_IDC_SET_CRT_LVRV_DELAY: 0
MDC_IDC_SET_CRT_PACED_CHAMBERS: NORMAL
MDC_IDC_SET_LEADCHNL_LV_PACING_AMPLITUDE: 3
MDC_IDC_SET_LEADCHNL_LV_PACING_PULSEWIDTH: 1.5
MDC_IDC_SET_LEADCHNL_RA_PACING_AMPLITUDE: 1.8
MDC_IDC_SET_LEADCHNL_RA_PACING_POLARITY: NORMAL
MDC_IDC_SET_LEADCHNL_RA_PACING_PULSEWIDTH: 0.4
MDC_IDC_SET_LEADCHNL_RA_SENSING_POLARITY: NORMAL
MDC_IDC_SET_LEADCHNL_RA_SENSING_SENSITIVITY: 0.25
MDC_IDC_SET_LEADCHNL_RV_PACING_AMPLITUDE: 2
MDC_IDC_SET_LEADCHNL_RV_PACING_POLARITY: NORMAL
MDC_IDC_SET_LEADCHNL_RV_PACING_PULSEWIDTH: 0.4
MDC_IDC_SET_LEADCHNL_RV_SENSING_POLARITY: NORMAL
MDC_IDC_SET_LEADCHNL_RV_SENSING_SENSITIVITY: 0.6
MDC_IDC_SET_ZONE_STATUS: NORMAL
MDC_IDC_SET_ZONE_STATUS: NORMAL
MDC_IDC_SET_ZONE_TYPE: NORMAL
MDC_IDC_SET_ZONE_TYPE: NORMAL
MDC_IDC_STAT_AT_BURDEN_PERCENT: 0
MDC_IDC_STAT_BRADY_RA_PERCENT_PACED: 0
MDC_IDC_STAT_BRADY_RV_PERCENT_PACED: 93
MDC_IDC_STAT_CRT_LV_PERCENT_PACED: 93
MDC_IDC_STAT_TACHYTHERAPY_ATP_DELIVERED_RECENT: 0
MDC_IDC_STAT_TACHYTHERAPY_SHOCKS_ABORTED_RECENT: 0
MDC_IDC_STAT_TACHYTHERAPY_SHOCKS_DELIVERED_RECENT: 0

## 2025-07-30 ENCOUNTER — OFFICE VISIT (OUTPATIENT)
Dept: CARDIOLOGY | Facility: CLINIC | Age: 75
End: 2025-07-30
Payer: MEDICARE

## 2025-07-30 VITALS
DIASTOLIC BLOOD PRESSURE: 74 MMHG | HEIGHT: 72 IN | BODY MASS INDEX: 25.06 KG/M2 | SYSTOLIC BLOOD PRESSURE: 119 MMHG | HEART RATE: 74 BPM | WEIGHT: 185 LBS

## 2025-07-30 DIAGNOSIS — Z95.810 PRESENCE OF BIVENTRICULAR IMPLANTABLE CARDIOVERTER-DEFIBRILLATOR (ICD): Primary | ICD-10-CM

## 2025-07-30 DIAGNOSIS — I10 ESSENTIAL HYPERTENSION: ICD-10-CM

## 2025-07-30 DIAGNOSIS — I42.8 NICM (NONISCHEMIC CARDIOMYOPATHY): ICD-10-CM

## 2025-07-30 DIAGNOSIS — R09.89 BRUIT OF LEFT CAROTID ARTERY: ICD-10-CM

## 2025-07-30 NOTE — ASSESSMENT & PLAN NOTE
The is working normally on last interrogation LV lead remains chronically elevated battery life still over 6 and half years repeat in office check next visit

## 2025-07-30 NOTE — ASSESSMENT & PLAN NOTE
Blood has been turned on the lower side patient is now down to just carvedilol 3.125 twice daily we will try to maintain given his previous decreased ejection fraction even though his most recent has normalized

## 2025-07-30 NOTE — ASSESSMENT & PLAN NOTE
Patient with stable class II symptoms no increase in volume overload on exam continue with carvedilol 3.125 twice daily will repeat echocardiogram since EF is normal would not recommend addition of any GDMT

## 2025-07-30 NOTE — PROGRESS NOTES
Chief Complaint  nonischemic cardiomyopathy, Hypertension, and ICD    Subjective    Patient is doing well symptomatically not been experiencing any problems with increased edema no change overall in his breathing ability.  Past Medical History:   Diagnosis Date    Arthritis     Back injury     RESULTING IN PARAPLEGIA    Essential hypertension 08/11/2021    History of transfusion     Hyperlipidemia     Injury of back     Irregular heartbeat     SEE'S DR GAY. DENIED  CP/SOB    Kidney stone     Memory loss     Nonischemic cardiomyopathy 08/11/2021    FOLLOWS DR. GAY    Osteomyelitis 08/04/2024    Paraplegic gait     Presence of biventricular implantable cardioverter-defibrillator (ICD) 08/12/2021    [employer] BiV ICD    UTI (urinary tract infection) 05/06/2024         Current Outpatient Medications:     atorvastatin (LIPITOR) 20 MG tablet, Take 1 tablet by mouth Every Night., Disp: 90 tablet, Rfl: 3    carvedilol (COREG) 3.125 MG tablet, Take 1 tablet by mouth 2 (Two) Times a Day With Meals., Disp: 180 tablet, Rfl: 3    Cholecalciferol 25 MCG (1000 UT) tablet, Take 1 tablet by mouth Daily., Disp: , Rfl:     donepezil (ARICEPT) 10 MG tablet, Take 1 tablet by mouth Every Night., Disp: , Rfl:     Hyoscyamine Sulfate ER 0.375 MG tablet controlled-release, Take  by mouth 2 (Two) Times a Day., Disp: , Rfl:     multivitamin with minerals tablet tablet, Take 1 tablet by mouth Daily., Disp: , Rfl:     naloxone (NARCAN) 4 MG/0.1ML nasal spray, Call 911. Don't prime. [place] in 1 nostril for overdose. Repeat in 2-3 minutes in other nostril if no or minimal breathing/responsiveness., Disp: 2 each, Rfl: 0    Pediatric Multivitamins-Iron (Animal Shapes/Iron) 18 MG chewable tablet, Chew 1 tablet Daily., Disp: , Rfl:     pregabalin (LYRICA) 200 MG capsule, Take 1 capsule by mouth 2 (Two) Times a Day., Disp: , Rfl:     venlafaxine XR (EFFEXOR-XR) 150 MG 24 hr capsule, , Disp: , Rfl:     vitamin B-12 (CYANOCOBALAMIN) 500 MCG tablet,  Take 1 tablet by mouth Daily., Disp: , Rfl:     ertapenem (INVanz) 1 g injection, , Disp: , Rfl:     ketorolac (TORADOL) 10 MG tablet, Take 1 tablet by mouth Every 6 (Six) Hours As Needed for Moderate Pain. (Patient not taking: Reported on 7/30/2025), Disp: 8 tablet, Rfl: 0    oxybutynin (DITROPAN) 5 MG tablet, Take 1 tablet by mouth 3 (Three) Times a Day As Needed (Bladder spasms, bladder cramping, urinary urgency). May cause constipation (Patient not taking: Reported on 7/30/2025), Disp: 12 tablet, Rfl: 0    oxyCODONE-acetaminophen (Percocet) 5-325 MG per tablet, Take 0.5-2 tablets by mouth Every 6 (Six) Hours As Needed for Severe Pain. (Patient not taking: Reported on 7/30/2025), Disp: 14 tablet, Rfl: 0    phenazopyridine (Pyridium) 200 MG tablet, Take 1 tablet by mouth 3 (Three) Times a Day As Needed for Bladder Spasms (Burning, urinary discomfort). Will turn urine orange (Patient not taking: Reported on 7/30/2025), Disp: 14 tablet, Rfl: 0    tamsulosin (FLOMAX) 0.4 MG capsule 24 hr capsule, Take 1 capsule by mouth Daily. (Patient not taking: Reported on 7/30/2025), Disp: 14 capsule, Rfl: 0    There are no discontinued medications.  Allergies   Allergen Reactions    Amoxicillin Hives    Ct Contrast Hives    Iodinated Contrast Media Other (See Comments)     Unknown, marked as high severity from,another facility, but reaction was unknown.    Penicillins Rash     Has tolerated Ampicillin/Sulbactam and Amoxicillin/clavulanate 5/2024 -Obed Randhawa RPH    Zosyn [Piperacillin-Tazobactam In Dex] Hives     Has tolerated Ampicillin/Sulbactam and Amoxicillin/clavulanate 5/2024 -Obed Randhawa RPH        Social History     Tobacco Use    Smoking status: Never    Smokeless tobacco: Never   Vaping Use    Vaping status: Never Used   Substance Use Topics    Alcohol use: Never    Drug use: Never       Family History   Problem Relation Age of Onset    Malig Hyperthermia Neg Hx         Objective     /74   Pulse 74   " Ht 182.9 cm (72.01\")   Wt 83.9 kg (185 lb)   BMI 25.08 kg/m²       Physical Exam    General Appearance:   no acute distress  Alert and oriented x3  HENT:   lips not cyanotic  Atraumatic  Neck:  No jvd   supple  Respiratory:  no respiratory distress  normal breath sounds  no rales  Cardiovascular:  Regular rate and rhythm  no S3, no S4   no murmur  no rub  Extremities  No cyanosis  lower extremity edema: +1  Skin:   warm, dry  No rashes      Result Review :     No results found for: \"PROBNP\"  CMP          5/5/2025    04:27 5/6/2025    02:56 5/7/2025    05:25   CMP   Glucose 96  94  94    BUN 17  15  17    Creatinine 0.89  0.73  0.83    EGFR 88.3  93.7  90.1    Sodium 136  136  137    Potassium 3.9  4.0  4.3    Chloride 105  105  106    Calcium 8.3  8.3  8.7    Total Protein 6.5  6.4     Albumin 2.8  3.0  3.3    Total Bilirubin 0.4  0.3     Alkaline Phosphatase 75  71     AST (SGOT) 13  15     ALT (SGPT) 12  14     BUN/Creatinine Ratio 19.1  20.5  20.5    Anion Gap 9.0  8.2  5.3      CBC w/diff          5/5/2025    04:27 5/6/2025    02:56 5/7/2025    05:25   CBC w/Diff   WBC 11.81  6.98  8.36    RBC 4.11  4.19  4.47    Hemoglobin 10.6  10.7  11.3    Hematocrit 33.5  34.2  36.6    MCV 81.5  81.6  81.9    MCH 25.8  25.5  25.3    MCHC 31.6  31.3  30.9    RDW 16.3  16.1  16.1    Platelets 239  231  264    Neutrophil Rel % 72.7  56.2  58.4    Immature Granulocyte Rel % 0.8  0.9  1.1    Lymphocyte Rel % 10.5  18.6  18.3    Monocyte Rel % 7.5  10.9  10.2    Eosinophil Rel % 7.7  11.7  10.2    Basophil Rel % 0.8  1.7  1.8       Lab Results   Component Value Date    TSH 2.100 10/09/2024      No results found for: \"FREET4\"   No results found for: \"DDIMERQUANT\"  Magnesium   Date Value Ref Range Status   05/07/2025 2.4 1.6 - 2.4 mg/dL Final      No results found for: \"DIGOXIN\"   Lab Results   Component Value Date    TROPONINT 26 (H) 05/06/2024           Lipid Panel          10/9/2024    11:39 2/10/2025    11:23   Lipid Panel " "  Total Cholesterol 205  172    Triglycerides 145  108    HDL Cholesterol 37  38    VLDL Cholesterol 26  20    LDL Cholesterol  142  114    LDL/HDL Ratio 3.76  2.96      No results found for: \"POCTROP\"    Results for orders placed during the hospital encounter of 01/17/24    Adult Transthoracic Echo Complete W/ Cont if Necessary Per Protocol 01/18/2024 12:15 PM    Interpretation Summary    The study is technically difficult for diagnosis.    Left ventricular systolic function is normal. Calculated left ventricular EF = 60.6%    Left ventricular diastolic function was indeterminate.    There is mild calcification of the aortic valve.                 Diagnoses and all orders for this visit:    1. Presence of biventricular implantable cardioverter-defibrillator (ICD) (Primary)  Assessment & Plan:  The is working normally on last interrogation LV lead remains chronically elevated battery life still over 6 and half years repeat in office check next visit    Orders:  -     Adult Transthoracic Echo Complete W/ Cont if Necessary Per Protocol; Future    2. NICM (nonischemic cardiomyopathy)  Assessment & Plan:  Patient with stable class II symptoms no increase in volume overload on exam continue with carvedilol 3.125 twice daily will repeat echocardiogram since EF is normal would not recommend addition of any GDMT         Orders:  -     Adult Transthoracic Echo Complete W/ Cont if Necessary Per Protocol; Future    3. Bruit of left carotid artery  Assessment & Plan:  Will check carotid ultrasound to evaluate for    Orders:  -     Duplex Carotid Ultrasound CAR; Future    4. Essential hypertension  Assessment & Plan:  Blood has been turned on the lower side patient is now down to just carvedilol 3.125 twice daily we will try to maintain given his previous decreased ejection fraction even though his most recent has normalized               Follow Up     Return in about 6 months (around 1/30/2026) for Follow with Naa Gambino, " Pacemaker w/f/u.          Patient was given instructions and counseling regarding his condition or for health maintenance advice. Please see specific information pulled into the AVS if appropriate.

## 2025-08-29 ENCOUNTER — HOSPITAL ENCOUNTER (OUTPATIENT)
Facility: HOSPITAL | Age: 75
Discharge: HOME | End: 2025-08-29
Payer: MEDICARE

## 2025-08-29 ENCOUNTER — HOSPITAL ENCOUNTER (OUTPATIENT)
Dept: CARDIOLOGY | Facility: HOSPITAL | Age: 75
Discharge: HOME | End: 2025-08-29
Payer: MEDICARE

## 2025-08-29 DIAGNOSIS — I42.8 NICM (NONISCHEMIC CARDIOMYOPATHY): ICD-10-CM

## 2025-08-29 DIAGNOSIS — R09.89 BRUIT OF LEFT CAROTID ARTERY: ICD-10-CM

## 2025-08-29 DIAGNOSIS — Z95.810 PRESENCE OF BIVENTRICULAR IMPLANTABLE CARDIOVERTER-DEFIBRILLATOR (ICD): ICD-10-CM

## 2025-08-29 LAB
BH CV XLRA MEAS LEFT CAROTID BULB EDV: 18.2 CM/SEC
BH CV XLRA MEAS LEFT CAROTID BULB PSV: 53.2 CM/SEC
BH CV XLRA MEAS LEFT DIST CCA EDV: 26 CM/SEC
BH CV XLRA MEAS LEFT DIST CCA PSV: 99.3 CM/SEC
BH CV XLRA MEAS LEFT DIST ICA EDV: 32.8 CM/SEC
BH CV XLRA MEAS LEFT DIST ICA PSV: 71.3 CM/SEC
BH CV XLRA MEAS LEFT ICA/CCA RATIO: 0.59
BH CV XLRA MEAS LEFT MID ICA EDV: 35.7 CM/SEC
BH CV XLRA MEAS LEFT MID ICA PSV: 92.8 CM/SEC
BH CV XLRA MEAS LEFT PROX CCA EDV: 21.4 CM/SEC
BH CV XLRA MEAS LEFT PROX CCA PSV: 119.4 CM/SEC
BH CV XLRA MEAS LEFT PROX ECA EDV: 11 CM/SEC
BH CV XLRA MEAS LEFT PROX ECA PSV: 53.2 CM/SEC
BH CV XLRA MEAS LEFT PROX ICA EDV: 18.8 CM/SEC
BH CV XLRA MEAS LEFT PROX ICA PSV: 57.8 CM/SEC
BH CV XLRA MEAS LEFT VERTEBRAL A EDV: 13 CM/SEC
BH CV XLRA MEAS LEFT VERTEBRAL A PSV: 37 CM/SEC
BH CV XLRA MEAS RIGHT CAROTID BULB EDV: 15.8 CM/SEC
BH CV XLRA MEAS RIGHT CAROTID BULB PSV: 54.7 CM/SEC
BH CV XLRA MEAS RIGHT DIST CCA EDV: 16.3 CM/SEC
BH CV XLRA MEAS RIGHT DIST CCA PSV: 60.7 CM/SEC
BH CV XLRA MEAS RIGHT DIST ICA EDV: 29.9 CM/SEC
BH CV XLRA MEAS RIGHT DIST ICA PSV: 71 CM/SEC
BH CV XLRA MEAS RIGHT ICA/CCA RATIO: 1.13
BH CV XLRA MEAS RIGHT MID ICA EDV: 22.2 CM/SEC
BH CV XLRA MEAS RIGHT MID ICA PSV: 75.7 CM/SEC
BH CV XLRA MEAS RIGHT PROX CCA EDV: 16.9 CM/SEC
BH CV XLRA MEAS RIGHT PROX CCA PSV: 94.1 CM/SEC
BH CV XLRA MEAS RIGHT PROX ECA EDV: 10.8 CM/SEC
BH CV XLRA MEAS RIGHT PROX ECA PSV: 59.7 CM/SEC
BH CV XLRA MEAS RIGHT PROX ICA EDV: 22.9 CM/SEC
BH CV XLRA MEAS RIGHT PROX ICA PSV: 69 CM/SEC
BH CV XLRA MEAS RIGHT VERTEBRAL A EDV: 13 CM/SEC
BH CV XLRA MEAS RIGHT VERTEBRAL A PSV: 36.4 CM/SEC
LEFT ARM BP: NORMAL MMHG
RIGHT ARM BP: NORMAL MMHG

## 2025-08-29 PROCEDURE — 93880 EXTRACRANIAL BILAT STUDY: CPT

## 2025-08-29 PROCEDURE — 93306 TTE W/DOPPLER COMPLETE: CPT

## (undated) DEVICE — GOWN,REINFORCE,POLY,SIRUS,BREATH SLV,XLG: Brand: MEDLINE

## (undated) DEVICE — SINGLE-USE BIOPSY FORCEPS: Brand: RADIAL JAW 4

## (undated) DEVICE — SET, IRRIGATION CYSTO, Y-TYPE, 81": Brand: MEDLINE

## (undated) DEVICE — SOL IRRG H2O BG 3000ML STRL

## (undated) DEVICE — GLOVE,SURG,SENSICARE SLT,LF,PF,7: Brand: MEDLINE

## (undated) DEVICE — CATH FOL COUDE TIEMAN 2WY 18F 30CC

## (undated) DEVICE — GW ULTRATRACK HYBRID STR/TP .035IN 3X150CM EA/5

## (undated) DEVICE — SNAR E/S POLYP SNAREMASTER OVL/10MM 2.8X2300MM YEL

## (undated) DEVICE — Device

## (undated) DEVICE — SYS IRR PUMP SGL ACTN VAC SYR 10CC

## (undated) DEVICE — GLV SURG BIOGEL LTX PF 7 1/2

## (undated) DEVICE — NITINOL STONE RETRIEVAL BASKET: Brand: ESCAPE

## (undated) DEVICE — CYSTO PACK: Brand: MEDLINE INDUSTRIES, INC.

## (undated) DEVICE — BASIC SINGLE BASIN-LF: Brand: MEDLINE INDUSTRIES, INC.

## (undated) DEVICE — CABL CONN SURG W/SAFE CONN

## (undated) DEVICE — Device: Brand: DEFENDO AIR/WATER/SUCTION AND BIOPSY VALVE

## (undated) DEVICE — THE STERILE LIGHT HANDLE COVER IS USED WITH STERIS SURGICAL LIGHTING AND VISUALIZATION SYSTEMS.

## (undated) DEVICE — CYSTO/BLADDER IRRIGATION SET, REGULATING CLAMP

## (undated) DEVICE — CATH URETRL OPEN END W/CONNECT 5F 70CM

## (undated) DEVICE — LINER SURG CANSTR SXN S/RIGD 1500CC

## (undated) DEVICE — SKIN PREP TRAY W/CHG: Brand: MEDLINE INDUSTRIES, INC.

## (undated) DEVICE — THE SINGLE USE ETRAP – POLYP TRAP IS USED FOR SUCTION RETRIEVAL OF ENDOSCOPICALLY REMOVED POLYPS.: Brand: ETRAP

## (undated) DEVICE — SOL IRRG H2O PL/BG 1000ML STRL

## (undated) DEVICE — Y-TYPE TUR/BLADDER IRRIGATION SET, REGULATING CLAMP

## (undated) DEVICE — CONN JET HYDRA H20 AUXILIARY DISP

## (undated) DEVICE — SOLIDIFIER LIQLOC PLS 1500CC BT

## (undated) DEVICE — GLOVE,SURG,SENSICARE SLT,LF,PF,8: Brand: MEDLINE

## (undated) DEVICE — DRSNG SURG AQUACEL AG/ADVNTGE 9X15CM 3.5X6IN

## (undated) DEVICE — ELECTRD BLD PLASMABLADE PEAK 4.0

## (undated) DEVICE — NITINOL WIRE WITH HYDROPHILIC TIP: Brand: SENSOR

## (undated) DEVICE — TOWEL,OR,DSP,ST,BLUE,STD,4/PK,20PK/CS: Brand: MEDLINE

## (undated) DEVICE — CATH URETRL FLXITP POLLACK STD 5F 70CM

## (undated) DEVICE — TRAY,ADD A CATH,FOLEY,DRAIN BG,10ML SYR: Brand: MEDLINE

## (undated) DEVICE — GOWN,SIRUS,POLYRNF,BRTHSLV,XL,30/CS: Brand: MEDLINE

## (undated) DEVICE — SOL IRR NACL 0.9PCT BT 1000ML

## (undated) DEVICE — PAD GRND E/S W/CORD SPLT A/

## (undated) DEVICE — SUT VIC 0 CT2 27IN J270H

## (undated) DEVICE — URETERAL ACCESS SHEATH SET: Brand: NAVIGATOR HD

## (undated) DEVICE — TUBING, SUCTION, 1/4" X 10', STRAIGHT: Brand: MEDLINE

## (undated) DEVICE — CATH 2L URETRL HC 6F 50CM

## (undated) DEVICE — SOL IRR NACL 0.9PCT 3000ML

## (undated) DEVICE — FIBR LASR HOLMIUM COMPAT 272MH DISP

## (undated) DEVICE — FIBR LASR MOSES 200 DFL 2J 80HZ

## (undated) DEVICE — SUT VIC 4/0 FS2 27IN VCP422H